# Patient Record
Sex: MALE | Race: WHITE | Employment: OTHER | URBAN - METROPOLITAN AREA
[De-identification: names, ages, dates, MRNs, and addresses within clinical notes are randomized per-mention and may not be internally consistent; named-entity substitution may affect disease eponyms.]

---

## 2017-02-03 ENCOUNTER — OFFICE VISIT (OUTPATIENT)
Dept: INTERNAL MEDICINE CLINIC | Age: 72
End: 2017-02-03

## 2017-02-03 VITALS
RESPIRATION RATE: 12 BRPM | BODY MASS INDEX: 32.59 KG/M2 | WEIGHT: 276 LBS | HEART RATE: 60 BPM | SYSTOLIC BLOOD PRESSURE: 155 MMHG | OXYGEN SATURATION: 99 % | DIASTOLIC BLOOD PRESSURE: 85 MMHG | HEIGHT: 77 IN | TEMPERATURE: 98.2 F

## 2017-02-03 DIAGNOSIS — Z00.00 MEDICARE ANNUAL WELLNESS VISIT, INITIAL: ICD-10-CM

## 2017-02-03 DIAGNOSIS — Z13.31 SCREENING FOR DEPRESSION: ICD-10-CM

## 2017-02-03 DIAGNOSIS — Z13.39 SCREENING FOR ALCOHOLISM: ICD-10-CM

## 2017-02-03 DIAGNOSIS — Z00.00 ROUTINE GENERAL MEDICAL EXAMINATION AT A HEALTH CARE FACILITY: ICD-10-CM

## 2017-02-03 DIAGNOSIS — G47.00 INSOMNIA, UNSPECIFIED TYPE: ICD-10-CM

## 2017-02-03 DIAGNOSIS — Z71.89 ADVANCED CARE PLANNING/COUNSELING DISCUSSION: ICD-10-CM

## 2017-02-03 DIAGNOSIS — J01.90 ACUTE SINUSITIS, RECURRENCE NOT SPECIFIED, UNSPECIFIED LOCATION: ICD-10-CM

## 2017-02-03 DIAGNOSIS — I10 ESSENTIAL HYPERTENSION, BENIGN: Primary | ICD-10-CM

## 2017-02-03 RX ORDER — CEFDINIR 300 MG/1
300 CAPSULE ORAL 2 TIMES DAILY
Qty: 20 CAP | Refills: 0 | Status: SHIPPED | OUTPATIENT
Start: 2017-02-03 | End: 2017-02-13

## 2017-02-03 RX ORDER — ZOLPIDEM TARTRATE 10 MG/1
10 TABLET ORAL
Qty: 30 TAB | Refills: 2 | Status: SHIPPED | OUTPATIENT
Start: 2017-02-03 | End: 2017-02-20

## 2017-02-03 RX ORDER — GABAPENTIN 300 MG/1
300 CAPSULE ORAL
COMMUNITY
Start: 2017-02-02 | End: 2019-02-08 | Stop reason: ALTCHOICE

## 2017-02-03 NOTE — MR AVS SNAPSHOT
Visit Information Date & Time Provider Department Dept. Phone Encounter #  
 2/3/2017  1:30 PM Cherelle Salcedo MD [facility] [address] [id number] Follow-up Instructions Return in about 3 months (around 5/3/2017). Your Appointments 4/26/2017  9:00 AM  
ESTABLISHED PATIENT with Ellie Scales MD  
[facility] Appt Note: 6 month follow up  
 [address] [facility] [address]  
[phone]  
  
   
 [address] [address]  
  
    
 8/9/2017  9:00 AM  
ESTABLISHED PATIENT with Ellie Scales MD  
[facility] Appt Note: annual check [address] [facility] [address] [place] [address] Road  
[phone] Upcoming Health Maintenance Date Due  
 GLAUCOMA SCREENING Q2Y 10/16/2017 MEDICARE YEARLY EXAM 2/4/2018 COLONOSCOPY 6/15/2021 DTaP/Tdap/Td series (2 - Td) 10/14/2023 Allergies as of 2/3/2017  Review Complete On: 2/3/2017 By: Cherelle Salcedo MD  
  
 Severity Noted Reaction Type Reactions Percocet [Oxycodone-acetaminophen] Medium 12/12/2011   Side Effect Other (comments)  
 hallucinations Penicillin G Low 12/12/2011   Side Effect Rash Did okay with keflex - no reaction with that. Current Immunizations  Reviewed on 2/3/2017 Name Date Hepatitis B Vaccine 1/1/1994 Influenza High Dose Vaccine PF 12/3/2016 Influenza Vaccine 12/3/2016, 11/16/2015, 10/14/2013 Pneumococcal Conjugate (PCV-13) 1/1/2013 Pneumococcal Polysaccharide (PPSV-23) 6/18/2014 Pneumococcal Vaccine (Unspecified Type) 10/16/2005 TD Vaccine 1/1/2001 Tdap 10/14/2013 Zoster Vaccine, Live 1/1/2012 Reviewed by Carmina Sanders on 2/3/2017 at  1:43 PM  
You Were Diagnosed With   
  
 Codes Comments Medicare annual wellness visit, initial    -  Primary ICD-10-CM: Z00.00 ICD-9-CM: V70.0 Routine general medical examination at a health care facility     ICD-10-CM: Z00.00 ICD-9-CM: V70.0 Screening for alcoholism     ICD-10-CM: Z13.89 ICD-9-CM: V79.1 Screening for depression     ICD-10-CM: Z13.89 ICD-9-CM: V79.0 Advanced care planning/counseling discussion     ICD-10-CM: Z71.89 ICD-9-CM: V65.49 Insomnia, unspecified type     ICD-10-CM: G47.00 ICD-9-CM: 780.52 Vitals BP Pulse Temp Resp Height(growth percentile) Weight(growth percentile) 155/85 (BP 1 Location: Left arm, BP Patient Position: Sitting) 60 98.2 °F (36.8 °C) 12 6' 5\" (1.956 m) 276 lb (125.2 kg) SpO2 BMI Smoking Status 99% 32.73 kg/m2 Former Smoker Vitals History BMI and BSA Data Body Mass Index Body Surface Area 32.73 kg/m 2 2.61 m 2 Preferred Pharmacy Pharmacy Name Phone [address] [facility] [phone] Your Updated Medication List  
  
   
This list is accurate as of: 2/3/17  2:16 PM.  Always use your most recent med list.  
  
  
  
  
 apixaban 5 mg tablet Commonly known as:  Princella De Jesus Take 1 Tab by mouth two (2) times a day. atorvastatin 20 mg tablet Commonly known as:  LIPITOR  
TAKE 1 TABLET DAILY  
  
 cefdinir 300 mg capsule Commonly known as:  OMNICEF Take 1 Cap by mouth two (2) times a day for 10 days. cyclobenzaprine 10 mg tablet Commonly known as:  FLEXERIL Take 1 Tab by mouth three (3) times daily as needed for Muscle Spasm(s). DICLOFENAC SODIUM PO Take  by mouth.  
  
 gabapentin 300 mg capsule Commonly known as:  NEURONTIN  
300 mg.  
  
 levocetirizine 5 mg tablet Commonly known as:  Rebecca Cocks TAKE 1 TABLET DAILY  
  
 lisinopril 10 mg tablet Commonly known as:  PRINIVIL, ZESTRIL  
TAKE 1 TABLET DAILY  
  
 metoprolol tartrate 25 mg tablet Commonly known as:  LOPRESSOR Take 0.5 Tabs by mouth two (2) times a day. terazosin 5 mg capsule Commonly known as:  HYTRIN  
TAKE 1 CAPSULE DAILY  
  
 zolpidem 10 mg tablet Commonly known as:  AMBIEN Take 1 Tab by mouth nightly as needed for Sleep. Max Daily Amount: 10 mg.  
  
  
  
  
Prescriptions Printed Refills  
 zolpidem (AMBIEN) 10 mg tablet 2 Sig: Take 1 Tab by mouth nightly as needed for Sleep. Max Daily Amount: 10 mg.  
 Class: Print Route: Oral  
 cefdinir (OMNICEF) 300 mg capsule 0 Sig: Take 1 Cap by mouth two (2) times a day for 10 days. Class: Print Route: Oral  
  
Follow-up Instructions Return in about 3 months (around 5/3/2017). Patient Instructions Medicare Part B Preventive Services Guidelines/Limitations Date last completed and Frequency Due Date Cardiovascular Screening Blood Tests (every 5 years) Total cholesterol, HDL, Triglycerides Order as a panel if possible Completed 5/2016 As recommended by your PCP As recommended by your PCP or Specialist  
Colorectal Cancer Screening 
-Fecal occult blood test (annual) -Flexible sigmoidoscopy (5y) 
-Screening colonoscopy (10y) -Barium Enema Age 49-80; After age [de-identified] if history of abnormal results Completed 6/15/2016 Recommended every 5 to 10 years  As recommended by your PCP or Specialist 
  
Counseling to Prevent Tobacco Use (up to 8 sessions per year) - Counseling greater than 3 and up to 10 minutes - Counseling greater than 10 minutes Patients must be asymptomatic of tobacco-related conditions to receive as preventive service N/A N/A Diabetes Screening Tests (at least every 3 years, Medicare covers annually or at 6-month intervals for prediabetic patients) Fasting blood sugar (FBS) or glucose tolerance test (GTT) Patient must be diagnosed with one of the following: 
-Hypertension, Dyslipidemia, obesity, previous impaired FBS or GTT 
Or any two of the following: overweight, FH of diabetes, age ? 65, history of gestational diabetes, birth of baby weighing more than 9 pounds Completed 8/2016 Recommended every 3 years for non-diabetics Recommended every 3-6 months for Pre-Diabetics and Diabetics As recommended by your PCP or Specialist 
  
Glaucoma Screening (no USPSTF recommendation) Diabetes mellitus, family history, , age 48 or over,  American, age 72 or over Completed 2016 Recommended annually As recommended by your PCP or Specialist  
Prostate Cancer Screening (annually up to age 76) - Digital rectal exam (BERNARD) - Prostate specific antigen (PSA) Annually (age 48 or over), BERNARD not paid separately when covered E/M service is provided on same date Completed 6/2014 Recommended annually to age 76 As recommended by your PCP or Specialist 
  
Seasonal Influenza Vaccination (annually)  Completed 12/2016 Recommended Annually Completed for 2016 flu season. TDAP Vaccination  Completed 10/2013 Recommended every 10 years As recommended by your PCP or Specialist  
Zoster (Shingles) Vaccination Covered by Medicare Part D through the pharmacy- PCP provides prescription Completed 1/2012 Recommended once over age 48  Complete Pneumococcal Vaccination (once after 65)  Pneumo 23- 6/2014 Recommended once over the age of 72 Prevnar 13- 1/2013 Recommended once over the age of 72 Complete Complete Ultrasound Screening for Abdominal Aortic Aneurysm (AAA) (once) Patient must be referred through IPPE and not have had a screening for abdominal aortic aneurysm before under Medicare. Limited to patients who meet one of the following criteria: 
- Men who are 73-68 years old and have smoked more than 100 cigarettes in their lifetime. 
-Anyone with a FH of AAA 
-Anyone recommended for screening by USPSTF Not indicated unless recommended by PCP Not indicated unless recommended by PCP Family Practice Management 2011 Please bring a copy of your completed advance medical directive to the office so it may be added to your medical record. Thank you. If you have any questions or concerns please feel free to contact me at [phone]. It was a pleasure meeting you today and participating in your healthcare. Wero Reid RN Introducing [facility]! Dear Janie Choi: Thank you for requesting a TrialBee account. Our records indicate that you already have an active TrialBee account. You can access your account anytime at [email or web address] Did you know that you can access your hospital and ER discharge instructions at any time in TrialBee? You can also review all of your test results from your hospital stay or ER visit. Additional Information If you have questions, please visit the Frequently Asked Questions section of the TrialBee website at [email or web address] Remember, TrialBee is NOT to be used for urgent needs. For medical emergencies, dial 911. Now available from your iPhone and Android! Please provide this summary of care documentation to your next provider. Your primary care clinician is listed as Madelin Stephen. If you have any questions after today's visit, please call [phone].

## 2017-02-03 NOTE — ACP (ADVANCE CARE PLANNING)
Patient states that a completed Advanced Medical Directive is at home. NN encouraged patient to bring a copy to the office for scanning into the medical record. Patient verbalized understanding & agreement.

## 2017-02-03 NOTE — PROGRESS NOTES
Nurse Navigator Medicare Wellness Visit performed by DORIS Zhao    This is an Initial Mya Exam (AWV) (Performed 12 months after IPPE or effective date of Medicare Part B enrollment, Once in a lifetime)    I have reviewed the patient's medical history in detail and updated the computerized patient record. History     Past Medical History   Diagnosis Date    Arthritis      left knee and lt. hand    BPH (benign prostatic hyperplasia)     Chronic pain      Back pain    Colon polyps     DJD (degenerative joint disease) of lumbar spine      laminectomy 1979, 1991, 2015    ED (erectile dysfunction) 6/18/2014    Heart palpitations      Dr. Javier Archuleta    Herniated nucleus pulposus, C3-4 12/2015     Dr. Yosi Hodges    Hyperlipidemia     Hypertension     IFG (impaired fasting glucose)     Insomnia     Knee pain, right     Lower back pain      hx lumbar laminectomyx2 w good results    Normal cardiac stress test 9/21/15    OA (osteoarthritis) of knee      Dr. German Frankel    Obesity     AVI (obstructive sleep apnea)      Dr. Abel Owens Right sided sciatica     Seasonal allergic rhinitis     Spinal stenosis in cervical region 12/2015    Spinal stenosis of lumbar region      MRI 12/2012. Dr. Yosi Hodges    SVT (supraventricular tachycardia)      Dr. Stoner Handler Dr Dilma Retana sleep apnea      cpap    Varicose veins      vein stripping 10/10      Past Surgical History   Procedure Laterality Date    Pr sinus surgery proc unlisted  1999    Hx bladder repair       polyp removal    Colonoscopy  2008     2 polyps. repeat 2011.   Dr. Priscilla Coreas Colonoscopy  2007     7 polyps per pt     Hx vein stripping  9/2010     Dr. Dario Queen, bilateral    Endoscopy, colon, diagnostic  2011     return in 2016    Vascular surgery procedure unlist  2008     bilateral vein stripping    Hx lumbar laminectomy  1979    Hx lumbar laminectomy  1991    Hx colonoscopy  4/27/11     Dr Sean Winchester polyp.  repeat 4/2016    Hx lumbar fusion  9/2015     L3-L4, Dr. Katheryn Klein    Hx cervical fusion  1/16/16     ACDF C3-C4 Dr. Katheryn Klein    Colonoscopy N/A 6/15/2016     COLONOSCOPY performed by Leandra Cardenas MD at [address] [facility] Hx urological  1993     bladder polyp removed with cystoscopy    Hx knee replacement  11/4/2013    Hx mohs procedure       left, Dr Farnaz Shah    Pr shoulder surg proc unlisted       left DECOMPRESSION, Dr. Steve Bajwa Hx knee arthroscopy  2005     right, Dr. Joya Cortes Hx knee arthroscopy  02/01/2012     left, Dr Joya Cortes Hx knee arthroscopy  1/2013     right    Pr total knee arthroplasty  11/2013     Current Outpatient Prescriptions   Medication Sig Dispense Refill    gabapentin (NEURONTIN) 300 mg capsule 300 mg.      DICLOFENAC SODIUM PO Take  by mouth.  levocetirizine (XYZAL) 5 mg tablet TAKE 1 TABLET DAILY 90 Tab 2    lisinopril (PRINIVIL, ZESTRIL) 10 mg tablet TAKE 1 TABLET DAILY 90 Tab 2    apixaban (ELIQUIS) 5 mg tablet Take 1 Tab by mouth two (2) times a day. 180 Tab 3    metoprolol tartrate (LOPRESSOR) 25 mg tablet Take 0.5 Tabs by mouth two (2) times a day. 90 Tab 3    terazosin (HYTRIN) 5 mg capsule TAKE 1 CAPSULE DAILY 90 Cap 1    atorvastatin (LIPITOR) 20 mg tablet TAKE 1 TABLET DAILY 90 Tab 1    zolpidem (AMBIEN) 5 mg tablet Take 1 Tab by mouth nightly as needed for Sleep. Max Daily Amount: 5 mg. 30 Tab 3    cyclobenzaprine (FLEXERIL) 10 mg tablet Take 1 Tab by mouth three (3) times daily as needed for Muscle Spasm(s). 60 Tab 0    aspirin 81 mg chewable tablet Take 1 Tab by mouth daily. 30 Tab 0    HYDROmorphone (DILAUDID) 2 mg tablet Take 1-2 Tabs by mouth every four (4) hours as needed. Max Daily Amount: 24 mg. 80 Tab 0     Allergies   Allergen Reactions    Percocet [Oxycodone-Acetaminophen] Other (comments)     hallucinations    Penicillin G Rash     Did okay with keflex - no reaction with that.      Family History   Problem Relation Age of Onset    Cancer Mother      liver    Cancer Father      leukemia    Cancer Sister      lung cancer     Social History   Substance Use Topics    Smoking status: Former Smoker     Packs/day: 0.25     Years: 19.00     Types: Cigarettes     Quit date: 1/1/1980    Smokeless tobacco: Never Used      Comment: quit ~1980    Alcohol use 0.6 oz/week     1 Glasses of wine per week      Comment: week     Patient Active Problem List   Diagnosis Code    Hypertension I10    Heart palpitations R00.2    Hyperlipidemia with target LDL less than 130 E78.5    BPH (benign prostatic hypertrophy) N40.0    Lower back pain M54.5    Knee pain, right M25.561    Right sided sciatica M54.31    AVI (obstructive sleep apnea) G47.33    Colon polyps K63.5    Palpitations R00.2    Supraventricular tachycardia I47.1    HLD (hyperlipidemia) E78.5    Essential hypertension, benign I10    Varicose veins I86.8    Tear of medial cartilage or meniscus of knee, current [id number]    Osteoarthrosis, unspecified whether generalized or localized, lower leg M17.9    Spinal stenosis of lumbar region M48.06    Right knee DJD M17.9    IFG (impaired fasting glucose) R73.01    ED (erectile dysfunction) N52.9    SVT (supraventricular tachycardia) I47.1    Spinal stenosis in cervical region M48.02    Herniated nucleus pulposus, C3-4 M50.21    Normal cardiac stress test Z13.6    Osteoarthritis of left knee M17.9    BPH (benign prostatic hyperplasia) N40.0    Insomnia G47.00    Paroxysmal atrial fibrillation (HCC) I48.0    Advanced care planning/counseling discussion Z71.89         Depression Risk Factor Screening:   Patient denies feelings of being down, depressed or hopeless at this time. Patient states that they have a strong support system within their family & friends.    PHQ 2 / 9, over the last two weeks 2/3/2017   Little interest or pleasure in doing things Not at all   Feeling down, depressed or hopeless Not at all   Total Score PHQ 2 0 Alcohol Risk Factor Screening: On any occasion during the past 3 months, have you had more than 4 drinks containing alcohol? No    Do you average more than 14 drinks per week? No    Functional Ability and Level of Safety:     Hearing Loss   normal-to-mild    Activities of Daily Living   Self-care. Patient states that he lives with his wife in a private residence. Patient states independence in all ADLs & denies the use of assistive devices for ambulation. NN encouraged patient to continue and/ or introduce routine physical exercise into their daily routine as applicable & as recommended by PCP. Patient verbalized understanding & agreement to take this into consideration. Requires assistance with:   ADL Assessment 2/3/2017   Feeding yourself No Help Needed   Getting from bed to chair No Help Needed   Getting dressed No Help Needed   Bathing or showering No Help Needed   Walk across the room (includes cane/walker) No Help Needed   Using the telphone No Help Needed   Taking your medications No Help Needed   Preparing meals No Help Needed   Managing money (expenses/bills) No Help Needed   Moderately strenuous housework (laundry) No Help Needed   Shopping for personal items (toiletries/medicines) No Help Needed   Shopping for groceries No Help Needed   Driving No Help Needed   Climbing a flight of stairs No Help Needed   Getting to places beyond walking distances No Help Needed       Fall Risk   Patient denies falls within the past year & verbalizes awareness of fall prevention strategies. Fall Risk Assessment, last 12 mths 2/3/2017   Able to walk? Yes   Fall in past 12 months?  No   Fall with injury? -   Number of falls in past 12 months -   Fall Risk Score -     Abuse Screen   Patient is not abused    Review of Systems   Medicare Wellness Visit    Physical Examination     No exam data present    Evaluation of Cognitive Function:  Mood/affect:  happy  Appearance: age appropriate and casually dressed  Family member/caregiver input: None present; however, patient reports a strong support system. No exam performed today, Medicare Wellness Visit. Patient Care Team:  [place] MD as PCP - Lj Goode MD (Orthopedic Surgery)  Kristen Pickens MD (Cardiology)  Karlos Glover MD (Orthopedic Surgery)  Kennedy Barton MD (Colon and Rectal Surgery)  Thong Fletcher MD (Orthopedic Surgery)    Advice/Referrals/Counseling   Education and counseling provided:  End-of-Life planning (with patient's consent)  Pneumococcal Vaccine  Influenza Vaccine  Prostate cancer screening tests (PSA, covered annually)  Colorectal cancer screening tests  Screening for glaucoma  tdap & shingles vaccinations    Assessment/Plan   1. Patient states that a completed AMD is at home. NN encouraged patient to bring a copy to the office for scanning into the medical record. Patient verbalized understanding & agreement. 2. Patient is up to date on the following immunizations: flu vaccine (admin 12/2016), tdap vaccine (admin 10/2013), shingles vaccine (admin 1/2012), pneumonia 23 vaccine (admin 6/2014) & prevnar 13 vaccine (admin 1/2013). Patient confirmed the aforementioned preventative immunization dates are correct. Patient's health maintenance immunization record has been updated & is current. 3. Patient states that he follows his PCP's recommendations for PSA screenings (report on file from 6/2014) & Colonoscopy screenings (report on file from 6/15/2016 performed by Dr. Pratibha Addison with recommended routine screening follow-up in 5 years, 2021). Patient confirmed the aforementioned preventative screening dates. 4. Patient was not wearing corrective lenses. Patient states that he does use reading glasses. Patient reports having a routine eye exam within the last year performed by Dr. Hamilton Gunn at [facility].  [facility] faxed requesting a copy of patient's last eye exam with glaucoma screening with patient's verbal approval.     Patient verbalized understanding of all information discussed. Patient was given the opportunity to ask questions. Medication reconciliation completed by MA/ LPN and reviewed by PCP. Patient provided AVS which includes Medicare Wellness Preventative Screening Table.

## 2017-02-03 NOTE — PROGRESS NOTES
Still with insomnia for a year now. He had a head cold that seems to be getting better. Blood pressures have been higher at home. Did miss today's morning pill.

## 2017-02-03 NOTE — PATIENT INSTRUCTIONS
Medicare Part B Preventive Services Guidelines/Limitations Date last completed and Frequency Due Date   Cardiovascular Screening Blood Tests (every 5 years)  Total cholesterol, HDL, Triglycerides Order as a panel if possible Completed 5/2016    As recommended by your PCP As recommended by your PCP or Specialist   Colorectal Cancer Screening  -Fecal occult blood test (annual)  -Flexible sigmoidoscopy (5y)  -Screening colonoscopy (10y)  -Barium Enema Age 49-80; After age [de-identified] if history of abnormal results Completed 6/15/2016     Recommended every 5 to 10 years  As recommended by your PCP or Specialist     Counseling to Prevent Tobacco Use (up to 8 sessions per year)  - Counseling greater than 3 and up to 10 minutes  - Counseling greater than 10 minutes Patients must be asymptomatic of tobacco-related conditions to receive as preventive service N/A N/A   Diabetes Screening Tests (at least every 3 years, Medicare covers annually or at 6-month intervals for prediabetic patients)    Fasting blood sugar (FBS) or glucose tolerance test (GTT) Patient must be diagnosed with one of the following:  -Hypertension, Dyslipidemia, obesity, previous impaired FBS or GTT  Or any two of the following: overweight, FH of diabetes, age ? 72, history of gestational diabetes, birth of baby weighing more than 9 pounds Completed 8/2016    Recommended every 3 years for non-diabetics    Recommended every 3-6 months for Pre-Diabetics and Diabetics As recommended by your PCP or Specialist     Glaucoma Screening (no USPSTF recommendation) Diabetes mellitus, family history, , age 48 or over,  American, age 72 or over Completed 2016    Recommended annually As recommended by your PCP or Specialist   Prostate Cancer Screening (annually up to age 76)  - Digital rectal exam (BERNARD)  - Prostate specific antigen (PSA) Annually (age 48 or over), BERNARD not paid separately when covered E/M service is provided on same date Completed 6/2014    Recommended annually to age 76 As recommended by your PCP or Specialist     Seasonal Influenza Vaccination (annually)  Completed 12/2016    Recommended Annually Completed for 2016 flu season. TDAP Vaccination  Completed 10/2013    Recommended every 10 years As recommended by your PCP or Specialist   Zoster (Shingles) Vaccination Covered by Medicare Part D through the pharmacy- PCP provides prescription Completed 1/2012    Recommended once over age 48  Complete   Pneumococcal Vaccination (once after 72)  Pneumo 23- 6/2014  Recommended once over the age of 72    Prevnar 15- 1/2013 Recommended once over the age of 72 Complete        Complete   Ultrasound Screening for Abdominal Aortic Aneurysm (AAA) (once) Patient must be referred through IPPE and not have had a screening for abdominal aortic aneurysm before under Medicare. Limited to patients who meet one of the following criteria:  - Men who are 73-68 years old and have smoked more than 100 cigarettes in their lifetime.  -Anyone with a FH of AAA  -Anyone recommended for screening by USPSTF Not indicated unless recommended by PCP   Not indicated unless recommended by PCP     Family Practice Management 2011    Please bring a copy of your completed advance medical directive to the office so it may be added to your medical record. Thank you. If you have any questions or concerns please feel free to contact me at [phone]. It was a pleasure meeting you today and participating in your healthcare.   Radha Nguyen RN

## 2017-02-05 NOTE — PROGRESS NOTES
HISTORY OF PRESENT ILLNESS    Chief Complaint   Patient presents with    Insomnia    Annual Wellness Visit       Presents for follow-up    Hypertension- did not take BP meds today. Took sudafed for sinus congestion  Hypertension ROS: taking medications as instructed, no medication side effects noted, no TIA's, no chest pain on exertion, no dyspnea on exertion, no swelling of ankles     reports that he quit smoking about 37 years ago. His smoking use included Cigarettes. He has a 4.75 pack-year smoking history. He has never used smokeless tobacco.    reports that he drinks about 0.6 oz of alcohol per week    BP Readings from Last 2 Encounters:   02/03/17 155/85   09/28/16 116/72     Presents with nasal blockage, post nasal drip and bilateral sinus pain for 7 days. Denies shortness of breath, chest pain, abdominal pain, nausea, vomiting,  fever and chills. Symptoms are moderate. Recent treatment for similar symptoms? None. Has tried over-the-counter remedies  with mild and paritial relief of symptoms. Contacts with similar infections: no.  Asthma?:  no.   reports that he quit smoking about 37 years ago. His smoking use included Cigarettes. He has a 4.75 pack-year smoking history. He has never used smokeless tobacco.    Insomnia  ongoing for years. Reports difficulty falling asleep all of the time. Reports difficulty staying asleep until morning most of the time. Does not feel rested most of the day. Is not falling asleep during the day or while driving. Has been taking ambien 5 mg for this and says it helps him fall asleep, but he wakes up 3 hours later and takes an OTC sleep aid with 2 shots of whiskey to fall back asleep. Review of Systems   All other systems reviewed and are negative, except as noted in HPI    Past Medical and Surgical History   has a past medical history of Arthritis; BPH (benign prostatic hyperplasia);  Chronic pain; Colon polyps; DJD (degenerative joint disease) of lumbar spine; ED (erectile dysfunction) (6/18/2014); Heart palpitations; Herniated nucleus pulposus, C3-4 (12/2015); Hyperlipidemia; Hypertension; IFG (impaired fasting glucose); Insomnia; Knee pain, right; Lower back pain; Normal cardiac stress test (9/21/15); OA (osteoarthritis) of knee; Obesity; AVI (obstructive sleep apnea); Right sided sciatica; Seasonal allergic rhinitis; Spinal stenosis in cervical region (12/2015); Spinal stenosis of lumbar region; SVT (supraventricular tachycardia); Unspecified sleep apnea; and Varicose veins. has a past surgical history that includes sinus surgery proc unlisted (1999); bladder repair; colonoscopy (2008); colonoscopy (2007); vein stripping (9/2010); endoscopy, colon, diagnostic (2011); vascular surgery procedure unlist (2008); lumbar laminectomy (4186); lumbar laminectomy (1991); colonoscopy (4/27/11); lumbar fusion (9/2015); cervical fusion (1/16/16); colonoscopy (N/A, 6/15/2016); urological (1993); knee replacement (11/4/2013); mohs procedure; shoulder surg proc unlisted; knee arthroscopy (2005); knee arthroscopy (02/01/2012); knee arthroscopy (1/2013); and total knee arthroplasty (11/2013). reports that he quit smoking about 37 years ago. His smoking use included Cigarettes. He has a 4.75 pack-year smoking history. He has never used smokeless tobacco. He reports that he drinks about 0.6 oz of alcohol per week  He reports that he does not use illicit drugs. family history includes Cancer in his father, mother, and sister. Physical Exam   Nursing note and vitals reviewed. Blood pressure 155/85, pulse 60, temperature 98.2 °F (36.8 °C), resp. rate 12, height 6' 5\" (1.956 m), weight 276 lb (125.2 kg), SpO2 99 %. Constitutional:  No distress. Eyes: Conjunctivae are normal.   Ears:  Hearing grossly intact  Cardiovascular: Normal rate.   regular rhythm, no murmurs or gallops  No edema  Pulmonary/Chest: Effort normal.   CTAB  Musculoskeletal: moves all 4 extremities Neurological: Alert and oriented to person, place, and time. Skin: No rash noted. Psychiatric: Normal mood and affect. Behavior is normal.     ASSESSMENT and PLAN  Zach Hobson was seen today for insomnia and annual wellness visit. Diagnoses and all orders for this visit:    Essential hypertension, benign- uncontrolled due to not taking meds and taking sudafed today. monitor    Insomnia, unspecified type- severe. Advised to avoid etoh. Will increase ambien - take 1/2 at bedtime and can take the other 1/2 prn if he wakens. Consider ambien CR or lunesta  -     zolpidem (AMBIEN) 10 mg tablet; Take 1 Tab by mouth nightly as needed for Sleep. Max Daily Amount: 10 mg. Acute sinusitis, recurrence not specified, unspecified location  Try mucinex 1200mg twice daily, tylenol or advil as directed on bottle. \  -     cefdinir (OMNICEF) 300 mg capsule; Take 1 Cap by mouth two (2) times a day for 10 days. lab results and schedule of future lab studies reviewed with patient  reviewed medications and side effects in detail  Return to clinic for further evaluation if new symptoms develop    Follow-up Disposition:  Return in about 3 months (around 5/3/2017). Current Outpatient Prescriptions   Medication Sig    gabapentin (NEURONTIN) 300 mg capsule 300 mg.    DICLOFENAC SODIUM PO Take  by mouth.  zolpidem (AMBIEN) 10 mg tablet Take 1 Tab by mouth nightly as needed for Sleep. Max Daily Amount: 10 mg.    cefdinir (OMNICEF) 300 mg capsule Take 1 Cap by mouth two (2) times a day for 10 days.  levocetirizine (XYZAL) 5 mg tablet TAKE 1 TABLET DAILY    lisinopril (PRINIVIL, ZESTRIL) 10 mg tablet TAKE 1 TABLET DAILY    apixaban (ELIQUIS) 5 mg tablet Take 1 Tab by mouth two (2) times a day.  metoprolol tartrate (LOPRESSOR) 25 mg tablet Take 0.5 Tabs by mouth two (2) times a day.     terazosin (HYTRIN) 5 mg capsule TAKE 1 CAPSULE DAILY    atorvastatin (LIPITOR) 20 mg tablet TAKE 1 TABLET DAILY    cyclobenzaprine (FLEXERIL) 10 mg tablet Take 1 Tab by mouth three (3) times daily as needed for Muscle Spasm(s). No current facility-administered medications for this visit.

## 2017-02-20 ENCOUNTER — TELEPHONE (OUTPATIENT)
Dept: INTERNAL MEDICINE CLINIC | Age: 72
End: 2017-02-20

## 2017-02-20 RX ORDER — ZOLPIDEM TARTRATE 6.25 MG/1
6.25 TABLET, FILM COATED, EXTENDED RELEASE ORAL
Qty: 15 TAB | Refills: 0 | OUTPATIENT
Start: 2017-02-20 | End: 2017-03-01 | Stop reason: ALTCHOICE

## 2017-02-20 NOTE — TELEPHONE ENCOUNTER
Pt was told he would get a script for Ambien Cr 12.5 mg, the 10 mg made him goofy. He has not filled the script.

## 2017-02-20 NOTE — TELEPHONE ENCOUNTER
Pt was told he would be given Ambien ER 12.5mg but was given a rx for Ambien 10mg. He has tried 10mg before and it did not agree with him.  Please give pt new rx

## 2017-02-20 NOTE — TELEPHONE ENCOUNTER
Discussed ambien CR or Lunesta as last visit. Recommend trial of ambien Cr, but 6.25 mg trial first.  Can not be cut. Please call in 15 pills of 6.25 mg- take 1 nightly.  Ask him to use [facility] to report how it works

## 2017-02-21 NOTE — TELEPHONE ENCOUNTER
Spoke with wife advised of new script called to [facility] for trial of Ambien Cr 6.25 mg, Ambien 10 mg discontinued.

## 2017-03-01 ENCOUNTER — TELEPHONE (OUTPATIENT)
Dept: INTERNAL MEDICINE CLINIC | Age: 72
End: 2017-03-01

## 2017-03-01 RX ORDER — ZOLPIDEM TARTRATE 12.5 MG/1
12.5 TABLET, FILM COATED, EXTENDED RELEASE ORAL
Qty: 30 TAB | Refills: 0 | OUTPATIENT
Start: 2017-03-01 | End: 2017-03-29 | Stop reason: SDUPTHER

## 2017-03-01 NOTE — TELEPHONE ENCOUNTER
Please have him try taking 2 pills of the 6.25 mg pills tonight.    We can call in 12.5 mg pill tomorrow if it helps

## 2017-03-01 NOTE — TELEPHONE ENCOUNTER
Has been taking (1 ) 6.25 mg of Ambien at bedtime, wakes up @ 2 am takes another, he has 1 pill left, it does work per pt taking 2 of the 6.25 mg.

## 2017-03-01 NOTE — TELEPHONE ENCOUNTER
----- Message from Ayah Osullivan sent at 3/1/2017 11:19 AM EST -----  Regarding: Dr. Anel Davies  Pt stated medication Ambien needs to be increased to 13.4mg instead of 6.2mg. The best contact number is [phone].

## 2017-03-16 ENCOUNTER — HOSPITAL ENCOUNTER (OUTPATIENT)
Dept: GENERAL RADIOLOGY | Age: 72
Discharge: HOME | End: 2017-03-16
Attending: ORTHOPAEDIC SURGERY
Payer: MEDICARE

## 2017-03-16 DIAGNOSIS — M16.11 PRIMARY OSTEOARTHRITIS OF RIGHT HIP: ICD-10-CM

## 2017-03-16 PROCEDURE — 74011000250 HC RX REV CODE- 250

## 2017-03-16 PROCEDURE — 74011250636 HC RX REV CODE- 250/636: Performed by: RADIOLOGY

## 2017-03-16 PROCEDURE — 74011000250 HC RX REV CODE- 250: Performed by: RADIOLOGY

## 2017-03-16 PROCEDURE — 20610 DRAIN/INJ JOINT/BURSA W/O US: CPT

## 2017-03-16 PROCEDURE — 74011636320 HC RX REV CODE- 636/320: Performed by: RADIOLOGY

## 2017-03-16 RX ORDER — LIDOCAINE HYDROCHLORIDE 10 MG/ML
5 INJECTION, SOLUTION EPIDURAL; INFILTRATION; INTRACAUDAL; PERINEURAL
Status: DISPENSED | OUTPATIENT
Start: 2017-03-16 | End: 2017-03-17

## 2017-03-16 RX ORDER — LIDOCAINE HYDROCHLORIDE 10 MG/ML
INJECTION INFILTRATION; PERINEURAL
Status: COMPLETED
Start: 2017-03-16 | End: 2017-03-16

## 2017-03-16 RX ORDER — TRIAMCINOLONE ACETONIDE 40 MG/ML
40 INJECTION, SUSPENSION INTRA-ARTICULAR; INTRAMUSCULAR
Status: COMPLETED | OUTPATIENT
Start: 2017-03-16 | End: 2017-03-16

## 2017-03-16 RX ORDER — BUPIVACAINE HYDROCHLORIDE 5 MG/ML
5 INJECTION, SOLUTION EPIDURAL; INTRACAUDAL
Status: COMPLETED | OUTPATIENT
Start: 2017-03-16 | End: 2017-03-16

## 2017-03-16 RX ADMIN — TRIAMCINOLONE ACETONIDE 40 MG: 40 INJECTION, SUSPENSION INTRA-ARTICULAR; INTRAMUSCULAR at 14:06

## 2017-03-16 RX ADMIN — LIDOCAINE HYDROCHLORIDE 10 MG: 10 INJECTION, SOLUTION INFILTRATION; PERINEURAL at 14:05

## 2017-03-16 RX ADMIN — IOHEXOL 20 ML: 180 INJECTION INTRAVENOUS at 14:05

## 2017-03-16 RX ADMIN — SODIUM BICARBONATE 5 ML: 0.2 INJECTION, SOLUTION INTRAVENOUS at 14:06

## 2017-03-16 RX ADMIN — BUPIVACAINE HYDROCHLORIDE 25 MG: 5 INJECTION, SOLUTION EPIDURAL; INTRACAUDAL at 14:05

## 2017-03-29 RX ORDER — ZOLPIDEM TARTRATE 12.5 MG/1
12.5 TABLET, FILM COATED, EXTENDED RELEASE ORAL
Qty: 30 TAB | Refills: 2 | OUTPATIENT
Start: 2017-03-29 | End: 2017-06-27 | Stop reason: SDUPTHER

## 2017-04-26 ENCOUNTER — OFFICE VISIT (OUTPATIENT)
Dept: CARDIOLOGY CLINIC | Age: 72
End: 2017-04-26

## 2017-04-26 VITALS
HEIGHT: 77 IN | SYSTOLIC BLOOD PRESSURE: 158 MMHG | RESPIRATION RATE: 18 BRPM | DIASTOLIC BLOOD PRESSURE: 96 MMHG | HEART RATE: 57 BPM | OXYGEN SATURATION: 98 % | WEIGHT: 279 LBS | BODY MASS INDEX: 32.94 KG/M2

## 2017-04-26 DIAGNOSIS — I48.0 PAROXYSMAL ATRIAL FIBRILLATION (HCC): Primary | ICD-10-CM

## 2017-04-26 NOTE — PATIENT INSTRUCTIONS
· Stop Eliquis. · Stop Toprol. · Injectable Cardiac Monitor. You are scheduled for a injectable cardiac monitor at [facility] on Wednesday, May 3rd. Please arrive at the patient registration desk located on the 2nd floor of the main building at 11:00am.    Do not eat or drink anything after midnight the night before your procedure. You will need a . You may take your normal medications with a sip of water the morning of your procedure. Please call Isaiah Simpson or David Lin if you have any questions at [phone]. Please call the office if you develop any type of illness prior to your procedure. Please contact our business office at [phone] with any financial concerns.

## 2017-04-26 NOTE — PROGRESS NOTES
Visit Vitals    BP (!) 158/96 (BP 1 Location: Left arm, BP Patient Position: Standing)    Pulse (!) 57    Resp 18    Ht 6' 5\" (1.956 m)    Wt 279 lb (126.6 kg)    SpO2 98%    BMI 33.08 kg/m2

## 2017-04-26 NOTE — PROGRESS NOTES
HISTORY OF PRESENTING ILLNESS      Bladimir Steward is a 74 y.o. male with HTN, SVT and hyperlipidemia whose Cardizem was reduced due to bradycardia in the past. He is no longer on calcium channel blocker. He is planned for left knee replacement on 8/15/16. He has AVI on CPAP. He was doing well although noted minor episodes of fluttering lasting only a few seconds last visit. He underwent left TKR recently and post operatively he was noted to have AF for which he was started on metoprolol 25 mg BID. He converted spontaneously while in hospital. He underwent echocardiogram which demonstrated preserved LV function. EKG tlast visit showed sinus bradycardia at 43bpm. He denies feeling AF since hospital visit and has not reported symptoms consistent with symptomatic bradycardia. Last visit, Eliquis was restarted for a CHADs-VASC of 2 and metoprolol was decreased to 12.5mg BID for bradycardia. EKG today demonstrates sinus bradycardia at 58 with PVC. He continues to feel well without breakthrough recurrence. The patient denies chest pain/ shortness of breath, orthopnea, PND, LE edema, palpitations, syncope, presyncope or fatigue.       ACTIVE PROBLEM LIST     Patient Active Problem List    Diagnosis Date Noted    Advanced care planning/counseling discussion 02/03/2017    Paroxysmal atrial fibrillation ([facility] [address] 09/28/2016    BPH (benign prostatic hyperplasia) 08/17/2016    Insomnia 08/17/2016    Osteoarthritis of left knee 08/15/2016    Spinal stenosis in cervical region 01/05/2016    Herniated nucleus pulposus, C3-4 01/05/2016    Normal cardiac stress test 09/21/2015    ED (erectile dysfunction) 06/18/2014    IFG (impaired fasting glucose)     SVT (supraventricular tachycardia) ([facility] [address]     Right knee DJD 11/04/2013    Spinal stenosis of lumbar region     Tear of medial cartilage or meniscus of knee, current 02/01/2013    Osteoarthrosis, unspecified whether generalized or localized, lower leg 02/01/2013  Palpitations 12/12/2011    Supraventricular tachycardia ([address] 12/12/2011    HLD (hyperlipidemia) 12/12/2011    Essential hypertension, benign 12/12/2011    Varicose veins 12/12/2011    Colon polyps     AVI (obstructive sleep apnea)     Heart palpitations     Hyperlipidemia with target LDL less than 130     BPH (benign prostatic hypertrophy)     Lower back pain     Knee pain, right     Right sided sciatica            PAST MEDICAL HISTORY     Past Medical History:   Diagnosis Date    Arthritis     left knee and lt. hand    Atrial fibrillation (HCC)     BPH (benign prostatic hyperplasia)     Chronic pain     Back pain    Colon polyps     DJD (degenerative joint disease) of lumbar spine     laminectomy 1979, 1991, 2015    ED (erectile dysfunction) 6/18/2014    Heart palpitations     Dr. Ortiz Young    Herniated nucleus pulposus, C3-4 12/2015    Dr. Shorty Read    Hyperlipidemia     Hypertension     IFG (impaired fasting glucose)     Insomnia     Knee pain, right     Lower back pain     hx lumbar laminectomyx2 w good results    Normal cardiac stress test 9/21/15    OA (osteoarthritis) of knee     Dr. Jenni Frankel    Obesity     AVI (obstructive sleep apnea)     Dr. Olivia Goins Right sided sciatica     Seasonal allergic rhinitis     Spinal stenosis in cervical region 12/2015    Spinal stenosis of lumbar region     MRI 12/2012. Dr. Shorty Read    SVT (supraventricular tachycardia) [facility])     Dr. Emmaunel [place] Dr Gabriela Back sleep apnea     cpap    Varicose veins     vein stripping 10/10           PAST SURGICAL HISTORY     Past Surgical History:   Procedure Laterality Date    COLONOSCOPY  2008    2 polyps. repeat 2011.   Dr. Jerrica Avery  2007    7 polyps per pt     COLONOSCOPY N/A 6/15/2016    COLONOSCOPY performed by Lai Huffman MD at [address], DIAGNOSTIC  2011    return in 2016    HX BLADDER REPAIR      polyp removal    HX CERVICAL FUSION 1/16/16    ACDF C3-C4 Dr. Tim Dahl HX COLONOSCOPY  4/27/11    Dr David Kimble, smal polyp. repeat 4/2016    HX KNEE ARTHROSCOPY  2005    right, Dr. Parker Clink ARTHROSCOPY  02/01/2012    left, Dr Parker Clink ARTHROSCOPY  1/2013    right    HX KNEE REPLACEMENT  11/4/2013    HX LUMBAR FUSION  9/2015    L3-L4, Dr. Melodie Christianson      left, Dr Dann Ash    bladder polyp removed with cystoscopy    HX VEIN STRIPPING  9/2010    Dr. Stew Musa, bilateral    SHOULDER SURG [address] UNLISTED      left DECOMPRESSION, Dr. Cady Lopez  11/2013    VASCULAR SURGERY PROCEDURE UNLIST  2008    bilateral vein stripping          ALLERGIES     Allergies   Allergen Reactions    Percocet [Oxycodone-Acetaminophen] Other (comments)     hallucinations    Penicillin G Rash     Did okay with keflex - no reaction with that.           FAMILY HISTORY     Family History   Problem Relation Age of Onset   [facility] Cancer Mother      liver    Cancer Father      leukemia    Cancer Sister      lung cancer    negative for cardiac disease       SOCIAL HISTORY     Social History     Social History    Marital status:      Spouse name: Ezequiel Plata Number of children: 3    Years of education: N/A     Social History Main Topics    Smoking status: Former Smoker     Packs/day: 0.25     Years: 19.00     Types: Cigarettes     Quit date: 1/1/1980    Smokeless tobacco: Never Used      Comment: quit ~1980    Alcohol use 0.6 oz/week     1 Glasses of wine per week      Comment: week    Drug use: No    Sexual activity: Yes     Other Topics Concern     Service Yes     [employer]     Social History Narrative    ** Merged History Encounter **         1 child, 2 stepchildren    fishes         MEDICATIONS     Current Outpatient Prescriptions   Medication Sig    zolpidem CR (AMBIEN CR) 12.5 mg tablet Take 1 Tab by mouth nightly as needed for Sleep. Max Daily Amount: 12.5 mg.    gabapentin (NEURONTIN) 300 mg capsule 300 mg.    DICLOFENAC SODIUM PO Take  by mouth.  levocetirizine (XYZAL) 5 mg tablet TAKE 1 TABLET DAILY    lisinopril (PRINIVIL, ZESTRIL) 10 mg tablet TAKE 1 TABLET DAILY    apixaban (ELIQUIS) 5 mg tablet Take 1 Tab by mouth two (2) times a day.  metoprolol tartrate (LOPRESSOR) 25 mg tablet Take 0.5 Tabs by mouth two (2) times a day.  terazosin (HYTRIN) 5 mg capsule TAKE 1 CAPSULE DAILY    atorvastatin (LIPITOR) 20 mg tablet TAKE 1 TABLET DAILY    cyclobenzaprine (FLEXERIL) 10 mg tablet Take 1 Tab by mouth three (3) times daily as needed for Muscle Spasm(s). No current facility-administered medications for this visit. I have reviewed the nurses notes, vitals, problem list, allergy list, medical history, family, social history and medications. REVIEW OF SYMPTOMS      General: Pt denies excessive weight gain or loss. Pt is able to conduct ADL's  HEENT: Denies blurred vision, headaches, hearing loss, epistaxis and difficulty swallowing. Respiratory: Denies cough, congestion, shortness of breath, MCRAE, wheezing or stridor. Cardiovascular: Denies precordial pain, palpitations, edema or PND  Gastrointestinal: Denies poor appetite, indigestion, abdominal pain or blood in stool  Genitourinary: Denies hematuria, dysuria, increased urinary frequency  Musculoskeletal: Denies joint pain or swelling from muscles or joints  Neurologic: Denies tremor, paresthesias, headache, or sensory motor disturbance  Psychiatric: Denies confusion, insomnia, depression  Integumentray: Denies rash, itching or ulcers. Hematologic: Denies easy bruising, bleeding       PHYSICAL EXAMINATION      Vitals:    04/26/17 0849   BP: (!) 158/96   Pulse: (!) 57   Resp: 18   SpO2: 98%   Weight: 279 lb (126.6 kg)   Height: 6' 5\" (1.956 m)     General: Well developed, in no acute distress.   HEENT: No jaundice, oral mucosa moist, no oral ulcers  Neck: Supple, no stiffness, no lymphadenopathy, supple  Heart:  Normal S1/S2 negative S3 or S4. Regular, + murmur, no gallop or rub, no jugular venous distention  Respiratory: Clear bilaterally x 4, no wheezing or rales  Abdomen:   Soft, non-tender, bowel sounds are active.   Extremities:  No edema, normal cap refill, no cyanosis. Musculoskeletal: No clubbing, no deformities  Neuro: A&Ox3, speech clear, gait stable, cooperative, no focal neurologic deficits  Skin: Skin color is normal. No rashes or lesions. Non diaphoretic, moist.  Vascular: 2+ pulses symmetric in all extremities       DIAGNOSTIC DATA      EKG: sinus bradycardia at 58bpm with PVC       LABORATORY DATA      Lab Results   Component Value Date/Time    WBC 8.8 08/08/2016 10:28 AM    HGB 12.4 08/18/2016 02:47 AM    HCT 40.1 08/08/2016 10:28 AM    PLATELET 090 04/73/4782 10:28 AM    MCV 90.1 08/08/2016 10:28 AM      Lab Results   Component Value Date/Time    Sodium 140 08/19/2016 03:36 AM    Potassium 4.1 08/19/2016 03:36 AM    Chloride 105 08/19/2016 03:36 AM    CO2 27 08/19/2016 03:36 AM    Anion gap 8 08/19/2016 03:36 AM    Glucose 133 08/19/2016 03:36 AM    BUN 23 08/19/2016 03:36 AM    Creatinine 1.47 08/19/2016 03:36 AM    BUN/Creatinine ratio 16 08/19/2016 03:36 AM    GFR est AA 57 08/19/2016 03:36 AM    GFR est non-AA 47 08/19/2016 03:36 AM    Calcium 8.0 08/19/2016 03:36 AM    Bilirubin, total 0.6 08/08/2016 10:28 AM    AST (SGOT) 18 08/08/2016 10:28 AM    Alk. phosphatase 63 08/08/2016 10:28 AM    Protein, total 6.6 08/08/2016 10:28 AM    Albumin 3.6 08/08/2016 10:28 AM    Globulin 3.0 08/08/2016 10:28 AM    A-G Ratio 1.2 08/08/2016 10:28 AM    ALT (SGPT) 24 08/08/2016 10:28 AM           ASSESSMENT      1. Atrial fibrillation   A. Paroxsymal    B. Asymptomatic   2. Supraventricular tachycardia  3. Hypertension  4. Hyperlipidemia  5. Venous insufficiency  6. First degree AV block   7. AVI on CPAP   8. Bradycardia     PLAN     Will discontinue Eliquis and Metoprolol, and plan for injection of loop recorder. He will start Aspirin 81mg. FOLLOW-UP     Follow up post ILR injection. Thank you, Gala Mcfadden MD and Dr. Maryam Baker for allowing me to participate in the care of this extraordinarily pleasant male. Please do not hesitate to contact me for further questions/concerns.      LUIS ALBERTO Ahn MD  Cardiac Electrophysiology / Cardiology    [address]  [address] [place] [address], [facility]  [phone] / [phone] Fax   [phone] / [phone] Fax

## 2017-04-27 RX ORDER — SODIUM CHLORIDE 0.9 % (FLUSH) 0.9 %
5-10 SYRINGE (ML) INJECTION AS NEEDED
Status: CANCELLED | OUTPATIENT
Start: 2017-04-27

## 2017-04-27 RX ORDER — SODIUM CHLORIDE 0.9 % (FLUSH) 0.9 %
5-10 SYRINGE (ML) INJECTION EVERY 8 HOURS
Status: CANCELLED | OUTPATIENT
Start: 2017-04-27

## 2017-05-03 ENCOUNTER — HOSPITAL ENCOUNTER (OUTPATIENT)
Dept: NON INVASIVE DIAGNOSTICS | Age: 72
Discharge: HOME | End: 2017-05-03
Attending: INTERNAL MEDICINE | Admitting: INTERNAL MEDICINE
Payer: MEDICARE

## 2017-05-03 VITALS
BODY MASS INDEX: 32.8 KG/M2 | WEIGHT: 277.78 LBS | RESPIRATION RATE: 16 BRPM | DIASTOLIC BLOOD PRESSURE: 95 MMHG | TEMPERATURE: 98.2 F | HEART RATE: 56 BPM | HEIGHT: 77 IN | OXYGEN SATURATION: 96 % | SYSTOLIC BLOOD PRESSURE: 137 MMHG

## 2017-05-03 PROCEDURE — C1764 EVENT RECORDER, CARDIAC: HCPCS

## 2017-05-03 PROCEDURE — 77030010507 HC ADH SKN DERMBND J&J -B

## 2017-05-03 PROCEDURE — 33282 EP STUDY: CPT

## 2017-05-03 PROCEDURE — 74011250636 HC RX REV CODE- 250/636: Performed by: INTERNAL MEDICINE

## 2017-05-03 PROCEDURE — 93306 TTE W/DOPPLER COMPLETE: CPT

## 2017-05-03 PROCEDURE — 77030012935 HC DRSG AQUACEL BMS -B

## 2017-05-03 PROCEDURE — 74011000250 HC RX REV CODE- 250: Performed by: INTERNAL MEDICINE

## 2017-05-03 RX ORDER — SODIUM CHLORIDE 0.9 % (FLUSH) 0.9 %
5-10 SYRINGE (ML) INJECTION AS NEEDED
Status: CANCELLED | OUTPATIENT
Start: 2017-05-03

## 2017-05-03 RX ORDER — SODIUM CHLORIDE 0.9 % (FLUSH) 0.9 %
5-10 SYRINGE (ML) INJECTION EVERY 8 HOURS
Status: CANCELLED | OUTPATIENT
Start: 2017-05-03

## 2017-05-03 RX ORDER — ONDANSETRON 2 MG/ML
4 INJECTION INTRAMUSCULAR; INTRAVENOUS
Status: CANCELLED | OUTPATIENT
Start: 2017-05-03

## 2017-05-03 RX ORDER — SODIUM CHLORIDE 0.9 % (FLUSH) 0.9 %
5-10 SYRINGE (ML) INJECTION EVERY 8 HOURS
Status: DISCONTINUED | OUTPATIENT
Start: 2017-05-03 | End: 2017-05-03 | Stop reason: HOSPADM

## 2017-05-03 RX ORDER — LIDOCAINE HYDROCHLORIDE AND EPINEPHRINE 10; 10 MG/ML; UG/ML
1.5 INJECTION, SOLUTION INFILTRATION; PERINEURAL ONCE
Status: COMPLETED | OUTPATIENT
Start: 2017-05-03 | End: 2017-05-03

## 2017-05-03 RX ORDER — FENTANYL CITRATE 50 UG/ML
25-200 INJECTION, SOLUTION INTRAMUSCULAR; INTRAVENOUS
Status: DISCONTINUED | OUTPATIENT
Start: 2017-05-03 | End: 2017-05-03 | Stop reason: HOSPADM

## 2017-05-03 RX ORDER — METOPROLOL TARTRATE 25 MG/1
25 TABLET, FILM COATED ORAL 2 TIMES DAILY
COMMUNITY
End: 2017-05-03

## 2017-05-03 RX ORDER — GENTAMICIN SULFATE 80 MG/100ML
80 INJECTION, SOLUTION INTRAVENOUS ONCE
Status: DISCONTINUED | OUTPATIENT
Start: 2017-05-03 | End: 2017-05-03 | Stop reason: HOSPADM

## 2017-05-03 RX ORDER — CLINDAMYCIN PHOSPHATE 900 MG/50ML
900 INJECTION INTRAVENOUS ONCE
Status: COMPLETED | OUTPATIENT
Start: 2017-05-03 | End: 2017-05-03

## 2017-05-03 RX ORDER — ASPIRIN 81 MG/1
81 TABLET ORAL DAILY
Qty: 30 TAB | Refills: 3 | Status: SHIPPED
Start: 2017-05-03 | End: 2017-07-12

## 2017-05-03 RX ORDER — SODIUM CHLORIDE 0.9 % (FLUSH) 0.9 %
5-10 SYRINGE (ML) INJECTION AS NEEDED
Status: DISCONTINUED | OUTPATIENT
Start: 2017-05-03 | End: 2017-05-03 | Stop reason: HOSPADM

## 2017-05-03 RX ORDER — CLINDAMYCIN HYDROCHLORIDE 150 MG/1
150 CAPSULE ORAL 3 TIMES DAILY
Qty: 15 CAP | Refills: 0 | Status: SHIPPED | OUTPATIENT
Start: 2017-05-03 | End: 2017-05-08

## 2017-05-03 RX ADMIN — GENTAMICIN SULFATE 80 MG: 80 INJECTION, SOLUTION INTRAVENOUS at 12:41

## 2017-05-03 RX ADMIN — LIDOCAINE HYDROCHLORIDE,EPINEPHRINE BITARTRATE 100 MG: 10; .01 INJECTION, SOLUTION INFILTRATION; PERINEURAL at 12:21

## 2017-05-03 RX ADMIN — FENTANYL CITRATE 50 MCG: 50 INJECTION, SOLUTION INTRAMUSCULAR; INTRAVENOUS at 12:19

## 2017-05-03 RX ADMIN — CLINDAMYCIN PHOSPHATE 900 MG: 18 INJECTION, SOLUTION INTRAMUSCULAR; INTRAVENOUS at 12:14

## 2017-05-03 RX ADMIN — FENTANYL CITRATE 50 MCG: 50 INJECTION, SOLUTION INTRAMUSCULAR; INTRAVENOUS at 12:17

## 2017-05-03 NOTE — PROGRESS NOTES
TRANSFER - OUT REPORT:    Verbal report given to Maude Prince RN on [employer].  being transferred to [facility](unit) for routine progression of care       Report consisted of patients Situation, Background, Assessment and   Recommendations(SBAR). Information from the following report(s) SBAR was reviewed with the receiving nurse. Lines:   Peripheral IV 05/03/17 Left Forearm (Active)   Site Assessment Clean, dry, & intact 5/3/2017 11:00 AM        Opportunity for questions and clarification was provided.       Patient transported with:   Registered Nurse

## 2017-05-03 NOTE — PROGRESS NOTES
TRANSFER - IN REPORT:    Verbal report received from [facility]  on [employer].  being received from EP Lab for routine progression of care      Report consisted of patients Situation, Background, Assessment and   Recommendations(SBAR). Information from the following report(s) Procedure Summary and MAR was reviewed with the receiving nurse. Opportunity for questions and clarification was provided. Assessment completed upon patients arrival to unit and care assumed.

## 2017-05-03 NOTE — H&P
HISTORY OF PRESENTING ILLNESS       Humberto Soler is a 74 y.o. male with HTN, SVT and hyperlipidemia whose Cardizem was reduced due to bradycardia in the past. He is no longer on calcium channel blocker. He is planned for left knee replacement on 8/15/16. He has AVI on CPAP. He was doing well although noted minor episodes of fluttering lasting only a few seconds last visit. He underwent left TKR recently and post operatively he was noted to have AF for which he was started on metoprolol 25 mg BID. He converted spontaneously while in hospital. He underwent echocardiogram which demonstrated preserved LV function. EKG tlast visit showed sinus bradycardia at 43bpm. He denies feeling AF since hospital visit and has not reported symptoms consistent with symptomatic bradycardia. Last visit, Eliquis was restarted for a CHADs-VASC of 2 and metoprolol was decreased to 12.5mg BID for bradycardia. EKG today demonstrates sinus bradycardia at 58 with PVC. He continues to feel well without breakthrough recurrence.  The patient denies chest pain/ shortness of breath, orthopnea, PND, LE edema, palpitations, syncope, presyncope or fatigue.      ACTIVE PROBLEM LIST           Patient Active Problem List     Diagnosis Date Noted    Advanced care planning/counseling discussion 02/03/2017    Paroxysmal atrial fibrillation ([facility] 09/28/2016    BPH (benign prostatic hyperplasia) 08/17/2016    Insomnia 08/17/2016    Osteoarthritis of left knee 08/15/2016    Spinal stenosis in cervical region 01/05/2016    Herniated nucleus pulposus, C3-4 01/05/2016    Normal cardiac stress test 09/21/2015    ED (erectile dysfunction) 06/18/2014    IFG (impaired fasting glucose)      SVT (supraventricular tachycardia) ([facility]      Right knee DJD 11/04/2013    Spinal stenosis of lumbar region      Tear of medial cartilage or meniscus of knee, current 02/01/2013    Osteoarthrosis, unspecified whether generalized or localized, lower leg 02/01/2013    Palpitations 12/12/2011    Supraventricular tachycardia ([address] 12/12/2011    HLD (hyperlipidemia) 12/12/2011    Essential hypertension, benign 12/12/2011    Varicose veins 12/12/2011    Colon polyps      AVI (obstructive sleep apnea)      Heart palpitations      Hyperlipidemia with target LDL less than 130      BPH (benign prostatic hypertrophy)      Lower back pain      Knee pain, right      Right sided sciatica              PAST MEDICAL HISTORY           Past Medical History:   Diagnosis Date    Arthritis       left knee and lt. hand    Atrial fibrillation (HCC)      BPH (benign prostatic hyperplasia)      Chronic pain       Back pain    Colon polyps      DJD (degenerative joint disease) of lumbar spine       laminectomy 1979, 1991, 2015    ED (erectile dysfunction) 6/18/2014    Heart palpitations       Dr. Mckinley Mckeon    Herniated nucleus pulposus, C3-4 12/2015     Dr. Maite Simmons Hyperlipidemia      Hypertension      IFG (impaired fasting glucose)      Insomnia      Knee pain, right      Lower back pain       hx lumbar laminectomyx2 w good results    Normal cardiac stress test 9/21/15    OA (osteoarthritis) of knee       Dr. Alex Singh Obesity      AVI (obstructive sleep apnea)       Dr. Mary Ko Right sided sciatica      Seasonal allergic rhinitis      Spinal stenosis in cervical region 12/2015    Spinal stenosis of lumbar region       MRI 12/2012. Dr. Bianca Sahu    SVT (supraventricular tachycardia) [facility])       Dr. Ignacio Brandon sleep apnea       cpap    Varicose veins       vein stripping 10/10            PAST SURGICAL HISTORY            Past Surgical History:   Procedure Laterality Date    COLONOSCOPY   2008     2 polyps. repeat 2011.  Dr. Gregory Aden   2007     7 polyps per pt     COLONOSCOPY N/A 6/15/2016     COLONOSCOPY performed by Janet Louise MD at [address] [place] [address], [place], DIAGNOSTIC   2011     return in 2016    HX BLADDER REPAIR         polyp removal    HX CERVICAL FUSION   1/16/16     ACDF C3-C4 Dr. Bianca Sahu    HX COLONOSCOPY   4/27/11     Dr Keely Bains, smal polyp.  repeat 4/2016    HX KNEE ARTHROSCOPY   2005     right, Dr. Ann Rey    HX KNEE ARTHROSCOPY   02/01/2012     left, Dr Ann Rey    HX KNEE ARTHROSCOPY   1/2013     right    HX KNEE REPLACEMENT   11/4/2013    HX LUMBAR FUSION   9/2015     L3-L4, Dr. Bianca Sahu    HX Samantha Schrader HX LUMBAR LAMINECTOMY   12    HX MOHS PROCEDURES         left, Dr Gerber Garcia     bladder polyp removed with cystoscopy    HX VEIN STRIPPING   9/2010     Dr. Jose Estrada, bilateral    SHOULDER SURG [address] UNLISTED         left DECOMPRESSION, Dr. Corey Pat   11/2013    VASCULAR SURGERY PROCEDURE UNLIST   2008     bilateral vein stripping            ALLERGIES            Allergies   Allergen Reactions    Percocet [Oxycodone-Acetaminophen] Other (comments)       hallucinations    Penicillin G Rash       Did okay with keflex - no reaction with that.            FAMILY HISTORY             Family History   Problem Relation Age of Onset    Cancer Mother         liver    Cancer Father         leukemia    Cancer Sister         lung cancer    negative for cardiac disease        SOCIAL HISTORY       Social History                Social History    Marital status:        Spouse name: Araceli Santo Number of children: 3    Years of education: N/A              Social History Main Topics    Smoking status: Former Smoker       Packs/day: 0.25       Years: 19.00       Types: Cigarettes       Quit date: 1/1/1980    Smokeless tobacco: Never Used         Comment: quit ~1980    Alcohol use 0.6 oz/week        1 Glasses of wine per week          Comment: week    Drug use: No    Sexual activity: Yes      Other Topics Concern     Service Yes       [employer]          Social History Narrative     ** Merged History Encounter **            1 child, 2 stepchildren     fishes               MEDICATIONS           Current Outpatient Prescriptions   Medication Sig    zolpidem CR (AMBIEN CR) 12.5 mg tablet Take 1 Tab by mouth nightly as needed for Sleep. Max Daily Amount: 12.5 mg.    gabapentin (NEURONTIN) 300 mg capsule 300 mg.    DICLOFENAC SODIUM PO Take by mouth.  levocetirizine (XYZAL) 5 mg tablet TAKE 1 TABLET DAILY    lisinopril (PRINIVIL, ZESTRIL) 10 mg tablet TAKE 1 TABLET DAILY    apixaban (ELIQUIS) 5 mg tablet Take 1 Tab by mouth two (2) times a day.  metoprolol tartrate (LOPRESSOR) 25 mg tablet Take 0.5 Tabs by mouth two (2) times a day.  terazosin (HYTRIN) 5 mg capsule TAKE 1 CAPSULE DAILY    atorvastatin (LIPITOR) 20 mg tablet TAKE 1 TABLET DAILY    cyclobenzaprine (FLEXERIL) 10 mg tablet Take 1 Tab by mouth three (3) times daily as needed for Muscle Spasm(s).      No current facility-administered medications for this visit.          I have reviewed the nurses notes, vitals, problem list, allergy list, medical history, family, social history and medications.        REVIEW OF SYMPTOMS       General: Pt denies excessive weight gain or loss. Pt is able to conduct ADL's  HEENT: Denies blurred vision, headaches, hearing loss, epistaxis and difficulty swallowing. Respiratory: Denies cough, congestion, shortness of breath, MCRAE, wheezing or stridor. Cardiovascular: Denies precordial pain, palpitations, edema or PND  Gastrointestinal: Denies poor appetite, indigestion, abdominal pain or blood in stool  Genitourinary: Denies hematuria, dysuria, increased urinary frequency  Musculoskeletal: Denies joint pain or swelling from muscles or joints  Neurologic: Denies tremor, paresthesias, headache, or sensory motor disturbance  Psychiatric: Denies confusion, insomnia, depression  Integumentray: Denies rash, itching or ulcers.   Hematologic: Denies easy bruising, bleeding        PHYSICAL EXAMINATION      Vitals:     04/26/17 0849   BP: (!) 158/96   Pulse: (!) 57   Resp: 18   SpO2: 98%   Weight: 279 lb (126.6 kg)   Height: 6' 5\" (1.956 m)      General: Well developed, in no acute distress. HEENT: No jaundice, oral mucosa moist, no oral ulcers  Neck: Supple, no stiffness, no lymphadenopathy, supple  Heart:  Normal S1/S2 negative S3 or S4. Regular, + murmur, no gallop or rub, no jugular venous distention  Respiratory: Clear bilaterally x 4, no wheezing or rales  Abdomen:   Soft, non-tender, bowel sounds are active.   Extremities: No edema, normal cap refill, no cyanosis. Musculoskeletal: No clubbing, no deformities  Neuro: A&Ox3, speech clear, gait stable, cooperative, no focal neurologic deficits  Skin: Skin color is normal. No rashes or lesions. Non diaphoretic, moist.  Vascular: 2+ pulses symmetric in all extremities        DIAGNOSTIC DATA       EKG: sinus bradycardia at 58bpm with PVC        LABORATORY DATA             Lab Results   Component Value Date/Time     WBC 8.8 08/08/2016 10:28 AM     HGB 12.4 08/18/2016 02:47 AM     HCT 40.1 08/08/2016 10:28 AM     PLATELET 603 96/86/1820 10:28 AM     MCV 90.1 08/08/2016 10:28 AM            Lab Results   Component Value Date/Time     Sodium 140 08/19/2016 03:36 AM     Potassium 4.1 08/19/2016 03:36 AM     Chloride 105 08/19/2016 03:36 AM     CO2 27 08/19/2016 03:36 AM     Anion gap 8 08/19/2016 03:36 AM     Glucose 133 08/19/2016 03:36 AM     BUN 23 08/19/2016 03:36 AM     Creatinine 1.47 08/19/2016 03:36 AM     BUN/Creatinine ratio 16 08/19/2016 03:36 AM     GFR est AA 57 08/19/2016 03:36 AM     GFR est non-AA 47 08/19/2016 03:36 AM     Calcium 8.0 08/19/2016 03:36 AM     Bilirubin, total 0.6 08/08/2016 10:28 AM     AST (SGOT) 18 08/08/2016 10:28 AM     Alk.  phosphatase 63 08/08/2016 10:28 AM     Protein, total 6.6 08/08/2016 10:28 AM     Albumin 3.6 08/08/2016 10:28 AM     Globulin 3.0 08/08/2016 10:28 AM     A-G Ratio 1.2 08/08/2016 10:28 AM     ALT (SGPT) 24 08/08/2016 10:28 AM            ASSESSMENT       1. Atrial fibrillation  A. Paroxsymal   B. Asymptomatic   2. Supraventricular tachycardia  3. Hypertension  4. Hyperlipidemia  5. Venous insufficiency  6. First degree AV block   7. AVI on CPAP   8. Bradycardia     PLAN      Will discontinue Eliquis and Metoprolol, and plan for injection of loop recorder. He will start Aspirin 81mg.      FOLLOW-UP      Follow up post ILR injection.     Thank you, Mary Rivas MD and Dr. Emma Love for allowing me to participate in the care of this extraordinarily pleasant male. Please do not hesitate to contact me for further questions/concerns.      LUIS ALBERTO Shetty MD  Cardiac Electrophysiology / Cardiology     [place] [address].  [address] [facility], [facility], [address]  [phone] / [phone] Fax   [phone] / [phone] Fax     No changes in history and physical since previous clinic visit on 4/26/17.     Viral Freire MD

## 2017-05-03 NOTE — IP AVS SNAPSHOT
[address] [facility] 
 
 
 [address] [facility] 
[phone] Patient: Ria Trevino. MRN: [id number] You are allergic to the following Allergen Reactions Percocet (Oxycodone-Acetaminophen) Other (comments)  
 hallucinations Penicillin G Rash Did okay with keflex - no reaction with that. Recent Documentation Height Weight BMI Smoking Status 1.956 m 126 kg 32.94 kg/m2 Former Smoker Emergency Contacts Name Discharge Info Relation Home Work Mobile [address] [facility] CAREGIVER [3] Spouse [3] [phone] About your hospitalization You were admitted on:  May 3, 2017 You last received care in the:  [facility] PAC[facility] You were discharged on:  May 3, 2017 Unit phone number:  [phone] Why you were hospitalized Your primary diagnosis was:  Not on File Providers Seen During Your Hospitalizations Provider Role Specialty Primary office phone Winsome Tavarez MD Attending Provider Cardiology [phone] Your Primary Care Physician (PCP) Primary Care Physician Office Phone Office Fax John Martel [phone] Follow-up Information Follow up With Details Comments Contact Info Sallie Thornton MD   [place] [address] [address] Internal Medicine [address] 
[phone] Current Discharge Medication List  
  
START taking these medications Dose & Instructions Dispensing Information Comments Morning Noon Evening Bedtime  
 aspirin delayed-release 81 mg tablet Your last dose was: Your next dose is:    
   
   
 Dose:  81 mg Take 1 Tab by mouth daily. Quantity:  30 Tab Refills:  3  
     
   
   
   
  
 clindamycin 150 mg capsule Commonly known as:  CLEOCIN Your last dose was:     
   
Your next dose is:    
   
   
 Dose:  150 mg  
 Take 1 Cap by mouth three (3) times daily for 5 days. Quantity:  15 Cap Refills:  0 CONTINUE these medications which have NOT CHANGED Dose & Instructions Dispensing Information Comments Morning Noon Evening Bedtime  
 atorvastatin 20 mg tablet Commonly known as:  LIPITOR Your last dose was: Your next dose is: TAKE 1 TABLET DAILY Quantity:  90 Tab Refills:  1  
     
   
   
   
  
 cyclobenzaprine 10 mg tablet Commonly known as:  FLEXERIL Your last dose was: Your next dose is:    
   
   
 Dose:  10 mg Take 1 Tab by mouth three (3) times daily as needed for Muscle Spasm(s). Quantity:  60 Tab Refills:  0 DICLOFENAC SODIUM PO Your last dose was: Your next dose is: Take  by mouth. Refills:  0  
     
   
   
   
  
 gabapentin 300 mg capsule Commonly known as:  NEURONTIN Your last dose was: Your next dose is:    
   
   
 Dose:  300 mg Take 300 mg by mouth nightly as needed for Pain. Indications: generalized joint pain Refills:  0  
     
   
   
   
  
 levocetirizine 5 mg tablet Commonly known as:  Castillo Sale Your last dose was: Your next dose is: TAKE 1 TABLET DAILY Quantity:  90 Tab Refills:  2  
     
   
   
   
  
 lisinopril 10 mg tablet Commonly known as:  Afsaneh Feil Your last dose was: Your next dose is: TAKE 1 TABLET DAILY Quantity:  90 Tab Refills:  2  
     
   
   
   
  
 terazosin 5 mg capsule Commonly known as:  HYTRIN Your last dose was: Your next dose is: TAKE 1 CAPSULE DAILY Quantity:  90 Cap Refills:  1  
     
   
   
   
  
 zolpidem CR 12.5 mg tablet Commonly known as:  AMBIEN CR Your last dose was:     
   
Your next dose is:    
   
   
 Dose:  12.5 mg  
 Take 1 Tab by mouth nightly as needed for Sleep. Max Daily Amount: 12.5 mg.  
 Quantity:  30 Tab Refills:  2 STOP taking these medications ELIQUIS 5 mg tablet Generic drug:  apixaban  
   
  
 metoprolol tartrate 25 mg tablet Commonly known as:  LOPRESSOR Where to Get Your Medications These medications were sent to [facility], [address] [facility] [address]  [address] [facility], [address] [facility] Hours:  24-hours Phone:  [phone]  
  clindamycin 150 mg capsule Information on where to get these meds will be given to you by the nurse or doctor. ! Ask your nurse or doctor about these medications  
  aspirin delayed-release 81 mg tablet Discharge Instructions Loop Recorder Discharge Instructions Please make sure you have received your Temporary Loop Recorder identification card with your discharge instructions MEDICATIONS ? Take only the medications prescribed to you at discharge. ACTIVITY ? Return to your normal activity, except as noted below. o Avoid tight clothes or unnecessary pressure over your incision (such as bra straps or seat belts). If it is tender or sensitive to clothing, cover the incision with a soft dressing or pad. 
o Questions about driving are individualized and should be discussed with one of the EP Physicians prior to discharge. SHOWERING  
  
 
? Leave the bandage over your incision for 7 days after the Loop Recorder implant. You bandage will be removed in clinic in 7 days. ? It is important to keep the bandaged area clean and dry. You may shower around the site until the bandage is removed in clinic. Thereafter, you may shower after the bandage is removed, washing it gently with soap and water. Do not apply any lotions, powders, or perfumes to the incision line. ? Avoid submerging your incision in water (tub baths, hot tubs, or swimming) for two weeks. ? Underneath the dressing. o If you have white steri-strips over your incision (underneath the gauze dressing), they will curl up at the end and fall off, usually within 10 days. Do not pull them off. 
- OR -  
o You may have a different type of closure for the incision. If Dermabond Adhesive was used to close your incision, you will receive a separate instruction sheet. DISCHARGE PRECAUTIONS ? Record your temperature every day, at the same time, for 3 weeks after your implant. A temperature of 100.5 F, or higher, can be the first sign of infection. This should be reported to your Doctor immediately. ? You can have an MRI after 6 weeks. You must be aware that any strong magnet or magnetic field can affect your Loop Recorder. In general, be careful of metal detectors, heavy machinery, and any area where arc-welding is performed. Avoid metal detectors such as the ones in security checkpoints at [facility] or [address] [place] [address]. When approaching a security checkpoint show your Loop Recorder ID Card to security personnel and ask to be hand searched. ? Always tell your doctor or dentist that you have a Loop Recorder. Antibiotics may be prescribed before certain procedures. ? Your temporary identification will be given to you with these instructions. Keep your device card in your wallet or on your person at all times. You should receive your device in 8 weeks. If you do not receive your permanent card, please call the office at [phone]. TAKING YOUR PULSE ? Take your pulse the same time every day, preferably in the morning. ? Sit down and rest for 5 minutes prior to taking your pulse. ? Take your pulse for 1 full minute, use a clock or stop watch with a second hand.  
 
? To feel your pulse, use the first two fingers of one hand; place them on the thumb side of the wrist of the opposite hand. The pulse will be steady, regular and throbbing. ? Call the EP Lab Doctors if your pulse is less than 40 beats per minute. SYMPTOMS THAT NEED TO BE REPORTED IMMEDIATELY ? Temperature more than 100.4 F ? Redness or warmth at the incision site, or pain for longer than the first 5 days after the implant. ? Drainage from the incision site. ? Swelling around the incision site. ? Shortness of breath. ? Rapid heart rate or palpitations. ? Dizziness, lightheadedness, fainting. ? Slow pulse below 40 beats per minute. ? REMEMBER: If you feel something is an emergency or cannot be handled over the phone, call 911 or go to the closest emergency room. Alexei oJhn MD 
Cardiac Electrophysiology / Cardiology [address] [facility] 
[address] [facility], [address] Lopez[address] 
[phone] / [phone] Fax       [phone] / [phone] Fax Discharge Orders None ACO Transitions of Care Introducing [facility] [employer] offers a voluntary care coordination program to provide high quality service and care to [facility] fee-for-service beneficiaries. Jomar Melendez was designed to help you enhance your health and well-being through the following services: ? Transitions of Care  support for individuals who are transitioning from one care setting to another (example: Hospital to home). ? Chronic and Complex Care Coordination  support for individuals and caregivers of those with serious or chronic illnesses or with more than one chronic (ongoing) condition and those who take a number of different medications.   
 
 
If you meet specific medical criteria, a [employer] Coordinator may call you directly to coordinate your care with your primary care physician and your other care providers. For questions about the [facility] programs, please, contact your physicians office. For general questions or additional information about Accountable Care Organizations: 
Please visit www.medicare.gov/acos. html or call 1-800-MEDICARE (7-101.981.7382) TTY users should call 1-949.483.7123. Introducing [facility] & HEALTH SERVICES! Dear Neha Gibson: Thank you for requesting a [employer] account. Our records indicate that you already have an active [employer] account. You can access your account anytime at [email or web address] Did you know that you can access your hospital and ER discharge instructions at any time in [employer]? You can also review all of your test results from your hospital stay or ER visit. Additional Information If you have questions, please visit the Frequently Asked Questions section of the [employer] website at [email or web address] Remember, [employer] is NOT to be used for urgent needs. For medical emergencies, dial 911. Now available from your iPhone and Android! General Information Please provide this summary of care documentation to your next provider. Patient Signature:  ____________________________________________________________ Date:  ____________________________________________________________  
  
Miki Star Provider Signature:  ____________________________________________________________ Date:  ____________________________________________________________

## 2017-05-03 NOTE — PROGRESS NOTES
10:55 AM Received patient from waiting area. Armband and allergies verbally confirmed with patient. Procedure explained and consents signed. 12:05 PM TRANSFER - OUT REPORT:    Verbal report given to [facility] [employer] (name) on [employer].  being transferred to  (unit) for routine progression of care       Report consisted of patients Situation, Background, Assessment and   Recommendations(SBAR). Information from the following report(s) Procedure Summary was reviewed with the receiving nurse. Lines:   Peripheral IV 05/03/17 Left Forearm (Active)   Site Assessment Clean, dry, & intact 5/3/2017 11:00 AM        Opportunity for questions and clarification was provided. Patient transported with:   Registered Nurse    12:58 PM reviewed discharge instructions with pt and wife, both verbalized understanding, loop recorder rep in to explain home device, occasional episodes of bigeminey rate in the 60's then back to sinus, gentamycin infusing    1:07 PM pt and wife have no questions re; home device  2903 Copy of printed discharge instructions given to patient and  Wife  Patient escorted via wheelchair to entrance. wife driving. Patient discharged into care of wife. Juliana King

## 2017-05-03 NOTE — IP AVS SNAPSHOT
Current Discharge Medication List  
  
START taking these medications Dose & Instructions Dispensing Information Comments Morning Noon Evening Bedtime  
 aspirin delayed-release 81 mg tablet Your last dose was: Your next dose is:    
   
   
 Dose:  81 mg Take 1 Tab by mouth daily. Quantity:  30 Tab Refills:  3  
     
   
   
   
  
 clindamycin 150 mg capsule Commonly known as:  CLEOCIN Your last dose was: Your next dose is:    
   
   
 Dose:  150 mg Take 1 Cap by mouth three (3) times daily for 5 days. Quantity:  15 Cap Refills:  0 CONTINUE these medications which have NOT CHANGED Dose & Instructions Dispensing Information Comments Morning Noon Evening Bedtime  
 atorvastatin 20 mg tablet Commonly known as:  LIPITOR Your last dose was: Your next dose is: TAKE 1 TABLET DAILY Quantity:  90 Tab Refills:  1  
     
   
   
   
  
 cyclobenzaprine 10 mg tablet Commonly known as:  FLEXERIL Your last dose was: Your next dose is:    
   
   
 Dose:  10 mg Take 1 Tab by mouth three (3) times daily as needed for Muscle Spasm(s). Quantity:  60 Tab Refills:  0 DICLOFENAC SODIUM PO Your last dose was: Your next dose is: Take  by mouth. Refills:  0  
     
   
   
   
  
 gabapentin 300 mg capsule Commonly known as:  NEURONTIN Your last dose was: Your next dose is:    
   
   
 Dose:  300 mg Take 300 mg by mouth nightly as needed for Pain. Indications: generalized joint pain Refills:  0  
     
   
   
   
  
 levocetirizine 5 mg tablet Commonly known as:  Jackson De Jesus Your last dose was: Your next dose is: TAKE 1 TABLET DAILY Quantity:  90 Tab Refills:  2  
     
   
   
   
  
 lisinopril 10 mg tablet Commonly known as:  Caleb Robledo Your last dose was: Your next dose is: TAKE 1 TABLET DAILY Quantity:  90 Tab Refills:  2  
     
   
   
   
  
 terazosin 5 mg capsule Commonly known as:  HYTRIN Your last dose was: Your next dose is: TAKE 1 CAPSULE DAILY Quantity:  90 Cap Refills:  1  
     
   
   
   
  
 zolpidem CR 12.5 mg tablet Commonly known as:  AMBIEN CR Your last dose was: Your next dose is:    
   
   
 Dose:  12.5 mg Take 1 Tab by mouth nightly as needed for Sleep. Max Daily Amount: 12.5 mg.  
 Quantity:  30 Tab Refills:  2 STOP taking these medications ELIQUIS 5 mg tablet Generic drug:  apixaban  
   
  
 metoprolol tartrate 25 mg tablet Commonly known as:  LOPRESSOR Where to Get Your Medications These medications were sent to [facility], [address] [facility]  [address] [facility], [address] [facility] Hours:  24-hours Phone:  [phone]  
  clindamycin 150 mg capsule Information on where to get these meds will be given to you by the nurse or doctor. ! Ask your nurse or doctor about these medications  
  aspirin delayed-release 81 mg tablet

## 2017-05-03 NOTE — PROCEDURES
Cardiac Electrophysiology Report      PATIENT INFORMATION     Patient Name: uRbén Escalera. MRN: [id number]       Study Date: 5/3/2017    YOB: 1945  Age: 74 y.o. Gender: male      Procedure:  Loop Recorder Injectiona    Referring Physician:  Meghana Long MD and Dr. Vickers Minus Name   Electrophysiologist Yann Gong MD   Monitor Norm Roland RN   Circulator Francis Parks RN       PATIENT HISTORY     Hector Sidhu is a 74 y.o. male with HTN, SVT and hyperlipidemia whose Cardizem was reduced due to bradycardia in the past. He has AVI on CPAP. He was doing well although noted minor episodes of fluttering lasting only a few seconds last visit. He underwent left TKR recently and post operatively he was noted to have AF for which he was started on metoprolol 25 mg BID. He converted spontaneously while in hospital. He underwent echocardiogram which demonstrated preserved LV function. EKG tlast visit showed sinus bradycardia at 43bpm. He denies feeling AF since hospital visit and has not reported symptoms consistent with symptomatic bradycardia. EKG today demonstrates sinus bradycardia at 58 with PVC. He was suspected to have postoperative AF. As a result, Eliquis and metoprolol were discontinued. He now presents for injection of a loop recorder. PROCEDURE     The patient was brought to the Cardiac Electrophysiology laboratory in a post-absorptive, fasting state. Informed consent was obtained. A peripheral IV was in place. Continuous electrocardiographic, blood pressure, O2 saturation and  CO2 monitoring was initiated. Pre-operative antibiotics were administered pre-operatively. Self-adhesive cardioversion patches were positioned on the chest.  Conscious sedation was effectuated according to protocol. The patient was then prepped and draped in the usual sterile fashion.   A 50/50 mixture of lidocaine (1%) with epinephrine and bupivicaine (0.5%) was utilized for local anesthesia. A blunt incision was delivered along the left sternal border between the 3rd and 4th intercostal space. A loop recorder was then injected successfully using a Medtronic loop recorder injection tool. 3-O monocryl was utilized for skin closure and Dermabond adhesive glue was applied to the skin. The patient remained hemodynamically stable, tolerated the procedure well and was transferred in stable condition. There were no immediate complications encountered during the procedure. There was minimal blood loss and no specimen were removed. LEAD & GENERATOR DATA       Model # Serial #   Generator Medtronic [id number]       MEDICATION SUMMARY     Medication Route Unit Total   Ancef IV grams 1   Fentanyl IV micrograms 100       RADIOLOGY SUMMARY     N/A      CONCLUSIONS     1. Successful injection of loop recorder. 2. Wound check in EP clinic in 7 days. 3. Oral antibiotics x 5 days. 4.  Follow up in EP clinic in 1 month or earlier if necessary. 5. Follow up with  Daniel Rogers MD and Dr. Anuel Curiel as scheduled. Thank you Daniel Rogers MD and Dr. Anuel Curiel for allowing me to participate in the care of this extraordinarily pleasant male.         Savage Lopez MD  Cardiac Electrophysiology / Cardiology    [address], [address] [facility], [address]  [address]  [phone] / [phone] Fax [phone] / [phone] Fax

## 2017-05-03 NOTE — DISCHARGE INSTRUCTIONS
Loop Recorder  Discharge Instructions    Please make sure you have received your Temporary Loop Recorder identification card with your discharge instructions      MEDICATIONS         Take only the medications prescribed to you at discharge. ACTIVITY         Return to your normal activity, except as noted below. o Avoid tight clothes or unnecessary pressure over your incision (such as bra straps or seat belts). If it is tender or sensitive to clothing, cover the incision with a soft dressing or pad.  o Questions about driving are individualized and should be discussed with one of the EP Physicians prior to discharge. SHOWERING         Leave the bandage over your incision for 7 days after the Loop Recorder implant. You bandage will be removed in clinic in 7 days.  It is important to keep the bandaged area clean and dry. You may shower around the site until the bandage is removed in clinic. Thereafter, you may shower after the bandage is removed, washing it gently with soap and water. Do not apply any lotions, powders, or perfumes to the incision line.  Avoid submerging your incision in water (tub baths, hot tubs, or swimming) for two weeks.  Underneath the dressing. o If you have white steri-strips over your incision (underneath the gauze dressing), they will curl up at the end and fall off, usually within 10 days. Do not pull them off.  - OR -   o You may have a different type of closure for the incision. If Dermabond Adhesive was used to close your incision, you will receive a separate instruction sheet. DISCHARGE PRECAUTIONS         Record your temperature every day, at the same time, for 3 weeks after your implant. A temperature of 100.5 F, or higher, can be the first sign of infection. This should be reported to your Doctor immediately.  You can have an MRI after 6 weeks. You must be aware that any strong magnet or magnetic field can affect your Loop Recorder. In general, be careful of metal detectors, heavy machinery, and any area where arc-welding is performed. Avoid metal detectors such as the ones in security checkpoints at [facility] or [address] [place] [address]. When approaching a security checkpoint show your Loop Recorder ID Card to security personnel and ask to be hand searched.  Always tell your doctor or dentist that you have a Loop Recorder. Antibiotics may be prescribed before certain procedures.  Your temporary identification will be given to you with these instructions. Keep your device card in your wallet or on your person at all times. You should receive your device in 8 weeks. If you do not receive your permanent card, please call the office at [phone]. TAKING YOUR PULSE         Take your pulse the same time every day, preferably in the morning.  Sit down and rest for 5 minutes prior to taking your pulse.  Take your pulse for 1 full minute, use a clock or stop watch with a second hand.  To feel your pulse, use the first two fingers of one hand; place them on the thumb side of the wrist of the opposite hand. The pulse will be steady, regular and throbbing.  Call the EP Lab Doctors if your pulse is less than 40 beats per minute. SYMPTOMS THAT NEED TO BE REPORTED IMMEDIATELY         Temperature more than 100.4 F     Redness or warmth at the incision site, or pain for longer than the first 5 days after the implant.  Drainage from the incision site.  Swelling around the incision site.  Shortness of breath.  Rapid heart rate or palpitations.  Dizziness, lightheadedness, fainting.  Slow pulse below 40 beats per minute.  REMEMBER: If you feel something is an emergency or cannot be handled over the phone, call 911 or go to the closest emergency room.           Kimberlee Blankenship MD  Cardiac Electrophysiology / Cardiology    [address] [place] [address] [address] [facility], [address] [place] [address]  [address] [facility]  [phone] / [phone] Fax       [phone] / [phone] Fax

## 2017-05-08 ENCOUNTER — OFFICE VISIT (OUTPATIENT)
Dept: CARDIOLOGY CLINIC | Age: 72
End: 2017-05-08

## 2017-05-08 DIAGNOSIS — Z95.818 STATUS POST PLACEMENT OF IMPLANTABLE LOOP RECORDER: Primary | ICD-10-CM

## 2017-05-09 ENCOUNTER — OFFICE VISIT (OUTPATIENT)
Dept: CARDIOLOGY CLINIC | Age: 72
End: 2017-05-09

## 2017-05-09 VITALS
WEIGHT: 279.2 LBS | RESPIRATION RATE: 18 BRPM | SYSTOLIC BLOOD PRESSURE: 142 MMHG | HEART RATE: 54 BPM | BODY MASS INDEX: 32.97 KG/M2 | HEIGHT: 77 IN | OXYGEN SATURATION: 98 % | DIASTOLIC BLOOD PRESSURE: 84 MMHG

## 2017-05-09 DIAGNOSIS — I48.91 ATRIAL FIBRILLATION BY ELECTROCARDIOGRAM (HCC): Primary | ICD-10-CM

## 2017-05-09 NOTE — PROGRESS NOTES
Visit Vitals    /84 (BP 1 Location: Left arm, BP Patient Position: Sitting)    Pulse (!) 54    Resp 18    Ht 6' 5\" (1.956 m)    Wt 279 lb 3.2 oz (126.6 kg)    SpO2 98%    BMI 33.11 kg/m2

## 2017-05-09 NOTE — PROGRESS NOTES
Patient presents for wound check post-device implantation of a loop recorder. The dressing was removed and the site was inspected. The site appeared to be well-healing without ecchymosis/tenderness/erythema. Denies pain, fevers, discharge. Plan:    Continue follow up in device clinic as planned.        Aram Springer MD

## 2017-05-15 ENCOUNTER — OFFICE VISIT (OUTPATIENT)
Dept: CARDIOLOGY CLINIC | Age: 72
End: 2017-05-15

## 2017-05-15 DIAGNOSIS — Z95.818 STATUS POST PLACEMENT OF IMPLANTABLE LOOP RECORDER: Primary | ICD-10-CM

## 2017-06-05 ENCOUNTER — OFFICE VISIT (OUTPATIENT)
Dept: CARDIOLOGY CLINIC | Age: 72
End: 2017-06-05

## 2017-06-05 DIAGNOSIS — Z95.818 STATUS POST PLACEMENT OF IMPLANTABLE LOOP RECORDER: Primary | ICD-10-CM

## 2017-06-07 ENCOUNTER — CLINICAL SUPPORT (OUTPATIENT)
Dept: CARDIOLOGY CLINIC | Age: 72
End: 2017-06-07

## 2017-06-07 ENCOUNTER — OFFICE VISIT (OUTPATIENT)
Dept: CARDIOLOGY CLINIC | Age: 72
End: 2017-06-07

## 2017-06-07 VITALS
SYSTOLIC BLOOD PRESSURE: 148 MMHG | RESPIRATION RATE: 18 BRPM | OXYGEN SATURATION: 97 % | HEIGHT: 77 IN | HEART RATE: 56 BPM | WEIGHT: 281 LBS | DIASTOLIC BLOOD PRESSURE: 92 MMHG | BODY MASS INDEX: 33.18 KG/M2

## 2017-06-07 DIAGNOSIS — I48.91 ATRIAL FIBRILLATION BY ELECTROCARDIOGRAPHY (HCC): Primary | ICD-10-CM

## 2017-06-07 RX ORDER — METOPROLOL SUCCINATE 25 MG/1
12.5 TABLET, EXTENDED RELEASE ORAL DAILY
Qty: 30 TAB | Refills: 3 | Status: SHIPPED | OUTPATIENT
Start: 2017-06-07 | End: 2017-06-07 | Stop reason: SDUPTHER

## 2017-06-07 NOTE — PROGRESS NOTES
HISTORY OF PRESENTING ILLNESS      Dafne Reyes is a 74 y.o. male with HTN,  hyperlipidemia whose Cardizem was reduced due to bradycardia in the past. He is no longer on calcium channel blocker. He has AVI on CPAP. He was doing well although noted minor episodes of fluttering lasting only a few seconds last visit. He underwent left TKR recently and post operatively he was noted to have AF for which he was started on metoprolol 25 mg BID. He converted spontaneously while in hospital. He underwent echocardiogram which demonstrated preserved LV function. He underwent injection of an ILR to monitor whether he truly has AF. ILR today demonstrates an episode of sustained, asymptomatic AF at 136 bpm. His BP log shows uncontrolled HTN as well.         ACTIVE PROBLEM LIST     Patient Active Problem List    Diagnosis Date Noted    Advanced care planning/counseling discussion 02/03/2017    Paroxysmal atrial fibrillation ([address] 09/28/2016    BPH (benign prostatic hyperplasia) 08/17/2016    Insomnia 08/17/2016    Osteoarthritis of left knee 08/15/2016    Spinal stenosis in cervical region 01/05/2016    Herniated nucleus pulposus, C3-4 01/05/2016    Normal cardiac stress test 09/21/2015    ED (erectile dysfunction) 06/18/2014    IFG (impaired fasting glucose)     SVT (supraventricular tachycardia) ([address]     Right knee DJD 11/04/2013    Spinal stenosis of lumbar region     Tear of medial cartilage or meniscus of knee, current 02/01/2013    Osteoarthrosis, unspecified whether generalized or localized, lower leg 02/01/2013    Palpitations 12/12/2011    Supraventricular tachycardia ([address] 12/12/2011    HLD (hyperlipidemia) 12/12/2011    Essential hypertension, benign 12/12/2011    Varicose veins 12/12/2011    Colon polyps     AVI (obstructive sleep apnea)     Heart palpitations     Hyperlipidemia with target LDL less than 130     BPH (benign prostatic hypertrophy)     Lower back pain     Knee pain, right     Right sided sciatica            PAST MEDICAL HISTORY     Past Medical History:   Diagnosis Date    Arthritis     left knee and lt. hand    Atrial fibrillation (HCC)     BPH (benign prostatic hyperplasia)     Chronic pain     Back pain    Colon polyps     DJD (degenerative joint disease) of lumbar spine     laminectomy 1979, 1991, 2015    ED (erectile dysfunction) 6/18/2014    Heart palpitations     Dr. Sb Casitllo    Herniated nucleus pulposus, C3-4 12/2015    Dr. Sergey Anderson    Hyperlipidemia     Hypertension     IFG (impaired fasting glucose)     Insomnia     Knee pain, right     Lower back pain     hx lumbar laminectomyx2 w good results    Normal cardiac stress test 9/21/15    OA (osteoarthritis) of knee     Dr. Betsy Osei    Obesity     AVI (obstructive sleep apnea)     Dr. Peter Ritter Right sided sciatica     Seasonal allergic rhinitis     Spinal stenosis in cervical region 12/2015    Spinal stenosis of lumbar region     MRI 12/2012. Dr. Sergey Anderson    SVT (supraventricular tachycardia) [facility])     Dr. Patty Quinn sleep apnea     cpap    Varicose veins     vein stripping 10/10           PAST SURGICAL HISTORY     Past Surgical History:   Procedure Laterality Date    COLONOSCOPY  2008    2 polyps. repeat 2011. Dr. aDry Pandey  2007    7 polyps per pt     COLONOSCOPY N/A 6/15/2016    COLONOSCOPY performed by Summer Monteiro MD at [address] [place] [address] [place], COLON, DIAGNOSTIC  2011    return in 2016    HX BLADDER REPAIR      polyp removal    HX CERVICAL FUSION  1/16/16    ACDF C3-C4 Dr. Sergey Anderson    HX COLONOSCOPY  4/27/11    Dr Marco A Fink, smal polyp.   repeat 4/2016    HX KNEE ARTHROSCOPY  2005    right, Dr. Tuan Acosta    HX KNEE ARTHROSCOPY  02/01/2012    left, Dr Tuan Acosta    HX KNEE ARTHROSCOPY  1/2013    right    HX KNEE REPLACEMENT  11/4/2013    HX LUMBAR FUSION  9/2015    L3-L4, Dr. Mckenzie Davis 1991    HX MOHS PROCEDURES      left, Dr Key Louie    bladder polyp removed with cystoscopy    HX VEIN STRIPPING  9/2010    Dr. Latoya Toribio, bilateral    SHOULDER SURG [address] UNLISTED      left DECOMPRESSION, Dr. April Baer  11/2013    VASCULAR SURGERY PROCEDURE UNLIST  2008    bilateral vein stripping          ALLERGIES     Allergies   Allergen Reactions    Percocet [Oxycodone-Acetaminophen] Other (comments)     hallucinations    Penicillin G Rash     Did okay with keflex - no reaction with that. FAMILY HISTORY     Family History   Problem Relation Age of Onset    Cancer Mother      liver    Cancer Father      leukemia    Cancer Sister      lung cancer    negative for cardiac disease       SOCIAL HISTORY     Social History     Social History    Marital status:      Spouse name: Deepika Holley Number of children: 3    Years of education: N/A     Social History Main Topics    Smoking status: Former Smoker     Packs/day: 0.25     Years: 19.00     Types: Cigarettes     Quit date: 1/1/1980    Smokeless tobacco: Never Used      Comment: quit ~1980    Alcohol use 0.6 oz/week     1 Glasses of wine per week      Comment: week    Drug use: No    Sexual activity: Yes     Other Topics Concern     Service Yes     arm[employer]     Social History Narrative    ** Merged History Encounter **         1 child, 2 stepchildren    fishes         MEDICATIONS     Current Outpatient Prescriptions   Medication Sig    terazosin (HYTRIN) 5 mg capsule TAKE 1 CAPSULE DAILY    atorvastatin (LIPITOR) 20 mg tablet TAKE 1 TABLET DAILY    diclofenac EC (VOLTAREN) 75 mg EC tablet TAKE 1 TABLET TWICE A DAY AS NEEDED    aspirin delayed-release 81 mg tablet Take 1 Tab by mouth daily.  zolpidem CR (AMBIEN CR) 12.5 mg tablet Take 1 Tab by mouth nightly as needed for Sleep.  Max Daily Amount: 12.5 mg.    gabapentin (NEURONTIN) 300 mg capsule Take 300 mg by mouth nightly as needed for Pain. Indications: generalized joint pain    levocetirizine (XYZAL) 5 mg tablet TAKE 1 TABLET DAILY    lisinopril (PRINIVIL, ZESTRIL) 10 mg tablet TAKE 1 TABLET DAILY    cyclobenzaprine (FLEXERIL) 10 mg tablet Take 1 Tab by mouth three (3) times daily as needed for Muscle Spasm(s). No current facility-administered medications for this visit. I have reviewed the nurses notes, vitals, problem list, allergy list, medical history, family, social history and medications. REVIEW OF SYMPTOMS      General: Pt denies excessive weight gain or loss. Pt is able to conduct ADL's  HEENT: Denies blurred vision, headaches, hearing loss, epistaxis and difficulty swallowing. Respiratory: Denies cough, congestion, shortness of breath, MCRAE, wheezing or stridor. Cardiovascular: Denies precordial pain, palpitations, edema or PND  Gastrointestinal: Denies poor appetite, indigestion, abdominal pain or blood in stool  Genitourinary: Denies hematuria, dysuria, increased urinary frequency  Musculoskeletal: Denies joint pain or swelling from muscles or joints  Neurologic: Denies tremor, paresthesias, headache, or sensory motor disturbance  Psychiatric: Denies confusion, insomnia, depression  Integumentray: Denies rash, itching or ulcers. Hematologic: Denies easy bruising, bleeding       PHYSICAL EXAMINATION      Vitals:    06/07/17 1359   BP: (!) 148/92   Pulse: (!) 56   Resp: 18   SpO2: 97%   Weight: 281 lb (127.5 kg)   Height: 6' 5\" (1.956 m)     General: Well developed, in no acute distress. HEENT: No jaundice, oral mucosa moist, no oral ulcers  Neck: Supple, no stiffness, no lymphadenopathy, supple  Heart:  Normal S1/S2 negative S3 or S4.  Regular, no murmur, gallop or rub, no jugular venous distention  Respiratory: Clear bilaterally x 4, no wheezing or rales  Abdomen:   Soft, non-tender, bowel sounds are active.   Extremities:  No edema, normal cap refill, no cyanosis. Musculoskeletal: No clubbing, no deformities  Neuro: A&Ox3, speech clear, gait stable, cooperative, no focal neurologic deficits  Skin: Skin color is normal. No rashes or lesions. Non diaphoretic, moist.  Vascular: 2+ pulses symmetric in all extremities       DIAGNOSTIC DATA      EKG:        LABORATORY DATA      Lab Results   Component Value Date/Time    WBC 8.8 08/08/2016 10:28 AM    HGB 12.4 08/18/2016 02:47 AM    HCT 40.1 08/08/2016 10:28 AM    PLATELET 523 89/62/0681 10:28 AM    MCV 90.1 08/08/2016 10:28 AM      Lab Results   Component Value Date/Time    Sodium 140 08/19/2016 03:36 AM    Potassium 4.1 08/19/2016 03:36 AM    Chloride 105 08/19/2016 03:36 AM    CO2 27 08/19/2016 03:36 AM    Anion gap 8 08/19/2016 03:36 AM    Glucose 133 08/19/2016 03:36 AM    BUN 23 08/19/2016 03:36 AM    Creatinine 1.47 08/19/2016 03:36 AM    BUN/Creatinine ratio 16 08/19/2016 03:36 AM    GFR est AA 57 08/19/2016 03:36 AM    GFR est non-AA 47 08/19/2016 03:36 AM    Calcium 8.0 08/19/2016 03:36 AM    Bilirubin, total 0.6 08/08/2016 10:28 AM    AST (SGOT) 18 08/08/2016 10:28 AM    Alk. phosphatase 63 08/08/2016 10:28 AM    Protein, total 6.6 08/08/2016 10:28 AM    Albumin 3.6 08/08/2016 10:28 AM    Globulin 3.0 08/08/2016 10:28 AM    A-G Ratio 1.2 08/08/2016 10:28 AM    ALT (SGPT) 24 08/08/2016 10:28 AM           ASSESSMENT      1. Atrial fibrillation   A. Paroxsymal    B. Asymptomatic   2. Supraventricular tachycardia  3. Hypertension  4. Hyperlipidemia  5. Venous insufficiency  6. First degree AV block   7. AVI on CPAP   8. Bradycardia       PLAN     Restart eliquis and low dose trial of toprol 12.5 mg daily. If bradycardia/tachycardia observed will consider PPM vs AF ablation in future. Monitor BP and if remains uncontrolled will consider additional agent (ARB etc). FOLLOW-UP     1 month      Thank you, Carry Penta, MD for allowing me to participate in the care of this extraordinarily pleasant male.  Please do not hesitate to contact me for further questions/concerns.          Juliette Saini MD  Cardiac Electrophysiology / Cardiology    [facility]  [address] [facility], [facility], [address] [facility]  Eugenia Lopez     Dieudonne Morgan  [phone] / [phone] Fax   [phone] / [phone] Fax

## 2017-06-07 NOTE — PROGRESS NOTES
Visit Vitals    BP (!) 148/92 (BP 1 Location: Left arm, BP Patient Position: Sitting)    Pulse (!) 56    Resp 18    Ht 6' 5\" (1.956 m)    Wt 281 lb (127.5 kg)    SpO2 97%    BMI 33.32 kg/m2

## 2017-06-08 RX ORDER — METOPROLOL SUCCINATE 25 MG/1
TABLET, EXTENDED RELEASE ORAL
Qty: 45 TAB | Refills: 3 | Status: SHIPPED | OUTPATIENT
Start: 2017-06-08 | End: 2017-11-27 | Stop reason: SDUPTHER

## 2017-06-08 NOTE — TELEPHONE ENCOUNTER
Requested Prescriptions     Signed Prescriptions Disp Refills    metoprolol succinate (TOPROL-XL) 25 mg XL tablet 45 Tab 3     Sig: TAKE 1/2 TABLET BY MOUTH EVERY DAY     Authorizing Provider: Lori Mojica     Ordering User: Dennise Ferrari

## 2017-06-19 ENCOUNTER — OFFICE VISIT (OUTPATIENT)
Dept: CARDIOLOGY CLINIC | Age: 72
End: 2017-06-19

## 2017-06-19 DIAGNOSIS — Z95.818 STATUS POST PLACEMENT OF IMPLANTABLE LOOP RECORDER: Primary | ICD-10-CM

## 2017-06-26 ENCOUNTER — OFFICE VISIT (OUTPATIENT)
Dept: CARDIOLOGY CLINIC | Age: 72
End: 2017-06-26

## 2017-06-26 DIAGNOSIS — Z95.818 STATUS POST PLACEMENT OF IMPLANTABLE LOOP RECORDER: Primary | ICD-10-CM

## 2017-07-06 ENCOUNTER — OFFICE VISIT (OUTPATIENT)
Dept: CARDIOLOGY CLINIC | Age: 72
End: 2017-07-06

## 2017-07-06 DIAGNOSIS — Z95.818 STATUS POST PLACEMENT OF IMPLANTABLE LOOP RECORDER: Primary | ICD-10-CM

## 2017-07-12 ENCOUNTER — OFFICE VISIT (OUTPATIENT)
Dept: CARDIOLOGY CLINIC | Age: 72
End: 2017-07-12

## 2017-07-12 VITALS
HEIGHT: 77 IN | DIASTOLIC BLOOD PRESSURE: 84 MMHG | SYSTOLIC BLOOD PRESSURE: 140 MMHG | WEIGHT: 279 LBS | HEART RATE: 52 BPM | BODY MASS INDEX: 32.94 KG/M2 | RESPIRATION RATE: 18 BRPM | OXYGEN SATURATION: 97 %

## 2017-07-12 DIAGNOSIS — I10 ESSENTIAL HYPERTENSION: ICD-10-CM

## 2017-07-12 DIAGNOSIS — I47.1 SVT (SUPRAVENTRICULAR TACHYCARDIA) (HCC): Primary | ICD-10-CM

## 2017-07-12 DIAGNOSIS — I48.0 PAROXYSMAL ATRIAL FIBRILLATION (HCC): ICD-10-CM

## 2017-07-12 DIAGNOSIS — I47.1 SVT (SUPRAVENTRICULAR TACHYCARDIA) (HCC): ICD-10-CM

## 2017-07-12 RX ORDER — LISINOPRIL 20 MG/1
20 TABLET ORAL DAILY
Qty: 30 TAB | Refills: 6 | Status: SHIPPED | OUTPATIENT
Start: 2017-07-12 | End: 2017-07-12 | Stop reason: SDUPTHER

## 2017-07-12 NOTE — PROGRESS NOTES
Visit Vitals    /84 (BP 1 Location: Left arm, BP Patient Position: Sitting)    Pulse (!) 52    Resp 18    Ht 6' 5\" (1.956 m)    Wt 279 lb (126.6 kg)    SpO2 97%    BMI 33.08 kg/m2

## 2017-07-12 NOTE — PROGRESS NOTES
HISTORY OF PRESENTING ILLNESS      Aram Arellano is a 74 y.o. male with HTN,  hyperlipidemia whose Cardizem was reduced due to bradycardia in the past. He is no longer on calcium channel blocker. He has AVI on CPAP. He was doing well although noted minor episodes of fluttering lasting only a few seconds last visit. He underwent left TKR recently and post operatively he was noted to have AF for which he was started on metoprolol 25 mg BID. He converted spontaneously while in hospital. He underwent echocardiogram which demonstrated preserved LV function. He underwent injection of an ILR to monitor whether he truly has AF. ILR demonstrated sustained asymptomatic AF with RVR (136 bpm). Last visit eliquis was restarted and toprol 12.5 mg daily was started with plan to monitor for bradycardia. His ILR has failed to demonstrate recurrent AF however HR's have been in the 40's. BP log shows SBP's in the 170-180's. He denies fatigue, MCRAE.         ACTIVE PROBLEM LIST     Patient Active Problem List    Diagnosis Date Noted    Advanced care planning/counseling discussion 02/03/2017    Paroxysmal atrial fibrillation ([address] 09/28/2016    BPH (benign prostatic hyperplasia) 08/17/2016    Insomnia 08/17/2016    Osteoarthritis of left knee 08/15/2016    Spinal stenosis in cervical region 01/05/2016    Herniated nucleus pulposus, C3-4 01/05/2016    Normal cardiac stress test 09/21/2015    ED (erectile dysfunction) 06/18/2014    IFG (impaired fasting glucose)     SVT (supraventricular tachycardia) ([address]     Right knee DJD 11/04/2013    Spinal stenosis of lumbar region     Tear of medial cartilage or meniscus of knee, current 02/01/2013    Osteoarthrosis, unspecified whether generalized or localized, lower leg 02/01/2013    Palpitations 12/12/2011    Supraventricular tachycardia ([address] 12/12/2011    HLD (hyperlipidemia) 12/12/2011    Essential hypertension, benign 12/12/2011    Varicose veins 12/12/2011    Colon polyps     AVI (obstructive sleep apnea)     Heart palpitations     Hyperlipidemia with target LDL less than 130     BPH (benign prostatic hypertrophy)     Lower back pain     Knee pain, right     Right sided sciatica            PAST MEDICAL HISTORY     Past Medical History:   Diagnosis Date    Arthritis     left knee and lt. hand    Atrial fibrillation (HCC)     BPH (benign prostatic hyperplasia)     Chronic pain     Back pain    Colon polyps     DJD (degenerative joint disease) of lumbar spine     laminectomy 1979, 1991, 2015    ED (erectile dysfunction) 6/18/2014    Heart palpitations     Dr. Charlie Ramirez    Herniated nucleus pulposus, C3-4 12/2015    Dr. Jose Singh    Hyperlipidemia     Hypertension     IFG (impaired fasting glucose)     Insomnia     Knee pain, right     Lower back pain     hx lumbar laminectomyx2 w good results    Normal cardiac stress test 9/21/15    OA (osteoarthritis) of knee     Dr. Orin Chavez    Obesity     AVI (obstructive sleep apnea)     Dr. Anjum Benitez Right sided sciatica     Seasonal allergic rhinitis     Spinal stenosis in cervical region 12/2015    Spinal stenosis of lumbar region     MRI 12/2012. Dr. Jose Singh    SVT (supraventricular tachycardia) [facility])     Dr. Uday Mann sleep apnea     cpap    Varicose veins     vein stripping 10/10           PAST SURGICAL HISTORY     Past Surgical History:   Procedure Laterality Date    COLONOSCOPY  2008    2 polyps. repeat 2011. Dr. Adenike Kinney  2007    7 polyps per pt     COLONOSCOPY N/A 6/15/2016    COLONOSCOPY performed by Junie Simmons MD at [address] [place] [address], COLON, DIAGNOSTIC  2011    return in 2016    HX BLADDER REPAIR      polyp removal    HX CERVICAL FUSION  1/16/16    ACDF C3-C4 Dr. Jose Singh    HX COLONOSCOPY  4/27/11    Dr Anthony Fallon, dyan polyp.   repeat 4/2016    HX KNEE ARTHROSCOPY  2005    right, Dr. Cuevas Sample ARTHROSCOPY  02/01/2012 left, Dr Davis Samples ARTHROSCOPY  1/2013    right    HX KNEE REPLACEMENT  11/4/2013    HX LUMBAR FUSION  9/2015    L3-L4, Dr. Prem Altamirano      left, Dr Lori Miller    bladder polyp removed with cystoscopy    HX VEIN STRIPPING  9/2010    Dr. Abbie Arteaga, bilateral    SHOULDER SURG [address] UNLISTED      left DECOMPRESSION, Dr. Terri Tran  11/2013    VASCULAR SURGERY PROCEDURE UNLIST  2008    bilateral vein stripping          ALLERGIES     Allergies   Allergen Reactions    Percocet [Oxycodone-Acetaminophen] Other (comments)     hallucinations    Penicillin G Rash     Did okay with keflex - no reaction with that.           FAMILY HISTORY     Family History   Problem Relation Age of Onset   Jignesh Tseer Cancer Mother      liver    Cancer Father      leukemia    Cancer Sister      lung cancer    negative for cardiac disease       SOCIAL HISTORY     Social History     Social History    Marital status:      Spouse name: Brandi Felder Number of children: 3    Years of education: N/A     Social History Main Topics    Smoking status: Former Smoker     Packs/day: 0.25     Years: 19.00     Types: Cigarettes     Quit date: 1/1/1980    Smokeless tobacco: Never Used      Comment: quit ~1980    Alcohol use 0.6 oz/week     1 Glasses of wine per week      Comment: week    Drug use: No    Sexual activity: Yes     Other Topics Concern     Service Yes     army     Social History Narrative    ** Merged History Encounter **         1 child, 2 stepchildren    fishes         MEDICATIONS     Current Outpatient Prescriptions   Medication Sig    zolpidem CR (AMBIEN CR) 12.5 mg tablet TAKE 1 TABLET BY MOUTH AT NIGHT AS NEEDED FOR SLEEP    metoprolol succinate (TOPROL-XL) 25 mg XL tablet TAKE 1/2 TABLET BY MOUTH EVERY DAY (Patient taking differently: TAKE 1/2 TABLET BY MOUTH twice daily)    apixaban (ELIQUIS) 5 mg tablet Take 1 Tab by mouth two (2) times a day.  terazosin (HYTRIN) 5 mg capsule TAKE 1 CAPSULE DAILY    atorvastatin (LIPITOR) 20 mg tablet TAKE 1 TABLET DAILY    diclofenac EC (VOLTAREN) 75 mg EC tablet TAKE 1 TABLET TWICE A DAY AS NEEDED    gabapentin (NEURONTIN) 300 mg capsule Take 300 mg by mouth nightly as needed for Pain. Indications: generalized joint pain    levocetirizine (XYZAL) 5 mg tablet TAKE 1 TABLET DAILY    lisinopril (PRINIVIL, ZESTRIL) 10 mg tablet TAKE 1 TABLET DAILY    cyclobenzaprine (FLEXERIL) 10 mg tablet Take 1 Tab by mouth three (3) times daily as needed for Muscle Spasm(s). No current facility-administered medications for this visit. I have reviewed the nurses notes, vitals, problem list, allergy list, medical history, family, social history and medications. REVIEW OF SYMPTOMS      General: Pt denies excessive weight gain or loss. Pt is able to conduct ADL's  HEENT: Denies blurred vision, headaches, hearing loss, epistaxis and difficulty swallowing. Respiratory: Denies cough, congestion, shortness of breath, MCRAE, wheezing or stridor. Cardiovascular: Denies precordial pain, palpitations, edema or PND  Gastrointestinal: Denies poor appetite, indigestion, abdominal pain or blood in stool  Genitourinary: Denies hematuria, dysuria, increased urinary frequency  Musculoskeletal: Denies joint pain or swelling from muscles or joints  Neurologic: Denies tremor, paresthesias, headache, or sensory motor disturbance  Psychiatric: Denies confusion, insomnia, depression  Integumentray: Denies rash, itching or ulcers. Hematologic: Denies easy bruising, bleeding       PHYSICAL EXAMINATION      Vitals:    07/12/17 1429   BP: 140/84   Pulse: (!) 52   Resp: 18   SpO2: 97%   Weight: 279 lb (126.6 kg)   Height: 6' 5\" (1.956 m)     General: Well developed, in no acute distress.   HEENT: No jaundice, oral mucosa moist, no oral ulcers  Neck: Supple, no stiffness, no lymphadenopathy, supple  Heart:  Normal S1/S2 negative S3 or S4. Regular, no murmur, gallop or rub, no jugular venous distention  Respiratory: Clear bilaterally x 4, no wheezing or rales  Abdomen:   Soft, non-tender, bowel sounds are active.   Extremities:  No edema, normal cap refill, no cyanosis. Musculoskeletal: No clubbing, no deformities  Neuro: A&Ox3, speech clear, gait stable, cooperative, no focal neurologic deficits  Skin: Skin color is normal. No rashes or lesions. Non diaphoretic, moist.  Vascular: 2+ pulses symmetric in all extremities       DIAGNOSTIC DATA      EKG: sinus bradycardia at 47 bpm       LABORATORY DATA      Lab Results   Component Value Date/Time    WBC 8.8 08/08/2016 10:28 AM    HGB 12.4 08/18/2016 02:47 AM    HCT 40.1 08/08/2016 10:28 AM    PLATELET 763 59/06/9775 10:28 AM    MCV 90.1 08/08/2016 10:28 AM      Lab Results   Component Value Date/Time    Sodium 140 08/19/2016 03:36 AM    Potassium 4.1 08/19/2016 03:36 AM    Chloride 105 08/19/2016 03:36 AM    CO2 27 08/19/2016 03:36 AM    Anion gap 8 08/19/2016 03:36 AM    Glucose 133 08/19/2016 03:36 AM    BUN 23 08/19/2016 03:36 AM    Creatinine 1.47 08/19/2016 03:36 AM    BUN/Creatinine ratio 16 08/19/2016 03:36 AM    GFR est AA 57 08/19/2016 03:36 AM    GFR est non-AA 47 08/19/2016 03:36 AM    Calcium 8.0 08/19/2016 03:36 AM    Bilirubin, total 0.6 08/08/2016 10:28 AM    AST (SGOT) 18 08/08/2016 10:28 AM    Alk. phosphatase 63 08/08/2016 10:28 AM    Protein, total 6.6 08/08/2016 10:28 AM    Albumin 3.6 08/08/2016 10:28 AM    Globulin 3.0 08/08/2016 10:28 AM    A-G Ratio 1.2 08/08/2016 10:28 AM    ALT (SGPT) 24 08/08/2016 10:28 AM           ASSESSMENT      1. Atrial fibrillation                        A. Paroxsymal                         B. Asymptomatic              C. RVR  2. Supraventricular tachycardia  3. Hypertension  4. Hyperlipidemia  5. Venous insufficiency  6. First degree AV block   7. AVI on CPAP   8. Bradycardia       PLAN     Currently, his bradycardia is asymptomatic though I am concerned he will likely need a pacemaker in the future. Will increase her lisinopril to 20 mg daily. FOLLOW-UP     2 weeks      Thank you, Rickie Crawley MD for allowing me to participate in the care of this extraordinarily pleasant male. Please do not hesitate to contact me for further questions/concerns.          Jennifer Betancourt MD  Cardiac Electrophysiology / Cardiology    [address]  [address], [address] [facility], [address]  [place], [address]    [facility], [facility]  [phone] / [phone] Fax   [phone] / [phone] Fax

## 2017-07-12 NOTE — PATIENT INSTRUCTIONS
Treatment Plan  · Increase lisinopril to 20mg daily. · Follow up with Dr. Rachel Rivera in 2 weeks. · Contact our office with any concerns.

## 2017-07-13 RX ORDER — LISINOPRIL 20 MG/1
TABLET ORAL
Qty: 90 TAB | Refills: 3 | Status: SHIPPED | OUTPATIENT
Start: 2017-07-13 | End: 2017-07-26 | Stop reason: SDUPTHER

## 2017-07-13 NOTE — TELEPHONE ENCOUNTER
Requested Prescriptions     Signed Prescriptions Disp Refills    lisinopril (PRINIVIL, ZESTRIL) 20 mg tablet 90 Tab 3     Sig: TAKE 1 TABLET BY MOUTH EVERY DAY     Authorizing Provider: Mariana Maloney     Ordering User: Perez Villanueva     90 day supply ordered per patient's request.

## 2017-07-21 ENCOUNTER — OFFICE VISIT (OUTPATIENT)
Dept: CARDIOLOGY CLINIC | Age: 72
End: 2017-07-21

## 2017-07-21 DIAGNOSIS — Z95.818 STATUS POST PLACEMENT OF IMPLANTABLE LOOP RECORDER: Primary | ICD-10-CM

## 2017-07-26 ENCOUNTER — OFFICE VISIT (OUTPATIENT)
Dept: CARDIOLOGY CLINIC | Age: 72
End: 2017-07-26

## 2017-07-26 VITALS
HEIGHT: 77 IN | SYSTOLIC BLOOD PRESSURE: 136 MMHG | BODY MASS INDEX: 33.13 KG/M2 | HEART RATE: 48 BPM | RESPIRATION RATE: 16 BRPM | OXYGEN SATURATION: 98 % | DIASTOLIC BLOOD PRESSURE: 64 MMHG | WEIGHT: 280.6 LBS

## 2017-07-26 DIAGNOSIS — I48.0 PAROXYSMAL ATRIAL FIBRILLATION (HCC): ICD-10-CM

## 2017-07-26 DIAGNOSIS — I10 ESSENTIAL HYPERTENSION: ICD-10-CM

## 2017-07-26 DIAGNOSIS — I47.1 SVT (SUPRAVENTRICULAR TACHYCARDIA) (HCC): ICD-10-CM

## 2017-07-26 RX ORDER — LISINOPRIL 40 MG/1
40 TABLET ORAL DAILY
Qty: 30 TAB | Refills: 6 | Status: SHIPPED | OUTPATIENT
Start: 2017-07-26 | End: 2017-07-26 | Stop reason: SDUPTHER

## 2017-07-26 NOTE — PROGRESS NOTES
Visit Vitals    /64 (BP 1 Location: Left arm, BP Patient Position: Sitting)    Pulse (!) 48    Resp 16    Ht 6' 5\" (1.956 m)    Wt 280 lb 9.6 oz (127.3 kg)    SpO2 98%    BMI 33.27 kg/m2

## 2017-07-26 NOTE — PROGRESS NOTES
HISTORY OF PRESENTING ILLNESS      Hemant Stein is a 74 y.o. male with HTN, hyperlipidemia whose Cardizem was reduced due to bradycardia in the past. He is no longer on calcium channel blocker. He has AVI on CPAP. He was doing well although noted minor episodes of fluttering lasting only a few seconds last visit. He underwent left TKR recently and post operatively he was noted to have AF for which he was started on metoprolol 25 mg BID. He converted spontaneously while in hospital. He underwent echocardiogram which demonstrated preserved LV function. He underwent injection of an ILR to monitor whether he truly has AF. ILR today demonstrates an episode of sustained, asymptomatic AF at 136 bpm. We restarted eliquis and low dose trial of toprol 12.5 mg daily. ILR interrogation demonstrates PACS as well as bradycardia with rates in the 40s-50s, during which he was asymptomatic. Although his blood pressure is normotensive today, his home readings are hypertensive.       ACTIVE PROBLEM LIST     Patient Active Problem List    Diagnosis Date Noted    Advanced care planning/counseling discussion 02/03/2017    Paroxysmal atrial fibrillation ([address] 09/28/2016    BPH (benign prostatic hyperplasia) 08/17/2016    Insomnia 08/17/2016    Osteoarthritis of left knee 08/15/2016    Spinal stenosis in cervical region 01/05/2016    Herniated nucleus pulposus, C3-4 01/05/2016    Normal cardiac stress test 09/21/2015    ED (erectile dysfunction) 06/18/2014    IFG (impaired fasting glucose)     SVT (supraventricular tachycardia) ([address]     Right knee DJD 11/04/2013    Spinal stenosis of lumbar region     Tear of medial cartilage or meniscus of knee, current 02/01/2013    Osteoarthrosis, unspecified whether generalized or localized, lower leg 02/01/2013    Palpitations 12/12/2011    Supraventricular tachycardia ([address] 12/12/2011    HLD (hyperlipidemia) 12/12/2011    Essential hypertension, benign 12/12/2011    Varicose veins 12/12/2011    Colon polyps     AVI (obstructive sleep apnea)     Heart palpitations     Hyperlipidemia with target LDL less than 130     BPH (benign prostatic hypertrophy)     Lower back pain     Knee pain, right     Right sided sciatica            PAST MEDICAL HISTORY     Past Medical History:   Diagnosis Date    Arthritis     left knee and lt. hand    Atrial fibrillation (HCC)     BPH (benign prostatic hyperplasia)     Chronic pain     Back pain    Colon polyps     DJD (degenerative joint disease) of lumbar spine     laminectomy 1979, 1991, 2015    ED (erectile dysfunction) 6/18/2014    Heart palpitations     Dr. Mildred Jones    Herniated nucleus pulposus, C3-4 12/2015    Dr. Alyssia Johnson    Hyperlipidemia     Hypertension     IFG (impaired fasting glucose)     Insomnia     Knee pain, right     Lower back pain     hx lumbar laminectomyx2 w good results    Normal cardiac stress test 9/21/15    OA (osteoarthritis) of knee     Dr. Kessler Ground    Obesity     AVI (obstructive sleep apnea)     Dr. Christos Gray Right sided sciatica     Seasonal allergic rhinitis     Spinal stenosis in cervical region 12/2015    Spinal stenosis of lumbar region     MRI 12/2012. Dr. Alyssia Johnson    SVT (supraventricular tachycardia) [facility])     Dr. Yovana Pacheco [place] sleep apnea     cpap    Varicose veins     vein stripping 10/10           PAST SURGICAL HISTORY     Past Surgical History:   Procedure Laterality Date    COLONOSCOPY  2008    2 polyps. repeat 2011. Dr. Maggi Narvaez  2007    7 polyps per pt     COLONOSCOPY N/A 6/15/2016    COLONOSCOPY performed by Angeles Hung MD at [address] [place] [address] [place], COLON, DIAGNOSTIC  2011    return in 2016    HX BLADDER REPAIR      polyp removal    HX CERVICAL FUSION  1/16/16    ACDF C3-C4 Dr. Alyssia Johnson    HX COLONOSCOPY  4/27/11    Dr Yassine Moe, dyan polyp.   repeat 4/2016    HX KNEE ARTHROSCOPY  2005    right, Dr. Veena Davila ARTHROSCOPY  02/01/2012    left, Dr Artem Parsons ARTHROSCOPY  1/2013    right    HX KNEE REPLACEMENT  11/4/2013    HX LUMBAR FUSION  9/2015    L3-L4, Dr. Berta Sanchez      left, Dr Bryan Ramos    bladder polyp removed with cystoscopy    HX VEIN STRIPPING  9/2010    Dr. Abi Trotter, bilateral    SHOULDER SURG [address] UNLISTED      left DECOMPRESSION, Dr. Dobbins Ket  11/2013    VASCULAR SURGERY PROCEDURE UNLIST  2008    bilateral vein stripping          ALLERGIES     Allergies   Allergen Reactions    Percocet [Oxycodone-Acetaminophen] Other (comments)     hallucinations    Penicillin G Rash     Did okay with keflex - no reaction with that.           FAMILY HISTORY     Family History   Problem Relation Age of Onset    Cancer Mother      liver    Cancer Father      leukemia    Cancer Sister      lung cancer    negative for cardiac disease       SOCIAL HISTORY     Social History     Social History    Marital status:      Spouse name: Terrell Closs Number of children: 3    Years of education: N/A     Social History Main Topics    Smoking status: Former Smoker     Packs/day: 0.25     Years: 19.00     Types: Cigarettes     Quit date: 1/1/1980    Smokeless tobacco: Never Used      Comment: quit ~1980    Alcohol use 0.6 oz/week     1 Glasses of wine per week      Comment: week    Drug use: No    Sexual activity: Yes     Other Topics Concern     Service Yes     army     Social History Narrative    ** Merged History Encounter **         1 child, 2 stepchildren    fishes         MEDICATIONS     Current Outpatient Prescriptions   Medication Sig    lisinopril (PRINIVIL, ZESTRIL) 20 mg tablet TAKE 1 TABLET BY MOUTH EVERY DAY    zolpidem CR (AMBIEN CR) 12.5 mg tablet TAKE 1 TABLET BY MOUTH AT NIGHT AS NEEDED FOR SLEEP    metoprolol succinate (TOPROL-XL) 25 mg XL tablet TAKE 1/2 TABLET BY MOUTH EVERY DAY (Patient taking differently: TAKE 1/2 TABLET BY MOUTH twice daily)    apixaban (ELIQUIS) 5 mg tablet Take 1 Tab by mouth two (2) times a day.  terazosin (HYTRIN) 5 mg capsule TAKE 1 CAPSULE DAILY    atorvastatin (LIPITOR) 20 mg tablet TAKE 1 TABLET DAILY    diclofenac EC (VOLTAREN) 75 mg EC tablet TAKE 1 TABLET TWICE A DAY AS NEEDED    gabapentin (NEURONTIN) 300 mg capsule Take 300 mg by mouth nightly as needed for Pain. Indications: generalized joint pain    levocetirizine (XYZAL) 5 mg tablet TAKE 1 TABLET DAILY    cyclobenzaprine (FLEXERIL) 10 mg tablet Take 1 Tab by mouth three (3) times daily as needed for Muscle Spasm(s). No current facility-administered medications for this visit. I have reviewed the nurses notes, vitals, problem list, allergy list, medical history, family, social history and medications. REVIEW OF SYMPTOMS      General: Pt denies excessive weight gain or loss. Pt is able to conduct ADL's  HEENT: Denies blurred vision, headaches, hearing loss, epistaxis and difficulty swallowing. Respiratory: Denies cough, congestion, shortness of breath, MCRAE, wheezing or stridor. Cardiovascular: Denies precordial pain, palpitations, edema or PND  Gastrointestinal: Denies poor appetite, indigestion, abdominal pain or blood in stool  Genitourinary: Denies hematuria, dysuria, increased urinary frequency  Musculoskeletal: Denies joint pain or swelling from muscles or joints  Neurologic: Denies tremor, paresthesias, headache, or sensory motor disturbance  Psychiatric: Denies confusion, insomnia, depression  Integumentray: Denies rash, itching or ulcers. Hematologic: Denies easy bruising, bleeding       PHYSICAL EXAMINATION      Vitals:    07/26/17 1305   Resp: 16   Weight: 280 lb 9.6 oz (127.3 kg)   Height: 6' 5\" (1.956 m)     General: Well developed, in no acute distress.   HEENT: No jaundice, oral mucosa moist, no oral ulcers  Neck: Supple, no stiffness, no lymphadenopathy, supple  Heart:  Normal S1/S2 negative S3 or S4. Regular, no murmur, gallop or rub, no jugular venous distention  Respiratory: Clear bilaterally x 4, no wheezing or rales  Abdomen:   Soft, non-tender, bowel sounds are active.   Extremities:  No edema, normal cap refill, no cyanosis. Musculoskeletal: No clubbing, no deformities  Neuro: A&Ox3, speech clear, gait stable, cooperative, no focal neurologic deficits  Skin: Skin color is normal. No rashes or lesions. Non diaphoretic, moist.  Vascular: 2+ pulses symmetric in all extremities       DIAGNOSTIC DATA      EKG:        LABORATORY DATA      Lab Results   Component Value Date/Time    WBC 8.8 08/08/2016 10:28 AM    HGB 12.4 08/18/2016 02:47 AM    HCT 40.1 08/08/2016 10:28 AM    PLATELET 767 96/23/9972 10:28 AM    MCV 90.1 08/08/2016 10:28 AM      Lab Results   Component Value Date/Time    Sodium 140 08/19/2016 03:36 AM    Potassium 4.1 08/19/2016 03:36 AM    Chloride 105 08/19/2016 03:36 AM    CO2 27 08/19/2016 03:36 AM    Anion gap 8 08/19/2016 03:36 AM    Glucose 133 08/19/2016 03:36 AM    BUN 23 08/19/2016 03:36 AM    Creatinine 1.47 08/19/2016 03:36 AM    BUN/Creatinine ratio 16 08/19/2016 03:36 AM    GFR est AA 57 08/19/2016 03:36 AM    GFR est non-AA 47 08/19/2016 03:36 AM    Calcium 8.0 08/19/2016 03:36 AM    Bilirubin, total 0.6 08/08/2016 10:28 AM    AST (SGOT) 18 08/08/2016 10:28 AM    Alk. phosphatase 63 08/08/2016 10:28 AM    Protein, total 6.6 08/08/2016 10:28 AM    Albumin 3.6 08/08/2016 10:28 AM    Globulin 3.0 08/08/2016 10:28 AM    A-G Ratio 1.2 08/08/2016 10:28 AM    ALT (SGPT) 24 08/08/2016 10:28 AM           ASSESSMENT      1. Atrial fibrillation                        A. Paroxsymal                         B. Asymptomatic   2. Supraventricular tachycardia  3. Hypertension  4. Hyperlipidemia  5. Venous insufficiency  6. First degree AV block   7. AVI on CPAP   8.  Bradycardia       PLAN     Will increase lisinopril to 40mg daily. He is going to replace home blood pressure monitoring system and continue to monitor blood pressure readings at home. FOLLOW-UP     1 month      Thank you, Chely Ferrell MD for allowing me to participate in the care of this extraordinarily pleasant male. Please do not hesitate to contact me for further questions/concerns.      LUIS ALBERTO Sweeney MD  Cardiac Electrophysiology / Cardiology    [address]  [address] [place] [address], [facility], [address]  [phone] / [phone] Fax   [phone] / [phone] Fax

## 2017-07-27 RX ORDER — LISINOPRIL 40 MG/1
TABLET ORAL
Qty: 90 TAB | Refills: 6 | Status: SHIPPED | OUTPATIENT
Start: 2017-07-27 | End: 2017-11-27 | Stop reason: SDUPTHER

## 2017-08-08 ENCOUNTER — OFFICE VISIT (OUTPATIENT)
Dept: CARDIOLOGY CLINIC | Age: 72
End: 2017-08-08

## 2017-08-08 DIAGNOSIS — Z95.818 STATUS POST PLACEMENT OF IMPLANTABLE LOOP RECORDER: Primary | ICD-10-CM

## 2017-08-18 ENCOUNTER — OFFICE VISIT (OUTPATIENT)
Dept: CARDIOLOGY CLINIC | Age: 72
End: 2017-08-18

## 2017-08-18 DIAGNOSIS — Z95.818 STATUS POST PLACEMENT OF IMPLANTABLE LOOP RECORDER: Primary | ICD-10-CM

## 2017-08-29 ENCOUNTER — OFFICE VISIT (OUTPATIENT)
Dept: CARDIOLOGY CLINIC | Age: 72
End: 2017-08-29

## 2017-08-29 DIAGNOSIS — Z95.818 STATUS POST PLACEMENT OF IMPLANTABLE LOOP RECORDER: Primary | ICD-10-CM

## 2017-08-30 ENCOUNTER — OFFICE VISIT (OUTPATIENT)
Dept: CARDIOLOGY CLINIC | Age: 72
End: 2017-08-30

## 2017-08-30 VITALS
DIASTOLIC BLOOD PRESSURE: 80 MMHG | HEIGHT: 77 IN | HEART RATE: 54 BPM | OXYGEN SATURATION: 98 % | SYSTOLIC BLOOD PRESSURE: 150 MMHG | RESPIRATION RATE: 16 BRPM | WEIGHT: 278 LBS | BODY MASS INDEX: 32.82 KG/M2

## 2017-08-30 DIAGNOSIS — E78.5 HYPERLIPIDEMIA WITH TARGET LDL LESS THAN 130: ICD-10-CM

## 2017-08-30 DIAGNOSIS — I10 ESSENTIAL HYPERTENSION, BENIGN: ICD-10-CM

## 2017-08-30 DIAGNOSIS — G47.33 OSA (OBSTRUCTIVE SLEEP APNEA): ICD-10-CM

## 2017-08-30 DIAGNOSIS — I47.1 SVT (SUPRAVENTRICULAR TACHYCARDIA) (HCC): ICD-10-CM

## 2017-08-30 DIAGNOSIS — I48.0 PAROXYSMAL ATRIAL FIBRILLATION (HCC): Primary | ICD-10-CM

## 2017-08-30 RX ORDER — AMLODIPINE BESYLATE 5 MG/1
5 TABLET ORAL DAILY
Qty: 30 TAB | Refills: 3 | Status: SHIPPED | OUTPATIENT
Start: 2017-08-30 | End: 2017-08-30 | Stop reason: SDUPTHER

## 2017-08-30 RX ORDER — AMLODIPINE BESYLATE 5 MG/1
TABLET ORAL
Qty: 90 TAB | Refills: 3 | Status: SHIPPED | OUTPATIENT
Start: 2017-08-30 | End: 2017-11-27 | Stop reason: SDUPTHER

## 2017-09-06 ENCOUNTER — OFFICE VISIT (OUTPATIENT)
Dept: CARDIOLOGY CLINIC | Age: 72
End: 2017-09-06

## 2017-09-06 DIAGNOSIS — Z95.818 STATUS POST PLACEMENT OF IMPLANTABLE LOOP RECORDER: Primary | ICD-10-CM

## 2017-09-15 ENCOUNTER — OFFICE VISIT (OUTPATIENT)
Dept: CARDIOLOGY CLINIC | Age: 72
End: 2017-09-15

## 2017-09-15 DIAGNOSIS — Z95.818 STATUS POST PLACEMENT OF IMPLANTABLE LOOP RECORDER: Primary | ICD-10-CM

## 2017-09-18 ENCOUNTER — DOCUMENTATION ONLY (OUTPATIENT)
Dept: CARDIOLOGY CLINIC | Age: 72
End: 2017-09-18

## 2017-09-18 NOTE — PROGRESS NOTES
Spoke to pt regarding his Linq implantable loop. Went over instructions on sending and asked he send a manual once a week since he has a lot of viv episodes that record. Pt stated he understands and will do today.

## 2017-09-28 ENCOUNTER — OFFICE VISIT (OUTPATIENT)
Dept: CARDIOLOGY CLINIC | Age: 72
End: 2017-09-28

## 2017-09-28 DIAGNOSIS — Z95.818 STATUS POST PLACEMENT OF IMPLANTABLE LOOP RECORDER: Primary | ICD-10-CM

## 2017-10-01 RX ORDER — LEVOCETIRIZINE DIHYDROCHLORIDE 5 MG/1
TABLET, FILM COATED ORAL
Qty: 90 TAB | Refills: 2 | Status: SHIPPED | OUTPATIENT
Start: 2017-10-01 | End: 2018-06-30 | Stop reason: SDUPTHER

## 2017-10-02 ENCOUNTER — OFFICE VISIT (OUTPATIENT)
Dept: CARDIOLOGY CLINIC | Age: 72
End: 2017-10-02

## 2017-10-02 DIAGNOSIS — Z95.818 STATUS POST PLACEMENT OF IMPLANTABLE LOOP RECORDER: Primary | ICD-10-CM

## 2017-10-09 ENCOUNTER — OFFICE VISIT (OUTPATIENT)
Dept: CARDIOLOGY CLINIC | Age: 72
End: 2017-10-09

## 2017-10-09 VITALS
SYSTOLIC BLOOD PRESSURE: 118 MMHG | HEIGHT: 77 IN | BODY MASS INDEX: 32.71 KG/M2 | DIASTOLIC BLOOD PRESSURE: 70 MMHG | OXYGEN SATURATION: 99 % | WEIGHT: 277 LBS | HEART RATE: 50 BPM

## 2017-10-09 DIAGNOSIS — Z95.818 STATUS POST PLACEMENT OF IMPLANTABLE LOOP RECORDER: Primary | ICD-10-CM

## 2017-10-09 DIAGNOSIS — I10 ESSENTIAL HYPERTENSION: Primary | ICD-10-CM

## 2017-10-09 NOTE — PROGRESS NOTES
HISTORY OF PRESENTING ILLNESS      Earline Becker is a 74 y.o. male with HTN, SVT, AF, AVI on CPAP and hyperlipidemia whose Cardizem was reduced due to bradycardia in the past.  Last visit we added amlodipine to his medical regimen for enhanced BP control. He presents today for follow-up. He is tolerating his current drug regimen thus far. His blood pressure is now well controlled on his BP log. Of note he has extensive varicose veins observed on bilateral external lower extremities.        ACTIVE PROBLEM LIST     Patient Active Problem List    Diagnosis Date Noted    Advanced care planning/counseling discussion 02/03/2017    Paroxysmal atrial fibrillation ([address] 09/28/2016    BPH (benign prostatic hyperplasia) 08/17/2016    Insomnia 08/17/2016    Osteoarthritis of left knee 08/15/2016    Spinal stenosis in cervical region 01/05/2016    Herniated nucleus pulposus, C3-4 01/05/2016    Normal cardiac stress test 09/21/2015    ED (erectile dysfunction) 06/18/2014    IFG (impaired fasting glucose)     SVT (supraventricular tachycardia) ([address]     Right knee DJD 11/04/2013    Spinal stenosis of lumbar region     Tear of medial cartilage or meniscus of knee, current 02/01/2013    Osteoarthrosis, unspecified whether generalized or localized, lower leg 02/01/2013    Palpitations 12/12/2011    Supraventricular tachycardia ([address] 12/12/2011    HLD (hyperlipidemia) 12/12/2011    Essential hypertension, benign 12/12/2011    Varicose veins 12/12/2011    Colon polyps     AVI (obstructive sleep apnea)     Heart palpitations     Hyperlipidemia with target LDL less than 130     BPH (benign prostatic hypertrophy)     Lower back pain     Knee pain, right     Right sided sciatica            PAST MEDICAL HISTORY     Past Medical History:   Diagnosis Date    Arthritis     left knee and lt. hand    Atrial fibrillation (HCC)     BPH (benign prostatic hyperplasia)     Chronic pain     Back pain    Colon polyps     DJD (degenerative joint disease) of lumbar spine     laminectomy 1979, 1991, 2015    ED (erectile dysfunction) 6/18/2014    Heart palpitations     Dr. German Montanez    Herniated nucleus pulposus, C3-4 12/2015    Dr. Alexandria Shelton Hyperlipidemia     Hypertension     IFG (impaired fasting glucose)     Insomnia     Knee pain, right     Lower back pain     hx lumbar laminectomyx2 w good results    Normal cardiac stress test 9/21/15    OA (osteoarthritis) of knee     Dr. Dariel Ga    Obesity     AVI (obstructive sleep apnea)     Dr. Rafat Metz Right sided sciatica     Seasonal allergic rhinitis     Spinal stenosis in cervical region 12/2015    Spinal stenosis of lumbar region     MRI 12/2012. Dr. Wanda Jones    SVT (supraventricular tachycardia) [facility])     Dr. Syl Bowen sleep apnea     cpap    Varicose veins     vein stripping 10/10           PAST SURGICAL HISTORY     Past Surgical History:   Procedure Laterality Date    COLONOSCOPY  2008    2 polyps. repeat 2011. Dr. Erasmo Altamirano  2007    7 polyps per pt     COLONOSCOPY N/A 6/15/2016    COLONOSCOPY performed by Raquel Kulkarni MD at [address] [place] [address] Andrews, COLON, DIAGNOSTIC  2011    return in 2016    HX BLADDER REPAIR      polyp removal    HX CERVICAL FUSION  1/16/16    ACDF C3-C4 Dr. Wanda Jones    HX COLONOSCOPY  4/27/11    Dr Osmani Liz, [facility] polyp.   repeat 4/2016    HX KNEE ARTHROSCOPY  2005    right, Dr. Betancur Six    HX KNEE ARTHROSCOPY  02/01/2012    left, Dr Betancur Six    HX KNEE ARTHROSCOPY  1/2013    right    HX KNEE REPLACEMENT  11/4/2013    HX LUMBAR FUSION  9/2015    L3-L4, Dr. Alcides Olsen      left, Dr Birdie Belle    bladder polyp removed with cystoscopy    HX VEIN STRIPPING  9/2010    Dr. Cadence Grewal, bilateral    SHOULDER SURG [address] UNLISTED      left DECOMPRESSION, Dr. Maria Teresa Garnett [address] TOTAL KNEE ARTHROPLASTY  11/2013    VASCULAR SURGERY PROCEDURE UNLIST  2008    bilateral vein stripping          ALLERGIES     Allergies   Allergen Reactions    Percocet [Oxycodone-Acetaminophen] Other (comments)     hallucinations    Penicillin G Rash     Did okay with keflex - no reaction with that. FAMILY HISTORY     Family History   Problem Relation Age of Onset    Cancer Mother      liver    Cancer Father      leukemia    Cancer Sister      lung cancer    negative for cardiac disease       SOCIAL HISTORY     Social History     Social History    Marital status:      Spouse name: Kristine Chapman Number of children: 3    Years of education: N/A     Social History Main Topics    Smoking status: Former Smoker     Packs/day: 0.25     Years: 19.00     Types: Cigarettes     Quit date: 1/1/1980    Smokeless tobacco: Never Used      Comment: quit ~1980    Alcohol use 0.6 oz/week     1 Glasses of wine per week      Comment: socially    Drug use: No    Sexual activity: Yes     Other Topics Concern     Service Yes     army     Social History Narrative    ** Merged History Encounter **         1 child, 2 stepchildren    fishes         MEDICATIONS     Current Outpatient Prescriptions   Medication Sig    levocetirizine (XYZAL) 5 mg tablet TAKE 1 TABLET DAILY    amLODIPine (NORVASC) 5 mg tablet TAKE 1 TABLET BY MOUTH DAILY    lisinopril (PRINIVIL, ZESTRIL) 40 mg tablet TAKE 1 TABLET BY MOUTH DAILY    zolpidem CR (AMBIEN CR) 12.5 mg tablet TAKE 1 TABLET BY MOUTH AT NIGHT AS NEEDED FOR SLEEP    metoprolol succinate (TOPROL-XL) 25 mg XL tablet TAKE 1/2 TABLET BY MOUTH EVERY DAY (Patient taking differently: TAKE 1/2 TABLET BY MOUTH twice daily)    apixaban (ELIQUIS) 5 mg tablet Take 1 Tab by mouth two (2) times a day.     terazosin (HYTRIN) 5 mg capsule TAKE 1 CAPSULE DAILY    atorvastatin (LIPITOR) 20 mg tablet TAKE 1 TABLET DAILY    diclofenac EC (VOLTAREN) 75 mg EC tablet TAKE 1 TABLET TWICE A DAY AS NEEDED    gabapentin (NEURONTIN) 300 mg capsule Take 300 mg by mouth nightly as needed for Pain. Indications: generalized joint pain    cyclobenzaprine (FLEXERIL) 10 mg tablet Take 1 Tab by mouth three (3) times daily as needed for Muscle Spasm(s). No current facility-administered medications for this visit. I have reviewed the nurses notes, vitals, problem list, allergy list, medical history, family, social history and medications. REVIEW OF SYMPTOMS      General: Pt denies excessive weight gain or loss. Pt is able to conduct ADL's  HEENT: Denies blurred vision, headaches, hearing loss, epistaxis and difficulty swallowing. Respiratory: Denies cough, congestion, shortness of breath, MCRAE, wheezing or stridor. Cardiovascular: Denies precordial pain, palpitations, edema or PND  Gastrointestinal: Denies poor appetite, indigestion, abdominal pain or blood in stool  Genitourinary: Denies hematuria, dysuria, increased urinary frequency  Musculoskeletal: Denies joint pain or swelling from muscles or joints  Neurologic: Denies tremor, paresthesias, headache, or sensory motor disturbance  Psychiatric: Denies confusion, insomnia, depression  Integumentray: Denies rash, itching or ulcers. Hematologic: Denies easy bruising, bleeding     PHYSICAL EXAMINATION      Vitals:    10/09/17 1051   BP: 118/70   Pulse: (!) 50   SpO2: 99%   Weight: 277 lb (125.6 kg)   Height: 6' 5\" (1.956 m)     General: Well developed, in no acute distress. HEENT: No jaundice, oral mucosa moist, no oral ulcers  Neck: Supple, no stiffness, no lymphadenopathy, supple  Heart:  Normal S1/S2 negative S3 or S4. Regular, no murmur, gallop or rub, no jugular venous distention  Respiratory: Clear bilaterally x 4, no wheezing or rales  Abdomen:   Soft, non-tender, bowel sounds are active.   Extremities:  No edema, normal cap refill, no cyanosis.   Musculoskeletal: No clubbing, no deformities  Neuro: A&Ox3, speech clear, gait stable, cooperative, no focal neurologic deficits  Skin: Skin color is normal. No rashes or lesions. Non diaphoretic, moist.  Vascular: 2+ pulses symmetric in all extremities       DIAGNOSTIC DATA             LABORATORY DATA      Lab Results   Component Value Date/Time    WBC 8.8 08/08/2016 10:28 AM    HGB 12.4 08/18/2016 02:47 AM    HCT 40.1 08/08/2016 10:28 AM    PLATELET 441 40/61/4510 10:28 AM    MCV 90.1 08/08/2016 10:28 AM      Lab Results   Component Value Date/Time    Sodium 140 08/19/2016 03:36 AM    Potassium 4.1 08/19/2016 03:36 AM    Chloride 105 08/19/2016 03:36 AM    CO2 27 08/19/2016 03:36 AM    Anion gap 8 08/19/2016 03:36 AM    Glucose 133 08/19/2016 03:36 AM    BUN 23 08/19/2016 03:36 AM    Creatinine 1.47 08/19/2016 03:36 AM    BUN/Creatinine ratio 16 08/19/2016 03:36 AM    GFR est AA 57 08/19/2016 03:36 AM    GFR est non-AA 47 08/19/2016 03:36 AM    Calcium 8.0 08/19/2016 03:36 AM    Bilirubin, total 0.6 08/08/2016 10:28 AM    AST (SGOT) 18 08/08/2016 10:28 AM    Alk. phosphatase 63 08/08/2016 10:28 AM    Protein, total 6.6 08/08/2016 10:28 AM    Albumin 3.6 08/08/2016 10:28 AM    Globulin 3.0 08/08/2016 10:28 AM    A-G Ratio 1.2 08/08/2016 10:28 AM    ALT (SGPT) 24 08/08/2016 10:28 AM           ASSESSMENT      1. Supraventricular tachycardia  2. Hypertension  3. Hyperlipidemia  4. Venous insufficiency  5. First degree AV block    6. AVI on CPAP       PLAN     Continue current drug regimen. Encourage hydration/exercise. He underwent recent steroid injection for degenerative disc disease in his lower back. Recommended compression socks for his venous insufficiency. FOLLOW-UP     6 months      Thank you,  Lisa Puri MD for involving me in the care of this extraordinarily pleasant male. Please do not hesitate to contact me for further questions/concerns.          Jaciel Kidd MD  Cardiac Electrophysiology / Cardiology    [address] [place] [address], [address] [place] [address] Drive    Eddie Andrésmofrancoise  [phone] / [phone] Fax   [phone] / [phone] Fax

## 2017-10-17 ENCOUNTER — OFFICE VISIT (OUTPATIENT)
Dept: CARDIOLOGY CLINIC | Age: 72
End: 2017-10-17

## 2017-10-17 DIAGNOSIS — Z95.818 STATUS POST PLACEMENT OF IMPLANTABLE LOOP RECORDER: Primary | ICD-10-CM

## 2017-10-24 RX ORDER — ZOLPIDEM TARTRATE 12.5 MG/1
TABLET, FILM COATED, EXTENDED RELEASE ORAL
Qty: 30 TAB | Refills: 3 | OUTPATIENT
Start: 2017-10-24 | End: 2017-10-26 | Stop reason: SDUPTHER

## 2017-10-25 ENCOUNTER — OFFICE VISIT (OUTPATIENT)
Dept: CARDIOLOGY CLINIC | Age: 72
End: 2017-10-25

## 2017-10-25 DIAGNOSIS — Z95.818 STATUS POST PLACEMENT OF IMPLANTABLE LOOP RECORDER: Primary | ICD-10-CM

## 2017-10-26 RX ORDER — ZOLPIDEM TARTRATE 12.5 MG/1
TABLET, FILM COATED, EXTENDED RELEASE ORAL
Qty: 30 TAB | Refills: 3 | Status: SHIPPED | OUTPATIENT
Start: 2017-10-26 | End: 2018-01-19 | Stop reason: ALTCHOICE

## 2017-10-26 NOTE — TELEPHONE ENCOUNTER
Pharmacy has not received refill request for ambien . Pt would like a call back . They are heading out of town.

## 2017-10-31 ENCOUNTER — OFFICE VISIT (OUTPATIENT)
Dept: CARDIOLOGY CLINIC | Age: 72
End: 2017-10-31

## 2017-10-31 DIAGNOSIS — Z95.818 STATUS POST PLACEMENT OF IMPLANTABLE LOOP RECORDER: Primary | ICD-10-CM

## 2017-11-14 ENCOUNTER — OFFICE VISIT (OUTPATIENT)
Dept: CARDIOLOGY CLINIC | Age: 72
End: 2017-11-14

## 2017-11-14 DIAGNOSIS — Z95.818 STATUS POST PLACEMENT OF IMPLANTABLE LOOP RECORDER: Primary | ICD-10-CM

## 2017-11-27 DIAGNOSIS — I47.1 SVT (SUPRAVENTRICULAR TACHYCARDIA) (HCC): ICD-10-CM

## 2017-11-27 DIAGNOSIS — I48.0 PAROXYSMAL ATRIAL FIBRILLATION (HCC): ICD-10-CM

## 2017-11-27 DIAGNOSIS — I10 ESSENTIAL HYPERTENSION: ICD-10-CM

## 2017-11-27 RX ORDER — METOPROLOL SUCCINATE 25 MG/1
TABLET, EXTENDED RELEASE ORAL
Qty: 90 TAB | Refills: 3 | Status: SHIPPED | OUTPATIENT
Start: 2017-11-27 | End: 2018-01-08 | Stop reason: ALTCHOICE

## 2017-11-27 RX ORDER — LISINOPRIL 40 MG/1
TABLET ORAL
Qty: 90 TAB | Refills: 3 | Status: SHIPPED | OUTPATIENT
Start: 2017-11-27 | End: 2018-10-08 | Stop reason: SDUPTHER

## 2017-11-27 RX ORDER — AMLODIPINE BESYLATE 5 MG/1
TABLET ORAL
Qty: 90 TAB | Refills: 3 | Status: SHIPPED | OUTPATIENT
Start: 2017-11-27 | End: 2018-02-21 | Stop reason: SDUPTHER

## 2017-11-27 NOTE — TELEPHONE ENCOUNTER
Requested Prescriptions     Signed Prescriptions Disp Refills    lisinopril (PRINIVIL, ZESTRIL) 40 mg tablet 90 Tab 3     Sig: TAKE 1 TABLET BY MOUTH DAILY     Authorizing Provider: Fartun Culver     Ordering User: Kell Jhonatanamado A    amLODIPine (NORVASC) 5 mg tablet 90 Tab 3     Sig: TAKE 1 TABLET BY MOUTH DAILY     Authorizing Provider: Fartun Culver     Ordering User: Kell Del Cid A    metoprolol succinate (TOPROL-XL) 25 mg XL tablet 90 Tab 3     Sig: TAKE 1/2 TABLET BY MOUTH twice daily     Authorizing Provider: Fartun Culver     Ordering User: Naida Angela     Verbal order for refill per Dr. Beryl Roman

## 2017-11-27 NOTE — TELEPHONE ENCOUNTER
Requested Prescriptions     Signed Prescriptions Disp Refills    apixaban (ELIQUIS) 5 mg tablet 180 Tab 3     Sig: Take 1 Tab by mouth two (2) times a day.      Authorizing Provider: Trang Villatoro     Ordering User: Yandel Golden     Verbal order for refill per Dr. Kendal Scott

## 2017-11-28 ENCOUNTER — OFFICE VISIT (OUTPATIENT)
Dept: INTERNAL MEDICINE CLINIC | Age: 72
End: 2017-11-28

## 2017-11-28 VITALS
HEART RATE: 47 BPM | WEIGHT: 283 LBS | RESPIRATION RATE: 18 BRPM | SYSTOLIC BLOOD PRESSURE: 147 MMHG | TEMPERATURE: 98.5 F | HEIGHT: 77 IN | DIASTOLIC BLOOD PRESSURE: 72 MMHG | OXYGEN SATURATION: 96 % | BODY MASS INDEX: 33.42 KG/M2

## 2017-11-28 DIAGNOSIS — H61.21 IMPACTED CERUMEN OF RIGHT EAR: ICD-10-CM

## 2017-11-28 DIAGNOSIS — J40 BRONCHITIS: Primary | ICD-10-CM

## 2017-11-28 RX ORDER — CODEINE PHOSPHATE AND GUAIFENESIN 10; 100 MG/5ML; MG/5ML
5 SOLUTION ORAL
Qty: 118 ML | Refills: 0 | Status: SHIPPED | OUTPATIENT
Start: 2017-11-28 | End: 2018-01-19 | Stop reason: ALTCHOICE

## 2017-11-28 RX ORDER — AZITHROMYCIN 250 MG/1
250 TABLET, FILM COATED ORAL SEE ADMIN INSTRUCTIONS
Qty: 6 TAB | Refills: 0 | Status: SHIPPED | OUTPATIENT
Start: 2017-11-28 | End: 2017-12-03

## 2017-11-28 NOTE — MR AVS SNAPSHOT
Visit Information Date & Time Provider Department Dept. Phone Encounter #  
 11/28/2017  4:00 PM Marvin Maldonado NP [facility] [address]  [id number] Your Appointments 4/25/2018  9:00 AM  
ESTABLISHED PATIENT with Santosh Street MD  
[facility] ([address]) Appt Note: 6 mo fu  
 [address] [facility] [address]  
[address] [facility] [address] [address] Upcoming Health Maintenance Date Due Influenza Age 5 to Adult 8/1/2017 GLAUCOMA SCREENING Q2Y 10/16/2017 MEDICARE YEARLY EXAM 2/4/2018 COLONOSCOPY 6/15/2021 DTaP/Tdap/Td series (2 - Td) 10/14/2023 Allergies as of 11/28/2017  Review Complete On: 11/28/2017 By: Marvin Maldonado NP Severity Noted Reaction Type Reactions Percocet [Oxycodone-acetaminophen] Medium 12/12/2011   Side Effect Other (comments)  
 hallucinations Penicillin G Low 12/12/2011   Side Effect Rash Did okay with keflex - no reaction with that. Current Immunizations  Reviewed on 2/3/2017 Name Date Hepatitis B Vaccine 1/1/1994 Influenza High Dose Vaccine PF 12/3/2016 Influenza Vaccine 12/3/2016, 11/16/2015, 10/14/2013 Pneumococcal Conjugate (PCV-13) 1/1/2013 Pneumococcal Polysaccharide (PPSV-23) 6/18/2014 TD Vaccine 1/1/2001 Tdap 10/14/2013 ZZZ-RETIRED (DO NOT USE) Pneumococcal Vaccine (Unspecified Type) 10/16/2005 Zoster Vaccine, Live 1/1/2012 Not reviewed this visit You Were Diagnosed With   
  
 Codes Comments Bronchitis    -  Primary ICD-10-CM: S91 ICD-9-CM: 753 Impacted cerumen of right ear     ICD-10-CM: H61.21 ICD-9-CM: 380.4 Vitals BP Pulse Temp Resp Height(growth percentile) Weight(growth percentile)  147/72 (BP 1 Location: Left arm, BP Patient Position: Sitting) (!) 47 98.5 °F (36.9 °C) (Oral) 18 6' 5\" (1.956 m) 283 lb (128.4 kg) SpO2 BMI Smoking Status 96% 33.56 kg/m2 Former Smoker Vitals History BMI and BSA Data Body Mass Index Body Surface Area  
 33.56 kg/m 2 2.64 m 2 Preferred Pharmacy Pharmacy Name Phone [facility] [address] [place] [address] Dr ALLEN AT [facility] [phone] Your Updated Medication List  
  
   
This list is accurate as of: 11/28/17  4:43 PM.  Always use your most recent med list. amLODIPine 5 mg tablet Commonly known as:  Wandra Reina TAKE 1 TABLET BY MOUTH DAILY  
  
 apixaban 5 mg tablet Commonly known as:  Delonte Woodbine Take 1 Tab by mouth two (2) times a day. atorvastatin 20 mg tablet Commonly known as:  LIPITOR  
TAKE 1 TABLET DAILY  
  
 azithromycin 250 mg tablet Commonly known as:  Nicole Kellie Take 1 Tab by mouth See Admin Instructions for 5 days. carbamide peroxide 6.5 % otic solution Commonly known as:  Gino Ahuja Administer 5 Drops into each ear two (2) times a day. cyclobenzaprine 10 mg tablet Commonly known as:  FLEXERIL Take 1 Tab by mouth three (3) times daily as needed for Muscle Spasm(s). diclofenac EC 75 mg EC tablet Commonly known as:  VOLTAREN  
TAKE 1 TABLET TWICE A DAY AS NEEDED  
  
 gabapentin 300 mg capsule Commonly known as:  NEURONTIN Take 300 mg by mouth nightly as needed for Pain. Indications: generalized joint pain  
  
 guaiFENesin-codeine 100-10 mg/5 mL solution Commonly known as:  ROBITUSSIN AC Take 5 mL by mouth nightly as needed for Cough. Max Daily Amount: 5 mL. levocetirizine 5 mg tablet Commonly known as:  Nikkie Acevedo TAKE 1 TABLET DAILY  
  
 lisinopril 40 mg tablet Commonly known as:  PRINIVIL, ZESTRIL  
TAKE 1 TABLET BY MOUTH DAILY  
  
 metoprolol succinate 25 mg XL tablet Commonly known as:  TOPROL-XL  
TAKE 1/2 TABLET BY MOUTH twice daily terazosin 5 mg capsule Commonly known as:  HYTRIN  
TAKE 1 CAPSULE DAILY  
  
 zolpidem CR 12.5 mg tablet Commonly known as:  AMBIEN CR  
TAKE 1 TABLET BY MOUTH AT NIGHT AS NEEDED FOR SLEEP Prescriptions Printed Refills  
 guaiFENesin-codeine (ROBITUSSIN AC) 100-10 mg/5 mL solution 0 Sig: Take 5 mL by mouth nightly as needed for Cough. Max Daily Amount: 5 mL. Class: Print Route: Oral  
  
Prescriptions Sent to Pharmacy Refills  
 azithromycin (ZITHROMAX) 250 mg tablet 0 Sig: Take 1 Tab by mouth See Admin Instructions for 5 days. Class: Normal  
 Pharmacy: [facility] [address] [facility] #: [phone] Route: Oral  
  
Patient Instructions Bronchitis: Care Instructions Your Care Instructions Bronchitis is inflammation of the bronchial tubes, which carry air to the lungs. The tubes swell and produce mucus, or phlegm. The mucus and inflamed bronchial tubes make you cough. You may have trouble breathing. Most cases of bronchitis are caused by viruses like those that cause colds. Antibiotics usually do not help and they may be harmful. Bronchitis usually develops rapidly and lasts about 2 to 3 weeks in otherwise healthy people. Follow-up care is a key part of your treatment and safety. Be sure to make and go to all appointments, and call your doctor if you are having problems. It's also a good idea to know your test results and keep a list of the medicines you take. How can you care for yourself at home? · Take all medicines exactly as prescribed. Call your doctor if you think you are having a problem with your medicine. · Get some extra rest. 
· Take an over-the-counter pain medicine, such as acetaminophen (Tylenol), ibuprofen (Advil, Motrin), or naproxen (Aleve) to reduce fever and relieve body aches. Read and follow all instructions on the label. · Do not take two or more pain medicines at the same time unless the doctor told you to. Many pain medicines have acetaminophen, which is Tylenol. Too much acetaminophen (Tylenol) can be harmful. · Take an over-the-counter cough medicine that contains dextromethorphan to help quiet a dry, hacking cough so that you can sleep. Avoid cough medicines that have more than one active ingredient. Read and follow all instructions on the label. · Breathe moist air from a humidifier, hot shower, or sink filled with hot water. The heat and moisture will thin mucus so you can cough it out. · Do not smoke. Smoking can make bronchitis worse. If you need help quitting, talk to your doctor about stop-smoking programs and medicines. These can increase your chances of quitting for good. When should you call for help? Call 911 anytime you think you may need emergency care. For example, call if: 
? · You have severe trouble breathing. ?Call your doctor now or seek immediate medical care if: 
? · You have new or worse trouble breathing. ? · You cough up dark brown or bloody mucus (sputum). ? · You have a new or higher fever. ? · You have a new rash. ? Watch closely for changes in your health, and be sure to contact your doctor if: 
? · You cough more deeply or more often, especially if you notice more mucus or a change in the color of your mucus. ? · You are not getting better as expected. Where can you learn more? Go to http://corrine-violeta.info/. Enter H333 in the search box to learn more about \"Bronchitis: Care Instructions. \" Current as of: May 12, 2017 Content Version: 11.4 © 3323-1090 [employer]). Care instructions adapted under license by [employer] (which disclaims liability or warranty for this information).  If you have questions about a medical condition or this instruction, always ask your healthcare professional. [employer] disclaims any warranty or liability for your use of this information. Earwax Blockage: Care Instructions Your Care Instructions Earwax is a natural substance that protects the ear canal. Normally, earwax drains from the ears and does not cause problems. Sometimes earwax builds up and hardens. Earwax blockage (also called cerumen impaction) can cause some loss of hearing and pain. When wax is tightly packed, you will need to have your doctor remove it. Follow-up care is a key part of your treatment and safety. Be sure to make and go to all appointments, and call your doctor if you are having problems. It's also a good idea to know your test results and keep a list of the medicines you take. How can you care for yourself at home? · Do not try to remove earwax with cotton swabs, fingers, or other objects. This can make the blockage worse and damage the eardrum. · If your doctor recommends that you try to remove earwax at home: ¨ Soften and loosen the earwax with warm mineral oil. You also can try hydrogen peroxide mixed with an equal amount of room temperature water. Place 2 drops of the fluid, warmed to body temperature, in the ear two times a day for up to 5 days. ¨ Once the wax is loose and soft, all that is usually needed to remove it from the ear canal is a gentle, warm shower. Direct the water into the ear, then tip your head to let the earwax drain out. Dry your ear thoroughly with a hair dryer set on low. Hold the dryer several inches from your ear. ¨ If the warm mineral oil and shower do not work, use an over-the-counter wax softener followed by gentle flushing with an ear syringe each night for a week or two. Make sure the flushing solution is body temperature. Cool or hot fluids in the ear can cause dizziness. When should you call for help? Call your doctor now or seek immediate medical care if: 
? · Pus or blood drains from your ear. ? · Your ears are ringing or feel full. ? · You have a loss of hearing. ? Watch closely for changes in your health, and be sure to contact your doctor if: 
? · You have pain or reduced hearing after 1 week of home treatment. ? · You have any new symptoms, such as nausea or balance problems. Where can you learn more? Go to http://corrine-violeta.info/. Enter P866 in the search box to learn more about \"Earwax Blockage: Care Instructions. \" Current as of: March 20, 2017 Content Version: 11.4 © 0223-8965 [employer]. Care instructions adapted under license by [employer] (which disclaims liability or warranty for this information). If you have questions about a medical condition or this instruction, always ask your healthcare professional. [address] any warranty or liability for your use of this information. Introducing [facility]! Dear Yue Wallace: Thank you for requesting a "[employer] account. Our records indicate that you already have an active "[employer] account. You can access your account anytime at [email or web address] Did you know that you can access your hospital and ER discharge instructions at any time in "[employer]? You can also review all of your test results from your hospital stay or ER visit. Additional Information If you have questions, please visit the Frequently Asked Questions section of the "[employer] website at [email or web address] Remember, "[employer] is NOT to be used for urgent needs. For medical emergencies, dial 911. Now available from your iPhone and Android! Please provide this summary of care documentation to your next provider. Your primary care clinician is listed as Danyelle Acuna. If you have any questions after today's visit, please call [phone].

## 2017-11-28 NOTE — PATIENT INSTRUCTIONS
Bronchitis: Care Instructions  Your Care Instructions    Bronchitis is inflammation of the bronchial tubes, which carry air to the lungs. The tubes swell and produce mucus, or phlegm. The mucus and inflamed bronchial tubes make you cough. You may have trouble breathing. Most cases of bronchitis are caused by viruses like those that cause colds. Antibiotics usually do not help and they may be harmful. Bronchitis usually develops rapidly and lasts about 2 to 3 weeks in otherwise healthy people. Follow-up care is a key part of your treatment and safety. Be sure to make and go to all appointments, and call your doctor if you are having problems. It's also a good idea to know your test results and keep a list of the medicines you take. How can you care for yourself at home? · Take all medicines exactly as prescribed. Call your doctor if you think you are having a problem with your medicine. · Get some extra rest.  · Take an over-the-counter pain medicine, such as acetaminophen (Tylenol), ibuprofen (Advil, Motrin), or naproxen (Aleve) to reduce fever and relieve body aches. Read and follow all instructions on the label. · Do not take two or more pain medicines at the same time unless the doctor told you to. Many pain medicines have acetaminophen, which is Tylenol. Too much acetaminophen (Tylenol) can be harmful. · Take an over-the-counter cough medicine that contains dextromethorphan to help quiet a dry, hacking cough so that you can sleep. Avoid cough medicines that have more than one active ingredient. Read and follow all instructions on the label. · Breathe moist air from a humidifier, hot shower, or sink filled with hot water. The heat and moisture will thin mucus so you can cough it out. · Do not smoke. Smoking can make bronchitis worse. If you need help quitting, talk to your doctor about stop-smoking programs and medicines. These can increase your chances of quitting for good.   When should you call for help? Call 911 anytime you think you may need emergency care. For example, call if:  ? · You have severe trouble breathing. ?Call your doctor now or seek immediate medical care if:  ? · You have new or worse trouble breathing. ? · You cough up dark brown or bloody mucus (sputum). ? · You have a new or higher fever. ? · You have a new rash. ? Watch closely for changes in your health, and be sure to contact your doctor if:  ? · You cough more deeply or more often, especially if you notice more mucus or a change in the color of your mucus. ? · You are not getting better as expected. Where can you learn more? Go to http://corrine-violeta.info/. Enter H333 in the search box to learn more about \"Bronchitis: Care Instructions. \"  Current as of: May 12, 2017  Content Version: 11.4  © 4268-7850 [employer]. Care instructions adapted under license by [employer] (which disclaims liability or warranty for this information). If you have questions about a medical condition or this instruction, always ask your healthcare professional. [place] [address] any warranty or liability for your use of this information. Earwax Blockage: Care Instructions  Your Care Instructions    Earwax is a natural substance that protects the ear canal. Normally, earwax drains from the ears and does not cause problems. Sometimes earwax builds up and hardens. Earwax blockage (also called cerumen impaction) can cause some loss of hearing and pain. When wax is tightly packed, you will need to have your doctor remove it. Follow-up care is a key part of your treatment and safety. Be sure to make and go to all appointments, and call your doctor if you are having problems. It's also a good idea to know your test results and keep a list of the medicines you take. How can you care for yourself at home? · Do not try to remove earwax with cotton swabs, fingers, or other objects. This can make the blockage worse and damage the eardrum. · If your doctor recommends that you try to remove earwax at home:  ¨ Soften and loosen the earwax with warm mineral oil. You also can try hydrogen peroxide mixed with an equal amount of room temperature water. Place 2 drops of the fluid, warmed to body temperature, in the ear two times a day for up to 5 days. ¨ Once the wax is loose and soft, all that is usually needed to remove it from the ear canal is a gentle, warm shower. Direct the water into the ear, then tip your head to let the earwax drain out. Dry your ear thoroughly with a hair dryer set on low. Hold the dryer several inches from your ear. ¨ If the warm mineral oil and shower do not work, use an over-the-counter wax softener followed by gentle flushing with an ear syringe each night for a week or two. Make sure the flushing solution is body temperature. Cool or hot fluids in the ear can cause dizziness. When should you call for help? Call your doctor now or seek immediate medical care if:  ? · Pus or blood drains from your ear. ? · Your ears are ringing or feel full. ? · You have a loss of hearing. ? Watch closely for changes in your health, and be sure to contact your doctor if:  ? · You have pain or reduced hearing after 1 week of home treatment. ? · You have any new symptoms, such as nausea or balance problems. Where can you learn more? Go to http://corrine-violeta.info/. Enter U348 in the search box to learn more about \"Earwax Blockage: Care Instructions. \"  Current as of: March 20, 2017  Content Version: 11.4  © 9329-3616 [employer]. Care instructions adapted under license by [facility] (which disclaims liability or warranty for this information).  If you have questions about a medical condition or this instruction, always ask your healthcare professional. [address] any warranty or liability for your use of this information.

## 2017-11-28 NOTE — PROGRESS NOTES
HISTORY OF PRESENT ILLNESS  Deric Randall is a 74 y.o. male. HPI  Upper respiratory illness:  Deric Randall presents with complaints of congestion, sore throat, post nasal drip, cough described as productive of yellow sputum, headache, lower sinus pain, fevers up to 99 degrees, yellow nasal discharge and hoarseness for 6 days. no nausea and no vomiting . he has not had  Shortness of breath. Symptoms are moderate. Over-the-counter remedies including Mucinex   has been used with poor relief of symptoms. cCough has been disturbing his sleep. Drinking plenty of fluids: yes  Asthma?:  no  non-smoker  Contacts with similar infections: yes         Review of Systems   Constitutional: Positive for chills, fever and malaise/fatigue. HENT: Positive for congestion, hearing loss, sinus pain and sore throat. Negative for ear pain. Respiratory: Positive for cough and sputum production. Negative for shortness of breath and wheezing. Cardiovascular: Negative for chest pain and palpitations. Gastrointestinal: Negative for nausea and vomiting. Musculoskeletal: Negative for myalgias. Neurological: Positive for headaches. Negative for dizziness. /72 (BP 1 Location: Left arm, BP Patient Position: Sitting)  Pulse (!) 47  Temp 98.5 °F (36.9 °C) (Oral)   Resp 18  Ht 6' 5\" (1.956 m)  Wt 283 lb (128.4 kg)  SpO2 96%  BMI 33.56 kg/m2  Physical Exam   Constitutional: He is oriented to person, place, and time. He appears well-developed and well-nourished. HENT:   Head: Normocephalic and atraumatic. Right Ear: External ear normal.   Left Ear: External ear normal.   Nose: Mucosal edema present. Right sinus exhibits maxillary sinus tenderness. Left sinus exhibits maxillary sinus tenderness. Mouth/Throat: Posterior oropharyngeal erythema present. No posterior oropharyngeal edema. Right ear canal occluded with hard brown cerumen   Neck: Normal range of motion. Neck supple. No thyromegaly present. Cardiovascular: Normal rate and regular rhythm. Pulmonary/Chest: Effort normal. He has no wheezes. Loose cough with upper chest congestion   Lymphadenopathy:     He has no cervical adenopathy. Neurological: He is alert and oriented to person, place, and time. Psychiatric: He has a normal mood and affect. His behavior is normal.   Nursing note and vitals reviewed. ASSESSMENT and PLAN  Diagnoses and all orders for this visit:    1. Bronchitis  -     azithromycin (ZITHROMAX) 250 mg tablet; Take 1 Tab by mouth See Admin Instructions for 5 days.  -     guaiFENesin-codeine (ROBITUSSIN AC) 100-10 mg/5 mL solution; Take 5 mL by mouth nightly as needed for Cough. Max Daily Amount: 5 mL. 2. Impacted cerumen of right ear - advised to use this for several days and if unable to remove cerumen, return to office for irrigation  -     carbamide peroxide (DEBROX) 6.5 % otic solution; Administer 5 Drops into each ear two (2) times a day.       reviewed diet, exercise and weight control  reviewed medications and side effects in detail

## 2017-11-30 ENCOUNTER — OFFICE VISIT (OUTPATIENT)
Dept: CARDIOLOGY CLINIC | Age: 72
End: 2017-11-30

## 2017-11-30 DIAGNOSIS — Z95.818 STATUS POST PLACEMENT OF IMPLANTABLE LOOP RECORDER: Primary | ICD-10-CM

## 2017-12-18 ENCOUNTER — OFFICE VISIT (OUTPATIENT)
Dept: CARDIOLOGY CLINIC | Age: 72
End: 2017-12-18

## 2017-12-18 DIAGNOSIS — Z95.818 STATUS POST PLACEMENT OF IMPLANTABLE LOOP RECORDER: Primary | ICD-10-CM

## 2017-12-26 ENCOUNTER — OFFICE VISIT (OUTPATIENT)
Dept: CARDIOLOGY CLINIC | Age: 72
End: 2017-12-26

## 2017-12-26 DIAGNOSIS — Z95.818 STATUS POST PLACEMENT OF IMPLANTABLE LOOP RECORDER: Primary | ICD-10-CM

## 2017-12-27 ENCOUNTER — TELEPHONE (OUTPATIENT)
Dept: INTERNAL MEDICINE CLINIC | Age: 72
End: 2017-12-27

## 2018-01-02 ENCOUNTER — OFFICE VISIT (OUTPATIENT)
Dept: CARDIOLOGY CLINIC | Age: 73
End: 2018-01-02

## 2018-01-02 DIAGNOSIS — Z95.818 STATUS POST PLACEMENT OF IMPLANTABLE LOOP RECORDER: Primary | ICD-10-CM

## 2018-01-03 ENCOUNTER — TELEPHONE (OUTPATIENT)
Dept: CARDIOLOGY CLINIC | Age: 73
End: 2018-01-03

## 2018-01-03 NOTE — TELEPHONE ENCOUNTER
Received a Linq alert this am from 1/2/18 9:26 am for AF w/3 sec pause. Showed Dr Osbaldo Golden & l/m for pt. Need to verify pts meds.

## 2018-01-05 NOTE — TELEPHONE ENCOUNTER
Per Dr Noble Yu wants to see pt next wk. Informed pt appt for [facility] 1/8/18 10:40 STF.

## 2018-01-05 NOTE — TELEPHONE ENCOUNTER
Spoke to pt regarding his Linq alert. He stated he doesn't remember what he was doing at the time but has not had any symptoms. Verified meds still taking Eliquis, Metoprolol 25 mg 1/2 twice daily. He has increased his Voltaren 75 mg 2 twice daily for back pain (this was the only change). Pt just called back stating that his wife informed him that at time of event he was sitting at home & c/o just not feeling good but couldn't explain why. Please advise.

## 2018-01-08 ENCOUNTER — OFFICE VISIT (OUTPATIENT)
Dept: CARDIOLOGY CLINIC | Age: 73
End: 2018-01-08

## 2018-01-08 VITALS
RESPIRATION RATE: 20 BRPM | WEIGHT: 289.8 LBS | HEIGHT: 77 IN | BODY MASS INDEX: 34.22 KG/M2 | DIASTOLIC BLOOD PRESSURE: 76 MMHG | HEART RATE: 48 BPM | SYSTOLIC BLOOD PRESSURE: 132 MMHG | OXYGEN SATURATION: 98 %

## 2018-01-08 DIAGNOSIS — G47.33 OSA (OBSTRUCTIVE SLEEP APNEA): ICD-10-CM

## 2018-01-08 DIAGNOSIS — I47.1 SUPRAVENTRICULAR TACHYCARDIA (HCC): ICD-10-CM

## 2018-01-08 DIAGNOSIS — I48.0 PAROXYSMAL ATRIAL FIBRILLATION (HCC): Primary | ICD-10-CM

## 2018-01-08 DIAGNOSIS — R00.1 BRADYCARDIA: ICD-10-CM

## 2018-01-08 DIAGNOSIS — I10 ESSENTIAL HYPERTENSION, BENIGN: ICD-10-CM

## 2018-01-08 NOTE — PROGRESS NOTES
HISTORY OF PRESENTING ILLNESS      Cayetano Mcclain is a 74 y.o. male with with HTN, SVT, AF, AVI on CPAP and hyperlipidemia whose Cardizem was reduced due to bradycardia in the past.  Last visit we added amlodipine to his medical regimen for enhanced BP control. His blood pressure is now well controlled on his BP log. Of note he has extensive varicose veins observed on bilateral external lower extremities. He is here for follow up today due to an alert on his ILR demonstrating a 3 second pause during AF. He reports feeling fatigued more than usual that day, but denies any other cardiac complaints.  EKG today demonstrates sinus bradycardia, which has been his baseline in the past.      ACTIVE PROBLEM LIST     Patient Active Problem List    Diagnosis Date Noted    Advanced care planning/counseling discussion 02/03/2017    Paroxysmal atrial fibrillation ([address] 09/28/2016    BPH (benign prostatic hyperplasia) 08/17/2016    Insomnia 08/17/2016    Osteoarthritis of left knee 08/15/2016    Spinal stenosis in cervical region 01/05/2016    Herniated nucleus pulposus, C3-4 01/05/2016    Normal cardiac stress test 09/21/2015    ED (erectile dysfunction) 06/18/2014    IFG (impaired fasting glucose)     SVT (supraventricular tachycardia) ([address]     Right knee DJD 11/04/2013    Spinal stenosis of lumbar region     Tear of medial cartilage or meniscus of knee, current 02/01/2013    Osteoarthrosis, unspecified whether generalized or localized, lower leg 02/01/2013    Palpitations 12/12/2011    Supraventricular tachycardia ([address] 12/12/2011    HLD (hyperlipidemia) 12/12/2011    Essential hypertension, benign 12/12/2011    Varicose veins 12/12/2011    Colon polyps     AVI (obstructive sleep apnea)     Heart palpitations     Hyperlipidemia with target LDL less than 130     BPH (benign prostatic hypertrophy)     Lower back pain     Knee pain, right     Right sided sciatica            PAST MEDICAL HISTORY     Past Medical History:   Diagnosis Date    Arthritis     left knee and lt. hand    Atrial fibrillation (HCC)     BPH (benign prostatic hyperplasia)     Chronic pain     Back pain    Colon polyps     DJD (degenerative joint disease) of lumbar spine     laminectomy 1979, 1991, 2015    ED (erectile dysfunction) 6/18/2014    Heart palpitations     Dr. Sriram Andrews    Herniated nucleus pulposus, C3-4 12/2015    Dr. Irma Tomlin    Hyperlipidemia     Hypertension     IFG (impaired fasting glucose)     Insomnia     Knee pain, right     Lower back pain     hx lumbar laminectomyx2 w good results    Normal cardiac stress test 9/21/15    OA (osteoarthritis) of knee     Dr. Blanca Shi    Obesity     AVI (obstructive sleep apnea)     Dr. Amelia Wilkinson Right sided sciatica     Seasonal allergic rhinitis     Spinal stenosis in cervical region 12/2015    Spinal stenosis of lumbar region     MRI 12/2012. Dr. Irma Tomlin    SVT (supraventricular tachycardia) [facility])     Dr. Nicole Chamberlain sleep apnea     cpap    Varicose veins     vein stripping 10/10           PAST SURGICAL HISTORY     Past Surgical History:   Procedure Laterality Date    COLONOSCOPY  2008    2 polyps. repeat 2011. Dr. Shraddha Esquivel  2007    7 polyps per pt     COLONOSCOPY N/A 6/15/2016    COLONOSCOPY performed by Luetta Moritz, MD at [address] [place] [address], COLON, DIAGNOSTIC  2011    return in 2016    HX BLADDER REPAIR      polyp removal    HX CERVICAL FUSION  1/16/16    ACDF C3-C4 Dr. Irma Tomlin    HX COLONOSCOPY  4/27/11    Dr Arin Tian, [facility] polyp.   repeat 4/2016    HX KNEE ARTHROSCOPY  2005    right, Dr. Khris Ramirez ARTHROSCOPY  02/01/2012    left, Dr Khris Ramirez ARTHROSCOPY  1/2013    right    HX KNEE REPLACEMENT  11/4/2013    HX LUMBAR FUSION  9/2015    L3-L4, Dr. Jose Watts HX MOHS PROCEDURES      left, Dr Greg Antunez UROLOGICAL  1993    bladder polyp removed with cystoscopy    HX VEIN STRIPPING  9/2010    Dr. Akira Duffy, bilateral    SHOULDER SURG [address] UNLISTED      left DECOMPRESSION, Dr. Jose Packer  11/2013    VASCULAR SURGERY PROCEDURE UNLIST  2008    bilateral vein stripping          ALLERGIES     Allergies   Allergen Reactions    Percocet [Oxycodone-Acetaminophen] Other (comments)     hallucinations    Penicillin G Rash     Did okay with keflex - no reaction with that. FAMILY HISTORY     Family History   Problem Relation Age of Onset   22 [facility] Abel Cancer Mother      liver    Cancer Father      leukemia    Cancer Sister      lung cancer    negative for cardiac disease       SOCIAL HISTORY     Social History     Social History    Marital status:      Spouse name: Mirian Simeon Number of children: 3    Years of education: N/A     Social History Main Topics    Smoking status: Former Smoker     Packs/day: 0.25     Years: 19.00     Types: Cigarettes     Quit date: 1/1/1980    Smokeless tobacco: Never Used      Comment: quit ~1980    Alcohol use 0.6 oz/week     1 Glasses of wine per week      Comment: socially    Drug use: No    Sexual activity: Yes     Other Topics Concern     Service Yes     [facility]     Social History Narrative    ** Merged History Encounter **         1 child, 2 stepchildren    fishes         MEDICATIONS     Current Outpatient Prescriptions   Medication Sig    carbamide peroxide (DEBROX) 6.5 % otic solution Administer 5 Drops into each ear two (2) times a day.  guaiFENesin-codeine (ROBITUSSIN AC) 100-10 mg/5 mL solution Take 5 mL by mouth nightly as needed for Cough. Max Daily Amount: 5 mL.  apixaban (ELIQUIS) 5 mg tablet Take 1 Tab by mouth two (2) times a day.     lisinopril (PRINIVIL, ZESTRIL) 40 mg tablet TAKE 1 TABLET BY MOUTH DAILY    amLODIPine (NORVASC) 5 mg tablet TAKE 1 TABLET BY MOUTH DAILY    zolpidem CR (AMBIEN CR) 12.5 mg tablet TAKE 1 TABLET BY MOUTH AT NIGHT AS NEEDED FOR SLEEP    levocetirizine (XYZAL) 5 mg tablet TAKE 1 TABLET DAILY    terazosin (HYTRIN) 5 mg capsule TAKE 1 CAPSULE DAILY    atorvastatin (LIPITOR) 20 mg tablet TAKE 1 TABLET DAILY    diclofenac EC (VOLTAREN) 75 mg EC tablet TAKE 1 TABLET TWICE A DAY AS NEEDED    gabapentin (NEURONTIN) 300 mg capsule Take 300 mg by mouth nightly as needed for Pain. Indications: generalized joint pain    cyclobenzaprine (FLEXERIL) 10 mg tablet Take 1 Tab by mouth three (3) times daily as needed for Muscle Spasm(s). No current facility-administered medications for this visit. I have reviewed the nurses notes, vitals, problem list, allergy list, medical history, family, social history and medications. REVIEW OF SYMPTOMS      General: +fatigue, Pt denies excessive weight gain or loss. Pt is able to conduct ADL's  HEENT: Denies blurred vision, headaches, hearing loss, epistaxis and difficulty swallowing. Respiratory: Denies cough, congestion, shortness of breath, MCRAE, wheezing or stridor. Cardiovascular: Denies precordial pain, palpitations, edema or PND  Gastrointestinal: Denies poor appetite, indigestion, abdominal pain or blood in stool  Genitourinary: Denies hematuria, dysuria, increased urinary frequency  Musculoskeletal: Denies joint pain or swelling from muscles or joints  Neurologic: Denies tremor, paresthesias, headache, or sensory motor disturbance  Psychiatric: Denies confusion, insomnia, depression  Integumentray: Denies rash, itching or ulcers. Hematologic: Denies easy bruising, bleeding       PHYSICAL EXAMINATION      Vitals:    01/08/18 1047   BP: 132/76   Pulse: (!) 48   Resp: 20   SpO2: 98%   Weight: 289 lb 12.8 oz (131.5 kg)   Height: 6' 5\" (1.956 m)     General: Well developed, in no acute distress.   HEENT: No jaundice, oral mucosa moist, no oral ulcers  Neck: Supple, no stiffness, no lymphadenopathy, supple  Heart:  Normal S1/S2 negative S3 or S4. Regular, no murmur, gallop or rub, no jugular venous distention  Respiratory: Clear bilaterally x 4, no wheezing or rales  Abdomen:   Soft, non-tender, bowel sounds are active.   Extremities:  No edema, normal cap refill, no cyanosis. Musculoskeletal: No clubbing, no deformities  Neuro: A&Ox3, speech clear, gait stable, cooperative, no focal neurologic deficits  Skin: Skin color is normal. No rashes or lesions. Non diaphoretic, moist.  Vascular: 2+ pulses symmetric in all extremities       DIAGNOSTIC DATA      EKG: sinus bradycardia (39bpm)     LABORATORY DATA      Lab Results   Component Value Date/Time    WBC 8.8 08/08/2016 10:28 AM    HGB 12.4 08/18/2016 02:47 AM    HCT 40.1 08/08/2016 10:28 AM    PLATELET 494 25/95/6401 10:28 AM    MCV 90.1 08/08/2016 10:28 AM      Lab Results   Component Value Date/Time    Sodium 140 08/19/2016 03:36 AM    Potassium 4.1 08/19/2016 03:36 AM    Chloride 105 08/19/2016 03:36 AM    CO2 27 08/19/2016 03:36 AM    Anion gap 8 08/19/2016 03:36 AM    Glucose 133 08/19/2016 03:36 AM    BUN 23 08/19/2016 03:36 AM    Creatinine 1.47 08/19/2016 03:36 AM    BUN/Creatinine ratio 16 08/19/2016 03:36 AM    GFR est AA 57 08/19/2016 03:36 AM    GFR est non-AA 47 08/19/2016 03:36 AM    Calcium 8.0 08/19/2016 03:36 AM    Bilirubin, total 0.6 08/08/2016 10:28 AM    AST (SGOT) 18 08/08/2016 10:28 AM    Alk. phosphatase 63 08/08/2016 10:28 AM    Protein, total 6.6 08/08/2016 10:28 AM    Albumin 3.6 08/08/2016 10:28 AM    Globulin 3.0 08/08/2016 10:28 AM    A-G Ratio 1.2 08/08/2016 10:28 AM    ALT (SGPT) 24 08/08/2016 10:28 AM           ASSESSMENT      1. Supraventricular tachycardia  2. Hypertension  3. Hyperlipidemia  4. Venous insufficiency  5. First degree AV block    6. AVI on CPAP  7. Atrial fibrillation   Persistent      PLAN     It is unclear whether reported symptoms correlate with recorded pause as he has a history of asymptomatic bradycardia at baseline.  Will discontinue metoprolol and monitor clinically for changes in symptoms/ILR download. May consider pacemaker for continued pauses/symptomatic bradycardia, as well as to allow for increased medical therapy in the setting of AF. FOLLOW-UP   1month    Thank you, La Nena Hall MD for allowing me to participate in the care of this extraordinarily pleasant male. Please do not hesitate to contact me for further questions/concerns.      LUIS ALBERTO [facility] [address].  [address] [facility], [facility], [place] [address]  [facility] LopezEm[facility]    [facility]  [phone] / [phone] Fax   [phone] / [phone] Fax

## 2018-01-08 NOTE — PATIENT INSTRUCTIONS
Treatment Plan:  Discontinue metoprolol. Follow up in 1 month. Atrial Fibrillation: Care Instructions  Your Care Instructions    Atrial fibrillation is an irregular and often fast heartbeat. Treating this condition is important for several reasons. It can cause blood clots, which can travel from your heart to your brain and cause a stroke. If you have a fast heartbeat, you may feel lightheaded, dizzy, and weak. An irregular heartbeat can also increase your risk for heart failure. Atrial fibrillation is often the result of another heart condition, such as high blood pressure or coronary artery disease. Making changes to improve your heart condition will help you stay healthy and active. Follow-up care is a key part of your treatment and safety. Be sure to make and go to all appointments, and call your doctor if you are having problems. It's also a good idea to know your test results and keep a list of the medicines you take. How can you care for yourself at home? Medicines  ? · Take your medicines exactly as prescribed. Call your doctor if you think you are having a problem with your medicine. You will get more details on the specific medicines your doctor prescribes. ? · If your doctor has given you a blood thinner to prevent a stroke, be sure you get instructions about how to take your medicine safely. Blood thinners can cause serious bleeding problems. ? · Do not take any vitamins, over-the-counter drugs, or herbal products without talking to your doctor first.   ? Lifestyle changes  ? · Do not smoke. Smoking can increase your chance of a stroke and heart attack. If you need help quitting, talk to your doctor about stop-smoking programs and medicines. These can increase your chances of quitting for good. ? · Eat a heart-healthy diet. ? · Stay at a healthy weight. Lose weight if you need to.   ? · Limit alcohol to 2 drinks a day for men and 1 drink a day for women.  Too much alcohol can cause health problems. ? · Avoid colds and flu. Get a pneumococcal vaccine shot. If you have had one before, ask your doctor whether you need another dose. Get a flu shot every year. If you must be around people with colds or flu, wash your hands often. Activity  ? · If your doctor recommends it, get more exercise. Walking is a good choice. Bit by bit, increase the amount you walk every day. Try for at least 30 minutes on most days of the week. You also may want to swim, bike, or do other activities. Your doctor may suggest that you join a cardiac rehabilitation program so that you can have help increasing your physical activity safely. ? · Start light exercise if your doctor says it is okay. Even a small amount will help you get stronger, have more energy, and manage stress. Walking is an easy way to get exercise. Start out by walking a little more than you did in the hospital. Gradually increase the amount you walk. ? · When you exercise, watch for signs that your heart is working too hard. You are pushing too hard if you cannot talk while you are exercising. If you become short of breath or dizzy or have chest pain, sit down and rest immediately. ? · Check your pulse regularly. Place two fingers on the artery at the palm side of your wrist, in line with your thumb. If your heartbeat seems uneven or fast, talk to your doctor. When should you call for help? Call 911 anytime you think you may need emergency care. For example, call if:  ? · You have symptoms of a heart attack. These may include:  ¨ Chest pain or pressure, or a strange feeling in the chest.  ¨ Sweating. ¨ Shortness of breath. ¨ Nausea or vomiting. ¨ Pain, pressure, or a strange feeling in the back, neck, jaw, or upper belly or in one or both shoulders or arms. ¨ Lightheadedness or sudden weakness. ¨ A fast or irregular heartbeat. After you call 911, the  may tell you to chew 1 adult-strength or 2 to 4 low-dose aspirin.  Wait for an ambulance. Do not try to drive yourself. ? · You have symptoms of a stroke. These may include:  ¨ Sudden numbness, tingling, weakness, or loss of movement in your face, arm, or leg, especially on only one side of your body. ¨ Sudden vision changes. ¨ Sudden trouble speaking. ¨ Sudden confusion or trouble understanding simple statements. ¨ Sudden problems with walking or balance. ¨ A sudden, severe headache that is different from past headaches. ? · You passed out (lost consciousness). ?Call your doctor now or seek immediate medical care if:  ? · You have new or increased shortness of breath. ? · You feel dizzy or lightheaded, or you feel like you may faint. ? · Your heart rate becomes irregular. ? · You can feel your heart flutter in your chest or skip heartbeats. Tell your doctor if these symptoms are new or worse. ? Watch closely for changes in your health, and be sure to contact your doctor if you have any problems. Where can you learn more? Go to http://corrine-violeta.info/. Enter U020 in the search box to learn more about \"Atrial Fibrillation: Care Instructions. \"  Current as of: September 21, 2016  Content Version: 11.4  © 8963-0489 [employer]. Care instructions adapted under license by [employer] (which disclaims liability or warranty for this information). If you have questions about a medical condition or this instruction, always ask your healthcare professional. [place] [address] any warranty or liability for your use of this information.

## 2018-01-11 ENCOUNTER — OFFICE VISIT (OUTPATIENT)
Dept: CARDIOLOGY CLINIC | Age: 73
End: 2018-01-11

## 2018-01-11 DIAGNOSIS — Z95.818 STATUS POST PLACEMENT OF IMPLANTABLE LOOP RECORDER: Primary | ICD-10-CM

## 2018-01-15 ENCOUNTER — OFFICE VISIT (OUTPATIENT)
Dept: CARDIOLOGY CLINIC | Age: 73
End: 2018-01-15

## 2018-01-15 DIAGNOSIS — Z95.818 STATUS POST PLACEMENT OF IMPLANTABLE LOOP RECORDER: Primary | ICD-10-CM

## 2018-01-19 ENCOUNTER — OFFICE VISIT (OUTPATIENT)
Dept: INTERNAL MEDICINE CLINIC | Age: 73
End: 2018-01-19

## 2018-01-19 VITALS
DIASTOLIC BLOOD PRESSURE: 71 MMHG | WEIGHT: 289 LBS | BODY MASS INDEX: 34.12 KG/M2 | HEIGHT: 77 IN | OXYGEN SATURATION: 100 % | HEART RATE: 64 BPM | RESPIRATION RATE: 20 BRPM | TEMPERATURE: 98.1 F | SYSTOLIC BLOOD PRESSURE: 115 MMHG

## 2018-01-19 DIAGNOSIS — E78.5 HYPERLIPIDEMIA WITH TARGET LDL LESS THAN 130: ICD-10-CM

## 2018-01-19 DIAGNOSIS — M25.471 ANKLE EDEMA, BILATERAL: ICD-10-CM

## 2018-01-19 DIAGNOSIS — I48.0 PAROXYSMAL ATRIAL FIBRILLATION (HCC): ICD-10-CM

## 2018-01-19 DIAGNOSIS — I10 ESSENTIAL HYPERTENSION, BENIGN: ICD-10-CM

## 2018-01-19 DIAGNOSIS — R73.01 IFG (IMPAIRED FASTING GLUCOSE): ICD-10-CM

## 2018-01-19 DIAGNOSIS — F51.01 PRIMARY INSOMNIA: Primary | ICD-10-CM

## 2018-01-19 DIAGNOSIS — M25.472 ANKLE EDEMA, BILATERAL: ICD-10-CM

## 2018-01-19 RX ORDER — ESZOPICLONE 2 MG/1
2 TABLET, FILM COATED ORAL
Qty: 30 TAB | Refills: 2 | Status: SHIPPED | OUTPATIENT
Start: 2018-01-19 | End: 2018-04-23 | Stop reason: SDUPTHER

## 2018-01-19 RX ORDER — TERBINAFINE HYDROCHLORIDE 250 MG/1
TABLET ORAL
Refills: 2 | COMMUNITY
Start: 2018-01-13 | End: 2018-06-27 | Stop reason: ALTCHOICE

## 2018-01-19 NOTE — PROGRESS NOTES
Patient states he is having trouble with insomnia. Some swelling in left ankle. States he is off Eliquis to get his \"heart rate up\".

## 2018-01-19 NOTE — PROGRESS NOTES
HISTORY OF PRESENT ILLNESS    Chief Complaint   Patient presents with    Insomnia       Presents for follow-up    Patient states he is having trouble with insomnia. Taking ambien CR 12.5 mg hs and is no longer sleeping well. Denies side effects. He is not drinking ethos as much    Some swelling in left leg. Onset was after knee surgery. Taking amlodopine    States he is off Eliquis to get his \"heart rate up\". Records show metoprolol was stopped by dr Osbaldo Golden. He then reports that he thinks he did stop metoprolol and is taking eliquiis    Hyperlipidemia  Currently he takes lipitor 20 mg  ROS: taking medications as instructed, no medication side effects noted  No new myalgias, no joint pains, no weakness  No TIA's, no chest pain on exertion, no dyspnea on exertion, no swelling of ankles. Lab Results   Component Value Date/Time    Cholesterol, total 142 05/12/2016 08:44 AM    HDL Cholesterol 52 05/12/2016 08:44 AM    LDL, calculated 80 05/12/2016 08:44 AM    VLDL, calculated 10 05/12/2016 08:44 AM    Triglyceride 52 05/12/2016 08:44 AM    CHOL/HDL Ratio 3.3 10/14/2010 09:43 AM       BP is low. Pulse is increased  Blood pressure 115/71, pulse 64, temperature 98.1 °F (36.7 °C), temperature source Oral, resp. rate 20, height 6' 5\" (1.956 m), weight 289 lb (131.1 kg), SpO2 100 %. Impaired fasting glucose / Pre-diabetes follow-up  Lab Results   Component Value Date/Time    Glucose 133 08/19/2016 03:36 AM   Last hemoglobin a1c   Lab Results   Component Value Date/Time    Hemoglobin A1c 5.9 08/08/2016 10:28 AM   Diabetic diet compliance: compliant most of the time. Patient does not perform home glucose monitoring. Review of Systems   All other systems reviewed and are negative, except as noted in HPI    Past Medical and Surgical History   has a past medical history of Arthritis; Atrial fibrillation ([address] BPH (benign prostatic hyperplasia);  Chronic pain; Colon polyps; DJD (degenerative joint disease) of lumbar spine; ED (erectile dysfunction) (6/18/2014); Heart palpitations; Herniated nucleus pulposus, C3-4 (12/2015); Hyperlipidemia; Hypertension; IFG (impaired fasting glucose); Insomnia; Knee pain, right; Lower back pain; Normal cardiac stress test (9/21/15); OA (osteoarthritis) of knee; Obesity; AVI (obstructive sleep apnea); Right sided sciatica; Seasonal allergic rhinitis; Spinal stenosis in cervical region (12/2015); Spinal stenosis of lumbar region; SVT (supraventricular tachycardia) ([facility] [address] Unspecified sleep apnea; and Varicose veins. has a past surgical history that includes pr sinus surgery proc unlisted (1999); hx bladder repair; colonoscopy (2008); colonoscopy (2007); hx vein stripping (9/2010); endoscopy, colon, diagnostic (2011); vascular surgery procedure unlist (2008); hx lumbar laminectomy (1979); hx lumbar laminectomy (1991); hx colonoscopy (4/27/11); hx lumbar fusion (9/2015); hx cervical fusion (1/16/16); colonoscopy (N/A, 6/15/2016); hx urological (1993); hx knee replacement (11/4/2013); hx mohs procedure; pr shoulder surg proc unlisted; hx knee arthroscopy (2005); hx knee arthroscopy (02/01/2012); hx knee arthroscopy (1/2013); and pr total knee arthroplasty (11/2013). reports that he quit smoking about 38 years ago. His smoking use included Cigarettes. He has a 4.75 pack-year smoking history. He has never used smokeless tobacco. He reports that he drinks about 0.6 oz of alcohol per week  He reports that he does not use illicit drugs. family history includes Cancer in his father, mother, and sister. Physical Exam   Nursing note and vitals reviewed. Blood pressure 115/71, pulse 64, temperature 98.1 °F (36.7 °C), temperature source Oral, resp. rate 20, height 6' 5\" (1.956 m), weight 289 lb (131.1 kg), SpO2 100 %. Constitutional:  No distress. Eyes: Conjunctivae are normal.   Ears:  Hearing grossly intact  Cardiovascular: Normal rate.   regular rhythm, no murmurs or gallops  No edema  Pulmonary/Chest: Effort normal.   CTAB  Musculoskeletal: moves all 4 extremities   Neurological: Alert and oriented to person, place, and time. Skin: No rash noted. Psychiatric: Normal mood and affect. Behavior is normal.     ASSESSMENT and PLAN  Diagnoses and all orders for this visit:    1. Primary insomnia  uncontrolled. Stop ambien. Start lunesta- can increase to 3 mg max prn. Next option would be to try Belsomra. -     eszopiclone (LUNESTA) 2 mg tablet; Take 1 Tab by mouth nightly. Max Daily Amount: 2 mg. 2. Paroxysmal atrial fibrillation ([address]- Currently asymptomatic  Confirm he stopped metoprolol    3. Essential hypertension, benign- Controlled on current regimen. Continue current medications as written in chart.  -     TSH 3RD GENERATION    4. IFG (impaired fasting glucose) - The patient is asked to make an attempt to improve diet and exercise patterns to aid in medical management of this problem. -     HEMOGLOBIN A1C WITH EAG    5. Ankle edema, bilateral- perhaps due to norvasc. Check labs  -     TSH 3RD GENERATION  -     METABOLIC PANEL, COMPREHENSIVE  -     CBC W/O DIFF    6. Hyperlipidemia with target LDL less than 130- The patient is asked to make an attempt to improve diet and exercise patterns to aid in medical management of this problem  -     LIPID PANEL    lab results and schedule of future lab studies reviewed with patient  reviewed medications and side effects in detail  Return to clinic for further evaluation if new symptoms develop    Follow-up Disposition: Not on File    Current Outpatient Prescriptions   Medication Sig    terbinafine HCl (LAMISIL) 250 mg tablet TK 1 T PO QD    eszopiclone (LUNESTA) 2 mg tablet Take 1 Tab by mouth nightly. Max Daily Amount: 2 mg.  apixaban (ELIQUIS) 5 mg tablet Take 1 Tab by mouth two (2) times a day.     lisinopril (PRINIVIL, ZESTRIL) 40 mg tablet TAKE 1 TABLET BY MOUTH DAILY    amLODIPine (NORVASC) 5 mg tablet TAKE 1 TABLET BY MOUTH DAILY    levocetirizine (XYZAL) 5 mg tablet TAKE 1 TABLET DAILY    terazosin (HYTRIN) 5 mg capsule TAKE 1 CAPSULE DAILY    atorvastatin (LIPITOR) 20 mg tablet TAKE 1 TABLET DAILY    diclofenac EC (VOLTAREN) 75 mg EC tablet TAKE 1 TABLET TWICE A DAY AS NEEDED    gabapentin (NEURONTIN) 300 mg capsule Take 300 mg by mouth nightly as needed for Pain. Indications: generalized joint pain    cyclobenzaprine (FLEXERIL) 10 mg tablet Take 1 Tab by mouth three (3) times daily as needed for Muscle Spasm(s). No current facility-administered medications for this visit.

## 2018-01-19 NOTE — MR AVS SNAPSHOT
[address] [facility] [address] [facility] [address] [facility] 
[phone] Patient: Pravin Marcelino MRN: [id number] CUK:[id number] Visit Information Date & Time Provider Department Dept. Phone Encounter #  
 1/19/2018  8:30 AM Gaurav Stapleton MD [facility] [address] [facility] [id number] Your Appointments 2/12/2018 10:00 AM  
ESTABLISHED PATIENT with Sophronia Castleman, MD  
[facility] ([facility]) Appt Note: 1 mo fu  
 [address] [place] [address] [facility]  
[phone]  
  
   
 [address] [place] [address] [place] [id number]  
  
    
 4/25/2018  9:00 AM  
ESTABLISHED PATIENT with Sophronia Castleman, MD  
[facility] ([facility]) Appt Note: 6 mo fu  
 [address] [place] [address] [address] [facility]  
[phone] Upcoming Health Maintenance Date Due  
 GLAUCOMA SCREENING Q2Y 10/16/2017 MEDICARE YEARLY EXAM 2/4/2018 COLONOSCOPY 6/15/2021 DTaP/Tdap/Td series (2 - Td) 10/14/2023 Allergies as of 1/19/2018  Review Complete On: 1/19/2018 By: Gaurav Stapleton MD  
  
 Severity Noted Reaction Type Reactions Percocet [Oxycodone-acetaminophen] Medium 12/12/2011   Side Effect Other (comments)  
 hallucinations Penicillin G Low 12/12/2011   Side Effect Rash Did okay with keflex - no reaction with that. Current Immunizations  Reviewed on 1/19/2018 Name Date Hepatitis B Vaccine 1/1/1994 Influenza High Dose Vaccine PF 12/3/2016 Influenza Vaccine 12/1/2017, 12/3/2016, 11/16/2015, 10/14/2013 Pneumococcal Conjugate (PCV-13) 1/1/2013 Pneumococcal Polysaccharide (PPSV-23) 6/18/2014 TD Vaccine 1/1/2001 Tdap 10/14/2013 ZZZ-RETIRED (DO NOT USE) Pneumococcal Vaccine (Unspecified Type) 10/16/2005 Zoster Vaccine, Live 1/1/2012  Reviewed by Adriana Durant on 1/19/2018 at  8:26 AM  
 You Were Diagnosed With   
  
 Codes Comments Primary insomnia    -  Primary ICD-10-CM: F51.01 
ICD-9-CM: 307.42 Paroxysmal atrial fibrillation (HCC)     ICD-10-CM: I48.0 ICD-9-CM: 427.31 Essential hypertension, benign     ICD-10-CM: I10 
ICD-9-CM: 401.1 IFG (impaired fasting glucose)     ICD-10-CM: R73.01 
ICD-9-CM: 790.21 Ankle edema, bilateral     ICD-10-CM: M25.471, M25.472 ICD-9-CM: 719.07 Hyperlipidemia with target LDL less than 130     ICD-10-CM: E78.5 ICD-9-CM: 272.4 Vitals BP Pulse Temp Resp Height(growth percentile) Weight(growth percentile) 115/71 (BP 1 Location: Left arm, BP Patient Position: Sitting) 64 98.1 °F (36.7 °C) (Oral) 20 6' 5\" (1.956 m) 289 lb (131.1 kg) SpO2 BMI Smoking Status 100% 34.27 kg/m2 Former Smoker Vitals History BMI and BSA Data Body Mass Index Body Surface Area  
 34.27 kg/m 2 2.67 m 2 Preferred Pharmacy Pharmacy Name Phone [facility] [place], [address] [place] [facility] [phone] Your Updated Medication List  
  
   
This list is accurate as of: 1/19/18  9:05 AM.  Always use your most recent med list. amLODIPine 5 mg tablet Commonly known as:  Nerissa Garcia TAKE 1 TABLET BY MOUTH DAILY  
  
 apixaban 5 mg tablet Commonly known as:  Alecia Ty Take 1 Tab by mouth two (2) times a day. atorvastatin 20 mg tablet Commonly known as:  LIPITOR  
TAKE 1 TABLET DAILY  
  
 cyclobenzaprine 10 mg tablet Commonly known as:  FLEXERIL Take 1 Tab by mouth three (3) times daily as needed for Muscle Spasm(s). diclofenac EC 75 mg EC tablet Commonly known as:  VOLTAREN  
TAKE 1 TABLET TWICE A DAY AS NEEDED  
  
 eszopiclone 2 mg tablet Commonly known as:  Akanksha Furlong Take 1 Tab by mouth nightly. Max Daily Amount: 2 mg.  
  
 gabapentin 300 mg capsule Commonly known as:  NEURONTIN  
 Take 300 mg by mouth nightly as needed for Pain. Indications: generalized joint pain  
  
 levocetirizine 5 mg tablet Commonly known as:  Darlis Handsome TAKE 1 TABLET DAILY  
  
 lisinopril 40 mg tablet Commonly known as:  PRINIVIL, ZESTRIL  
TAKE 1 TABLET BY MOUTH DAILY  
  
 terazosin 5 mg capsule Commonly known as:  HYTRIN  
TAKE 1 CAPSULE DAILY  
  
 terbinafine HCl 250 mg tablet Commonly known as:  LAMISIL TK 1 T PO QD Prescriptions Printed Refills  
 eszopiclone (LUNESTA) 2 mg tablet 2 Sig: Take 1 Tab by mouth nightly. Max Daily Amount: 2 mg. Class: Print Route: Oral  
  
We Performed the Following CBC W/O DIFF [38936 CPT(R)] HEMOGLOBIN A1C WITH EAG [88586 CPT(R)] LIPID PANEL [02895 CPT(R)] METABOLIC PANEL, COMPREHENSIVE [87668 CPT(R)] TSH 3RD GENERATION [08764 CPT(R)] Introducing [facility] SERVICES! Dear Gopi Bliss: Thank you for requesting a [employer] account. Our records indicate that you already have an active [employer] account. You can access your account anytime at [email or web address] Did you know that you can access your hospital and ER discharge instructions at any time in [employer]? You can also review all of your test results from your hospital stay or ER visit. Additional Information If you have questions, please visit the Frequently Asked Questions section of the [employer] website at [email or web address] Remember, [employer] is NOT to be used for urgent needs. For medical emergencies, dial 911. Now available from your iPhone and Android! Please provide this summary of care documentation to your next provider. Your primary care clinician is listed as [facility]. If you have any questions after today's visit, please call [phone].

## 2018-01-29 ENCOUNTER — HOSPITAL ENCOUNTER (OUTPATIENT)
Dept: LAB | Age: 73
Discharge: HOME | End: 2018-01-29
Payer: MEDICARE

## 2018-01-29 ENCOUNTER — CLINICAL SUPPORT (OUTPATIENT)
Dept: CARDIOLOGY CLINIC | Age: 73
End: 2018-01-29

## 2018-01-29 DIAGNOSIS — Z95.818 STATUS POST PLACEMENT OF IMPLANTABLE LOOP RECORDER: Primary | ICD-10-CM

## 2018-01-29 PROCEDURE — 84443 ASSAY THYROID STIM HORMONE: CPT

## 2018-01-29 PROCEDURE — 80053 COMPREHEN METABOLIC PANEL: CPT

## 2018-01-29 PROCEDURE — 36415 COLL VENOUS BLD VENIPUNCTURE: CPT

## 2018-01-29 PROCEDURE — 80061 LIPID PANEL: CPT

## 2018-01-29 PROCEDURE — 83036 HEMOGLOBIN GLYCOSYLATED A1C: CPT

## 2018-01-29 PROCEDURE — 85027 COMPLETE CBC AUTOMATED: CPT

## 2018-01-30 LAB
ALBUMIN SERPL-MCNC: 4.2 G/DL (ref 3.5–4.8)
ALBUMIN/GLOB SERPL: 1.9 {RATIO} (ref 1.2–2.2)
ALP SERPL-CCNC: 44 IU/L (ref 39–117)
ALT SERPL-CCNC: 29 IU/L (ref 0–44)
AST SERPL-CCNC: 23 IU/L (ref 0–40)
BILIRUB SERPL-MCNC: 0.6 MG/DL (ref 0–1.2)
BUN SERPL-MCNC: 23 MG/DL (ref 8–27)
BUN/CREAT SERPL: 18 (ref 10–24)
CALCIUM SERPL-MCNC: 8.7 MG/DL (ref 8.6–10.2)
CHLORIDE SERPL-SCNC: 108 MMOL/L (ref 96–106)
CHOLEST SERPL-MCNC: 154 MG/DL (ref 100–199)
CO2 SERPL-SCNC: 23 MMOL/L (ref 18–29)
CREAT SERPL-MCNC: 1.31 MG/DL (ref 0.76–1.27)
ERYTHROCYTE [DISTWIDTH] IN BLOOD BY AUTOMATED COUNT: 14.9 % (ref 12.3–15.4)
EST. AVERAGE GLUCOSE BLD GHB EST-MCNC: 108 MG/DL
GFR SERPLBLD CREATININE-BSD FMLA CKD-EPI: 53 ML/MIN/1.73
GFR SERPLBLD CREATININE-BSD FMLA CKD-EPI: 62 ML/MIN/1.73
GLOBULIN SER CALC-MCNC: 2.2 G/DL (ref 1.5–4.5)
GLUCOSE SERPL-MCNC: 93 MG/DL (ref 65–99)
HBA1C MFR BLD: 5.4 % (ref 4.8–5.6)
HCT VFR BLD AUTO: 39.6 % (ref 37.5–51)
HDLC SERPL-MCNC: 47 MG/DL
HGB BLD-MCNC: 13 G/DL (ref 13–17.7)
INTERPRETATION, 910389: NORMAL
INTERPRETATION: NORMAL
LDLC SERPL CALC-MCNC: 94 MG/DL (ref 0–99)
MCH RBC QN AUTO: 29.9 PG (ref 26.6–33)
MCHC RBC AUTO-ENTMCNC: 32.8 G/DL (ref 31.5–35.7)
MCV RBC AUTO: 91 FL (ref 79–97)
PDF IMAGE, 910387: NORMAL
PLATELET # BLD AUTO: 165 X10E3/UL (ref 150–379)
POTASSIUM SERPL-SCNC: 4.6 MMOL/L (ref 3.5–5.2)
PROT SERPL-MCNC: 6.4 G/DL (ref 6–8.5)
RBC # BLD AUTO: 4.35 X10E6/UL (ref 4.14–5.8)
SODIUM SERPL-SCNC: 144 MMOL/L (ref 134–144)
TRIGL SERPL-MCNC: 66 MG/DL (ref 0–149)
TSH SERPL DL<=0.005 MIU/L-ACNC: 3.04 UIU/ML (ref 0.45–4.5)
VLDLC SERPL CALC-MCNC: 13 MG/DL (ref 5–40)
WBC # BLD AUTO: 6.2 X10E3/UL (ref 3.4–10.8)

## 2018-02-12 ENCOUNTER — CLINICAL SUPPORT (OUTPATIENT)
Dept: CARDIOLOGY CLINIC | Age: 73
End: 2018-02-12

## 2018-02-12 DIAGNOSIS — Z95.818 STATUS POST PLACEMENT OF IMPLANTABLE LOOP RECORDER: Primary | ICD-10-CM

## 2018-02-14 ENCOUNTER — TELEPHONE (OUTPATIENT)
Dept: CARDIOLOGY CLINIC | Age: 73
End: 2018-02-14

## 2018-02-14 NOTE — TELEPHONE ENCOUNTER
Pt needs to r/s his appt missed on 2/12. Please call pt after 1pm.  Pt can be reached at [phone].       Thank you,  Anastasiia Ludwig

## 2018-02-21 ENCOUNTER — OFFICE VISIT (OUTPATIENT)
Dept: CARDIOLOGY CLINIC | Age: 73
End: 2018-02-21

## 2018-02-21 VITALS
BODY MASS INDEX: 34.36 KG/M2 | SYSTOLIC BLOOD PRESSURE: 120 MMHG | WEIGHT: 291 LBS | HEART RATE: 61 BPM | DIASTOLIC BLOOD PRESSURE: 80 MMHG | HEIGHT: 77 IN | OXYGEN SATURATION: 99 %

## 2018-02-21 DIAGNOSIS — I47.1 SVT (SUPRAVENTRICULAR TACHYCARDIA) (HCC): ICD-10-CM

## 2018-02-21 DIAGNOSIS — I10 ESSENTIAL HYPERTENSION: ICD-10-CM

## 2018-02-21 DIAGNOSIS — R00.1 BRADYCARDIA: ICD-10-CM

## 2018-02-21 DIAGNOSIS — I48.0 PAROXYSMAL ATRIAL FIBRILLATION (HCC): Primary | ICD-10-CM

## 2018-02-21 DIAGNOSIS — Z95.818 STATUS POST PLACEMENT OF IMPLANTABLE LOOP RECORDER: Primary | ICD-10-CM

## 2018-02-21 RX ORDER — AMLODIPINE BESYLATE 10 MG/1
TABLET ORAL
Qty: 90 TAB | Refills: 3 | Status: SHIPPED | OUTPATIENT
Start: 2018-02-21 | End: 2018-06-27 | Stop reason: SDUPTHER

## 2018-02-21 NOTE — PROGRESS NOTES
Visit Vitals    /80 (BP 1 Location: Left arm, BP Patient Position: Sitting)    Pulse 61    Ht 6' 5\" (1.956 m)    Wt 291 lb (132 kg)    SpO2 99%    BMI 34.51 kg/m2

## 2018-02-21 NOTE — PROGRESS NOTES
HISTORY OF PRESENTING ILLNESS      Joe Torres is a 74 y.o. male with HTN, SVT, AF, AVI on CPAP and hyperlipidemia whose Cardizem was reduced due to bradycardia in the past.  Last visit we added amlodipine to his medical regimen for enhanced BP control.  His blood pressure is now well controlled on his BP log. Of note he has extensive varicose veins observed on bilateral external lower extremities. He is here for follow up today due to an alert on his ILR demonstrating a 3 second pause during AF. He reports feeling fatigued more than usual that day, but denies any other cardiac complaints. He has history of bradycardia as a baseline. Last visit it was unclear whether reported symptoms correlated with recorded pause since he has a history of asymptomatic bradycardia at baseline. Metoprolol was discontinued and ILR downloads thus far have revealed sinus bradycardia with infrequent ectopy. He reports feeling well, has had some improvement in energy since last visit. His home blood pressure readings have been trending hypertensive (150s-160s/90) at home since metoprolol was discontinued.        ACTIVE PROBLEM LIST     Patient Active Problem List    Diagnosis Date Noted    Advanced care planning/counseling discussion 02/03/2017    Paroxysmal atrial fibrillation ([facility] [address] 09/28/2016    BPH (benign prostatic hyperplasia) 08/17/2016    Insomnia 08/17/2016    Osteoarthritis of left knee 08/15/2016    Spinal stenosis in cervical region 01/05/2016    Herniated nucleus pulposus, C3-4 01/05/2016    Normal cardiac stress test 09/21/2015    ED (erectile dysfunction) 06/18/2014    IFG (impaired fasting glucose)     SVT (supraventricular tachycardia) ([address]     Right knee DJD 11/04/2013    Spinal stenosis of lumbar region     Tear of medial cartilage or meniscus of knee, current 02/01/2013    Osteoarthrosis, unspecified whether generalized or localized, lower leg 02/01/2013    Palpitations 12/12/2011    Supraventricular tachycardia ([facility] [address] 12/12/2011    HLD (hyperlipidemia) 12/12/2011    Essential hypertension, benign 12/12/2011    Varicose veins 12/12/2011    Colon polyps     AVI (obstructive sleep apnea)     Heart palpitations     Hyperlipidemia with target LDL less than 130     BPH (benign prostatic hypertrophy)     Lower back pain     Knee pain, right     Right sided sciatica            PAST MEDICAL HISTORY     Past Medical History:   Diagnosis Date    Arthritis     left knee and lt. hand    Atrial fibrillation (HCC)     BPH (benign prostatic hyperplasia)     Chronic pain     Back pain    Colon polyps     DJD (degenerative joint disease) of lumbar spine     laminectomy 1979, 1991, 2015    ED (erectile dysfunction) 6/18/2014    Heart palpitations     Dr. Valentina Celaya    Herniated nucleus pulposus, C3-4 12/2015    Dr. Perez Crocker    Hyperlipidemia     Hypertension     IFG (impaired fasting glucose)     Insomnia     Knee pain, right     Lower back pain     hx lumbar laminectomyx2 w good results    Normal cardiac stress test 9/21/15    OA (osteoarthritis) of knee     Dr. Zaire Kendall    Obesity     Onychomycosis 2018    Dr Tish Jaime. lamisil    AVI (obstructive sleep apnea)     Dr. Yamile Gray Right sided sciatica     Seasonal allergic rhinitis     Spinal stenosis in cervical region 12/2015    Spinal stenosis of lumbar region     MRI 12/2012. Dr. Perez Crocker    SVT (supraventricular tachycardia) [facility])     Dr. Jamari Marcus sleep apnea     cpap    Varicose veins     vein stripping 10/10           PAST SURGICAL HISTORY     Past Surgical History:   Procedure Laterality Date    COLONOSCOPY  2008    2 polyps. repeat 2011.   Dr. Zulema Cook  2007    7 polyps per pt     COLONOSCOPY N/A 6/15/2016    COLONOSCOPY performed by Breanne Posey MD at [address], DIAGNOSTIC  2011    return in 2016    HX BLADDER REPAIR      polyp removal    HX CERVICAL FUSION  1/16/16    ACDF C3-C4 Dr. Remigio Shone    HX COLONOSCOPY  4/27/11    Dr Guillaume Tuhrman, smal polyp. repeat 4/2016    HX KNEE ARTHROSCOPY  2005    right, Dr. Ratna Brar ARTHROSCOPY  02/01/2012    left, Dr Ratna Brar ARTHROSCOPY  1/2013    right    HX KNEE REPLACEMENT  11/4/2013    HX LUMBAR FUSION  9/2015    L3-L4, Dr. Lavetta Bloch      left, Dr Ronald Ramirez    bladder polyp removed with cystoscopy    HX VEIN STRIPPING  9/2010    Dr. Kevan Quinonez, bilateral    SHOULDER SURG [address] UNLISTED      left DECOMPRESSION, Dr. Jordan Vicente  11/2013    VASCULAR SURGERY PROCEDURE UNLIST  2008    bilateral vein stripping          ALLERGIES     Allergies   Allergen Reactions    Percocet [Oxycodone-Acetaminophen] Other (comments)     hallucinations    Penicillin G Rash     Did okay with keflex - no reaction with that.           FAMILY HISTORY     Family History   Problem Relation Age of Onset   Rita Talco Cancer Mother      liver    Cancer Father      leukemia    Cancer Sister      lung cancer    negative for cardiac disease       SOCIAL HISTORY     Social History     Social History    Marital status:      Spouse name: Milton Metz Number of children: 3    Years of education: N/A     Social History Main Topics    Smoking status: Former Smoker     Packs/day: 0.25     Years: 19.00     Types: Cigarettes     Quit date: 1/1/1980    Smokeless tobacco: Never Used      Comment: quit ~1980    Alcohol use 0.6 oz/week     1 Glasses of wine per week      Comment: socially    Drug use: No    Sexual activity: Yes     Other Topics Concern     Service Yes     army     Social History Narrative    ** Merged History Encounter **         1 child, 2 stepchildren    fishes         MEDICATIONS     Current Outpatient Prescriptions   Medication Sig    amLODIPine (NORVASC) 10 mg tablet TAKE 1 TABLET BY MOUTH DAILY    diclofenac EC (VOLTAREN) 75 mg EC tablet TAKE 1 TABLET TWICE A DAY AS NEEDED (Patient taking differently: TAKE 1 TABLET  A DAY AS NEEDED)    eszopiclone (LUNESTA) 2 mg tablet Take 1 Tab by mouth nightly. Max Daily Amount: 2 mg.  apixaban (ELIQUIS) 5 mg tablet Take 1 Tab by mouth two (2) times a day.  lisinopril (PRINIVIL, ZESTRIL) 40 mg tablet TAKE 1 TABLET BY MOUTH DAILY    levocetirizine (XYZAL) 5 mg tablet TAKE 1 TABLET DAILY    terazosin (HYTRIN) 5 mg capsule TAKE 1 CAPSULE DAILY    atorvastatin (LIPITOR) 20 mg tablet TAKE 1 TABLET DAILY    gabapentin (NEURONTIN) 300 mg capsule Take 300 mg by mouth nightly as needed for Pain. Indications: generalized joint pain    terbinafine HCl (LAMISIL) 250 mg tablet TK 1 T PO QD    cyclobenzaprine (FLEXERIL) 10 mg tablet Take 1 Tab by mouth three (3) times daily as needed for Muscle Spasm(s). No current facility-administered medications for this visit. I have reviewed the nurses notes, vitals, problem list, allergy list, medical history, family, social history and medications. REVIEW OF SYMPTOMS      General: Pt denies excessive weight gain or loss. Pt is able to conduct ADL's  HEENT: Denies blurred vision, headaches, hearing loss, epistaxis and difficulty swallowing. Respiratory: Denies cough, congestion, shortness of breath, MCRAE, wheezing or stridor. Cardiovascular: Denies precordial pain, palpitations, edema or PND  Gastrointestinal: Denies poor appetite, indigestion, abdominal pain or blood in stool  Genitourinary: Denies hematuria, dysuria, increased urinary frequency  Musculoskeletal: Denies joint pain or swelling from muscles or joints  Neurologic: Denies tremor, paresthesias, headache, or sensory motor disturbance  Psychiatric: Denies confusion, insomnia, depression  Integumentray: Denies rash, itching or ulcers.   Hematologic: Denies easy bruising, bleeding       PHYSICAL EXAMINATION      Vitals:    02/21/18 1319   BP: 120/80   Pulse: 61   SpO2: 99%   Weight: 291 lb (132 kg)   Height: 6' 5\" (1.956 m)     General: Well developed, in no acute distress. HEENT: No jaundice, oral mucosa moist, no oral ulcers  Neck: Supple, no stiffness, no lymphadenopathy, supple  Heart:  Normal S1/S2 negative S3 or S4. Regular, no murmur, gallop or rub, no jugular venous distention  Respiratory: Clear bilaterally x 4, no wheezing or rales  Abdomen:   Soft, non-tender, bowel sounds are active.   Extremities:  No edema, normal cap refill, no cyanosis. Musculoskeletal: No clubbing, no deformities  Neuro: A&Ox3, speech clear, gait stable, cooperative, no focal neurologic deficits  Skin: Skin color is normal. No rashes or lesions. Non diaphoretic, moist.  Vascular: 2+ pulses symmetric in all extremities       DIAGNOSTIC DATA      EKG:        LABORATORY DATA      Lab Results   Component Value Date/Time    WBC 6.2 01/29/2018 09:51 AM    HGB 13.0 01/29/2018 09:51 AM    HCT 39.6 01/29/2018 09:51 AM    PLATELET 300 98/59/6243 09:51 AM    MCV 91 01/29/2018 09:51 AM      Lab Results   Component Value Date/Time    Sodium 144 01/29/2018 09:51 AM    Potassium 4.6 01/29/2018 09:51 AM    Chloride 108 (H) 01/29/2018 09:51 AM    CO2 23 01/29/2018 09:51 AM    Anion gap 8 08/19/2016 03:36 AM    Glucose 93 01/29/2018 09:51 AM    BUN 23 01/29/2018 09:51 AM    Creatinine 1.31 (H) 01/29/2018 09:51 AM    BUN/Creatinine ratio 18 01/29/2018 09:51 AM    GFR est AA 62 01/29/2018 09:51 AM    GFR est non-AA 53 (L) 01/29/2018 09:51 AM    Calcium 8.7 01/29/2018 09:51 AM    Bilirubin, total 0.6 01/29/2018 09:51 AM    AST (SGOT) 23 01/29/2018 09:51 AM    Alk. phosphatase 44 01/29/2018 09:51 AM    Protein, total 6.4 01/29/2018 09:51 AM    Albumin 4.2 01/29/2018 09:51 AM    Globulin 3.0 08/08/2016 10:28 AM    A-G Ratio 1.9 01/29/2018 09:51 AM    ALT (SGPT) 29 01/29/2018 09:51 AM           ASSESSMENT      1. Supraventricular tachycardia  2. Hypertension  3. Hyperlipidemia  4. Venous insufficiency  5. First degree AV block    6. AVI on CPAP  7. Atrial fibrillation                        Persistent      PLAN     Will increase Amlodipine to 10mg daily and continue to record home blood pressure readings. Continue ILR downloads monitoring for recurrence of sinus pauses, symptomatic bradycardia or AF with tachycardia. FOLLOW-UP   As scheduled    Thank you, Alexia Thurman MD for allowing me to participate in the care of this extraordinarily pleasant male. Please do not hesitate to contact me for further questions/concerns.      LUIS ALBERTO Gonzalez [address].  [address] [place] [address], [address], [facility]  [phone] / [phone] Fax   [phone] / [phone] Fax

## 2018-03-19 ENCOUNTER — OFFICE VISIT (OUTPATIENT)
Dept: CARDIOLOGY CLINIC | Age: 73
End: 2018-03-19

## 2018-03-19 DIAGNOSIS — Z95.818 STATUS POST PLACEMENT OF IMPLANTABLE LOOP RECORDER: Primary | ICD-10-CM

## 2018-03-26 ENCOUNTER — OFFICE VISIT (OUTPATIENT)
Dept: CARDIOLOGY CLINIC | Age: 73
End: 2018-03-26

## 2018-03-26 DIAGNOSIS — Z95.818 STATUS POST PLACEMENT OF IMPLANTABLE LOOP RECORDER: Primary | ICD-10-CM

## 2018-03-30 ENCOUNTER — OFFICE VISIT (OUTPATIENT)
Dept: CARDIOLOGY CLINIC | Age: 73
End: 2018-03-30

## 2018-03-30 DIAGNOSIS — Z95.818 STATUS POST PLACEMENT OF IMPLANTABLE LOOP RECORDER: Primary | ICD-10-CM

## 2018-04-16 ENCOUNTER — OFFICE VISIT (OUTPATIENT)
Dept: CARDIOLOGY CLINIC | Age: 73
End: 2018-04-16

## 2018-04-16 DIAGNOSIS — Z95.818 STATUS POST PLACEMENT OF IMPLANTABLE LOOP RECORDER: Primary | ICD-10-CM

## 2018-04-25 ENCOUNTER — OFFICE VISIT (OUTPATIENT)
Dept: CARDIOLOGY CLINIC | Age: 73
End: 2018-04-25

## 2018-04-25 VITALS
SYSTOLIC BLOOD PRESSURE: 120 MMHG | OXYGEN SATURATION: 99 % | HEART RATE: 60 BPM | DIASTOLIC BLOOD PRESSURE: 80 MMHG | BODY MASS INDEX: 34.59 KG/M2 | HEIGHT: 77 IN | WEIGHT: 293 LBS

## 2018-04-25 DIAGNOSIS — I48.0 PAROXYSMAL ATRIAL FIBRILLATION (HCC): Primary | ICD-10-CM

## 2018-04-25 NOTE — PROGRESS NOTES
Visit Vitals    /80 (BP 1 Location: Left arm, BP Patient Position: Sitting)    Pulse 60    Ht 6' 5\" (1.956 m)    Wt 293 lb (132.9 kg)    SpO2 99%    BMI 34.74 kg/m2

## 2018-04-25 NOTE — MR AVS SNAPSHOT
[address] [facility] [address] 
[phone] Patient: Redd Goss MRN: [id number] DBI:[birth year] Visit Information Date & Time Provider Department Dept. Phone Encounter #  
 4/25/2018  9:00 AM Adair Aguilera MD [facility] [phone] [id number] Your Appointments 10/26/2018  9:00 AM  
ESTABLISHED PATIENT with Adair Aguilera MD  
[facility] ([address]) Appt Note: 6 mo fu  
 [address] [facility] [address]  
54 [facility] [address] [address] Upcoming Health Maintenance Date Due  
 GLAUCOMA SCREENING Q2Y 10/16/2017 MEDICARE YEARLY EXAM 3/14/2018 COLONOSCOPY 6/15/2021 DTaP/Tdap/Td series (2 - Td) 10/14/2023 Allergies as of 4/25/2018  Review Complete On: 4/25/2018 By: Adair Aguilera MD  
  
 Severity Noted Reaction Type Reactions Percocet [Oxycodone-acetaminophen] Medium 12/12/2011   Side Effect Other (comments)  
 hallucinations Penicillin G Low 12/12/2011   Side Effect Rash Did okay with keflex - no reaction with that. Current Immunizations  Reviewed on 1/19/2018 Name Date Hepatitis B Vaccine 1/1/1994 Influenza High Dose Vaccine PF 12/3/2016 Influenza Vaccine 12/1/2017, 12/3/2016, 11/16/2015, 10/14/2013 Pneumococcal Conjugate (PCV-13) 1/1/2013 Pneumococcal Polysaccharide (PPSV-23) 6/18/2014 TD Vaccine 1/1/2001 Tdap 10/14/2013 ZZZ-RETIRED (DO NOT USE) Pneumococcal Vaccine (Unspecified Type) 10/16/2005 Zoster Vaccine, Live 1/1/2012 Not reviewed this visit Vitals BP Pulse Height(growth percentile) Weight(growth percentile) SpO2 BMI  
 120/80 (BP 1 Location: Left arm, BP Patient Position: Sitting) 60 6' 5\" (1.956 m) 293 lb (132.9 kg) 99% 34.74 kg/m2 Smoking Status Former Smoker Vitals History BMI and BSA Data Body Mass Index Body Surface Area 34.74 kg/m 2 2.69 m 2 Preferred Pharmacy Pharmacy Name Phone [facility] [address] [facility] [phone] Your Updated Medication List  
  
   
This list is accurate as of 4/25/18  9:19 AM.  Always use your most recent med list. amLODIPine 10 mg tablet Commonly known as:  Maira Starr TAKE 1 TABLET BY MOUTH DAILY  
  
 apixaban 5 mg tablet Commonly known as:  Artice Becki Take 1 Tab by mouth two (2) times a day. atorvastatin 20 mg tablet Commonly known as:  LIPITOR  
TAKE 1 TABLET DAILY  
  
 cyclobenzaprine 10 mg tablet Commonly known as:  FLEXERIL Take 1 Tab by mouth three (3) times daily as needed for Muscle Spasm(s). diclofenac EC 75 mg EC tablet Commonly known as:  VOLTAREN  
TAKE 1 TABLET TWICE A DAY AS NEEDED  
  
 eszopiclone 2 mg tablet Commonly known as:  Oley Erm TAKE 1 TABLET BY MOUTH EVERY NIGHT AT BEDTIME  
  
 gabapentin 300 mg capsule Commonly known as:  NEURONTIN Take 300 mg by mouth nightly as needed for Pain. Indications: generalized joint pain  
  
 levocetirizine 5 mg tablet Commonly known as:  Angelita Birks TAKE 1 TABLET DAILY  
  
 lisinopril 40 mg tablet Commonly known as:  PRINIVIL, ZESTRIL  
TAKE 1 TABLET BY MOUTH DAILY  
  
 terazosin 5 mg capsule Commonly known as:  HYTRIN  
TAKE 1 CAPSULE DAILY  
  
 terbinafine HCl 250 mg tablet Commonly known as:  LAMISIL TK 1 T PO QD Introducing [facility]! Dear Ilan Bedoya: Thank you for requesting a [employer] account. Our records indicate that you already have an active [employer] account. You can access your account anytime at [email or web address] Did you know that you can access your hospital and ER discharge instructions at any time in [employer]?   You can also review all of your test results from your hospital stay or ER visit. Additional Information If you have questions, please visit the Frequently Asked Questions section of the [employer] website at [email or web address] Remember, [employer] is NOT to be used for urgent needs. For medical emergencies, dial 911. Now available from your iPhone and Android! Please provide this summary of care documentation to your next provider. Your primary care clinician is listed as Carlos Delgadillo. If you have any questions after today's visit, please call [phone].

## 2018-04-25 NOTE — PROGRESS NOTES
HISTORY OF PRESENTING ILLNESS      Vance Reed is a 74 y.o. male with with HTN, SVT, AF, AVI on CPAP, hyperlipidemia, bradycardia (Cardizem was reduced due to bradycardia in the past) here for follow-up of his atrial fibrillation and bradycardia. During last visit it was unclear whether his reported symptoms correlated with a recorded pause on his ILR. We decided to discontinue metoprolol and to monitor clinically for changes in his symptoms/ILR findings. Last visit, his Amlodipine was increased to 10 mg and his blood pressures at home have been normotensive. He has had some bradycardia on his ILR during sleeping hours. He denies additional complaints.      ACTIVE PROBLEM LIST     Patient Active Problem List    Diagnosis Date Noted    Advanced care planning/counseling discussion 02/03/2017    Paroxysmal atrial fibrillation ([address] 09/28/2016    BPH (benign prostatic hyperplasia) 08/17/2016    Insomnia 08/17/2016    Osteoarthritis of left knee 08/15/2016    Spinal stenosis in cervical region 01/05/2016    Herniated nucleus pulposus, C3-4 01/05/2016    Normal cardiac stress test 09/21/2015    ED (erectile dysfunction) 06/18/2014    IFG (impaired fasting glucose)     SVT (supraventricular tachycardia) ([address]     Right knee DJD 11/04/2013    Spinal stenosis of lumbar region     Tear of medial cartilage or meniscus of knee, current 02/01/2013    Osteoarthrosis, unspecified whether generalized or localized, lower leg 02/01/2013    Palpitations 12/12/2011    Supraventricular tachycardia ([address] 12/12/2011    HLD (hyperlipidemia) 12/12/2011    Essential hypertension, benign 12/12/2011    Varicose veins 12/12/2011    Colon polyps     AVI (obstructive sleep apnea)     Heart palpitations     Hyperlipidemia with target LDL less than 130     BPH (benign prostatic hypertrophy)     Lower back pain     Knee pain, right     Right sided sciatica            PAST MEDICAL HISTORY     Past Medical History:   Diagnosis Date    Arthritis     left knee and lt. hand    Atrial fibrillation (HCC)     BPH (benign prostatic hyperplasia)     Chronic pain     Back pain    Colon polyps     DJD (degenerative joint disease) of lumbar spine     laminectomy 1979, 1991, 2015    ED (erectile dysfunction) 6/18/2014    Heart palpitations     Dr. Schulte Tang    Herniated nucleus pulposus, C3-4 12/2015    Dr. Kita Harrison    Hyperlipidemia     Hypertension     IFG (impaired fasting glucose)     Insomnia     Knee pain, right     Lower back pain     hx lumbar laminectomyx2 w good results    Normal cardiac stress test 9/21/15    OA (osteoarthritis) of knee     Dr. Amira De Souza Obesity     Onychomycosis 2018    Dr Reginald Zepeda lamisil    AVI (obstructive sleep apnea)     Dr. Prince Antunez Right sided sciatica     Seasonal allergic rhinitis     Spinal stenosis in cervical region 12/2015    Spinal stenosis of lumbar region     MRI 12/2012. Dr. Kita Harrison    SVT (supraventricular tachycardia) [facility])     Dr. Colin Snyder [facility] sleep apnea     cpap    Varicose veins     vein stripping 10/10           PAST SURGICAL HISTORY     Past Surgical History:   Procedure Laterality Date    COLONOSCOPY  2008    2 polyps. repeat 2011. Dr. Geoff Zarco  2007    7 polyps per pt     COLONOSCOPY N/A 6/15/2016    COLONOSCOPY performed by Kana Piedra MD at [address] [place] [address] [place], COLON, DIAGNOSTIC  2011    return in 2016    HX BLADDER REPAIR      polyp removal    HX CERVICAL FUSION  1/16/16    ACDF C3-C4 Dr. Kita Harrison    HX COLONOSCOPY  4/27/11    Dr Cinthia Jolley, [facility] polyp.   repeat 4/2016    HX KNEE ARTHROSCOPY  2005    right, Dr. Zeke Gongora ARTHROSCOPY  02/01/2012    left, Dr Zeke Gongora ARTHROSCOPY  1/2013    right    HX KNEE REPLACEMENT  11/4/2013    HX LUMBAR FUSION  9/2015    L3-L4, Dr. Nataly Renee      left, Dr Jay Jensen    bladder polyp removed with cystoscopy    HX VEIN STRIPPING  9/2010    Dr. Mykel Ball, bilateral    SHOULDER SURG [address] UNLISTED      left DECOMPRESSION, Dr. Adrian Quijano  11/2013    VASCULAR SURGERY PROCEDURE UNLIST  2008    bilateral vein stripping          ALLERGIES     Allergies   Allergen Reactions    Percocet [Oxycodone-Acetaminophen] Other (comments)     hallucinations    Penicillin G Rash     Did okay with keflex - no reaction with that. FAMILY HISTORY     Family History   Problem Relation Age of Onset    Cancer Mother      liver    Cancer Father      leukemia    Cancer Sister      lung cancer    negative for cardiac disease       SOCIAL HISTORY     Social History     Social History    Marital status:      Spouse name: Aubrey Silva Number of children: 3    Years of education: N/A     Social History Main Topics    Smoking status: Former Smoker     Packs/day: 0.25     Years: 19.00     Types: Cigarettes     Quit date: 1/1/1980    Smokeless tobacco: Never Used      Comment: quit ~1980    Alcohol use 0.6 oz/week     1 Glasses of wine per week      Comment: socially    Drug use: No    Sexual activity: Yes     Other Topics Concern     Service Yes     army     Social History Narrative    ** Merged History Encounter **         1 child, 2 stepchildren    fishes         MEDICATIONS     Current Outpatient Prescriptions   Medication Sig    eszopiclone (LUNESTA) 2 mg tablet TAKE 1 TABLET BY MOUTH EVERY NIGHT AT BEDTIME    diclofenac EC (VOLTAREN) 75 mg EC tablet TAKE 1 TABLET TWICE A DAY AS NEEDED    amLODIPine (NORVASC) 10 mg tablet TAKE 1 TABLET BY MOUTH DAILY    terbinafine HCl (LAMISIL) 250 mg tablet TK 1 T PO QD    apixaban (ELIQUIS) 5 mg tablet Take 1 Tab by mouth two (2) times a day.     lisinopril (PRINIVIL, ZESTRIL) 40 mg tablet TAKE 1 TABLET BY MOUTH DAILY    levocetirizine (XYZAL) 5 mg tablet TAKE 1 TABLET DAILY    terazosin (HYTRIN) 5 mg capsule TAKE 1 CAPSULE DAILY    atorvastatin (LIPITOR) 20 mg tablet TAKE 1 TABLET DAILY    gabapentin (NEURONTIN) 300 mg capsule Take 300 mg by mouth nightly as needed for Pain. Indications: generalized joint pain    cyclobenzaprine (FLEXERIL) 10 mg tablet Take 1 Tab by mouth three (3) times daily as needed for Muscle Spasm(s). No current facility-administered medications for this visit. I have reviewed the nurses notes, vitals, problem list, allergy list, medical history, family, social history and medications. REVIEW OF SYMPTOMS      General: Pt denies excessive weight gain or loss. Pt is able to conduct ADL's  HEENT: Denies blurred vision, headaches, hearing loss, epistaxis and difficulty swallowing. Respiratory: Denies cough, congestion, shortness of breath, MCRAE, wheezing or stridor. Cardiovascular: +BLE edema, Denies precordial pain, palpitations, or PND  Gastrointestinal: Denies poor appetite, indigestion, abdominal pain or blood in stool  Genitourinary: Denies hematuria, dysuria, increased urinary frequency  Musculoskeletal: Denies joint pain or swelling from muscles or joints  Neurologic: Denies tremor, paresthesias, headache, or sensory motor disturbance  Psychiatric: Denies confusion, insomnia, depression  Integumentray: Denies rash, itching or ulcers. Hematologic: Denies easy bruising, bleeding       PHYSICAL EXAMINATION      There were no vitals filed for this visit. General: Well developed, in no acute distress. HEENT: No jaundice, oral mucosa moist, no oral ulcers  Neck: Supple, no stiffness, no lymphadenopathy, supple  Heart:  Normal S1/S2 negative S3 or S4. Regular, no murmur, gallop or rub, no jugular venous distention  Respiratory: Clear bilaterally x 4, no wheezing or rales  Abdomen:   Soft, non-tender, bowel sounds are active.   Extremities:  No edema, normal cap refill, no cyanosis.   Musculoskeletal: No clubbing, no deformities  Neuro: A&Ox3, speech clear, gait stable, cooperative, no focal neurologic deficits  Skin: Skin color is normal. No rashes or lesions. Non diaphoretic, moist.  Vascular: 2+ pulses symmetric in all extremities       DIAGNOSTIC DATA      EKG:        LABORATORY DATA      Lab Results   Component Value Date/Time    WBC 6.2 01/29/2018 09:51 AM    HGB 13.0 01/29/2018 09:51 AM    HCT 39.6 01/29/2018 09:51 AM    PLATELET 702 96/57/3406 09:51 AM    MCV 91 01/29/2018 09:51 AM      Lab Results   Component Value Date/Time    Sodium 144 01/29/2018 09:51 AM    Potassium 4.6 01/29/2018 09:51 AM    Chloride 108 (H) 01/29/2018 09:51 AM    CO2 23 01/29/2018 09:51 AM    Anion gap 8 08/19/2016 03:36 AM    Glucose 93 01/29/2018 09:51 AM    BUN 23 01/29/2018 09:51 AM    Creatinine 1.31 (H) 01/29/2018 09:51 AM    BUN/Creatinine ratio 18 01/29/2018 09:51 AM    GFR est AA 62 01/29/2018 09:51 AM    GFR est non-AA 53 (L) 01/29/2018 09:51 AM    Calcium 8.7 01/29/2018 09:51 AM    Bilirubin, total 0.6 01/29/2018 09:51 AM    AST (SGOT) 23 01/29/2018 09:51 AM    Alk. phosphatase 44 01/29/2018 09:51 AM    Protein, total 6.4 01/29/2018 09:51 AM    Albumin 4.2 01/29/2018 09:51 AM    Globulin 3.0 08/08/2016 10:28 AM    A-G Ratio 1.9 01/29/2018 09:51 AM    ALT (SGPT) 29 01/29/2018 09:51 AM           ASSESSMENT      1. Supraventricular tachycardia  2. Hypertension  3. Hyperlipidemia  4. Venous insufficiency  5. First degree AV block    6. AVI on CPAP  7. Atrial fibrillation                        Persistent        PLAN     Continue current therapy and monitor ILR reports for recurrence of sinus pauses, symptomatic bradycardia or AF with tachycardia. Suspect that LE edema is secondary to Amlodipine; however, the patient would like to continue to monitor this prior to changing medication. FOLLOW-UP   6 months    Thank you, Marlon Bennett MD for allowing me to participate in the care of this extraordinarily pleasant male.  Please do not hesitate to contact me for further questions/concerns.      LUIS ALBERTO Zayas MD  Cardiac Electrophysiology / Cardiology    [facility] [address] [address]  [address], [address] [facility], [address]  [address]  [phone] / [phone] Fax   [phone] / [phone] Fax

## 2018-04-26 RX ORDER — TERAZOSIN 5 MG/1
CAPSULE ORAL
Qty: 90 CAP | Refills: 3 | Status: SHIPPED | OUTPATIENT
Start: 2018-04-26 | End: 2019-03-30 | Stop reason: SDUPTHER

## 2018-04-26 RX ORDER — ATORVASTATIN CALCIUM 20 MG/1
TABLET, FILM COATED ORAL
Qty: 90 TAB | Refills: 3 | Status: SHIPPED | OUTPATIENT
Start: 2018-04-26 | End: 2019-03-30 | Stop reason: SDUPTHER

## 2018-04-30 ENCOUNTER — OFFICE VISIT (OUTPATIENT)
Dept: CARDIOLOGY CLINIC | Age: 73
End: 2018-04-30

## 2018-04-30 DIAGNOSIS — Z95.818 STATUS POST PLACEMENT OF IMPLANTABLE LOOP RECORDER: Primary | ICD-10-CM

## 2018-05-15 ENCOUNTER — OFFICE VISIT (OUTPATIENT)
Dept: CARDIOLOGY CLINIC | Age: 73
End: 2018-05-15

## 2018-05-15 DIAGNOSIS — Z95.818 STATUS POST PLACEMENT OF IMPLANTABLE LOOP RECORDER: Primary | ICD-10-CM

## 2018-05-16 ENCOUNTER — DOCUMENTATION ONLY (OUTPATIENT)
Dept: CARDIOLOGY CLINIC | Age: 73
End: 2018-05-16

## 2018-05-16 NOTE — PROGRESS NOTES
Recent ILR interrogation demonstrates recurrent AF with RVR. Patient's metoprolol was recently discontinued due to observation of tachycardia. Will restart metoprolol and monitor for recurrent episodes of bradycardia. If observed will consider pacemaker implantation.   Follow-up 1 month      Mika Solano MD

## 2018-05-17 RX ORDER — METOPROLOL SUCCINATE 25 MG/1
12.5 TABLET, EXTENDED RELEASE ORAL 2 TIMES DAILY
Qty: 90 TAB | Refills: 1
Start: 2018-05-17 | End: 2019-04-01 | Stop reason: SDUPTHER

## 2018-05-17 NOTE — PROGRESS NOTES
Spoke to pts wife & informed her that Dr Cam Britt wants pt to restart the Metoprolol 25 mg 1/2 tablet twice daily and monitor for rates with recurrent pauses. She stated pt still has some Metoprolol so does not need a new script called to pharmacy. Wife verified understanding and will call if any problems.

## 2018-05-17 NOTE — TELEPHONE ENCOUNTER
Per Dr. Claire Sheehan patient to restart Metoprolol Succinate 25 mg 1/2 tablet twice daily for recurrent Atrial FIB with RVR noted on ILR. Patient aware to restart medication and follow up with Dr. Claire Sheehan in one month.

## 2018-05-24 ENCOUNTER — TELEPHONE (OUTPATIENT)
Dept: CARDIOLOGY CLINIC | Age: 73
End: 2018-05-24

## 2018-05-24 NOTE — TELEPHONE ENCOUNTER
Blood thinner has been signed off but the form was not checked to determine if it was approved. [facility] is refaxing this form to [phone].      Thanks

## 2018-05-29 ENCOUNTER — OFFICE VISIT (OUTPATIENT)
Dept: CARDIOLOGY CLINIC | Age: 73
End: 2018-05-29

## 2018-05-29 DIAGNOSIS — Z95.818 STATUS POST PLACEMENT OF IMPLANTABLE LOOP RECORDER: Primary | ICD-10-CM

## 2018-06-18 ENCOUNTER — OFFICE VISIT (OUTPATIENT)
Dept: CARDIOLOGY CLINIC | Age: 73
End: 2018-06-18

## 2018-06-18 DIAGNOSIS — Z95.818 STATUS POST PLACEMENT OF IMPLANTABLE LOOP RECORDER: Primary | ICD-10-CM

## 2018-06-27 ENCOUNTER — OFFICE VISIT (OUTPATIENT)
Dept: CARDIOLOGY CLINIC | Age: 73
End: 2018-06-27

## 2018-06-27 VITALS
OXYGEN SATURATION: 97 % | DIASTOLIC BLOOD PRESSURE: 64 MMHG | HEART RATE: 46 BPM | SYSTOLIC BLOOD PRESSURE: 112 MMHG | HEIGHT: 77 IN | WEIGHT: 289.4 LBS | BODY MASS INDEX: 34.17 KG/M2 | RESPIRATION RATE: 20 BRPM

## 2018-06-27 DIAGNOSIS — I47.1 SVT (SUPRAVENTRICULAR TACHYCARDIA) (HCC): ICD-10-CM

## 2018-06-27 DIAGNOSIS — I48.0 PAROXYSMAL ATRIAL FIBRILLATION (HCC): Primary | ICD-10-CM

## 2018-06-27 DIAGNOSIS — I10 ESSENTIAL HYPERTENSION: ICD-10-CM

## 2018-06-27 RX ORDER — AMLODIPINE BESYLATE 5 MG/1
TABLET ORAL
Qty: 90 TAB | Refills: 3 | Status: SHIPPED | OUTPATIENT
Start: 2018-06-27 | End: 2018-09-18 | Stop reason: SDUPTHER

## 2018-06-27 NOTE — PROGRESS NOTES
HISTORY OF PRESENTING ILLNESS      Carole Kendall is a 74 y.o. male with HTN, SVT, AF, AVI on CPAP, hyperlipidemia, bradycardia (Cardizem was reduced due to bradycardia in the past) here for follow-up of his atrial fibrillation and bradycardia. During last visit it was unclear whether his reported symptoms correlated with a recorded pause on his ILR. We decided to discontinue metoprolol and to monitor clinically for changes in his symptoms/ILR findings. Previously, his Amlodipine was increased to 10 mg and his blood pressures at home had been normotensive. However he has lower extremity edema and also reports bilateral exertional thigh pain. ILR shows his last AF episode was in 12/2017.  No bradycardia.         ACTIVE PROBLEM LIST     Patient Active Problem List    Diagnosis Date Noted    Advanced care planning/counseling discussion 02/03/2017    Paroxysmal atrial fibrillation ([address] 09/28/2016    BPH (benign prostatic hyperplasia) 08/17/2016    Insomnia 08/17/2016    Osteoarthritis of left knee 08/15/2016    Spinal stenosis in cervical region 01/05/2016    Herniated nucleus pulposus, C3-4 01/05/2016    Normal cardiac stress test 09/21/2015    ED (erectile dysfunction) 06/18/2014    IFG (impaired fasting glucose)     SVT (supraventricular tachycardia) ([address]     Right knee DJD 11/04/2013    Spinal stenosis of lumbar region     Tear of medial cartilage or meniscus of knee, current 02/01/2013    Osteoarthrosis, unspecified whether generalized or localized, lower leg 02/01/2013    Palpitations 12/12/2011    Supraventricular tachycardia ([address] 12/12/2011    HLD (hyperlipidemia) 12/12/2011    Essential hypertension, benign 12/12/2011    Varicose veins 12/12/2011    Colon polyps     AVI (obstructive sleep apnea)     Heart palpitations     Hyperlipidemia with target LDL less than 130     BPH (benign prostatic hypertrophy)     Lower back pain     Knee pain, right     Right sided sciatica            PAST MEDICAL HISTORY     Past Medical History:   Diagnosis Date    Arthritis     left knee and lt. hand    Atrial fibrillation (HCC)     BPH (benign prostatic hyperplasia)     Chronic pain     Back pain    Colon polyps     DJD (degenerative joint disease) of lumbar spine     laminectomy 1979, 1991, 2015    ED (erectile dysfunction) 6/18/2014    Heart palpitations     Dr. Ayad Rae    Herniated nucleus pulposus, C3-4 12/2015    Dr. Sangeetha Hernandez    Hyperlipidemia     Hypertension     IFG (impaired fasting glucose)     Insomnia     Knee pain, right     Lower back pain     hx lumbar laminectomyx2 w good results    Normal cardiac stress test 9/21/15    OA (osteoarthritis) of knee     Dr. Vega Hannon Obesity     Onychomycosis 2018    Dr Clarisse Schwab. lamisil    AVI (obstructive sleep apnea)     Dr. Dennis Avery Right sided sciatica     Seasonal allergic rhinitis     Spinal stenosis in cervical region 12/2015    Spinal stenosis of lumbar region     MRI 12/2012. Dr. Sangeetha Hernandez    [facility])     Dr. Bal Bound Dr Graciela Conde sleep apnea     cpap    Varicose veins     vein stripping 10/10           PAST SURGICAL HISTORY     Past Surgical History:   Procedure Laterality Date    COLONOSCOPY  2008    2 polyps. repeat 2011. Dr. Kar Ricks  2007    7 polyps per pt     COLONOSCOPY N/A 6/15/2016    COLONOSCOPY performed by Karlee Barker MD at [address] [place] [address], COLON, DIAGNOSTIC  2011    return in 2016    HX BLADDER REPAIR      polyp removal    HX CERVICAL FUSION  1/16/16    ACDF C3-C4 Dr. Sangeetha Hernandez    HX COLONOSCOPY  4/27/11    Dr Dakota Cox, smal polyp.   repeat 4/2016    HX KNEE ARTHROSCOPY  2005    right, Dr. Marcus Soliz    HX KNEE ARTHROSCOPY  02/01/2012    left, Dr Cruz Blejavanr ARTHROSCOPY  1/2013    right    HX KNEE REPLACEMENT  11/4/2013    HX LUMBAR FUSION  9/2015    L3-L4, Dr. Nam Marrero HX LUMBAR LAMINECTOMY  1991    HX MOHS PROCEDURES      left, Dr Sumeet Sharma    bladder polyp removed with cystoscopy    HX VEIN STRIPPING  9/2010    Dr. Aguilar Boy, bilateral    SHOULDER SURG [address] UNLISTED      left DECOMPRESSION, Dr. Sahil Larsen  11/2013    VASCULAR SURGERY PROCEDURE UNLIST  2008    bilateral vein stripping          ALLERGIES     Allergies   Allergen Reactions    Percocet [Oxycodone-Acetaminophen] Other (comments)     hallucinations    Penicillin G Rash     Did okay with keflex - no reaction with that. FAMILY HISTORY     Family History   Problem Relation Age of Onset   Denisa Newell Cancer Mother      liver    Cancer Father      leukemia    Cancer Sister      lung cancer    negative for cardiac disease       SOCIAL HISTORY     Social History     Social History    Marital status:      Spouse name: Bacilio Scanlon Number of children: 3    Years of education: N/A     Social History Main Topics    Smoking status: Former Smoker     Packs/day: 0.25     Years: 19.00     Types: Cigarettes     Quit date: 1/1/1980    Smokeless tobacco: Never Used      Comment: quit ~1980    Alcohol use 0.6 oz/week     1 Glasses of wine per week      Comment: socially    Drug use: No    Sexual activity: Yes     Other Topics Concern     Service Yes     army     Social History Narrative    ** Merged History Encounter **         1 child, 2 stepchildren    fishes         MEDICATIONS     Current Outpatient Prescriptions   Medication Sig    metoprolol succinate (TOPROL-XL) 25 mg XL tablet Take 0.5 Tabs by mouth two (2) times a day.     terazosin (HYTRIN) 5 mg capsule TAKE 1 CAPSULE DAILY    atorvastatin (LIPITOR) 20 mg tablet TAKE 1 TABLET DAILY    eszopiclone (LUNESTA) 2 mg tablet TAKE 1 TABLET BY MOUTH EVERY NIGHT AT BEDTIME    diclofenac EC (VOLTAREN) 75 mg EC tablet TAKE 1 TABLET TWICE A DAY AS NEEDED    amLODIPine (NORVASC) 10 mg tablet TAKE 1 TABLET BY MOUTH DAILY    terbinafine HCl (LAMISIL) 250 mg tablet TK 1 T PO QD    apixaban (ELIQUIS) 5 mg tablet Take 1 Tab by mouth two (2) times a day.  lisinopril (PRINIVIL, ZESTRIL) 40 mg tablet TAKE 1 TABLET BY MOUTH DAILY    levocetirizine (XYZAL) 5 mg tablet TAKE 1 TABLET DAILY    gabapentin (NEURONTIN) 300 mg capsule Take 300 mg by mouth nightly as needed for Pain. Indications: generalized joint pain    cyclobenzaprine (FLEXERIL) 10 mg tablet Take 1 Tab by mouth three (3) times daily as needed for Muscle Spasm(s). No current facility-administered medications for this visit. I have reviewed the nurses notes, vitals, problem list, allergy list, medical history, family, social history and medications. REVIEW OF SYMPTOMS      General: Pt denies excessive weight gain or loss. Pt is able to conduct ADL's  HEENT: Denies blurred vision, headaches, hearing loss, epistaxis and difficulty swallowing. Respiratory: Denies cough, congestion, shortness of breath, MCRAE, wheezing or stridor. Cardiovascular: Denies precordial pain, palpitations, edema or PND  Gastrointestinal: Denies poor appetite, indigestion, abdominal pain or blood in stool  Genitourinary: Denies hematuria, dysuria, increased urinary frequency  Musculoskeletal: Denies joint pain or swelling from muscles or joints  Neurologic: Denies tremor, paresthesias, headache, or sensory motor disturbance  Psychiatric: Denies confusion, insomnia, depression  Integumentray: Denies rash, itching or ulcers. Hematologic: Denies easy bruising, bleeding       PHYSICAL EXAMINATION      There were no vitals filed for this visit. General: Well developed, in no acute distress. HEENT: No jaundice, oral mucosa moist, no oral ulcers  Neck: Supple, no stiffness, no lymphadenopathy, supple  Heart:  Normal S1/S2 negative S3 or S4.  Regular, no murmur, gallop or rub, no jugular venous distention  Respiratory: Clear bilaterally x 4, no wheezing or rales  Abdomen:   Soft, non-tender, bowel sounds are active.   Extremities:  No edema, normal cap refill, no cyanosis. Musculoskeletal: No clubbing, no deformities  Neuro: A&Ox3, speech clear, gait stable, cooperative, no focal neurologic deficits  Skin: Skin color is normal. No rashes or lesions. Non diaphoretic, moist.  Vascular: 2+ pulses symmetric in all extremities       DIAGNOSTIC DATA      EKG:        LABORATORY DATA      Lab Results   Component Value Date/Time    WBC 6.2 01/29/2018 09:51 AM    HGB 13.0 01/29/2018 09:51 AM    HCT 39.6 01/29/2018 09:51 AM    PLATELET 396 46/63/1959 09:51 AM    MCV 91 01/29/2018 09:51 AM      Lab Results   Component Value Date/Time    Sodium 144 01/29/2018 09:51 AM    Potassium 4.6 01/29/2018 09:51 AM    Chloride 108 (H) 01/29/2018 09:51 AM    CO2 23 01/29/2018 09:51 AM    Anion gap 8 08/19/2016 03:36 AM    Glucose 93 01/29/2018 09:51 AM    BUN 23 01/29/2018 09:51 AM    Creatinine 1.31 (H) 01/29/2018 09:51 AM    BUN/Creatinine ratio 18 01/29/2018 09:51 AM    GFR est AA 62 01/29/2018 09:51 AM    GFR est non-AA 53 (L) 01/29/2018 09:51 AM    Calcium 8.7 01/29/2018 09:51 AM    Bilirubin, total 0.6 01/29/2018 09:51 AM    AST (SGOT) 23 01/29/2018 09:51 AM    Alk. phosphatase 44 01/29/2018 09:51 AM    Protein, total 6.4 01/29/2018 09:51 AM    Albumin 4.2 01/29/2018 09:51 AM    Globulin 3.0 08/08/2016 10:28 AM    A-G Ratio 1.9 01/29/2018 09:51 AM    ALT (SGPT) 29 01/29/2018 09:51 AM           ASSESSMENT      1. Supraventricular tachycardia  2. Hypertension  3. Hyperlipidemia  4. Venous insufficiency  5. First degree AV block    6. AVI on CPAP  7. Atrial fibrillation                        Persistent        PLAN     Lower norvasc to 5 mg daily and monitor for improvement in lower ext edema. Obtain MELITA exercise test to evaluate bilateral exertional thigh pain.         FOLLOW-UP     1 month with Daniela Moreno      Thank you, Kaitlin Rizvi MD for allowing me to participate in the care of this extraordinarily pleasant male. Please do not hesitate to contact me for further questions/concerns.          Merry Valle MD  Cardiac Electrophysiology / Cardiology    [facility]  [address] [facility], [facility], [address] [facility] [address]  [address], [address]  [phone] / [phone] Fax   [phone] / [phone] Fax

## 2018-06-27 NOTE — PROGRESS NOTES
Visit Vitals    /64 (BP 1 Location: Left arm, BP Patient Position: Sitting)    Pulse (!) 46    Resp 20    Ht 6' 5\" (1.956 m)    Wt 289 lb 6.4 oz (131.3 kg)    SpO2 97%    BMI 34.32 kg/m2

## 2018-07-01 RX ORDER — LEVOCETIRIZINE DIHYDROCHLORIDE 5 MG/1
TABLET, FILM COATED ORAL
Qty: 90 TAB | Refills: 2 | Status: SHIPPED | OUTPATIENT
Start: 2018-07-01 | End: 2019-03-30 | Stop reason: SDUPTHER

## 2018-07-02 ENCOUNTER — CLINICAL SUPPORT (OUTPATIENT)
Dept: CARDIOLOGY CLINIC | Age: 73
End: 2018-07-02

## 2018-07-02 DIAGNOSIS — I73.9 CLAUDICATION (HCC): Primary | ICD-10-CM

## 2018-07-02 NOTE — PROCEDURES
[facility]  *** FINAL REPORT ***    Name: Munira Chen  MRN: [id number]       Outpatient  : 1945  HIS Order #: [id number]  [address] [facility] Visit #: [id number]  Date: 2018    TYPE OF TEST: Peripheral Arterial Testing    REASON FOR TEST  Claudication (both sides)    Right Leg  Segmentals: Normal                     mmHg  Brachial         115  High thigh  Low thigh  Calf  Posterior tibial 181  Dorsalis pedis   177  Peroneal  Metatarsal  Toe pressure  Doppler:    Normal  PVR:        Normal  Ankle/Brachial: 1.57    Left Leg  Segmentals: Normal                     mmHg  Brachial         111  High thigh  Low thigh  Calf  Posterior tibial 180  Dorsalis pedis   178  Peroneal  Metatarsal  Toe pressure  Doppler:    Normal  PVR:        Normal  Ankle/Brachial: 1.57  Post exercise results:  Speed:  mph  Grade:  Duration: 5     Brachial  Right Ankle  MELITA    Left Ankle  MELITA    1:   156         207     1.33       216     1.38  2:  3:  4:  5:    INTERPRETATION/FINDINGS  PROCEDURE:  Evaluation of lower extremity arteries with systolic blood   pressure measurement at the ankle and brachial level pre and post  exercise. FINDINGS:  Normal multiphasic Doppler waveforms are exhibited within  the bilateral distal posterior tibial and dorsalis pedis arteries. The  pulse volume recordings obtained at the ankle level appears normal in  configuration. Resting ankle/brachial indices are within normal  limits. The patient walked for 5 minutes without symptoms. Post exercise,  there is a rise in ankle pressure bilaterally ( 1.33 on the right and  1.38 on the left). IMPRESSION:  1)  No evidence of significant arterial occlusive disease in either  lower extremity. 2)  Resting ankle/brachial indices are normal bilaterally. 3)  There is no significant drop in ankle pressure post exercise,  consistent with normal peripheral perfusion.     ADDITIONAL COMMENTS    I have personally reviewed the data relevant to the interpretation of  this  study.     TECHNOLOGIST: GLORIA Jennings  Signed: 07/02/2018 12:07 PM    PHYSICIAN: Destiny Cowart MD  Signed: 07/02/2018 05:56 PM

## 2018-07-03 ENCOUNTER — OFFICE VISIT (OUTPATIENT)
Dept: CARDIOLOGY CLINIC | Age: 73
End: 2018-07-03

## 2018-07-03 DIAGNOSIS — Z95.818 STATUS POST PLACEMENT OF IMPLANTABLE LOOP RECORDER: Primary | ICD-10-CM

## 2018-07-12 DIAGNOSIS — F51.01 PRIMARY INSOMNIA: Primary | ICD-10-CM

## 2018-07-12 RX ORDER — ESZOPICLONE 3 MG/1
3 TABLET, FILM COATED ORAL
Qty: 30 TAB | Refills: 2 | OUTPATIENT
Start: 2018-07-12 | End: 2018-10-08 | Stop reason: SDUPTHER

## 2018-07-18 ENCOUNTER — OFFICE VISIT (OUTPATIENT)
Dept: CARDIOLOGY CLINIC | Age: 73
End: 2018-07-18

## 2018-07-18 DIAGNOSIS — Z95.818 STATUS POST PLACEMENT OF IMPLANTABLE LOOP RECORDER: Primary | ICD-10-CM

## 2018-07-23 ENCOUNTER — OFFICE VISIT (OUTPATIENT)
Dept: CARDIOLOGY CLINIC | Age: 73
End: 2018-07-23

## 2018-07-23 DIAGNOSIS — Z95.818 STATUS POST PLACEMENT OF IMPLANTABLE LOOP RECORDER: Primary | ICD-10-CM

## 2018-07-25 ENCOUNTER — OFFICE VISIT (OUTPATIENT)
Dept: CARDIOLOGY CLINIC | Age: 73
End: 2018-07-25

## 2018-07-25 VITALS
WEIGHT: 286 LBS | OXYGEN SATURATION: 98 % | HEART RATE: 74 BPM | DIASTOLIC BLOOD PRESSURE: 82 MMHG | BODY MASS INDEX: 33.77 KG/M2 | SYSTOLIC BLOOD PRESSURE: 130 MMHG | HEIGHT: 77 IN

## 2018-07-25 DIAGNOSIS — I48.0 PAROXYSMAL ATRIAL FIBRILLATION (HCC): Primary | ICD-10-CM

## 2018-07-25 DIAGNOSIS — I10 ESSENTIAL HYPERTENSION, BENIGN: ICD-10-CM

## 2018-07-25 DIAGNOSIS — G47.33 OSA (OBSTRUCTIVE SLEEP APNEA): ICD-10-CM

## 2018-07-25 DIAGNOSIS — I47.1 SVT (SUPRAVENTRICULAR TACHYCARDIA) (HCC): ICD-10-CM

## 2018-07-25 NOTE — PROGRESS NOTES
HISTORY OF PRESENTING ILLNESS      Olya Parham is a 74 y.o. male with  HTN, SVT, AF, AVI on CPAP, hyperlipidemia, bradycardia (Cardizem was reduced due to bradycardia in the past) here for follow-up of his atrial fibrillation and bradycardia.  During last visit it was unclear whether his reported symptoms correlated with a recorded pause on his ILR.  We decided to discontinue metoprolol and to monitor clinically for changes in his symptoms/ILR findings. Previously, his Amlodipine was increased to 10 mg and his blood pressures at home had been normotensive. However he has lower extremity edema and also reports bilateral exertional thigh pain. ILR demonstrates an episode of AF in July. No bradycardia. He had ABIs to evaluate bilateral thigh pain and FAY has improved with lower dose of amlodipine.      ACTIVE PROBLEM LIST     Patient Active Problem List    Diagnosis Date Noted    Advanced care planning/counseling discussion 02/03/2017    Paroxysmal atrial fibrillation ([address] 09/28/2016    BPH (benign prostatic hyperplasia) 08/17/2016    Insomnia 08/17/2016    Osteoarthritis of left knee 08/15/2016    Spinal stenosis in cervical region 01/05/2016    Herniated nucleus pulposus, C3-4 01/05/2016    Normal cardiac stress test 09/21/2015    ED (erectile dysfunction) 06/18/2014    IFG (impaired fasting glucose)     SVT (supraventricular tachycardia) ([address]     Right knee DJD 11/04/2013    Spinal stenosis of lumbar region     Tear of medial cartilage or meniscus of knee, current 02/01/2013    Osteoarthrosis, unspecified whether generalized or localized, lower leg 02/01/2013    Palpitations 12/12/2011    Supraventricular tachycardia ([address] 12/12/2011    HLD (hyperlipidemia) 12/12/2011    Essential hypertension, benign 12/12/2011    Varicose veins 12/12/2011    Colon polyps     AVI (obstructive sleep apnea)     Heart palpitations     Hyperlipidemia with target LDL less than 130     BPH (benign prostatic hypertrophy)     Lower back pain     Knee pain, right     Right sided sciatica            PAST MEDICAL HISTORY     Past Medical History:   Diagnosis Date    Arthritis     left knee and lt. hand    Atrial fibrillation (HCC)     BPH (benign prostatic hyperplasia)     Chronic pain     Back pain    Colon polyps     DJD (degenerative joint disease) of lumbar spine     laminectomy 1979, 1991, 2015    ED (erectile dysfunction) 6/18/2014    Heart palpitations     Dr. Yamila Godwin    Herniated nucleus pulposus, C3-4 12/2015    Dr. Ina Rangel    Hyperlipidemia     Hypertension     IFG (impaired fasting glucose)     Insomnia     Knee pain, right     Lower back pain     hx lumbar laminectomyx2 w good results    Normal cardiac stress test 9/21/15    OA (osteoarthritis) of knee     Dr. Merna Carney Obesity     Onychomycosis 2018    Dr Fabricio Narayan. lamisil    AVI (obstructive sleep apnea)     Dr. Miranda Rosales Right sided sciatica     Seasonal allergic rhinitis     Spinal stenosis in cervical region 12/2015    Spinal stenosis of lumbar region     MRI 12/2012. Dr. Ina Rangel    SVT (supraventricular tachycardia) [facility])     Dr. Lelia Saenz [place] sleep apnea     cpap    Varicose veins     vein stripping 10/10           PAST SURGICAL HISTORY     Past Surgical History:   Procedure Laterality Date    COLONOSCOPY  2008    2 polyps. repeat 2011. Dr. Vance Kidd  2007    7 polyps per pt     COLONOSCOPY N/A 6/15/2016    COLONOSCOPY performed by Cookie Ramírez MD at [address] [place] [address], [place], DIAGNOSTIC  2011    return in 2016    HX BLADDER REPAIR      polyp removal    HX CERVICAL FUSION  1/16/16    ACDF C3-C4 Dr. Ina Rangel    [facility]  [facility]27[facility]    [facility]   repeat 4/2016    HX KNEE ARTHROSCOPY  2005    right, Dr. Abbey Burkitt ARTHROSCOPY  02/01/2012    left, Dr Abbey Burkitt ARTHROSCOPY  1/2013    right    HX KNEE REPLACEMENT  11/4/2013    HX LUMBAR FUSION  9/2015    L3-L4, Dr. Jsoe Stallings      left, Dr Jocelyne Carroll    bladder polyp removed with cystoscopy    HX VEIN STRIPPING  9/2010    Dr. Antonella Ferrari, bilateral    SHOULDER SURG [address] UNLISTED      left DECOMPRESSION, Dr. Leatha Canas  11/2013    VASCULAR SURGERY PROCEDURE UNLIST  2008    bilateral vein stripping          ALLERGIES     Allergies   Allergen Reactions    Percocet [Oxycodone-Acetaminophen] Other (comments)     hallucinations    Penicillin G Rash     Did okay with keflex - no reaction with that. FAMILY HISTORY     Family History   Problem Relation Age of Onset   Enrique Flynn Cancer Mother      liver    Cancer Father      leukemia    Cancer Sister      lung cancer    negative for cardiac disease       SOCIAL HISTORY     Social History     Social History    Marital status:      Spouse name: Cam Pruett Number of children: 3    Years of education: N/A     Social History Main Topics    Smoking status: Former Smoker     Packs/day: 0.25     Years: 19.00     Types: Cigarettes     Quit date: 1/1/1980    Smokeless tobacco: Never Used      Comment: quit ~1980    Alcohol use 4.2 oz/week     7 Glasses of wine per week      Comment: socially    Drug use: No    Sexual activity: Yes     Other Topics Concern     Service Yes     army     Social History Narrative    ** Merged History Encounter **         1 child, 2 stepchildren    fishes         MEDICATIONS     Current Outpatient Prescriptions   Medication Sig    eszopiclone (LUNESTA) 3 mg tablet Take 1 Tab by mouth nightly. Max Daily Amount: 3 mg.  Increased pill dose 7/12/18    levocetirizine (XYZAL) 5 mg tablet TAKE 1 TABLET DAILY    amLODIPine (NORVASC) 5 mg tablet TAKE 1 TABLET BY MOUTH DAILY    metoprolol succinate (TOPROL-XL) 25 mg XL tablet Take 0.5 Tabs by mouth two (2) times a day.  terazosin (HYTRIN) 5 mg capsule TAKE 1 CAPSULE DAILY    atorvastatin (LIPITOR) 20 mg tablet TAKE 1 TABLET DAILY    diclofenac EC (VOLTAREN) 75 mg EC tablet TAKE 1 TABLET TWICE A DAY AS NEEDED    apixaban (ELIQUIS) 5 mg tablet Take 1 Tab by mouth two (2) times a day.  lisinopril (PRINIVIL, ZESTRIL) 40 mg tablet TAKE 1 TABLET BY MOUTH DAILY    gabapentin (NEURONTIN) 300 mg capsule Take 300 mg by mouth nightly as needed for Pain. Indications: generalized joint pain    cyclobenzaprine (FLEXERIL) 10 mg tablet Take 1 Tab by mouth three (3) times daily as needed for Muscle Spasm(s). No current facility-administered medications for this visit. I have reviewed the nurses notes, vitals, problem list, allergy list, medical history, family, social history and medications. REVIEW OF SYMPTOMS      General: Pt denies excessive weight gain or loss. Pt is able to conduct ADL's  HEENT: Denies blurred vision, headaches, hearing loss, epistaxis and difficulty swallowing. Respiratory: Denies cough, congestion, shortness of breath, MCRAE, wheezing or stridor. Cardiovascular: Denies precordial pain, palpitations, edema or PND  Gastrointestinal: Denies poor appetite, indigestion, abdominal pain or blood in stool  Genitourinary: Denies hematuria, dysuria, increased urinary frequency  Musculoskeletal: Denies joint pain or swelling from muscles or joints  Neurologic: Denies tremor, paresthesias, headache, or sensory motor disturbance  Psychiatric: Denies confusion, insomnia, depression  Integumentray: Denies rash, itching or ulcers. Hematologic: Denies easy bruising, bleeding       PHYSICAL EXAMINATION      Vitals:    07/25/18 1402   BP: 130/82   Pulse: 74   SpO2: 98%   Weight: 286 lb (129.7 kg)   Height: 6' 5\" (1.956 m)     General: Well developed, in no acute distress.   HEENT: No jaundice, oral mucosa moist, no oral ulcers  Neck: Supple, no stiffness, no lymphadenopathy, supple  Heart:  Normal S1/S2 negative S3 or S4. Regular, no murmur, gallop or rub, no jugular venous distention  Respiratory: Clear bilaterally x 4, no wheezing or rales  Abdomen:   Soft, non-tender, bowel sounds are active.   Extremities:  No edema, normal cap refill, no cyanosis. Musculoskeletal: No clubbing, no deformities  Neuro: A&Ox3, speech clear, gait stable, cooperative, no focal neurologic deficits  Skin: Skin color is normal. No rashes or lesions. Non diaphoretic, moist.  Vascular: 2+ pulses symmetric in all extremities       DIAGNOSTIC DATA      EKG:        LABORATORY DATA      Lab Results   Component Value Date/Time    WBC 6.2 01/29/2018 09:51 AM    HGB 13.0 01/29/2018 09:51 AM    HCT 39.6 01/29/2018 09:51 AM    PLATELET 954 05/49/7980 09:51 AM    MCV 91 01/29/2018 09:51 AM      Lab Results   Component Value Date/Time    Sodium 144 01/29/2018 09:51 AM    Potassium 4.6 01/29/2018 09:51 AM    Chloride 108 (H) 01/29/2018 09:51 AM    CO2 23 01/29/2018 09:51 AM    Anion gap 8 08/19/2016 03:36 AM    Glucose 93 01/29/2018 09:51 AM    BUN 23 01/29/2018 09:51 AM    Creatinine 1.31 (H) 01/29/2018 09:51 AM    BUN/Creatinine ratio 18 01/29/2018 09:51 AM    GFR est AA 62 01/29/2018 09:51 AM    GFR est non-AA 53 (L) 01/29/2018 09:51 AM    Calcium 8.7 01/29/2018 09:51 AM    Bilirubin, total 0.6 01/29/2018 09:51 AM    AST (SGOT) 23 01/29/2018 09:51 AM    Alk. phosphatase 44 01/29/2018 09:51 AM    Protein, total 6.4 01/29/2018 09:51 AM    Albumin 4.2 01/29/2018 09:51 AM    Globulin 3.0 08/08/2016 10:28 AM    A-G Ratio 1.9 01/29/2018 09:51 AM    ALT (SGPT) 29 01/29/2018 09:51 AM           ASSESSMENT      1. Supraventricular tachycardia  2. Hypertension  3. Hyperlipidemia  4. Venous insufficiency  5. First degree AV block    6. AVI on CPAP  7. Atrial fibrillation                        Persistent      PLAN     Continue to monitor ILRs for tachycardia or symptomatic AF.       FOLLOW-UP   1 year    Thank you, Marleen Richardson MD for allowing me to participate in the care of this extraordinarily pleasant male. Please do not hesitate to contact me for further questions/concerns.      Simon Haring, NP    [address] [facility]  [address] [facility], [facility], [place] [address]  [address]  [phone] / [phone] Fax   [phone] / [phone] Fax

## 2018-07-25 NOTE — PROGRESS NOTES
Visit Vitals    /82 (BP 1 Location: Left arm, BP Patient Position: Sitting)    Pulse 74    Ht 6' 5\" (1.956 m)    Wt 286 lb (129.7 kg)    SpO2 98%    BMI 33.91 kg/m2

## 2018-07-30 ENCOUNTER — OFFICE VISIT (OUTPATIENT)
Dept: CARDIOLOGY CLINIC | Age: 73
End: 2018-07-30

## 2018-07-30 DIAGNOSIS — Z95.818 STATUS POST PLACEMENT OF IMPLANTABLE LOOP RECORDER: Primary | ICD-10-CM

## 2018-08-13 ENCOUNTER — OFFICE VISIT (OUTPATIENT)
Dept: CARDIOLOGY CLINIC | Age: 73
End: 2018-08-13

## 2018-08-13 DIAGNOSIS — Z95.818 STATUS POST PLACEMENT OF IMPLANTABLE LOOP RECORDER: Primary | ICD-10-CM

## 2018-08-27 ENCOUNTER — OFFICE VISIT (OUTPATIENT)
Dept: CARDIOLOGY CLINIC | Age: 73
End: 2018-08-27

## 2018-08-27 DIAGNOSIS — Z95.818 STATUS POST PLACEMENT OF IMPLANTABLE LOOP RECORDER: Primary | ICD-10-CM

## 2018-09-17 ENCOUNTER — TELEPHONE (OUTPATIENT)
Dept: CARDIOLOGY CLINIC | Age: 73
End: 2018-09-17

## 2018-09-17 DIAGNOSIS — I10 ESSENTIAL HYPERTENSION: ICD-10-CM

## 2018-09-17 DIAGNOSIS — I48.0 PAROXYSMAL ATRIAL FIBRILLATION (HCC): ICD-10-CM

## 2018-09-17 DIAGNOSIS — I47.1 SVT (SUPRAVENTRICULAR TACHYCARDIA) (HCC): ICD-10-CM

## 2018-09-17 NOTE — TELEPHONE ENCOUNTER
Spoke with patient. C: Elevated blood pressure readings. O: Patient states for the past 4-5 days he has noticed a rise in his blood pressure. States his bp is normally in the 510 range for systolic but has increase to 180's. Last reading was 187/86. Blood pressure readings taken between 6-10pm.  Patient notes no changes in diet or lifestyle. Confirmed patient is taking Lisinopril, amlodipine, and metoprolol as stated in chart. Will send to Ho Romero for review.

## 2018-09-18 RX ORDER — AMLODIPINE BESYLATE 10 MG/1
TABLET ORAL
Qty: 80 TAB | Refills: 3
Start: 2018-09-18 | End: 2019-02-08

## 2018-09-18 NOTE — TELEPHONE ENCOUNTER
Returned call. Per Lesley James, patient will increase amlodipine to 10mg daily. Instructed to report updated blood pressure readings in 1 week. Understanding expressed.     Requested Prescriptions     Signed Prescriptions Disp Refills    amLODIPine (NORVASC) 10 mg tablet 80 Tab 3     Sig: TAKE 1 TABLET BY MOUTH DAILY     Authorizing Provider: Elen Merino     Ordering User: Jay Brower

## 2018-10-08 DIAGNOSIS — I10 ESSENTIAL HYPERTENSION: ICD-10-CM

## 2018-10-08 DIAGNOSIS — I47.1 SVT (SUPRAVENTRICULAR TACHYCARDIA) (HCC): ICD-10-CM

## 2018-10-08 DIAGNOSIS — I48.0 PAROXYSMAL ATRIAL FIBRILLATION (HCC): ICD-10-CM

## 2018-10-08 RX ORDER — LISINOPRIL 40 MG/1
TABLET ORAL
Qty: 90 TAB | Refills: 3 | Status: SHIPPED | OUTPATIENT
Start: 2018-10-08 | End: 2019-09-05 | Stop reason: SDUPTHER

## 2018-10-08 NOTE — TELEPHONE ENCOUNTER
Requested Prescriptions     Signed Prescriptions Disp Refills    lisinopril (PRINIVIL, ZESTRIL) 40 mg tablet 90 Tab 3     Sig: TAKE 1 TABLET DAILY     Authorizing Provider: Jane Beckford     Ordering User: Paulette Party     Refill per verbal order Dr. Leandro Tee.

## 2018-10-26 NOTE — PROGRESS NOTES
ROOM: 2    Patient c/o legs feeling \"tired\" when walking.      Visit Vitals  /62 (BP 1 Location: Left arm, BP Patient Position: Sitting)   Pulse (!) 56   Resp 18   Ht 6' 5\" (1.956 m)   Wt 290 lb 6.4 oz (131.7 kg)   SpO2 97%   BMI 34.44 kg/m²

## 2018-10-31 ENCOUNTER — OFFICE VISIT (OUTPATIENT)
Dept: CARDIOLOGY CLINIC | Age: 73
End: 2018-10-31

## 2018-10-31 VITALS
SYSTOLIC BLOOD PRESSURE: 110 MMHG | BODY MASS INDEX: 34.29 KG/M2 | WEIGHT: 290.4 LBS | HEART RATE: 56 BPM | OXYGEN SATURATION: 97 % | DIASTOLIC BLOOD PRESSURE: 62 MMHG | HEIGHT: 77 IN | RESPIRATION RATE: 18 BRPM

## 2018-10-31 DIAGNOSIS — R00.2 PALPITATIONS: ICD-10-CM

## 2018-10-31 DIAGNOSIS — I47.1 SUPRAVENTRICULAR TACHYCARDIA (HCC): Primary | ICD-10-CM

## 2018-10-31 RX ORDER — FLUOROURACIL 50 MG/G
CREAM TOPICAL
Refills: 0 | COMMUNITY
Start: 2018-10-03 | End: 2018-12-05

## 2018-10-31 NOTE — PROGRESS NOTES
HISTORY OF PRESENTING ILLNESS      Mariela Wilson is a 74 y.o. male with HTN, SVT, AF, AVI on CPAP, hyperlipidemia, bradycardia (Cardizem was reduced due to bradycardia in the past) here for follow-up of his atrial fibrillation and bradycardia. His metoprolol was decreased d/t pause noted on ILR and baseline bradycardia. His Amlodipine was increased to 10 mg and his blood pressures at home had been normotensive. He has lower extremity edema and also reports bilateral exertional thigh pain. Exercise ABIs were normal. ILR downloads how an increase of AF episodes over the past two months and now show PVCs. He had one 3 second pause in July and remains bradycardic. He reports exertional shortness of breath/fatigue has slightly worsened.        ACTIVE PROBLEM LIST     Patient Active Problem List    Diagnosis Date Noted    Advanced care planning/counseling discussion 02/03/2017    Paroxysmal atrial fibrillation ([address] 09/28/2016    BPH (benign prostatic hyperplasia) 08/17/2016    Insomnia 08/17/2016    Osteoarthritis of left knee 08/15/2016    Spinal stenosis in cervical region 01/05/2016    Herniated nucleus pulposus, C3-4 01/05/2016    Normal cardiac stress test 09/21/2015    ED (erectile dysfunction) 06/18/2014    IFG (impaired fasting glucose)     SVT (supraventricular tachycardia) ([address]     Right knee DJD 11/04/2013    Spinal stenosis of lumbar region     Tear of medial cartilage or meniscus of knee, current 02/01/2013    Osteoarthrosis, unspecified whether generalized or localized, lower leg 02/01/2013    Palpitations 12/12/2011    Supraventricular tachycardia ([address] 12/12/2011    HLD (hyperlipidemia) 12/12/2011    Essential hypertension, benign 12/12/2011    Varicose veins 12/12/2011    Colon polyps     AVI (obstructive sleep apnea)     Heart palpitations     Hyperlipidemia with target LDL less than 130     BPH (benign prostatic hypertrophy)     Lower back pain     Knee pain, right     Right sided sciatica            PAST MEDICAL HISTORY     Past Medical History:   Diagnosis Date    Arthritis     left knee and lt. hand    Atrial fibrillation (HCC)     BPH (benign prostatic hyperplasia)     Chronic pain     Back pain    Colon polyps     DJD (degenerative joint disease) of lumbar spine     laminectomy 1979, 1991, 2015    ED (erectile dysfunction) 6/18/2014    Heart palpitations     Dr. Nitesh Redmond    Herniated nucleus pulposus, C3-4 12/2015    Dr. Shiva Mireles    Hyperlipidemia     Hypertension     IFG (impaired fasting glucose)     Insomnia     Knee pain, right     Lower back pain     hx lumbar laminectomyx2 w good results    Normal cardiac stress test 9/21/15    OA (osteoarthritis) of knee     Dr. Claudia Avery Obesity     Onychomycosis 2018    Dr Iain Lynn. lamisil    AVI (obstructive sleep apnea)     Dr. Mio Chavez Right sided sciatica     Seasonal allergic rhinitis     Spinal stenosis in cervical region 12/2015    Spinal stenosis of lumbar region     MRI 12/2012. Dr. Shiva Mireles    SVT (supraventricular tachycardia) [facility])     Dr. Lino Pitt sleep apnea     cpap    Varicose veins     vein stripping 10/10           PAST SURGICAL HISTORY     Past Surgical History:   Procedure Laterality Date    COLONOSCOPY  2008    2 polyps. repeat 2011. Dr. Facundo Obrien  2007    7 polyps per pt     ENDOSCOPY, COLON, DIAGNOSTIC  2011    return in 2016    HX BLADDER REPAIR      polyp removal    HX CERVICAL FUSION  1/16/16    ACDF C3-C4 Dr. Shiva Mireles    HX COLONOSCOPY  4/27/11    Dr Gabby Robison, [facility] polyp.   repeat 4/2016    HX KNEE ARTHROSCOPY  2005    right, Dr. Sai Bella ARTHROSCOPY  02/01/2012    left, Dr Sai Bella ARTHROSCOPY  1/2013    right    HX KNEE REPLACEMENT  11/4/2013    HX LUMBAR FUSION  9/2015    L3-L4, Dr. Collin Reyna HX MOHS PROCEDURES      left, Dr Jigna Keen   [facility]      bladder polyp removed with cystoscopy    HX VEIN STRIPPING  2010    Dr. Samy Olson, bilateral    SHOULDER SURG [address] UNLISTED      left DECOMPRESSION, Dr. Shruthi Snider  2013    VASCULAR SURGERY PROCEDURE UNLIST      bilateral vein stripping          ALLERGIES     Allergies   Allergen Reactions    Percocet [Oxycodone-Acetaminophen] Other (comments)     hallucinations    Penicillin G Rash     Did okay with keflex - no reaction with that. FAMILY HISTORY     Family History   Problem Relation Age of Onset   Irma Piggs Cancer Mother         liver    Cancer Father         leukemia    Cancer Sister         lung cancer    negative for cardiac disease       SOCIAL HISTORY     Social History     Socioeconomic History    Marital status:      Spouse name: Grisel Cabrera Number of children: 3    Years of education: Not on file    Highest education level: Not on file   Social Needs    Financial resource strain: Not on file    Food insecurity - worry: Not on file    Food insecurity - inability: Not on file   [employer] needs - medical: Not on file   [employer] needs - non-medical: Not on file   Occupational History    Not on file   Tobacco Use    Smoking status: Former Smoker     Packs/day: 0.25     Years: 19.00     Pack years: 4.75     Types: Cigarettes     Last attempt to quit: 1980     Years since quittin.8    Smokeless tobacco: Never Used    Tobacco comment: quit ~   Substance and Sexual Activity    Alcohol use:  Yes     Alcohol/week: 4.2 oz     Types: 7 Glasses of wine per week     Comment: socially    Drug use: No    Sexual activity: Yes   Other Topics Concern     Service Yes     Comment: army    Blood Transfusions Not Asked    Caffeine Concern Not Asked    Occupational Exposure Not Asked    Hobby Hazards Not Asked    Sleep Concern Not Asked    Stress Concern Not Asked    Weight Concern Not Asked    Special Diet Not Asked    Back Care Not Asked    Exercise Not Asked    Bike Helmet Not Asked    Seat Belt Not Asked    Self-Exams Not Asked   Social History Narrative    ** Merged History Encounter **         1 child, 2 stepchildren    fishes         MEDICATIONS     Current Outpatient Medications   Medication Sig    eszopiclone (LUNESTA) 3 mg tablet TAKE 1 TABLET BY MOUTH AT NIGHT    lisinopril (PRINIVIL, ZESTRIL) 40 mg tablet TAKE 1 TABLET DAILY    amLODIPine (NORVASC) 10 mg tablet TAKE 1 TABLET BY MOUTH DAILY    levocetirizine (XYZAL) 5 mg tablet TAKE 1 TABLET DAILY    metoprolol succinate (TOPROL-XL) 25 mg XL tablet Take 0.5 Tabs by mouth two (2) times a day.  terazosin (HYTRIN) 5 mg capsule TAKE 1 CAPSULE DAILY    atorvastatin (LIPITOR) 20 mg tablet TAKE 1 TABLET DAILY    diclofenac EC (VOLTAREN) 75 mg EC tablet TAKE 1 TABLET TWICE A DAY AS NEEDED    apixaban (ELIQUIS) 5 mg tablet Take 1 Tab by mouth two (2) times a day.  gabapentin (NEURONTIN) 300 mg capsule Take 300 mg by mouth nightly as needed for Pain. Indications: generalized joint pain    cyclobenzaprine (FLEXERIL) 10 mg tablet Take 1 Tab by mouth three (3) times daily as needed for Muscle Spasm(s). No current facility-administered medications for this visit. I have reviewed the nurses notes, vitals, problem list, allergy list, medical history, family, social history and medications. REVIEW OF SYMPTOMS      General: Pt denies excessive weight gain or loss. Pt is able to conduct ADL's  HEENT: Denies blurred vision, headaches, hearing loss, epistaxis and difficulty swallowing. Respiratory: Denies cough, congestion, shortness of breath, MCRAE, wheezing or stridor.   Cardiovascular: Denies precordial pain, palpitations, edema or PND  Gastrointestinal: Denies poor appetite, indigestion, abdominal pain or blood in stool  Genitourinary: Denies hematuria, dysuria, increased urinary frequency  Musculoskeletal: Denies joint pain or swelling from muscles or joints  Neurologic: Denies tremor, paresthesias, headache, or sensory motor disturbance  Psychiatric: Denies confusion, insomnia, depression  Integumentray: Denies rash, itching or ulcers. Hematologic: Denies easy bruising, bleeding       PHYSICAL EXAMINATION      There were no vitals filed for this visit. General: Well developed, in no acute distress. HEENT: No jaundice, oral mucosa moist, no oral ulcers  Neck: Supple, no stiffness, no lymphadenopathy, supple  Heart:  Normal S1/S2 negative S3 or S4. Regular, no murmur, gallop or rub, no jugular venous distention  Respiratory: Clear bilaterally x 4, no wheezing or rales  Abdomen:   Soft, non-tender, bowel sounds are active.   Extremities:  No edema, normal cap refill, no cyanosis. Musculoskeletal: No clubbing, no deformities  Neuro: A&Ox3, speech clear, gait stable, cooperative, no focal neurologic deficits  Skin: Skin color is normal. No rashes or lesions.  Non diaphoretic, moist.  Vascular: 2+ pulses symmetric in all extremities       DIAGNOSTIC DATA      EKG: Sinus rhythm with 1st degree AVB and PVC       LABORATORY DATA      Lab Results   Component Value Date/Time    WBC 6.2 01/29/2018 09:51 AM    HGB 13.0 01/29/2018 09:51 AM    HCT 39.6 01/29/2018 09:51 AM    PLATELET 995 89/78/3138 09:51 AM    MCV 91 01/29/2018 09:51 AM      Lab Results   Component Value Date/Time    Sodium 144 01/29/2018 09:51 AM    Potassium 4.6 01/29/2018 09:51 AM    Chloride 108 (H) 01/29/2018 09:51 AM    CO2 23 01/29/2018 09:51 AM    Anion gap 8 08/19/2016 03:36 AM    Glucose 93 01/29/2018 09:51 AM    BUN 23 01/29/2018 09:51 AM    Creatinine 1.31 (H) 01/29/2018 09:51 AM    BUN/Creatinine ratio 18 01/29/2018 09:51 AM    GFR est AA 62 01/29/2018 09:51 AM    GFR est non-AA 53 (L) 01/29/2018 09:51 AM    Calcium 8.7 01/29/2018 09:51 AM    Bilirubin, total 0.6 01/29/2018 09:51 AM    AST (SGOT) 23 01/29/2018 09:51 AM Alk. phosphatase 44 01/29/2018 09:51 AM    Protein, total 6.4 01/29/2018 09:51 AM    Albumin 4.2 01/29/2018 09:51 AM    Globulin 3.0 08/08/2016 10:28 AM    A-G Ratio 1.9 01/29/2018 09:51 AM    ALT (SGPT) 29 01/29/2018 09:51 AM           ASSESSMENT      1. Supraventricular tachycardia  2. Hypertension  3. Hyperlipidemia  4. Venous insufficiency  5. First degree AV block    6. AVI on CPAP  7. Atrial fibrillation                        Persistent        PLAN     It is unclear whether patient may have symptom attic bradycardia/chronotropic incompetence that is driving his fatigue. We will plan for an exercise treadmill test to evaluate whether he has an appropriate heart rate response to exercise determine whether pacemaker therapy may be indicated. FOLLOW-UP     After testing completed. Thank you, Marc Tse MD for allowing me to participate in the care of this extraordinarily pleasant male. Please do not hesitate to contact me for further questions/concerns.          Gina Crowell MD  Cardiac Electrophysiology / Cardiology    [address]  [address] [place] [address]  [phone] / [phone] Fax   [phone] / [phone] Fax

## 2018-10-31 NOTE — PATIENT INSTRUCTIONS
Exercise Treadmill test  Follow up with Ramez Nguyen  Contact our office with any concerns. Exercise Electrocardiogram: About This Test  What is it? An exercise electrocardiogram (EKG or ECG) is a test that checks for changes in your heart while you exercise. Sometimes your doctor can only see heart problems during exercise or while symptoms are present. This test is sometimes called an exercise EKG, stress test, or treadmill test.  Why is this test done? You may need this test to check your heart's electrical activity. It can help find out whether a heart problem is causing chest pain and can help your doctor decide on the best treatment for certain heart problems. It also helps find the cause of symptoms that happen during exercise or activity, such as dizziness, fainting, or rapid, irregular heartbeats. How can you prepare for the test?  · Do not smoke or eat a heavy meal before this test.  · Wear flat, comfortable shoes (no bedroom slippers) and loose, lightweight shorts or sweatpants. Walking or running shoes are best.  · Tell your doctor if:  ? You are taking any medicines. ? You are taking medicine for an erection problem (such as Viagra). You may need to take nitroglycerin during this test, which can cause a serious reaction if you have taken a medicine for an erection problem within the past 48 hours. ? You have had bleeding problems, or if you take aspirin or some other blood thinner. ? You have joint problems in your hips or legs that may make it hard for you to exercise. ? You have a heart valve problem. What happens before the test?  · Men are usually bare-chested during the test. Women may often wear a bra, T-shirt, or gown. · You may want to stretch your arm and leg muscles. · You may have to remove certain jewelry. · You will have a blood pressure cuff on your upper arm.   · Small pads or patches (electrodes) will be attached to your skin on each arm and leg and on your chest. A special paste or pad may go between the electrodes and your skin. Your doctor may wrap your chest with an elastic band to keep the electrodes from falling off. What happens during the test?  You most likely will either walk on a treadmill or pedal a stationary bicycle. On the treadmill, you will start out slowly in a level or slightly inclined position. After certain periods of time, the speed and steepness of the treadmill will be increased so that you will be walking faster and at a greater incline. On the stationary bicycle, you will pedal fast enough to keep a certain speed. After certain periods of time, the resistance will be increased, making it harder to pedal.  In both tests:  · Your EKG, heart rate, and blood pressure are recorded. · You might be asked to use numbers to say how hard you are exercising. The higher the number, the harder you think you are exercising. · The test will continue until:  ? You need to stop. ? You have reached your maximum heart rate. Heart rate is how many times your heart beats in a minute. Maximum heart rate is the fastest your heart can beat when you are exercising as hard as you can. Maximum heart rate changes as you get older. ? You have angina symptoms, such as chest pain or pressure. ? You are very tired or very short of breath. ? Your doctor feels you need to stop because of a change in your heartbeat or blood pressure. What else should you know about the test?  · No electricity passes through your body during the test. There is no danger of getting an electrical shock. · During the test, tell your doctor if:  ? You have chest pain. ? You are very short of breath. ? You are lightheaded. ? You have other symptoms. How long does the test take? · The test usually takes 15 to 30 minutes. What happens after the test?  · You will be able to sit or lie down and rest.  · Your EKG and blood pressure will be checked for about 5 to 10 minutes.   When should you call for help? Call 911 anytime you think you may need emergency care. For example, call if:  · You passed out (lost consciousness). · You have been diagnosed with angina, and you have angina symptoms that do not go away with rest or are not getting better within 5 minutes after you take a dose of nitroglycerin. · You have symptoms of a heart attack. These may include:  ? Chest pain or pressure, or a strange feeling in the chest.  ? Sweating. ? Shortness of breath. ? Nausea or vomiting. ? Pain, pressure, or a strange feeling in the back, neck, jaw, or upper belly or in one or both shoulders or arms. ? Lightheadedness or sudden weakness. ? A fast or irregular pulse. After you call 911, the  may tell you to chew 1 adult-strength or 2 to 4 low-dose aspirin. Wait for an ambulance. Do not try to drive yourself. Watch closely for changes in your health, and be sure to contact your doctor if you have any problems. Follow-up care is a key part of your treatment and safety. Be sure to make and go to all appointments, and call your doctor if you are having problems. It's also a good idea to keep a list of the medicines you take. Ask your doctor when you can expect to have your test results. Where can you learn more? Go to http://corrine-violeta.info/. Enter Z441 in the search box to learn more about \"Exercise Electrocardiogram: About This Test.\"  Current as of: December 6, 2017  Content Version: 11.8  © 1059-7840 Healthwise, Incorporated. Care instructions adapted under license by [employer] (which disclaims liability or warranty for this information). If you have questions about a medical condition or this instruction, always ask your healthcare professional. [address] any warranty or liability for your use of this information.

## 2018-11-26 NOTE — PROGRESS NOTES
LOV: 10/31/18  Reason For Visit: Follow Up, Atrial Fib, Bradycardia, Fatigue, Follow up to testing   EKG:NO  Stress Echo- 11/27/18  Device: Medtronic  ILR  Device Check: 10/31/18

## 2018-11-27 ENCOUNTER — CLINICAL SUPPORT (OUTPATIENT)
Dept: CARDIOLOGY CLINIC | Age: 73
End: 2018-11-27

## 2018-11-27 DIAGNOSIS — R00.1 BRADYCARDIA: ICD-10-CM

## 2018-11-27 DIAGNOSIS — R06.02 SHORTNESS OF BREATH: Primary | ICD-10-CM

## 2018-11-27 DIAGNOSIS — R53.82 CHRONIC FATIGUE: ICD-10-CM

## 2018-12-05 ENCOUNTER — OFFICE VISIT (OUTPATIENT)
Dept: CARDIOLOGY CLINIC | Age: 73
End: 2018-12-05

## 2018-12-05 VITALS
HEART RATE: 44 BPM | SYSTOLIC BLOOD PRESSURE: 120 MMHG | HEIGHT: 77 IN | BODY MASS INDEX: 33.93 KG/M2 | OXYGEN SATURATION: 95 % | DIASTOLIC BLOOD PRESSURE: 70 MMHG | RESPIRATION RATE: 16 BRPM | WEIGHT: 287.4 LBS

## 2018-12-05 DIAGNOSIS — I48.0 PAROXYSMAL ATRIAL FIBRILLATION (HCC): Primary | ICD-10-CM

## 2018-12-05 NOTE — PROGRESS NOTES
HISTORY OF PRESENTING ILLNESS      Genoveva Perkins is a 74 y.o. male with HTN, SVT, AF, AVI on CPAP, hyperlipidemia, bradycardia (Cardizem was reduced due to bradycardia in the past) here for follow-up of his atrial fibrillation and bradycardia. His metoprolol was decreased d/t pause noted on ILR and baseline bradycardia. His Amlodipine was increased to 10 mg and his blood pressures at home had been normotensive. He has lower extremity edema and also reports bilateral exertional thigh pain. Exercise ABIs were normal. ILR downloads how an increase of AF episodes over the past two months and now show PVCs. He had one 3 second pause in July and remains bradycardic. He reported exertional shortness of breath/fatigue has slightly worsened. Last visit we arranged for an exercise treadmill test to determine whether he exhibited an appropriate heart rate response to exercise to determine whether he has chronotropic incompetence as an underlying etiology for his fatigue. Treadmill exercise test demonstrated that he achieved 79% of his predicted heart rate.        ACTIVE PROBLEM LIST     Patient Active Problem List    Diagnosis Date Noted    Advanced care planning/counseling discussion 02/03/2017    Paroxysmal atrial fibrillation ([address] 09/28/2016    BPH (benign prostatic hyperplasia) 08/17/2016    Insomnia 08/17/2016    Osteoarthritis of left knee 08/15/2016    Spinal stenosis in cervical region 01/05/2016    Herniated nucleus pulposus, C3-4 01/05/2016    Normal cardiac stress test 09/21/2015    ED (erectile dysfunction) 06/18/2014    IFG (impaired fasting glucose)     SVT (supraventricular tachycardia) ([address]     Right knee DJD 11/04/2013    Spinal stenosis of lumbar region     Tear of medial cartilage or meniscus of knee, current 02/01/2013    Osteoarthrosis, unspecified whether generalized or localized, lower leg 02/01/2013    Palpitations 12/12/2011    Supraventricular tachycardia ([address] 12/12/2011    HLD (hyperlipidemia) 12/12/2011    Essential hypertension, benign 12/12/2011    Varicose veins 12/12/2011    Colon polyps     AVI (obstructive sleep apnea)     Heart palpitations     Hyperlipidemia with target LDL less than 130     BPH (benign prostatic hypertrophy)     Lower back pain     Knee pain, right     Right sided sciatica            PAST MEDICAL HISTORY     Past Medical History:   Diagnosis Date    Arthritis     left knee and lt. hand    Atrial fibrillation (HCC)     BPH (benign prostatic hyperplasia)     Chronic pain     Back pain    Colon polyps     DJD (degenerative joint disease) of lumbar spine     laminectomy 1979, 1991, 2015    ED (erectile dysfunction) 6/18/2014    Heart palpitations     Dr. Jeff Pierre    Herniated nucleus pulposus, C3-4 12/2015    Dr. Ulises Peoples    Hyperlipidemia     Hypertension     IFG (impaired fasting glucose)     Insomnia     Knee pain, right     Lower back pain     hx lumbar laminectomyx2 w good results    Normal cardiac stress test 9/21/15    OA (osteoarthritis) of knee     Dr. Chetan Bowie    Obesity     Onychomycosis 2018    Dr Amparo Douglas. lamisil    AVI (obstructive sleep apnea)     Dr. Mimi Chairez AVI on CPAP     Right sided sciatica     Seasonal allergic rhinitis     Spinal stenosis in cervical region 12/2015    Spinal stenosis of lumbar region     MRI 12/2012. Dr. Ulises Peoples    SVT (supraventricular tachycardia) [facility])     Dr. Holger Estrella sleep apnea     cpap    Varicose veins     vein stripping 10/10           PAST SURGICAL HISTORY     Past Surgical History:   Procedure Laterality Date    COLONOSCOPY  2008    2 polyps. repeat 2011.   Dr. Jalyn Jj  2007    7 polyps per pt     COLONOSCOPY N/A 6/15/2016    COLONOSCOPY performed by Fatemeh Ruffin MD at [address], DIAGNOSTIC  2011    return in 2016    HX BLADDER REPAIR      polyp removal    HX CERVICAL FUSION 16    ACDF C3-C4 Dr. Medardo Sun HX COLONOSCOPY  11    Dr Marycruz Jones, smal polyp. repeat 2016    HX KNEE ARTHROSCOPY      right, Dr. Mallorie Jackson ARTHROSCOPY  2012    left, Dr Mallorie Jackson ARTHROSCOPY  2013    right    HX KNEE REPLACEMENT  2013    HX LUMBAR FUSION  2015    L3-L4, Dr. Edmar Joseph      left, Dr Анна Hamlin    bladder polyp removed with cystoscopy    HX VEIN STRIPPING  2010    Dr. Deonna Clarke, bilateral    SHOULDER SURG [address] UNLISTED      left DECOMPRESSION, Dr. Chester   2013    VASCULAR SURGERY PROCEDURE UNLIST      bilateral vein stripping          ALLERGIES     Allergies   Allergen Reactions    Percocet [Oxycodone-Acetaminophen] Other (comments)     hallucinations    Penicillin G Rash     Did okay with keflex - no reaction with that. FAMILY HISTORY     Family History   Problem Relation Age of Onset   [facility] Cancer Mother         liver    Cancer Father         leukemia    Cancer Sister         lung cancer    negative for cardiac disease       SOCIAL HISTORY     Social History     Socioeconomic History    Marital status:      Spouse name: Mart Yip Number of children: 3    Years of education: Not on file    Highest education level: Not on file   Tobacco Use    Smoking status: Former Smoker     Packs/day: 0.25     Years: 19.00     Pack years: 4.75     Types: Cigarettes     Last attempt to quit: 1980     Years since quittin.9    Smokeless tobacco: Never Used    Tobacco comment: quit ~   Substance and Sexual Activity    Alcohol use:  Yes     Alcohol/week: 1.2 - 1.8 oz     Types: 2 - 3 Glasses of wine per week     Comment: socially    Drug use: No    Sexual activity: Yes   Other Topics Concern     Service Yes     Comment: [employer]   Social History Narrative ** Merged History Encounter **         1 child, 2 stepchildren    fishes         MEDICATIONS     Current Outpatient Medications   Medication Sig    diclofenac EC (VOLTAREN) 75 mg EC tablet Take 1 Tab by mouth two (2) times a day. Use sparingly!  fluorouracil (EFUDEX) 5 % chemo cream APPLY 1 APPLICATION AA BID    eszopiclone (LUNESTA) 3 mg tablet TAKE 1 TABLET BY MOUTH AT NIGHT    lisinopril (PRINIVIL, ZESTRIL) 40 mg tablet TAKE 1 TABLET DAILY    amLODIPine (NORVASC) 10 mg tablet TAKE 1 TABLET BY MOUTH DAILY    levocetirizine (XYZAL) 5 mg tablet TAKE 1 TABLET DAILY    metoprolol succinate (TOPROL-XL) 25 mg XL tablet Take 0.5 Tabs by mouth two (2) times a day.  terazosin (HYTRIN) 5 mg capsule TAKE 1 CAPSULE DAILY    atorvastatin (LIPITOR) 20 mg tablet TAKE 1 TABLET DAILY    apixaban (ELIQUIS) 5 mg tablet Take 1 Tab by mouth two (2) times a day.  gabapentin (NEURONTIN) 300 mg capsule Take 300 mg by mouth nightly as needed for Pain. Indications: generalized joint pain    cyclobenzaprine (FLEXERIL) 10 mg tablet Take 1 Tab by mouth three (3) times daily as needed for Muscle Spasm(s). No current facility-administered medications for this visit. I have reviewed the nurses notes, vitals, problem list, allergy list, medical history, family, social history and medications. REVIEW OF SYMPTOMS      General: Pt denies excessive weight gain or loss. Pt is able to conduct ADL's  HEENT: Denies blurred vision, headaches, hearing loss, epistaxis and difficulty swallowing. Respiratory: Denies cough, congestion, shortness of breath, MCRAE, wheezing or stridor.   Cardiovascular: Denies precordial pain, palpitations, edema or PND  Gastrointestinal: Denies poor appetite, indigestion, abdominal pain or blood in stool  Genitourinary: Denies hematuria, dysuria, increased urinary frequency  Musculoskeletal: Denies joint pain or swelling from muscles or joints  Neurologic: Denies tremor, paresthesias, headache, or sensory motor disturbance  Psychiatric: Denies confusion, insomnia, depression  Integumentray: Denies rash, itching or ulcers. Hematologic: Denies easy bruising, bleeding       PHYSICAL EXAMINATION      There were no vitals filed for this visit. General: Well developed, in no acute distress. HEENT: No jaundice, oral mucosa moist, no oral ulcers  Neck: Supple, no stiffness, no lymphadenopathy, supple  Heart:  Normal S1/S2 negative S3 or S4. Regular, no murmur, gallop or rub, no jugular venous distention  Respiratory: Clear bilaterally x 4, no wheezing or rales  Abdomen:   Soft, non-tender, bowel sounds are active.   Extremities:  No edema, normal cap refill, no cyanosis. Musculoskeletal: No clubbing, no deformities  Neuro: A&Ox3, speech clear, gait stable, cooperative, no focal neurologic deficits  Skin: Skin color is normal. No rashes or lesions. Non diaphoretic, moist.  Vascular: 2+ pulses symmetric in all extremities       DIAGNOSTIC DATA      EKG:        LABORATORY DATA      Lab Results   Component Value Date/Time    WBC 6.2 01/29/2018 09:51 AM    HGB 13.0 01/29/2018 09:51 AM    HCT 39.6 01/29/2018 09:51 AM    PLATELET 393 49/61/0281 09:51 AM    MCV 91 01/29/2018 09:51 AM      Lab Results   Component Value Date/Time    Sodium 144 01/29/2018 09:51 AM    Potassium 4.6 01/29/2018 09:51 AM    Chloride 108 (H) 01/29/2018 09:51 AM    CO2 23 01/29/2018 09:51 AM    Anion gap 8 08/19/2016 03:36 AM    Glucose 93 01/29/2018 09:51 AM    BUN 23 01/29/2018 09:51 AM    Creatinine 1.31 (H) 01/29/2018 09:51 AM    BUN/Creatinine ratio 18 01/29/2018 09:51 AM    GFR est AA 62 01/29/2018 09:51 AM    GFR est non-AA 53 (L) 01/29/2018 09:51 AM    Calcium 8.7 01/29/2018 09:51 AM    Bilirubin, total 0.6 01/29/2018 09:51 AM    AST (SGOT) 23 01/29/2018 09:51 AM    Alk.  phosphatase 44 01/29/2018 09:51 AM    Protein, total 6.4 01/29/2018 09:51 AM    Albumin 4.2 01/29/2018 09:51 AM    Globulin 3.0 08/08/2016 10:28 AM    A-G Ratio 1.9 01/29/2018 09:51 AM    ALT (SGPT) 29 01/29/2018 09:51 AM           ASSESSMENT      1. Supraventricular tachycardia  2. Hypertension  3. Hyperlipidemia  4. Venous insufficiency  5. First degree AV block    6. AVI on CPAP  7. Atrial fibrillation                        Persistent        PLAN     Given the remains unclear whether patient has chronotropic incompetence driving his fatigue, I recommended weight loss and exercise. Should his fatigue persist we may consider pacemaker implantation in the future. FOLLOW-UP     1 year      Thank you, Jeramie Jimenez MD for allowing me to participate in the care of this extraordinarily pleasant male. Please do not hesitate to contact me for further questions/concerns.          Delores Raymundo MD  Cardiac Electrophysiology / Cardiology    [place] [address].  [address] [place], [place] [address]    [facility], [facility]  [phone] / [phone] Fax   [phone] / [phone] Fax

## 2018-12-05 NOTE — PROGRESS NOTES
Room[address]  · Stress Echo on 11/27  · Medtronic ILR  · No cardiac complaints  Visit Vitals  /70 (BP 1 Location: Right arm, BP Patient Position: Sitting)   Pulse (!) 44   Resp 16   Ht 6' 5\" (1.956 m)   Wt 287 lb 6.4 oz (130.4 kg)   SpO2 95%   BMI 34.08 kg/m²

## 2019-01-16 DIAGNOSIS — F51.01 PRIMARY INSOMNIA: ICD-10-CM

## 2019-01-16 RX ORDER — ESZOPICLONE 3 MG/1
TABLET, FILM COATED ORAL
Qty: 30 TAB | Refills: 2 | OUTPATIENT
Start: 2019-01-16 | End: 2019-01-17 | Stop reason: SDUPTHER

## 2019-01-16 NOTE — TELEPHONE ENCOUNTER
[facility] [place]             Katie Guillen, pt wife, requesting a refill on prescription: \"Eszopiclone\". [facility] pharmacy is on file [phone].  Mrs. Adah Kawasaki best contact number is [address] [place] AT [facility] [phone]

## 2019-01-17 DIAGNOSIS — F51.01 PRIMARY INSOMNIA: ICD-10-CM

## 2019-01-17 RX ORDER — ESZOPICLONE 3 MG/1
TABLET, FILM COATED ORAL
Qty: 30 TAB | Refills: 2 | Status: CANCELLED | OUTPATIENT
Start: 2019-01-17

## 2019-01-17 NOTE — TELEPHONE ENCOUNTER
[facility] Front Office [place]             Radha Ashton (wife) is requesting a call back regarding the Rx for \"Eszopiclone 3 mg\"  that has not been sent to [facility]. Mrs. Martinez  advised that pt is down to only one pill.      Best [phone]

## 2019-01-25 ENCOUNTER — OFFICE VISIT (OUTPATIENT)
Dept: CARDIOLOGY CLINIC | Age: 74
End: 2019-01-25

## 2019-01-25 DIAGNOSIS — Z95.818 STATUS POST PLACEMENT OF IMPLANTABLE LOOP RECORDER: Primary | ICD-10-CM

## 2019-02-08 ENCOUNTER — OFFICE VISIT (OUTPATIENT)
Dept: INTERNAL MEDICINE CLINIC | Age: 74
End: 2019-02-08

## 2019-02-08 VITALS
RESPIRATION RATE: 16 BRPM | DIASTOLIC BLOOD PRESSURE: 81 MMHG | BODY MASS INDEX: 34 KG/M2 | SYSTOLIC BLOOD PRESSURE: 118 MMHG | OXYGEN SATURATION: 95 % | HEIGHT: 77 IN | TEMPERATURE: 97.6 F | HEART RATE: 73 BPM | WEIGHT: 288 LBS

## 2019-02-08 DIAGNOSIS — R73.01 IFG (IMPAIRED FASTING GLUCOSE): ICD-10-CM

## 2019-02-08 DIAGNOSIS — I47.1 SVT (SUPRAVENTRICULAR TACHYCARDIA) (HCC): ICD-10-CM

## 2019-02-08 DIAGNOSIS — I73.9 CLAUDICATION (HCC): ICD-10-CM

## 2019-02-08 DIAGNOSIS — I10 ESSENTIAL HYPERTENSION, BENIGN: ICD-10-CM

## 2019-02-08 DIAGNOSIS — Z00.00 MEDICARE ANNUAL WELLNESS VISIT, SUBSEQUENT: Primary | ICD-10-CM

## 2019-02-08 DIAGNOSIS — M25.472 ANKLE EDEMA, BILATERAL: ICD-10-CM

## 2019-02-08 DIAGNOSIS — F51.01 PRIMARY INSOMNIA: ICD-10-CM

## 2019-02-08 DIAGNOSIS — I48.0 PAROXYSMAL ATRIAL FIBRILLATION (HCC): ICD-10-CM

## 2019-02-08 DIAGNOSIS — M25.471 ANKLE EDEMA, BILATERAL: ICD-10-CM

## 2019-02-08 RX ORDER — ZALEPLON 10 MG/1
10 CAPSULE ORAL
Qty: 15 CAP | Refills: 0 | Status: SHIPPED | OUTPATIENT
Start: 2019-02-08 | End: 2019-03-02 | Stop reason: SDUPTHER

## 2019-02-08 RX ORDER — MELATONIN 5 MG
5 CAPSULE ORAL
Qty: 30 CAP | Refills: 2
Start: 2019-02-08 | End: 2019-09-06

## 2019-02-08 RX ORDER — AMLODIPINE BESYLATE 10 MG/1
TABLET ORAL
Qty: 45 TAB | Refills: 1
Start: 2019-02-08 | End: 2019-05-14 | Stop reason: DRUGHIGH

## 2019-02-08 NOTE — PROGRESS NOTES
Patient stated he is taking Lunesta, he would fall asleep for 1 hour then he would wake up and can't fall back asleep so he would take a shot of whiskey to help him fall asleep.

## 2019-02-08 NOTE — PATIENT INSTRUCTIONS
Body Mass Index: Care Instructions Your Care Instructions Body mass index (BMI) can help you see if your weight is raising your risk for health problems. It uses a formula to compare how much you weigh with how tall you are. · A BMI lower than 18.5 is considered underweight. · A BMI between 18.5 and 24.9 is considered healthy. · A BMI between 25 and 29.9 is considered overweight. A BMI of 30 or higher is considered obese. If your BMI is in the normal range, it means that you have a lower risk for weight-related health problems. If your BMI is in the overweight or obese range, you may be at increased risk for weight-related health problems, such as high blood pressure, heart disease, stroke, arthritis or joint pain, and diabetes. If your BMI is in the underweight range, you may be at increased risk for health problems such as fatigue, lower protection (immunity) against illness, muscle loss, bone loss, hair loss, and hormone problems. BMI is just one measure of your risk for weight-related health problems. You may be at higher risk for health problems if you are not active, you eat an unhealthy diet, or you drink too much alcohol or use tobacco products. Follow-up care is a key part of your treatment and safety. Be sure to make and go to all appointments, and call your doctor if you are having problems. It's also a good idea to know your test results and keep a list of the medicines you take. How can you care for yourself at home? · Practice healthy eating habits. This includes eating plenty of fruits, vegetables, whole grains, lean protein, and low-fat dairy. · If your doctor recommends it, get more exercise. Walking is a good choice. Bit by bit, increase the amount you walk every day. Try for at least 30 minutes on most days of the week. · Do not smoke. Smoking can increase your risk for health problems.  If you need help quitting, talk to your doctor about stop-smoking programs and medicines. These can increase your chances of quitting for good. · Limit alcohol to 2 drinks a day for men and 1 drink a day for women. Too much alcohol can cause health problems. If you have a BMI higher than 25 · Your doctor may do other tests to check your risk for weight-related health problems. This may include measuring the distance around your waist. A waist measurement of more than 40 inches in men or 35 inches in women can increase the risk of weight-related health problems. · Talk with your doctor about steps you can take to stay healthy or improve your health. You may need to make lifestyle changes to lose weight and stay healthy, such as changing your diet and getting regular exercise. If you have a BMI lower than 18.5 · Your doctor may do other tests to check your risk for health problems. · Talk with your doctor about steps you can take to stay healthy or improve your health. You may need to make lifestyle changes to gain or maintain weight and stay healthy, such as getting more healthy foods in your diet and doing exercises to build muscle. Where can you learn more? Go to http://corrine-violeta.info/. Enter S176 in the search box to learn more about \"Body Mass Index: Care Instructions. \" Current as of: October 13, 2016 Content Version: 11.4 © 0455-9543 Healthwise, Incorporated. Care instructions adapted under license by [employer] (which disclaims liability or warranty for this information). If you have questions about a medical condition or this instruction, always ask your healthcare professional. [address] any warranty or liability for your use of this information.

## 2019-02-08 NOTE — ACP (ADVANCE CARE PLANNING)
Advance Care Planning (ACP) Provider Note - Comprehensive     Date of ACP Conversation: 02/08/19  Persons included in Conversation:  patient  Length of ACP Conversation in minutes:  <16 minutes (Non-Billable)    Authorized Decision Maker (if patient is incapable of making informed decisions): This person is:  Healthcare Agent/Medical Power of  under Advance Directive          General ACP for ALL Patients with Decision Making Capacity:   Importance of advance care planning, including choosing a healthcare agent to communicate patient's healthcare decisions if patient lost the ability to make decisions, such as after a sudden illness or accident  Understanding of the healthcare agent role was assessed and information provided  Exploration of values, goals, and preferences if recovery is not expected, even with continued medical treatment in the event of: Imminent death  Severe, permanent brain injury    Review of Existing Advance Directive:  not availble     For Serious or Chronic Illness:  Understanding of medical condition    Understanding of CPR, goals and expected outcomes, benefits and burdens discussed. Information on CPR success rates provided (e.g. for CPR in hospital, survival to d/c at two weeks is 22%, for chronically ill or elderly/frail survival is less than 3%); Individual asked to communicate understanding of information in his/her own words.   Explored fears and concerns regarding CPR or possible outcomes    Interventions Provided:  Recommended completion of Advance Directive form after review of ACP materials and conversation with prospective healthcare agent   Recommended communicating the plan and making copies for the healthcare agent, personal physician, and others as appropriate (e.g., health system)

## 2019-02-08 NOTE — PROGRESS NOTES
This is a Subsequent Medicare Annual Wellness Visit providing Personalized Prevention Plan Services (PPPS) (Performed 12 months after initial AWV and PPPS ) I have reviewed the patient's medical history in detail and updated the computerized patient record. His insomnia is not controlled with lunesta 3 mg. Sleeps 4-5 hours and wakes up. Saw Dr. Jun Mendez and was recommended to try melatonin Impaired fasting glucose / Pre-diabetes follow-up Lab Results Component Value Date/Time Glucose 93 01/29/2018 09:51 AM  
Last hemoglobin a1c Lab Results Component Value Date/Time Hemoglobin A1c 5.4 01/29/2018 09:51 AM  
Diabetic diet compliance: compliant most of the time. Patient does not perform home glucose monitoring. Hypertension Hypertension ROS: taking medications as instructed, no medication side effects noted, no TIA's, no chest pain on exertion, no dyspnea on exertion,  
Reports some ankle edema 
    reports that he quit smoking about 39 years ago. His smoking use included cigarettes. He has a 4.75 pack-year smoking history. he has never used smokeless tobacco.    reports that he drinks about 1.2 - 1.8 oz of alcohol per week. BP Readings from Last 2 Encounters:  
02/08/19 118/81  
12/05/18 120/70 History Past Medical History:  
Diagnosis Date  Arthritis   
 left knee and lt. hand  Atrial fibrillation ([address]  BPH (benign prostatic hyperplasia)  Chronic pain Back pain  Colon polyps  DJD (degenerative joint disease) of lumbar spine   
 laminectomy 1979, 1991, 2015  ED (erectile dysfunction) 6/18/2014  Heart palpitations Dr. Naveen Piper  Herniated nucleus pulposus, C3-4 12/2015 Dr. Leyla Mcintyre  Hyperlipidemia  Hypertension  IFG (impaired fasting glucose)  Insomnia  Knee pain, right  Lower back pain   
 hx lumbar laminectomyx2 w good results  Normal cardiac stress test 9/21/15  OA (osteoarthritis) of knee Dr. Toño Ventura  Obesity  Onychomycosis 2018 Dr Colleen Deal. lamisil  AVI (obstructive sleep apnea) Dr. Elizabeth Monroy  AVI on CPAP  Right sided sciatica  Seasonal allergic rhinitis  Spinal stenosis in cervical region 12/2015  Spinal stenosis of lumbar region MRI 12/2012. Dr. Seth Bazzi  SVT (supraventricular tachycardia) ([facility] Dr. Radha Han  Unspecified sleep apnea   
 cpap  Varicose veins   
 vein stripping 10/10 Past Surgical History:  
Procedure Laterality Date  COLONOSCOPY  2008 2 polyps. repeat 2011. Dr. Avi Brady  COLONOSCOPY  2007  
 7 polyps per pt  COLONOSCOPY N/A 6/15/2016 COLONOSCOPY performed by Xenia Holland MD at [address] [place] [address]  ENDOSCOPY, COLON, DIAGNOSTIC  2011  
 return in 2016  HX BLADDER REPAIR    
 polyp removal  
 HX CERVICAL FUSION  1/16/16 ACDF C3-C4 Dr. Seth Bazzi  HX COLONOSCOPY  4/27/11 Dr Lian Gongora, [facility] polyp. repeat 4/2016  HX KNEE ARTHROSCOPY  2005  
 right, Dr. Shirlene Mejia  HX KNEE ARTHROSCOPY  02/01/2012  
 left, Dr Shirlene Mejia  HX KNEE ARTHROSCOPY  1/2013  
 right  HX KNEE REPLACEMENT  11/4/2013  HX LUMBAR FUSION  9/2015 L3-L4, Dr. Seth Bazzi [id number]  HX LUMBAR LAMINECTOMY  1991  
 HX MOHS PROCEDURES    
 left, Dr Shirlene Mejia  HX UROLOGICAL  1993  
 bladder polyp removed with cystoscopy  HX VEIN STRIPPING  9/2010 Dr. Ivor Duverney, bilateral  
 SHOULDER SURG [address] UNLISTED    
 left DECOMPRESSION, Dr. Bernarda Garyph [id number] [facility]  TOTAL KNEE ARTHROPLASTY  11/2013  VASCULAR SURGERY PROCEDURE UNLIST  2008  
 bilateral vein stripping Current Outpatient Medications Medication Sig  
 zaleplon (SONATA) 10 mg capsule Take 1 Cap by mouth nightly. Max Daily Amount: 10 mg.  
 amLODIPine (NORVASC) 10 mg tablet TAKE 1/2 PILL DAILY. DECREASED 2/28/19 DUE TO EDEMA  melatonin 5 mg cap capsule Take 1 Cap by mouth nightly.  TAKE 2 HOURS BEFORE BEDTIME  
  lisinopril (PRINIVIL, ZESTRIL) 40 mg tablet TAKE 1 TABLET DAILY  levocetirizine (XYZAL) 5 mg tablet TAKE 1 TABLET DAILY  metoprolol succinate (TOPROL-XL) 25 mg XL tablet Take 0.5 Tabs by mouth two (2) times a day.  terazosin (HYTRIN) 5 mg capsule TAKE 1 CAPSULE DAILY  atorvastatin (LIPITOR) 20 mg tablet TAKE 1 TABLET DAILY  apixaban (ELIQUIS) 5 mg tablet Take 1 Tab by mouth two (2) times a day. No current facility-administered medications for this visit. Allergies Allergen Reactions  Percocet [Oxycodone-Acetaminophen] Other (comments)  
  hallucinations  Penicillin G Rash Did okay with keflex - no reaction with that. Family History Problem Relation Age of Onset  Cancer Mother   
     liver  Cancer Father   
     leukemia  Cancer Sister   
     lung cancer  
 
 
 reports that he quit smoking about 39 years ago. His smoking use included cigarettes. He has a 4.75 pack-year smoking history. he has never used smokeless tobacco. 
 reports that he drinks about 1.2 - 1.8 oz of alcohol per week. Depression Risk Factor Screening:  
 
 
Alcohol Risk Factor Screening: On any occasion during the past 3 months, have you had more than 3 drinks containing alcohol? No 
 
Do you average more than 14 drinks per week? No 
 
 
Functional Ability and Level of Safety:  
 
Hearing Loss  
mild Activities of Daily Living Self-care. Requires assistance with: no ADLs Fall Risk Fall Risk Assessment, last 12 mths 2/3/2017 Able to walk? Yes Fall in past 12 months? No  
Fall with injury? -  
Number of falls in past 12 months - Fall Risk Score -  
 
 
 
Abuse Screen Patient is not abused Review of Systems A comprehensive review of systems was negative except for that written in the HPI. Physical Examination Evaluation of Cognitive Function: 
Mood/affect:  neutral, happy Appearance: age appropriate Family member/caregiver input: none Blood pressure 118/81, pulse 73, temperature 97.6 °F (36.4 °C), temperature source Oral, resp. rate 16, height 6' 5\" (1.956 m), weight 288 lb (130.6 kg), SpO2 95 %. General appearance: alert, cooperative, no distress, appears stated age Neck: supple, symmetrical, trachea midline, no adenopathy, thyroid: not enlarged, symmetric, no tenderness/mass/nodules, no carotid bruit and no JVD Lungs: clear to auscultation bilaterally Heart: regular rate and rhythm, S1, S2 normal, no murmur, click, rub or gallop Extremities: extremities normal, atraumatic, no cyanosis 2 edema sheng Patient Care Team: 
Jackie Marcum MD as PCP - Marquita Vinson MD (Orthopedic Surgery) Ana Bhatti MD (Cardiology) Sola Collazo MD (Orthopedic Surgery) Fern Gonzalez MD (Colon and Rectal Surgery) Neeru Bloom MD (Orthopedic Surgery) Cyrus Carter MD (Cardiology) Advice/Referrals/Counseling Education and counseling provided. See below for specific orders Assessment/Plan Diagnoses and all orders for this visit: 
 
1. Medicare annual wellness visit, subsequent 
shingrix Eye exam 
 
2. Primary insomnia Chronic, severe. D/c lunesta He will try melatonin. Sleep apnea is controlled per sleep specialist.   
Given option of Sonata. He says he may want to re-try ambien CR Could consider seququel, but weight gain may be prohibitive. Belsomra is liklely cost-prohibitive  
-     zaleplon (SONATA) 10 mg capsule; Take 1 Cap by mouth nightly. Max Daily Amount: 10 mg. 
-     melatonin 5 mg cap capsule; Take 1 Cap by mouth nightly. TAKE 2 HOURS BEFORE BEDTIME 
 
3. IFG (impaired fasting glucose)- The patient is asked to make an attempt to improve diet and exercise patterns to aid in medical management of this problem 
-     HEMOGLOBIN A1C WITH EAG 4. Paroxysmal atrial fibrillation (HCC) 5. Ankle edema, bilateral 
norvasc reduction. 6. Essential hypertension, benign- controlled, but edema is likely due to norvasc 
-     amLODIPine (NORVASC) 10 mg tablet; TAKE 1/2 PILL DAILY. DECREASED 2/28/19 DUE TO EDEMA 
-     LIPID PANEL 
-     METABOLIC PANEL, COMPREHENSIVE 
-     CBC WITH AUTOMATED DIFF 7. Claudication ([address] 8. SVT (supraventricular tachycardia) (HCC) 
-     amLODIPine (NORVASC) 10 mg tablet; TAKE 1/2 PILL DAILY. DECREASED 2/28/19 DUE TO EDEMA Potential medication side effects were discussed with the patient; let me know if any occur. Return for yearly Annual Wellness Visits Discussed the patient's BMI with him. The BMI follow up plan is as follows:  
 
dietary management education, guidance, and counseling 
encourage exercise 
monitor weight 
prescribed dietary intake An After Visit Summary was printed and given to the patient.

## 2019-02-11 DIAGNOSIS — J31.0 CHRONIC RHINITIS: ICD-10-CM

## 2019-02-11 RX ORDER — IPRATROPIUM BROMIDE 21 UG/1
2 SPRAY, METERED NASAL 3 TIMES DAILY
Qty: 30 ML | Refills: 3 | Status: SHIPPED | OUTPATIENT
Start: 2019-02-11 | End: 2019-02-11 | Stop reason: SDUPTHER

## 2019-02-11 RX ORDER — IPRATROPIUM BROMIDE 21 UG/1
SPRAY, METERED NASAL
Qty: 90 ML | Refills: 3 | Status: ON HOLD | OUTPATIENT
Start: 2019-02-11 | End: 2020-10-26

## 2019-02-12 ENCOUNTER — HOSPITAL ENCOUNTER (OUTPATIENT)
Dept: LAB | Age: 74
Discharge: HOME | End: 2019-02-12
Payer: MEDICARE

## 2019-02-12 PROCEDURE — 85025 COMPLETE CBC W/AUTO DIFF WBC: CPT

## 2019-02-12 PROCEDURE — 36415 COLL VENOUS BLD VENIPUNCTURE: CPT

## 2019-02-12 PROCEDURE — 80053 COMPREHEN METABOLIC PANEL: CPT

## 2019-02-12 PROCEDURE — 80061 LIPID PANEL: CPT

## 2019-02-12 PROCEDURE — 83036 HEMOGLOBIN GLYCOSYLATED A1C: CPT

## 2019-02-13 LAB
ALBUMIN SERPL-MCNC: 4.3 G/DL (ref 3.5–4.8)
ALBUMIN/GLOB SERPL: 2 {RATIO} (ref 1.2–2.2)
ALP SERPL-CCNC: 51 IU/L (ref 39–117)
ALT SERPL-CCNC: 14 IU/L (ref 0–44)
AST SERPL-CCNC: 17 IU/L (ref 0–40)
BASOPHILS # BLD AUTO: 0 X10E3/UL (ref 0–0.2)
BASOPHILS NFR BLD AUTO: 0 %
BILIRUB SERPL-MCNC: 0.6 MG/DL (ref 0–1.2)
BUN SERPL-MCNC: 20 MG/DL (ref 8–27)
BUN/CREAT SERPL: 14 (ref 10–24)
CALCIUM SERPL-MCNC: 8.7 MG/DL (ref 8.6–10.2)
CHLORIDE SERPL-SCNC: 106 MMOL/L (ref 96–106)
CHOLEST SERPL-MCNC: 133 MG/DL (ref 100–199)
CO2 SERPL-SCNC: 25 MMOL/L (ref 20–29)
CREAT SERPL-MCNC: 1.43 MG/DL (ref 0.76–1.27)
EOSINOPHIL # BLD AUTO: 0.2 X10E3/UL (ref 0–0.4)
EOSINOPHIL NFR BLD AUTO: 4 %
ERYTHROCYTE [DISTWIDTH] IN BLOOD BY AUTOMATED COUNT: 14.5 % (ref 12.3–15.4)
EST. AVERAGE GLUCOSE BLD GHB EST-MCNC: 114 MG/DL
GLOBULIN SER CALC-MCNC: 2.2 G/DL (ref 1.5–4.5)
GLUCOSE SERPL-MCNC: 106 MG/DL (ref 65–99)
HBA1C MFR BLD: 5.6 % (ref 4.8–5.6)
HCT VFR BLD AUTO: 41.5 % (ref 37.5–51)
HDLC SERPL-MCNC: 43 MG/DL
HGB BLD-MCNC: 13.9 G/DL (ref 13–17.7)
IMM GRANULOCYTES # BLD AUTO: 0 X10E3/UL (ref 0–0.1)
IMM GRANULOCYTES NFR BLD AUTO: 0 %
INTERPRETATION, 910389: NORMAL
INTERPRETATION: NORMAL
LDLC SERPL CALC-MCNC: 74 MG/DL (ref 0–99)
LYMPHOCYTES # BLD AUTO: 1.8 X10E3/UL (ref 0.7–3.1)
LYMPHOCYTES NFR BLD AUTO: 30 %
MCH RBC QN AUTO: 30.7 PG (ref 26.6–33)
MCHC RBC AUTO-ENTMCNC: 33.5 G/DL (ref 31.5–35.7)
MCV RBC AUTO: 92 FL (ref 79–97)
MONOCYTES # BLD AUTO: 0.5 X10E3/UL (ref 0.1–0.9)
MONOCYTES NFR BLD AUTO: 8 %
NEUTROPHILS # BLD AUTO: 3.5 X10E3/UL (ref 1.4–7)
NEUTROPHILS NFR BLD AUTO: 58 %
PDF IMAGE, 910387: NORMAL
PLATELET # BLD AUTO: 166 X10E3/UL (ref 150–379)
POTASSIUM SERPL-SCNC: 4.4 MMOL/L (ref 3.5–5.2)
PROT SERPL-MCNC: 6.5 G/DL (ref 6–8.5)
RBC # BLD AUTO: 4.53 X10E6/UL (ref 4.14–5.8)
SODIUM SERPL-SCNC: 146 MMOL/L (ref 134–144)
TRIGL SERPL-MCNC: 78 MG/DL (ref 0–149)
VLDLC SERPL CALC-MCNC: 16 MG/DL (ref 5–40)
WBC # BLD AUTO: 6 X10E3/UL (ref 3.4–10.8)

## 2019-03-02 DIAGNOSIS — F51.01 PRIMARY INSOMNIA: ICD-10-CM

## 2019-03-02 RX ORDER — ZALEPLON 10 MG/1
10 CAPSULE ORAL
Qty: 30 CAP | Refills: 2 | OUTPATIENT
Start: 2019-03-02 | End: 2019-05-29 | Stop reason: SDUPTHER

## 2019-03-02 RX ORDER — GABAPENTIN 300 MG/1
600 CAPSULE ORAL
Qty: 60 CAP | Refills: 2 | Status: ON HOLD | OUTPATIENT
Start: 2019-03-02 | End: 2020-10-26

## 2019-03-28 ENCOUNTER — OFFICE VISIT (OUTPATIENT)
Dept: CARDIOLOGY CLINIC | Age: 74
End: 2019-03-28

## 2019-03-28 DIAGNOSIS — Z95.818 STATUS POST PLACEMENT OF IMPLANTABLE LOOP RECORDER: Primary | ICD-10-CM

## 2019-03-30 DIAGNOSIS — I10 ESSENTIAL HYPERTENSION: ICD-10-CM

## 2019-03-30 DIAGNOSIS — I48.0 PAROXYSMAL ATRIAL FIBRILLATION (HCC): ICD-10-CM

## 2019-03-30 DIAGNOSIS — I47.1 SVT (SUPRAVENTRICULAR TACHYCARDIA) (HCC): ICD-10-CM

## 2019-04-01 RX ORDER — APIXABAN 5 MG/1
TABLET, FILM COATED ORAL
Qty: 180 TAB | Refills: 3 | Status: SHIPPED | OUTPATIENT
Start: 2019-04-01 | End: 2020-05-06

## 2019-04-01 RX ORDER — METOPROLOL SUCCINATE 25 MG/1
TABLET, EXTENDED RELEASE ORAL
Qty: 90 TAB | Refills: 3 | Status: SHIPPED | OUTPATIENT
Start: 2019-04-01 | End: 2019-10-05

## 2019-04-01 NOTE — TELEPHONE ENCOUNTER
Requested Prescriptions     Signed Prescriptions Disp Refills    metoprolol succinate (TOPROL-XL) 25 mg XL tablet 90 Tab 3     Sig: TAKE ONE-HALF (1/2) TABLET TWICE A DAY     Authorizing Provider: Blaire Fish     Ordering User: MILADY HARRISQUAYAN 5 mg tablet 180 Tab 3     Sig: TAKE 1 TABLET TWICE A DAY     Authorizing Provider: Blaire Padilla User: Nayla Britton     Refill per verbal order Dr. Conrad Rodriguez.

## 2019-04-30 ENCOUNTER — OFFICE VISIT (OUTPATIENT)
Dept: CARDIOLOGY CLINIC | Age: 74
End: 2019-04-30

## 2019-04-30 DIAGNOSIS — Z95.818 STATUS POST PLACEMENT OF IMPLANTABLE LOOP RECORDER: Primary | ICD-10-CM

## 2019-05-14 DIAGNOSIS — I47.1 SVT (SUPRAVENTRICULAR TACHYCARDIA) (HCC): ICD-10-CM

## 2019-05-14 DIAGNOSIS — I48.0 PAROXYSMAL ATRIAL FIBRILLATION (HCC): ICD-10-CM

## 2019-05-14 DIAGNOSIS — I10 ESSENTIAL HYPERTENSION: ICD-10-CM

## 2019-05-14 RX ORDER — AMLODIPINE BESYLATE 5 MG/1
TABLET ORAL
Qty: 90 TAB | Refills: 3 | OUTPATIENT
Start: 2019-05-14

## 2019-05-14 RX ORDER — AMLODIPINE BESYLATE 5 MG/1
5 TABLET ORAL DAILY
Qty: 90 TAB | Refills: 1 | Status: SHIPPED | COMMUNITY
Start: 2019-05-14 | End: 2019-09-12 | Stop reason: SDUPTHER

## 2019-05-29 DIAGNOSIS — F51.01 PRIMARY INSOMNIA: ICD-10-CM

## 2019-05-29 RX ORDER — ZALEPLON 10 MG/1
10 CAPSULE ORAL
Qty: 30 CAP | Refills: 2 | Status: SHIPPED | OUTPATIENT
Start: 2019-05-29 | End: 2019-09-18 | Stop reason: ALTCHOICE

## 2019-07-01 ENCOUNTER — OFFICE VISIT (OUTPATIENT)
Dept: CARDIOLOGY CLINIC | Age: 74
End: 2019-07-01

## 2019-07-01 DIAGNOSIS — Z95.818 STATUS POST PLACEMENT OF IMPLANTABLE LOOP RECORDER: Primary | ICD-10-CM

## 2019-08-01 ENCOUNTER — OFFICE VISIT (OUTPATIENT)
Dept: CARDIOLOGY CLINIC | Age: 74
End: 2019-08-01

## 2019-08-01 DIAGNOSIS — Z95.818 STATUS POST PLACEMENT OF IMPLANTABLE LOOP RECORDER: Primary | ICD-10-CM

## 2019-09-03 ENCOUNTER — TELEPHONE (OUTPATIENT)
Dept: CARDIOLOGY CLINIC | Age: 74
End: 2019-09-03

## 2019-09-03 NOTE — TELEPHONE ENCOUNTER
Patient's wife, Milton Grimaldo, is calling to schedule an appt with Dr. Car Nichols. Please call Milton Grimaldo at [phone].  Thanks

## 2019-09-05 DIAGNOSIS — I47.1 SVT (SUPRAVENTRICULAR TACHYCARDIA) (HCC): ICD-10-CM

## 2019-09-05 DIAGNOSIS — I48.0 PAROXYSMAL ATRIAL FIBRILLATION (HCC): ICD-10-CM

## 2019-09-05 DIAGNOSIS — I10 ESSENTIAL HYPERTENSION: ICD-10-CM

## 2019-09-05 RX ORDER — LISINOPRIL 40 MG/1
40 TABLET ORAL DAILY
Qty: 90 TAB | Refills: 3 | Status: SHIPPED | OUTPATIENT
Start: 2019-09-05 | End: 2020-07-17

## 2019-09-05 NOTE — TELEPHONE ENCOUNTER
Requested Prescriptions     Signed Prescriptions Disp Refills    lisinopril (PRINIVIL, ZESTRIL) 40 mg tablet 90 Tab 3     Sig: Take 1 Tab by mouth daily. Authorizing Provider: Noel Yuen     Ordering User: Paul Mclaughlin     Refill per verbal order Dr. Alisson Garcias.

## 2019-09-06 ENCOUNTER — OFFICE VISIT (OUTPATIENT)
Dept: CARDIOLOGY CLINIC | Age: 74
End: 2019-09-06

## 2019-09-06 VITALS
HEIGHT: 77 IN | BODY MASS INDEX: 34.31 KG/M2 | OXYGEN SATURATION: 97 % | RESPIRATION RATE: 18 BRPM | DIASTOLIC BLOOD PRESSURE: 62 MMHG | SYSTOLIC BLOOD PRESSURE: 100 MMHG | WEIGHT: 290.6 LBS | HEART RATE: 58 BPM

## 2019-09-06 DIAGNOSIS — R53.82 CHRONIC FATIGUE: ICD-10-CM

## 2019-09-06 DIAGNOSIS — M25.471 EDEMA OF BOTH ANKLES: ICD-10-CM

## 2019-09-06 DIAGNOSIS — M25.472 EDEMA OF BOTH ANKLES: ICD-10-CM

## 2019-09-06 DIAGNOSIS — I47.1 SVT (SUPRAVENTRICULAR TACHYCARDIA) (HCC): ICD-10-CM

## 2019-09-06 DIAGNOSIS — I48.0 PAROXYSMAL ATRIAL FIBRILLATION (HCC): Primary | ICD-10-CM

## 2019-09-06 RX ORDER — FUROSEMIDE 20 MG/1
TABLET ORAL
Qty: 30 TAB | Refills: 3 | Status: SHIPPED | OUTPATIENT
Start: 2019-09-06 | End: 2019-11-25 | Stop reason: SDUPTHER

## 2019-09-06 NOTE — PROGRESS NOTES
HISTORY OF PRESENTING ILLNESS      Janie Greenfield is a 66 y.o. male with HTN, SVT, AF, AVI on CPAP, hyperlipidemia, bradycardia (Cardizem was reduced due to bradycardia in the past) here for follow-up of his atrial fibrillation and bradycardia. His metoprolol was decreased d/t pause noted on ILR and baseline bradycardia. His Amlodipine was increased to 10 mg and his blood pressures at home had been normotensive. He has lower extremity edema and also reports bilateral exertional thigh pain. Exercise ABIs were normal. ILR downloads how an increase of AF episodes over the past two months and now show PVCs. He had one 3 second pause in July and remains bradycardic. He reported exertional shortness of breath/fatigue has slightly worsened. Last visit we arranged for an exercise treadmill test to determine whether he exhibited an appropriate heart rate response to exercise to determine whether he has chronotropic incompetence as an underlying etiology for his fatigue. Treadmill exercise test demonstrated that he achieved 79% of his predicted heart rate. Given the remains unclear whether patient has chronotropic incompetence driving his fatigue, I recommended weight loss and exercise. Should his fatigue persist we would potentially consider pacemaker implantation in the future. He now presents with continued worsening fatigue and now has significant lower extremity edema as well.         ACTIVE PROBLEM LIST     Patient Active Problem List    Diagnosis Date Noted    Advanced care planning/counseling discussion 02/03/2017    Paroxysmal atrial fibrillation ([address] 09/28/2016    BPH (benign prostatic hyperplasia) 08/17/2016    Insomnia 08/17/2016    Osteoarthritis of left knee 08/15/2016    Spinal stenosis in cervical region 01/05/2016    Herniated nucleus pulposus, C3-4 01/05/2016    Normal cardiac stress test 09/21/2015    ED (erectile dysfunction) 06/18/2014    IFG (impaired fasting glucose)     [facility]     Right knee DJD 11/04/2013    Spinal stenosis of lumbar region     Tear of medial cartilage or meniscus of knee, current 02/01/2013    Osteoarthrosis, unspecified whether generalized or localized, lower leg 02/01/2013    Palpitations 12/12/2011    Supraventricular tachycardia ([address] 12/12/2011    HLD (hyperlipidemia) 12/12/2011    Essential hypertension, benign 12/12/2011    Varicose veins 12/12/2011    Colon polyps     AVI (obstructive sleep apnea)     Heart palpitations     Hyperlipidemia with target LDL less than 130     BPH (benign prostatic hypertrophy)     Lower back pain     Knee pain, right     Right sided sciatica            PAST MEDICAL HISTORY     Past Medical History:   Diagnosis Date    Arthritis     left knee and lt. hand    Atrial fibrillation (HCC)     BPH (benign prostatic hyperplasia)     Chronic pain     Back pain    Colon polyps     DJD (degenerative joint disease) of lumbar spine     laminectomy 1979, 1991, 2015    ED (erectile dysfunction) 6/18/2014    Heart palpitations     Dr. Kyle Avitia    Herniated nucleus pulposus, C3-4 12/2015    Dr. Nicolasa Campbell    Hyperlipidemia     Hypertension     IFG (impaired fasting glucose)     Insomnia     Knee pain, right     Normal cardiac stress test 9/21/15    OA (osteoarthritis) of knee     Dr. Elina Hernandez    Obesity     Onychomycosis 2018    Dr Blaire Patel. lamisil    AVI on CPAP     Dr. Kendal Mares    Seasonal allergic rhinitis     Spinal stenosis in cervical region 12/2015    Spinal stenosis of lumbar region     MRI 12/2012. Dr. Nicolasa Campbell    SVT (supraventricular tachycardia) [facility])     Dr. Kyle Griffin Varicose veins     vein stripping 10/10           PAST SURGICAL HISTORY     Past Surgical History:   Procedure Laterality Date    COLONOSCOPY  2008    2 polyps. repeat 2011.   Dr. Forrest Marie  2007    7 polyps per pt     COLONOSCOPY N/A 6/15/2016    COLONOSCOPY performed by Em Garnett MD at [address] [place] [address] [place], COLON, DIAGNOSTIC      return in     HX BLADDER REPAIR      polyp removal    HX CERVICAL FUSION  16    ACDF C3-C4 Dr. Shahida Lyon HX COLONOSCOPY  11    Dr Maru Moore, smal polyp. repeat 2016    HX KNEE ARTHROSCOPY      right, Dr. Marj Recio ARTHROSCOPY  2012    left, Dr Marj Recio ARTHROSCOPY  2013    right    HX KNEE REPLACEMENT  2013    HX LUMBAR FUSION  2015    L3-L4, Dr. Betty Nava      left, Dr Magdalena Mejía    bladder polyp removed with cystoscopy    HX VEIN STRIPPING  2010    Dr. Daisy Alonzo, bilateral    SHOULDER SURG [address] UNLISTED      left DECOMPRESSION, Dr. Malachi Navarro  2013    VASCULAR SURGERY PROCEDURE UNLIST      bilateral vein stripping          ALLERGIES     Allergies   Allergen Reactions    Percocet [Oxycodone-Acetaminophen] Other (comments)     hallucinations    Penicillin G Rash     Did okay with keflex - no reaction with that. FAMILY HISTORY     Family History   Problem Relation Age of Onset   Arvil Fitch Cancer Mother         liver    Cancer Father         leukemia    Cancer Sister         lung cancer    negative for cardiac disease       SOCIAL HISTORY     Social History     Socioeconomic History    Marital status:      Spouse name: Farrah Kaiser Number of children: 3    Years of education: Not on file    Highest education level: Not on file   Tobacco Use    Smoking status: Former Smoker     Packs/day: 0.25     Years: 19.00     Pack years: 4.75     Types: Cigarettes     Last attempt to quit: 1980     Years since quittin.7    Smokeless tobacco: Never Used    Tobacco comment: quit ~   Substance and Sexual Activity    Alcohol use:  Yes     Alcohol/week: 2.0 - 3.0 standard drinks     Types: 2 - 3 Glasses of wine per week     Comment: socially    Drug use: No    Sexual activity: Yes   Other Topics Concern     Service Yes     Comment: [employer]   Social History Narrative    ** Merged History Encounter **         1 child, 2 stepchildren    fishes         MEDICATIONS     Current Outpatient Medications   Medication Sig    lisinopril (PRINIVIL, ZESTRIL) 40 mg tablet Take 1 Tab by mouth daily.  zaleplon (SONATA) 10 mg capsule Take 1 Cap by mouth nightly. Max Daily Amount: 10 mg.    amLODIPine (NORVASC) 5 mg tablet Take 1 Tab by mouth daily.  metoprolol succinate (TOPROL-XL) 25 mg XL tablet TAKE ONE-HALF (1/2) TABLET TWICE A DAY    ELIQUIS 5 mg tablet TAKE 1 TABLET TWICE A DAY    terazosin (HYTRIN) 5 mg capsule TAKE 1 CAPSULE DAILY    atorvastatin (LIPITOR) 20 mg tablet TAKE 1 TABLET DAILY    levocetirizine (XYZAL) 5 mg tablet TAKE 1 TABLET DAILY    gabapentin (NEURONTIN) 300 mg capsule Take 2 Caps by mouth nightly.  ipratropium (ATROVENT) 0.03 % nasal spray SPRAY TWICE INTO BOTH NOSTRILS THREE TIMES DAILY    melatonin 5 mg cap capsule Take 1 Cap by mouth nightly. TAKE 2 HOURS BEFORE BEDTIME     No current facility-administered medications for this visit. I have reviewed the nurses notes, vitals, problem list, allergy list, medical history, family, social history and medications. REVIEW OF SYMPTOMS      General: Pt denies excessive weight gain or loss. Pt is able to conduct ADL's  HEENT: Denies blurred vision, headaches, hearing loss, epistaxis and difficulty swallowing. Respiratory: Denies cough, congestion, +shortness of breath, +MCRAE, no wheezing or stridor.   Cardiovascular: Denies precordial pain, palpitations, +edema no PND  Gastrointestinal: Denies poor appetite, indigestion, abdominal pain or blood in stool  Genitourinary: Denies hematuria, dysuria, increased urinary frequency  Musculoskeletal: Denies joint pain or swelling from muscles or joints  Neurologic: Denies tremor, paresthesias, headache, or sensory motor disturbance  Psychiatric: Denies confusion, insomnia, depression  Integumentray: Denies rash, itching or ulcers. Hematologic: Denies easy bruising, bleeding       PHYSICAL EXAMINATION      There were no vitals filed for this visit. General: Well developed, in no acute distress. HEENT: No jaundice, oral mucosa moist, no oral ulcers  Neck: Supple, no stiffness, no lymphadenopathy, supple  Heart:  Normal S1/S2 negative S3 or S4. Regular, no murmur, gallop or rub, no jugular venous distention  Respiratory: Clear bilaterally x 4, no wheezing or rales  Abdomen:   Soft, non-tender, bowel sounds are active.   Extremities:  4+ bilateral pitting edema, normal cap refill, no cyanosis. Musculoskeletal: No clubbing, no deformities  Neuro: A&Ox3, speech clear, gait stable, cooperative, no focal neurologic deficits  Skin: Skin color is normal. No rashes or lesions. Non diaphoretic, moist.  Vascular: 2+ pulses symmetric in all extremities       DIAGNOSTIC DATA      EKG:        LABORATORY DATA      Lab Results   Component Value Date/Time    WBC 6.0 02/12/2019 08:52 AM    HGB 13.9 02/12/2019 08:52 AM    HCT 41.5 02/12/2019 08:52 AM    PLATELET 857 21/77/5686 08:52 AM    MCV 92 02/12/2019 08:52 AM      Lab Results   Component Value Date/Time    Sodium 146 (H) 02/12/2019 08:52 AM    Potassium 4.4 02/12/2019 08:52 AM    Chloride 106 02/12/2019 08:52 AM    CO2 25 02/12/2019 08:52 AM    Anion gap 8 08/19/2016 03:36 AM    Glucose 106 (H) 02/12/2019 08:52 AM    BUN 20 02/12/2019 08:52 AM    Creatinine 1.43 (H) 02/12/2019 08:52 AM    BUN/Creatinine ratio 14 02/12/2019 08:52 AM    GFR est AA 55 (L) 02/12/2019 08:52 AM    GFR est non-AA 48 (L) 02/12/2019 08:52 AM    Calcium 8.7 02/12/2019 08:52 AM    Bilirubin, total 0.6 02/12/2019 08:52 AM    AST (SGOT) 17 02/12/2019 08:52 AM    Alk.  phosphatase 51 02/12/2019 08:52 AM    Protein, total 6.5 02/12/2019 08:52 AM    Albumin 4.3 02/12/2019 08:52 AM Globulin 3.0 08/08/2016 10:28 AM    A-G Ratio 2.0 02/12/2019 08:52 AM    ALT (SGPT) 14 02/12/2019 08:52 AM           ASSESSMENT      1. Supraventricular tachycardia  2. Hypertension  3. Hyperlipidemia  4. Venous insufficiency  5. First degree AV block    6. AVI on CPAP  7. Atrial fibrillation                        Persistent   8. Edema  9. Bradycardia  10. PVCs  12. Shortness of breath  13. Dizziness       PLAN     Obtain bloodwork including CBC, CMP, TSH; venous ultrasound of lower extremities, echocardiogram and nuclear stress test given PVCs, fatigue/dizziness and bradycardia. Recommend removal of loop recorder (25404) followed by single chamber pacemaker (07987/54753) with [employer] given symptomatic bradycardia. Lasix 20 mg daily. FOLLOW-UP     Post procedure      Thank you, Shelly Carter MD for allowing me to participate in the care of this extraordinarily pleasant male. Please do not hesitate to contact me for further questions/concerns.          Isabel Pacheco MD  Cardiac Electrophysiology / Cardiology    [place] [address].  [address], [facility] [place] .    [address] [facility]  [phone] / [phone] Fax   [phone] / [phone] Fax

## 2019-09-06 NOTE — PROGRESS NOTES
Room # 2    Swelling in lower legs and fatigue,  Wants to discuss pacemaker    Visit Vitals  /62 (BP 1 Location: Left arm, BP Patient Position: Sitting)   Pulse (!) 58   Resp 18   Ht 6' 5\" (1.956 m)   Wt 290 lb 9.6 oz (131.8 kg)   SpO2 97%   BMI 34.46 kg/m²

## 2019-09-09 ENCOUNTER — TELEPHONE (OUTPATIENT)
Dept: INTERNAL MEDICINE CLINIC | Age: 74
End: 2019-09-09

## 2019-09-09 NOTE — TELEPHONE ENCOUNTER
----- Message from Daryn Jorge sent at 9/9/2019 10:10 AM EDT -----  Regarding: Dr. Yenifer Scott: [phone]  Caller/relationship: Pt's wife Berenice Fung  Best contact: [phone]  Preferred date/time:  Scheduled appt date/time: 9/18/19 1:20pm  Reason for appt: Follow up for medication  Additional details: Pt's wife advised that Dr. Jad Lancaster instructed them to schedule an appt ASAP through [address]

## 2019-09-09 NOTE — TELEPHONE ENCOUNTER
----- Message from Ramandeep Stuart sent at 9/9/2019  9:59 AM EDT -----  Regarding: Dr. Shirley Bojorquez: [phone]  Appointment Request From: Radha Muñiz    With Provider: Eusebia Her MD [[facility]]    Preferred Date Range: 9/9/2019 - 9/11/2019    Preferred Times: Any time    Reason for visit: Request an Appointment    Comments:  per Dr Adan Underwood request following/answer on medication question via My Chart wants to see me ASAP and to speak to the nurse if I  could not  get an appointment ASAP

## 2019-09-10 ENCOUNTER — DOCUMENTATION ONLY (OUTPATIENT)
Dept: CARDIOLOGY CLINIC | Age: 74
End: 2019-09-10

## 2019-09-11 ENCOUNTER — HOSPITAL ENCOUNTER (OUTPATIENT)
Dept: LAB | Age: 74
Discharge: HOME | End: 2019-09-11
Payer: MEDICARE

## 2019-09-11 PROCEDURE — 36415 COLL VENOUS BLD VENIPUNCTURE: CPT

## 2019-09-11 PROCEDURE — 80053 COMPREHEN METABOLIC PANEL: CPT

## 2019-09-11 PROCEDURE — 84443 ASSAY THYROID STIM HORMONE: CPT

## 2019-09-11 PROCEDURE — 85027 COMPLETE CBC AUTOMATED: CPT

## 2019-09-12 LAB
ALBUMIN SERPL-MCNC: 4.2 G/DL (ref 3.5–4.8)
ALBUMIN/GLOB SERPL: 1.8 {RATIO} (ref 1.2–2.2)
ALP SERPL-CCNC: 49 IU/L (ref 39–117)
ALT SERPL-CCNC: 15 IU/L (ref 0–44)
AST SERPL-CCNC: 21 IU/L (ref 0–40)
BILIRUB SERPL-MCNC: 0.5 MG/DL (ref 0–1.2)
BUN SERPL-MCNC: 22 MG/DL (ref 8–27)
BUN/CREAT SERPL: 18 (ref 10–24)
CALCIUM SERPL-MCNC: 9 MG/DL (ref 8.6–10.2)
CHLORIDE SERPL-SCNC: 102 MMOL/L (ref 96–106)
CO2 SERPL-SCNC: 23 MMOL/L (ref 20–29)
CREAT SERPL-MCNC: 1.22 MG/DL (ref 0.76–1.27)
ERYTHROCYTE [DISTWIDTH] IN BLOOD BY AUTOMATED COUNT: 14.1 % (ref 12.3–15.4)
GLOBULIN SER CALC-MCNC: 2.3 G/DL (ref 1.5–4.5)
GLUCOSE SERPL-MCNC: 90 MG/DL (ref 65–99)
HCT VFR BLD AUTO: 40.7 % (ref 37.5–51)
HGB BLD-MCNC: 13.7 G/DL (ref 13–17.7)
INTERPRETATION: NORMAL
MCH RBC QN AUTO: 30.9 PG (ref 26.6–33)
MCHC RBC AUTO-ENTMCNC: 33.7 G/DL (ref 31.5–35.7)
MCV RBC AUTO: 92 FL (ref 79–97)
PLATELET # BLD AUTO: 171 X10E3/UL (ref 150–450)
POTASSIUM SERPL-SCNC: 4.3 MMOL/L (ref 3.5–5.2)
PROT SERPL-MCNC: 6.5 G/DL (ref 6–8.5)
RBC # BLD AUTO: 4.43 X10E6/UL (ref 4.14–5.8)
SODIUM SERPL-SCNC: 141 MMOL/L (ref 134–144)
TSH SERPL DL<=0.005 MIU/L-ACNC: 2.32 UIU/ML (ref 0.45–4.5)
WBC # BLD AUTO: 5.9 X10E3/UL (ref 3.4–10.8)

## 2019-09-12 RX ORDER — AMLODIPINE BESYLATE 5 MG/1
5 TABLET ORAL DAILY
Qty: 90 TAB | Refills: 1 | Status: SHIPPED | OUTPATIENT
Start: 2019-09-12 | End: 2019-10-22 | Stop reason: SDUPTHER

## 2019-09-18 ENCOUNTER — OFFICE VISIT (OUTPATIENT)
Dept: INTERNAL MEDICINE CLINIC | Age: 74
End: 2019-09-18

## 2019-09-18 VITALS
OXYGEN SATURATION: 96 % | RESPIRATION RATE: 18 BRPM | DIASTOLIC BLOOD PRESSURE: 68 MMHG | WEIGHT: 286 LBS | TEMPERATURE: 98 F | HEART RATE: 44 BPM | BODY MASS INDEX: 33.77 KG/M2 | HEIGHT: 77 IN | SYSTOLIC BLOOD PRESSURE: 110 MMHG

## 2019-09-18 DIAGNOSIS — F51.01 PRIMARY INSOMNIA: Primary | ICD-10-CM

## 2019-09-18 DIAGNOSIS — M25.472 ANKLE EDEMA, BILATERAL: ICD-10-CM

## 2019-09-18 DIAGNOSIS — M25.471 ANKLE EDEMA, BILATERAL: ICD-10-CM

## 2019-09-18 RX ORDER — CLONAZEPAM 2 MG/1
2 TABLET ORAL
Qty: 15 TAB | Refills: 0 | Status: SHIPPED | OUTPATIENT
Start: 2019-09-18 | End: 2019-10-02

## 2019-09-19 NOTE — PROGRESS NOTES
HISTORY OF PRESENT ILLNESS    Chief Complaint   Patient presents with    Insomnia       Presents for follow-up    Presents with chronic severe insomnia. History of sleep apnea and reports compliance with CPAP. He sleeps for about 2 hours then wakes up and has difficulty falling back asleep most nights per week. He has been taking Sonata 10 mg at bedtime. Says that he drinks some gin or other alcohol after he has been up for a while and sometimes can fall back asleep with it. Otherwise cannot fall back asleep. He has had some occasional back spasm but does not think this makes him up. Has been on multiple medications for severe insomnia over the years. Is taking Ambien CR, Lunesta, trazodone without significant improvement. Took Valium in the past for back spasm which may or may not have helped. Saw cardiology for edema of legs. Echo, US legs, stress test all ok. Takes norvasc. Blood pressure 110/68, pulse (!) 44, temperature 98 °F (36.7 °C), temperature source Oral, resp. rate 18, height 6' 5\" (1.956 m), weight 286 lb (129.7 kg), SpO2 96 %. Review of Systems   All other systems reviewed and are negative, except as noted in HPI    Past Medical and Surgical History   has a past medical history of Arthritis, Atrial fibrillation ([address] BPH (benign prostatic hyperplasia), Chronic pain, Colon polyps, DJD (degenerative joint disease) of lumbar spine, ED (erectile dysfunction) (6/18/2014), Heart palpitations, Herniated nucleus pulposus, C3-4 (12/2015), Hyperlipidemia, Hypertension, IFG (impaired fasting glucose), Insomnia, Knee pain, right, Normal cardiac stress test (9/21/15), OA (osteoarthritis) of knee, Obesity, Onychomycosis (2018), AVI on CPAP, Seasonal allergic rhinitis, Spinal stenosis in cervical region (12/2015), Spinal stenosis of lumbar region, SVT (supraventricular tachycardia) ([address] and Varicose veins.    has a past surgical history that includes pr sinus surgery proc unlisted (1999); hx bladder repair; colonoscopy (2008); colonoscopy (2007); hx vein stripping (9/2010); endoscopy, colon, diagnostic (2011); vascular surgery procedure unlist (2008); hx lumbar laminectomy (1979); hx lumbar laminectomy (1991); hx colonoscopy (4/27/11); hx lumbar fusion (9/2015); hx cervical fusion (1/16/16); colonoscopy (N/A, 6/15/2016); hx urological (1993); hx knee replacement (11/4/2013); hx mohs procedure; pr shoulder surg proc unlisted; hx knee arthroscopy (2005); hx knee arthroscopy (02/01/2012); hx knee arthroscopy (1/2013); and pr total knee arthroplasty (11/2013). reports that he quit smoking about 39 years ago. His smoking use included cigarettes. He has a 4.75 pack-year smoking history. He has never used smokeless tobacco. He reports that he drinks about 5.0 standard drinks of alcohol per week. He reports that he does not use drugs. family history includes Cancer in his father, mother, and sister. Physical Exam   Nursing note and vitals reviewed. Blood pressure 110/68, pulse (!) 44, temperature 98 °F (36.7 °C), temperature source Oral, resp. rate 18, height 6' 5\" (1.956 m), weight 286 lb (129.7 kg), SpO2 96 %. Constitutional:  No distress. Eyes: Conjunctivae are normal.   Ears:  Hearing grossly intact  Cardiovascular: Normal rate. regular rhythm, no murmurs or gallops  No edema  Pulmonary/Chest: Effort normal.   CTAB  Musculoskeletal: moves all 4 extremities   Neurological: Alert and oriented to person, place, and time. Skin: No rash noted. Psychiatric: Normal mood and affect. Behavior is normal.     ASSESSMENT and PLAN  Diagnoses and all orders for this visit:    1. Primary insomnia  This is chronic, very severe. Sleep apnea does not appear the main issue. Back spasm also may contribute. Alcohol use also may contribute. Discussed that medication therapy may be helpful, but needs to avoid alcohol if taking. He voices understanding.   Alcohol along with sedated therapy can lead to extreme sedation, aspiration, death. Discontinue Sonata. Trial of clonazepam.  Consider PTSD? Consider trial of prazosin as next step. -     clonazePAM (KLONOPIN) 2 mg tablet; Take 1 Tab by mouth nightly. Max Daily Amount: 2 mg. Replaces zaplon for insomnia    2. Ankle edema, bilateral  Stable, mild    lab results and schedule of future lab studies reviewed with patient  reviewed medications and side effects in detail    Return to clinic for further evaluation if new symptoms develop        Current Outpatient Medications   Medication Sig    clonazePAM (KLONOPIN) 2 mg tablet Take 1 Tab by mouth nightly. Max Daily Amount: 2 mg. Replaces zaplon for insomnia    amLODIPine (NORVASC) 5 mg tablet Take 1 Tab by mouth daily.  furosemide (LASIX) 20 mg tablet Take once daily    lisinopril (PRINIVIL, ZESTRIL) 40 mg tablet Take 1 Tab by mouth daily.  metoprolol succinate (TOPROL-XL) 25 mg XL tablet TAKE ONE-HALF (1/2) TABLET TWICE A DAY (Patient taking differently: Take 25 mg by mouth daily. TAKE ONE-HALF (1/2) TABLET TWICE A DAY)    ELIQUIS 5 mg tablet TAKE 1 TABLET TWICE A DAY    terazosin (HYTRIN) 5 mg capsule TAKE 1 CAPSULE DAILY    atorvastatin (LIPITOR) 20 mg tablet TAKE 1 TABLET DAILY    levocetirizine (XYZAL) 5 mg tablet TAKE 1 TABLET DAILY    gabapentin (NEURONTIN) 300 mg capsule Take 2 Caps by mouth nightly.  ipratropium (ATROVENT) 0.03 % nasal spray SPRAY TWICE INTO BOTH NOSTRILS THREE TIMES DAILY (Patient taking differently: as needed.)     No current facility-administered medications for this visit.

## 2019-09-27 RX ORDER — SODIUM CHLORIDE 0.9 % (FLUSH) 0.9 %
5-40 SYRINGE (ML) INJECTION EVERY 8 HOURS
Status: CANCELLED | OUTPATIENT
Start: 2019-09-27

## 2019-09-27 RX ORDER — SODIUM CHLORIDE 0.9 % (FLUSH) 0.9 %
5-40 SYRINGE (ML) INJECTION AS NEEDED
Status: CANCELLED | OUTPATIENT
Start: 2019-09-27

## 2019-10-02 ENCOUNTER — TELEPHONE (OUTPATIENT)
Dept: INTERNAL MEDICINE CLINIC | Age: 74
End: 2019-10-02

## 2019-10-02 ENCOUNTER — OFFICE VISIT (OUTPATIENT)
Dept: CARDIOLOGY CLINIC | Age: 74
End: 2019-10-02

## 2019-10-02 VITALS
HEIGHT: 77 IN | HEART RATE: 42 BPM | DIASTOLIC BLOOD PRESSURE: 70 MMHG | SYSTOLIC BLOOD PRESSURE: 120 MMHG | WEIGHT: 284.8 LBS | OXYGEN SATURATION: 97 % | BODY MASS INDEX: 33.63 KG/M2

## 2019-10-02 DIAGNOSIS — I87.2 VENOUS INSUFFICIENCY: Primary | ICD-10-CM

## 2019-10-02 DIAGNOSIS — F51.01 PRIMARY INSOMNIA: ICD-10-CM

## 2019-10-02 RX ORDER — CLONAZEPAM 2 MG/1
2 TABLET ORAL
Qty: 30 TAB | Refills: 0 | Status: CANCELLED | OUTPATIENT
Start: 2019-10-02

## 2019-10-02 RX ORDER — CLONAZEPAM 2 MG/1
4 TABLET ORAL
Qty: 60 TAB | Refills: 0 | OUTPATIENT
Start: 2019-10-02 | End: 2019-11-18 | Stop reason: SDUPTHER

## 2019-10-02 NOTE — TELEPHONE ENCOUNTER
Pt states the Clonazepam  2 mg (takes 2) is working well for his insomnia , will update later this month.

## 2019-10-02 NOTE — PROGRESS NOTES
CardioVascular Consult    Patient: Soni Sanford MRN: [id number]  SSN: [id number]    YOB: 1945  Age: 66 y.o. Sex: male       Subjective:      Primary Care Provider: Dillan Bonilla MD      Soni Sanford is a 66 y.o.  male who presents for assessment of venous insufficiency. This consultation was requested by Dr. Elizabeth Dawkins. Mr. Emil Miranda is a 65 yo WM with a history of HTN, SVT, Afib, AVI on CPAP, dyslipidemia who presents for assessment of venous insufficiency. The patient has symptomatic bradycardia and is scheduled for PPM on 10/4/2019. He has a history of bilateral GSV ablation in 2008. Symptoms improved at that time. Unfortunately, he has had recurrent of discomfort in the calves over the past few years. He has also noted superficial varicosities. Mild dependent edema and nocturia. No chest pain or shortness of breath. Fatigue and mild lightheadedness at times. No syncope. No fevers or chills. No h/o DVT. Past Medical History:   Diagnosis Date    Arthritis     left knee and lt. hand    Atrial fibrillation (HCC)     BPH (benign prostatic hyperplasia)     Chronic pain     Back pain    Colon polyps     DJD (degenerative joint disease) of lumbar spine     laminectomy 1979, 1991, 2015    ED (erectile dysfunction) 6/18/2014    Heart palpitations     Dr. Donavan Rubio    Herniated nucleus pulposus, C3-4 12/2015    Dr. Zeke Stewart    Hyperlipidemia     Hypertension     IFG (impaired fasting glucose)     Insomnia     severe. has treated, AVI, back pain    Knee pain, right     Normal cardiac stress test 9/21/15    OA (osteoarthritis) of knee     Dr. Grant Mackey Obesity     Onychomycosis 2018    Dr Ana Castaneda. lamisil    AVI on CPAP     Dr. Daphne Bojorquez    Seasonal allergic rhinitis     Spinal stenosis in cervical region 12/2015    Spinal stenosis of lumbar region     MRI 12/2012.   Dr. Zeke Stewart    SVT (supraventricular tachycardia) [facility])     Dr. Dayanara Jin veins     vein stripping 10/10      Past Surgical History:   Procedure Laterality Date    COLONOSCOPY  2008    2 polyps. repeat . Dr. Dafne Dong  2007    7 polyps per pt     COLONOSCOPY N/A 6/15/2016    COLONOSCOPY performed by Shantell Ariza MD at [address] [place] [address], COLON, DIAGNOSTIC      return in     HX BLADDER REPAIR      polyp removal    HX CERVICAL FUSION  16    ACDF C3-C4 Dr. Anisha Zuluaga    HX COLONOSCOPY  11    Dr Elaina Steiner, smal polyp. repeat 2016    HX KNEE ARTHROSCOPY      right, Dr. Saskia Gates ARTHROSCOPY  2012    left, Dr Saskia Gates ARTHROSCOPY  2013    right    HX KNEE REPLACEMENT  2013    HX LUMBAR FUSION  2015    L3-L4, Dr. Len Molina HX MOHS PROCEDURES      left, Dr Rubén Alonso    bladder polyp removed with cystoscopy    HX VEIN STRIPPING  2010    Dr. Irene Elliott, bilateral    SHOULDER SURG [address] UNLISTED      left DECOMPRESSION, Dr. Fernando Porter  2013    VASCULAR SURGERY PROCEDURE UNLIST  2008    bilateral vein stripping     Family History   Problem Relation Age of Onset   Lanelle Delay Cancer Mother         liver    Cancer Father         leukemia    Cancer Sister         lung cancer      Social History     Tobacco Use    Smoking status: Former Smoker     Packs/day: 0.25     Years: 19.00     Pack years: 4.75     Types: Cigarettes     Last attempt to quit: 1980     Years since quittin.7    Smokeless tobacco: Never Used    Tobacco comment: quit ~   Substance Use Topics    Alcohol use: Yes     Alcohol/week: 5.0 standard drinks     Types: 5 Glasses of wine per week     Comment: socially      Current Outpatient Medications   Medication Sig    clonazePAM (KLONOPIN) 2 mg tablet Take 1 Tab by mouth nightly. Max Daily Amount: 2 mg.  Replaces zaplon for insomnia    amLODIPine (NORVASC) 5 mg tablet Take 1 Tab by mouth daily.  furosemide (LASIX) 20 mg tablet Take once daily    lisinopril (PRINIVIL, ZESTRIL) 40 mg tablet Take 1 Tab by mouth daily.  metoprolol succinate (TOPROL-XL) 25 mg XL tablet TAKE ONE-HALF (1/2) TABLET TWICE A DAY (Patient taking differently: Take 25 mg by mouth daily. TAKE ONE-HALF (1/2) TABLET TWICE A DAY)    ELIQUIS 5 mg tablet TAKE 1 TABLET TWICE A DAY    terazosin (HYTRIN) 5 mg capsule TAKE 1 CAPSULE DAILY    atorvastatin (LIPITOR) 20 mg tablet TAKE 1 TABLET DAILY    levocetirizine (XYZAL) 5 mg tablet TAKE 1 TABLET DAILY    gabapentin (NEURONTIN) 300 mg capsule Take 2 Caps by mouth nightly.  ipratropium (ATROVENT) 0.03 % nasal spray SPRAY TWICE INTO BOTH NOSTRILS THREE TIMES DAILY (Patient taking differently: as needed.)     No current facility-administered medications for this visit. Allergies   Allergen Reactions    Percocet [Oxycodone-Acetaminophen] Other (comments)     hallucinations    Penicillin G Rash     Did okay with keflex - no reaction with that. Review of Symptoms:  Constitutional: No fevers or chills. Fatigue. HEET: No trauma. No visual changes. No hearing loss. No sore throat. Neck: No adenopathy or swelling. Heart: No chest pain or palpitations. Lungs: No shortness of breath. No cough. Abdomen: No pain. No nausea of vomiting. No BRBPR or melena. No diarrhea. Urology: No dysuria or hematuria. Ext: Edema and  heaviness. Skin: No acute rashes or ulcers. Endocrine: No heat or cold intolerance. Neuro: No transient neurologic deficits. Subjective:     Visit Vitals  /70 (BP 1 Location: Left arm, BP Patient Position: Sitting)   Pulse (!) 42   Ht 6' 5\" (1.956 m)   Wt 284 lb 12.8 oz (129.2 kg)   SpO2 97%   BMI 33.77 kg/m²     Physical Exam: Overweight  Head: Normocephalic, atraumatic. Eyes: Pupils equal, round, reactive to light and accomodation. , Extra ocular muscles intact.  Sclera anicteric. Ears: Grossly responsive to sound. Neck: No adenopathy. No bruits. Throat: No sores or erythema. Heart: Regular rate and rhythm. Normal S1 and S2. No murmurs, gallops, or rub. Lungs: Clear to auscultation bilaterally. No wheezing, rales, or rhonchi. Abdomen: Soft, non-tender. No guarding or rebound. No hepatosplenomegaly. Bowel sounds active. Ext: Varicosities. Diminished pulses  Skin: Stasis dermatitis in legs. . Warm and dry. No rash. Neuro: Cranial nerves II through XII intact. Motor and sensory grossly intact. Affect: Appropriate. Alert and interactive.      19   DUPLEX LOWER EXT VENOUS BILAT 2019    Narrative *  Right Leg:  Mild venous insufficiency is detected within saphenofemoral   junction and within the calf of the greater saphenous vein. No evidence   of deep or superficial venous thrombosis within the right common femoral,   femoral, popliteal, posterior tibial or greater saphenous vein. *  Left Leg:  Significant venous insufficiency is detected within the calf   of the greater saphenous vein. No evidence of deep vein or superficial   venous thrombosis within the left common femoral, femoral, popliteal,   posterior tibial or greater saphenous vein.        Signed by: Natividad Garcia MD       [address] SPECT STRESS & REST   Order# [id number]   Reading physician: Gino Bazan DO Ordering physician: Nam Szymanski MD Study date: 19   Patient Information     Patient Name  Ulyses Kayser MRN  [id number] Sex  Male     1945 (66 y.o.)   Reason for Exam   Priority: Routine   dyspnea   Dx: Chronic fatigue [R53.82 (ICD-10-CM)]   Comments:    Vitals     Weight Height BSA (calculated - sq m) BP Pulse (Heart Rate)   290 lb (131.5 kg) 6' 5\" (1.956 m) 2.67 sq meters     Reading Providers      Reading Role Read Date   Gino Bazan DO ECG Howes, SPECT Howes 2019   Procedure Conclusion     Nuclear Stress Test     Negative myocardial perfusion imaging. There is no prior study available for comparison. .   Interpretation Summary     · Gated SPECT: Left ventricular function post-stress was normal. Calculated ejection fraction is 55%. There is no evidence of transient ischemic dilation (TID). · Baseline ECG: Normal sinus rhythm. · No ECG changes with Lexiscan. .  · Left ventricular perfusion is normal.  · Negative myocardial perfusion imaging. Stress Findings     ECG Baseline ECG: Normal sinus rhythm. There was no ST segment deviation noted during rest. There were no arrhythmias during stress. There was no ST segment deviation noted during stress. There were no arrhythmias during recovery. Stress Findings A pharmacological stress test was performed without low-level exercise. The patient reached the end of the protocol. The patient reported dyspnea on exertion during stress. Resolved within 3 minutes in stress test.   Blood pressure demonstrated a normal response to exercise. Resting BP WNL. Heart rate demonstrated maximal response to stress. No ECG changes with Lexiscan. . 1 Day 2/ lexiscan Cardiolite. Myocardial perfusion imaging was performed at rest 60 minutes following the intravenous Cardiolite injection. During stress, Kathleen Maxwell was injected intravenously over 10 seconds and then Cardiolite administered intravenously 10 seconds later. Gaited post stress tomographic imaging performed 60 minutes after injection.    Stress Measurements     Baseline Vitals   Baseline HR 53 bpm      Baseline  mmHg      Stress Base Diastolic BP 76 mmHg      O2 sat rest 95 %       Peak Stress Vitals   Post peak HR 74 bpm      Stress Base Systolic  mmHg      Stress Base Diastolic BP 74 mmHg      O2 sat peak 97 %      Stress Rate Pressure Product 9,028 bpm*mmHg       Exercise Data   Target  bpm      Percent HR 51 %         Nuclear Stress Findings     Nuclear Stress Function Left ventricular function post-stress was normal. Wall motion was normal at stress. Calculated ejection fraction is 55%. There is no evidence of transient ischemic dilation (TID). Nuclear Perfusion Left ventricular perfusion is normal.   Procedure Staff     Technologist/Clinician: Mahnaz Renee RN  Supporting Staff: Jose Jones  Performing Physician/Midlevel: Nikky Figueroa     Exam Completion Date/Time: 9/27/19  3:40 PM   Procedure Related Medications     Medication     regadenoson (LEXISCAN) injection 0.4 mg (Completed)   Most Recent Administration:     User Action Time Recorded Time Dose Route Site Comment Action Reason   Mahnaz Renee RN 09/24/19 1321 09/24/19 1507 0.4 mg IntraVENous   Given    Full Administration Report              Medication     technetium sestamibi TC 99M (CARDIOLITE) injection 16.3 millicurie (Completed)   Most Recent Administration:     User Action Time Recorded Time Dose Route Site Comment Action Reason   Jose Jones 09/24/19 1315 26/62/34 0743 06.3 millicurie IntraVENous   Given    Full Administration Report              Medication     technetium sestamibi TC 99M (CARDIOLITE) injection 21.4 millicurie (Completed)   Most Recent Administration:     User Action Time Recorded Time Dose Route Site Comment Action Reason   Brandy Varma 09/27/19 1400 68/32/63 2855 46.0 millicurie IntraVENous   Given    Full Administration Report              Signed     Electronically signed by Angeles Jeronimo DO on 9/30/19 at [phone] EDT     09/11/19   ECHO ADULT COMPLETE 09/12/2019 9/12/2019    Narrative · Left Ventricle: Normal cavity size, wall thickness and systolic function   (ejection fraction normal). Estimated left ventricular ejection fraction   is 61 - 65%. Biplane method used to measure ejection fraction. No regional   wall motion abnormality noted. Inconclusive left ventricular diastolic   function. · Left Atrium: Moderately dilated left atrium. · Right Ventricle: Mildly dilated right ventricle.   · Aortic Valve: Mild aortic valve sclerosis with no significant stenosis. Signed by: Shad Alonzo MD     Lab Results   Component Value Date/Time    Cholesterol, total 133 02/12/2019 08:52 AM    HDL Cholesterol 43 02/12/2019 08:52 AM    LDL, calculated 74 02/12/2019 08:52 AM    VLDL, calculated 16 02/12/2019 08:52 AM    Triglyceride 78 02/12/2019 08:52 AM    CHOL/HDL Ratio 3.3 10/14/2010 09:43 AM     Lab Results   Component Value Date/Time    TSH 2.320 09/11/2019 04:18 PM     Lab Results   Component Value Date/Time    Hemoglobin A1c 5.6 02/12/2019 08:52 AM                Assessment/Plan        ICD-10-CM ICD-9-CM    1. Venous insufficiency I87.2 459.81        Mr. Gilles Valera is a 75-year-old white male who presents for assessment of bilateral calf weakness. He has a known history of venous insufficiency status post GSV ablation proximally 10 years ago. Lower extremity venous ultrasound confirms the presence of insufficiency calf veins. Venous stasis dermatitis ulcerations. I discussed the physiology and treatment of venous insufficiency in detail patient and his wife. We discussed conservative strategies including support stockings or socks, elevation of his legs, diuretics, anticoagulation, and exercise. His bradycardia is likely also contributing to poor circulation, and the scheduled PPM may also be of benefit. Finally, we discussed venous ablation or stripping if he fails conservative therapy. The patient and his wife understand. All question answered. I have provided a script for 20 mmHg pressure stocking should OTC stockings prove insufficient. We discussed the risk for DVT, which should be low as he is on anticoagulation for atrial fibrillation. I will see the patient back in 3 months to review his progress.       Eula Espinoza MD  10/2/2019, 11:52 AM    [facility] Office:   [place] Office:  [address] Cassius Trevizo [address] [facility]  [address]     [address] [facility] [address] [facility], [id number] [place] [address]  P: [phone]    P: [phone]  F: [phone]    F: [phone]

## 2019-10-02 NOTE — TELEPHONE ENCOUNTER
He was supposed to update me on how 4 mg (two 2 mg pills worked last night). Ok to call in #60, no RF.   Update me later this month as well

## 2019-10-02 NOTE — PATIENT INSTRUCTIONS
Please wear compression stockings    Please elevate your legs    Please work on your diet and exercise.

## 2019-10-02 NOTE — TELEPHONE ENCOUNTER
Patient called requesting a refill on his Klonopin 2 MG Tablet - 2 times a day. Patient is requesting 60 tabs with refills.      [facility] [address] [place] [address] Dr ALLEN AT [facility]  [phone]

## 2019-10-03 ENCOUNTER — TELEPHONE (OUTPATIENT)
Dept: INTERNAL MEDICINE CLINIC | Age: 74
End: 2019-10-03

## 2019-10-03 NOTE — TELEPHONE ENCOUNTER
Spoke with pt @ 5:30 pm, advised prior auth. Was sent via fax, cover my meds and will call in the am to check on status pt is ok with this and is appreciative of our help.

## 2019-10-03 NOTE — TELEPHONE ENCOUNTER
Per patient's wife , a PA needs to be completed on patient's Clonazepam medication . Wife would like to speak with the nurse , she can be reached at [phone].   States patient is completely out of the medication

## 2019-10-03 NOTE — TELEPHONE ENCOUNTER
Patient's wife is calling back, she's very concerned with this  matter , states she has been waiting to hear back from the  nurse, patient was advised of msge below, PA is being worked on and patient can pay out of pocket for the medication.

## 2019-10-04 ENCOUNTER — HOSPITAL ENCOUNTER (OUTPATIENT)
Age: 74
Setting detail: OBSERVATION
Discharge: HOME | End: 2019-10-05
Attending: INTERNAL MEDICINE | Admitting: INTERNAL MEDICINE
Payer: MEDICARE

## 2019-10-04 ENCOUNTER — APPOINTMENT (OUTPATIENT)
Dept: GENERAL RADIOLOGY | Age: 74
End: 2019-10-04
Attending: INTERNAL MEDICINE
Payer: MEDICARE

## 2019-10-04 DIAGNOSIS — R00.1 BRADYCARDIA: ICD-10-CM

## 2019-10-04 PROBLEM — I49.5 TACHY-BRADY SYNDROME (HCC): Status: ACTIVE | Noted: 2019-10-04

## 2019-10-04 PROCEDURE — 77030018729 HC ELECTRD DEFIB PAD CARD -B: Performed by: INTERNAL MEDICINE

## 2019-10-04 PROCEDURE — 74011250636 HC RX REV CODE- 250/636: Performed by: INTERNAL MEDICINE

## 2019-10-04 PROCEDURE — C1893 INTRO/SHEATH, FIXED,NON-PEEL: HCPCS | Performed by: INTERNAL MEDICINE

## 2019-10-04 PROCEDURE — 99218 HC RM OBSERVATION: CPT

## 2019-10-04 PROCEDURE — C1785 PMKR, DUAL, RATE-RESP: HCPCS | Performed by: INTERNAL MEDICINE

## 2019-10-04 PROCEDURE — 71046 X-RAY EXAM CHEST 2 VIEWS: CPT

## 2019-10-04 PROCEDURE — C1898 LEAD, PMKR, OTHER THAN TRANS: HCPCS | Performed by: INTERNAL MEDICINE

## 2019-10-04 PROCEDURE — A4565 SLINGS: HCPCS | Performed by: INTERNAL MEDICINE

## 2019-10-04 PROCEDURE — 77030012935 HC DRSG AQUACEL BMS -B: Performed by: INTERNAL MEDICINE

## 2019-10-04 PROCEDURE — 77030031139 HC SUT VCRL2 J&J -A: Performed by: INTERNAL MEDICINE

## 2019-10-04 PROCEDURE — 74011250637 HC RX REV CODE- 250/637: Performed by: NURSE PRACTITIONER

## 2019-10-04 PROCEDURE — 33207 INSERT HEART PM VENTRICULAR: CPT | Performed by: INTERNAL MEDICINE

## 2019-10-04 PROCEDURE — 33208 INSRT HEART PM ATRIAL & VENT: CPT | Performed by: INTERNAL MEDICINE

## 2019-10-04 PROCEDURE — 74011250637 HC RX REV CODE- 250/637: Performed by: INTERNAL MEDICINE

## 2019-10-04 PROCEDURE — 99153 MOD SED SAME PHYS/QHP EA: CPT | Performed by: INTERNAL MEDICINE

## 2019-10-04 PROCEDURE — 99152 MOD SED SAME PHYS/QHP 5/>YRS: CPT | Performed by: INTERNAL MEDICINE

## 2019-10-04 PROCEDURE — 77030018547 HC SUT ETHBND1 J&J -B: Performed by: INTERNAL MEDICINE

## 2019-10-04 PROCEDURE — 77030018673: Performed by: INTERNAL MEDICINE

## 2019-10-04 PROCEDURE — 33286 RMVL SUBQ CAR RHYTHM MNTR: CPT | Performed by: INTERNAL MEDICINE

## 2019-10-04 PROCEDURE — 77030037713 HC CLOSR DEV INCIS ZIP STRY -B: Performed by: INTERNAL MEDICINE

## 2019-10-04 PROCEDURE — 77030002933 HC SUT MCRYL J&J -A: Performed by: INTERNAL MEDICINE

## 2019-10-04 DEVICE — IMPLANTABLE DEVICE: Type: IMPLANTABLE DEVICE | Status: FUNCTIONAL

## 2019-10-04 DEVICE — IPG W1DR01 AZURE XT DR MRI WL USA
Type: IMPLANTABLE DEVICE | Status: FUNCTIONAL
Brand: AZURE™ XT DR MRI SURESCAN™

## 2019-10-04 DEVICE — LEAD 5076-58 MRI US RCMCRD
Type: IMPLANTABLE DEVICE | Status: FUNCTIONAL
Brand: CAPSUREFIX NOVUS MRI™ SURESCAN®

## 2019-10-04 RX ORDER — AMLODIPINE BESYLATE 5 MG/1
5 TABLET ORAL DAILY
Status: DISCONTINUED | OUTPATIENT
Start: 2019-10-04 | End: 2019-10-05 | Stop reason: HOSPADM

## 2019-10-04 RX ORDER — SODIUM CHLORIDE 0.9 % (FLUSH) 0.9 %
5-40 SYRINGE (ML) INJECTION AS NEEDED
Status: DISCONTINUED | OUTPATIENT
Start: 2019-10-04 | End: 2019-10-05 | Stop reason: HOSPADM

## 2019-10-04 RX ORDER — TERAZOSIN 5 MG/1
5 CAPSULE ORAL DAILY
Status: DISCONTINUED | OUTPATIENT
Start: 2019-10-04 | End: 2019-10-05 | Stop reason: HOSPADM

## 2019-10-04 RX ORDER — SODIUM CHLORIDE 0.9 % (FLUSH) 0.9 %
5-40 SYRINGE (ML) INJECTION EVERY 8 HOURS
Status: DISCONTINUED | OUTPATIENT
Start: 2019-10-04 | End: 2019-10-05 | Stop reason: HOSPADM

## 2019-10-04 RX ORDER — METOPROLOL SUCCINATE 50 MG/1
50 TABLET, EXTENDED RELEASE ORAL DAILY
Status: DISCONTINUED | OUTPATIENT
Start: 2019-10-04 | End: 2019-10-05 | Stop reason: HOSPADM

## 2019-10-04 RX ORDER — SODIUM CHLORIDE 0.9 % (FLUSH) 0.9 %
5-40 SYRINGE (ML) INJECTION EVERY 8 HOURS
Status: DISCONTINUED | OUTPATIENT
Start: 2019-10-04 | End: 2019-10-04 | Stop reason: HOSPADM

## 2019-10-04 RX ORDER — DOXYCYCLINE 100 MG/1
100 CAPSULE ORAL 2 TIMES DAILY
Qty: 10 CAP | Refills: 0 | Status: SHIPPED | OUTPATIENT
Start: 2019-10-04 | End: 2019-10-09

## 2019-10-04 RX ORDER — MIDAZOLAM HYDROCHLORIDE 1 MG/ML
INJECTION, SOLUTION INTRAMUSCULAR; INTRAVENOUS AS NEEDED
Status: DISCONTINUED | OUTPATIENT
Start: 2019-10-04 | End: 2019-10-04 | Stop reason: HOSPADM

## 2019-10-04 RX ORDER — VANCOMYCIN/0.9 % SOD CHLORIDE 1.5G/250ML
1500 PLASTIC BAG, INJECTION (ML) INTRAVENOUS EVERY 12 HOURS
Status: COMPLETED | OUTPATIENT
Start: 2019-10-04 | End: 2019-10-05

## 2019-10-04 RX ORDER — CLONAZEPAM 1 MG/1
4 TABLET ORAL
Status: DISCONTINUED | OUTPATIENT
Start: 2019-10-04 | End: 2019-10-05 | Stop reason: HOSPADM

## 2019-10-04 RX ORDER — FUROSEMIDE 20 MG/1
20 TABLET ORAL DAILY
Status: DISCONTINUED | OUTPATIENT
Start: 2019-10-04 | End: 2019-10-05 | Stop reason: HOSPADM

## 2019-10-04 RX ORDER — METOPROLOL SUCCINATE 50 MG/1
50 TABLET, EXTENDED RELEASE ORAL DAILY
Qty: 30 TAB | Refills: 0 | Status: SHIPPED | OUTPATIENT
Start: 2019-10-04 | End: 2019-10-04 | Stop reason: SDUPTHER

## 2019-10-04 RX ORDER — METOPROLOL SUCCINATE 25 MG/1
25 TABLET, EXTENDED RELEASE ORAL DAILY
Status: DISCONTINUED | OUTPATIENT
Start: 2019-10-04 | End: 2019-10-04

## 2019-10-04 RX ORDER — GENTAMICIN SULFATE 80 MG/100ML
80 INJECTION, SOLUTION INTRAVENOUS ONCE
Status: COMPLETED | OUTPATIENT
Start: 2019-10-04 | End: 2019-10-04

## 2019-10-04 RX ORDER — LORATADINE 10 MG/1
10 TABLET ORAL DAILY
Status: DISCONTINUED | OUTPATIENT
Start: 2019-10-05 | End: 2019-10-05 | Stop reason: HOSPADM

## 2019-10-04 RX ORDER — TRAMADOL HYDROCHLORIDE 50 MG/1
50 TABLET ORAL
Status: DISCONTINUED | OUTPATIENT
Start: 2019-10-04 | End: 2019-10-05 | Stop reason: HOSPADM

## 2019-10-04 RX ORDER — LISINOPRIL 20 MG/1
40 TABLET ORAL DAILY
Status: DISCONTINUED | OUTPATIENT
Start: 2019-10-04 | End: 2019-10-05 | Stop reason: HOSPADM

## 2019-10-04 RX ORDER — FENTANYL CITRATE 50 UG/ML
INJECTION, SOLUTION INTRAMUSCULAR; INTRAVENOUS AS NEEDED
Status: DISCONTINUED | OUTPATIENT
Start: 2019-10-04 | End: 2019-10-04 | Stop reason: HOSPADM

## 2019-10-04 RX ORDER — SODIUM CHLORIDE 0.9 % (FLUSH) 0.9 %
5-40 SYRINGE (ML) INJECTION AS NEEDED
Status: DISCONTINUED | OUTPATIENT
Start: 2019-10-04 | End: 2019-10-04 | Stop reason: HOSPADM

## 2019-10-04 RX ORDER — GABAPENTIN 300 MG/1
600 CAPSULE ORAL
Status: DISCONTINUED | OUTPATIENT
Start: 2019-10-04 | End: 2019-10-05 | Stop reason: HOSPADM

## 2019-10-04 RX ORDER — METOPROLOL SUCCINATE 50 MG/1
TABLET, EXTENDED RELEASE ORAL
Qty: 90 TAB | Refills: 0 | Status: SHIPPED | OUTPATIENT
Start: 2019-10-04 | End: 2019-10-23 | Stop reason: SDUPTHER

## 2019-10-04 RX ORDER — VANCOMYCIN/0.9 % SOD CHLORIDE 1.5G/250ML
1500 PLASTIC BAG, INJECTION (ML) INTRAVENOUS ONCE
Status: COMPLETED | OUTPATIENT
Start: 2019-10-04 | End: 2019-10-04

## 2019-10-04 RX ORDER — ACETAMINOPHEN 325 MG/1
650 TABLET ORAL
Status: DISCONTINUED | OUTPATIENT
Start: 2019-10-04 | End: 2019-10-05 | Stop reason: HOSPADM

## 2019-10-04 RX ORDER — ONDANSETRON 2 MG/ML
4 INJECTION INTRAMUSCULAR; INTRAVENOUS
Status: DISCONTINUED | OUTPATIENT
Start: 2019-10-04 | End: 2019-10-05 | Stop reason: HOSPADM

## 2019-10-04 RX ORDER — ATORVASTATIN CALCIUM 20 MG/1
20 TABLET, FILM COATED ORAL DAILY
Status: DISCONTINUED | OUTPATIENT
Start: 2019-10-04 | End: 2019-10-05 | Stop reason: HOSPADM

## 2019-10-04 RX ADMIN — Medication 1500 MG: at 17:19

## 2019-10-04 RX ADMIN — Medication 10 ML: at 14:00

## 2019-10-04 RX ADMIN — CLONAZEPAM 4 MG: 1 TABLET ORAL at 22:07

## 2019-10-04 RX ADMIN — METOPROLOL SUCCINATE 50 MG: 50 TABLET, EXTENDED RELEASE ORAL at 22:08

## 2019-10-04 RX ADMIN — Medication 10 ML: at 22:10

## 2019-10-04 RX ADMIN — TRAMADOL HYDROCHLORIDE 50 MG: 50 TABLET, FILM COATED ORAL at 22:08

## 2019-10-04 RX ADMIN — TRAMADOL HYDROCHLORIDE 50 MG: 50 TABLET, FILM COATED ORAL at 15:30

## 2019-10-04 RX ADMIN — APIXABAN 5 MG: 5 TABLET, FILM COATED ORAL at 22:08

## 2019-10-04 RX ADMIN — GENTAMICIN SULFATE 80 MG: 80 INJECTION, SOLUTION INTRAVENOUS at 11:49

## 2019-10-04 RX ADMIN — GABAPENTIN 600 MG: 300 CAPSULE ORAL at 22:07

## 2019-10-04 NOTE — PROCEDURES
Cardiac Electrophysiology Report      PATIENT INFORMATION     Patient Name: Darci Burkitt  MRN: [id number]                  Study Date: 10/4/2019    YOB: 1945   Age: 66 y.o. Gender: male      Procedure:  Oleg Guzman    Referring Physician:  Darin Cordova MD         STAFF     Duty Name   Electrophysiologist Caitlin Quick MD   Monitor Elen Garcias RN   Circulator Victorina Rose RN; FRENCH Valderrama       PATIENT HISTORY     66year old male with atrial fibrillation, ILR who has experienced fatigue felt to be potentially secondary to bradycardia. He now presents for implantation of a pacemaker. Of note, he was found to be in sinus rhythm today and thus is planned for a dual chamber pacemaker. PROCEDURE     The patient was brought to the Cardiac Electrophysiology laboratory in a post-absorptive, fasting state. Informed consent was obtained. A peripheral IV was in place. Continuous electrocardiographic, blood pressure, O2 saturation and  CO2 monitoring was initiated. Intravenous antibiotics were administered pre-operatively. Self-adhesive cardioversion patches were positioned on the chest.  Conscious sedation was effectuated according to protocol. The patient was then prepped and draped in the usual sterile fashion. A left subclavian venogram was performed and demonstrated patency of the vein. A 50/50 mixture of lidocaine (1%) with epinephrine and bupivicaine (0.5%) was utilized for local anesthesia. An incision was performed medial to the left delto-pectoral groove. Sharp and blunt dissection was carried down to the level of the pre-pectoralis fascia. Hemostasis was maintained with electrocautery. Extra-thoracic, subclavian venous access was obtained twice and sheaths were placed using the modified Seldinger technique.  Permanent pace/sense leads were advanced under fluoroscopic guidance and positioned in the right atrium and ventricle where stable pacing and sensing characteristics were encountered. The sheaths were peeled away and the leads were anchored to the pre-pectoralis fascia using O-ethibond non-absorbable suture material. A device pocket was then fashioned and flushed copiously with antibiotic solution. A pacemaker generator was connected to the leads and the generator was placed into the pocket. The device was secured to the pocket using O-ethibond, non-absorbable suture material. The pocket was then closed in three layers using 2-O vicryl, 3-O monocryl absorbable suture material and a zipline. The skin was closed using a sub-cuticular technique. A bio-occlusive dressing were applied to the skin. The patient remained hemodynamically stable, tolerated the procedure well and was transferred in stable condition. There were no immediate complications encountered during the procedure. There was minimal blood loss and no specimen were removed. LEAD & GENERATOR DATA       Model # Serial #   Generator Medtronic [id number]   Atrial Lead Medtronic 8420 [id number]   Ventricular Lead Medtronic 6180 [id number]       PACE/SENSE DATA      Sensed Wave (mV) Threshold (V) Impedance (Ohms)   Atrium 1.8    Ventricle 7.25 1.125 1216       FINAL PROGRAMMING     Mode Lower Rate (ppm) Upper Rate (ppm)   AAIR-DDDR 60 130       MEDICATION SUMMARY     Medication Route Unit Total   Gentamycin IV mg 80   Vancomycin IV grams 2   Fentanyl IV micrograms 100   Versed IV grams 10       RADIOLOGY SUMMARY      Total   Fluoro Time (minutes) 7.8      Dose Area Product (mGy) 221       CONCLUSIONS     1. Successful implantation of a dual-chamber pacemaker. 2. IV antibiotics x 24 hours for 3 doses followed by Keflex 500 mg po tid x 5 days. 3. Monitor overnight on telemetry; increase toprol to 50 mg daily  4. CXR (PA & lateral) and device interrogation in AM.  5. Possible discharge in AM.  6. Wound check in EP clinic in 10 days.    7. Follow up in EP clinic in 1 month or earlier if necessary. 8. Follow up with  Fco Lovett MD  as scheduled. Thank you, Fco Lovett MD for involving me in the care of this extraordinarily pleasant male.        Lauren Wallace MD  Cardiac Electrophysiology / Cardiology    [facility]  [address], [address], [address]  [place] [address]        [facility]  [phone] / [phone] Fax      [phone] / [phone] Fax

## 2019-10-04 NOTE — ACP (ADVANCE CARE PLANNING)
requested per In Basket request for Advance Directive (AMD) consult. Patient states he already has already completed an AMD years ago and does not wish to change it or update it at this time. Visited by Rev. Estela Miller MDiv, [facility], [facility]   paging service: [phone]

## 2019-10-04 NOTE — Clinical Note
TRANSFER - IN REPORT:  
 
Verbal report received from: Chelsey. Report consisted of patient's Situation, Background, Assessment and  
Recommendations(SBAR). Opportunity for questions and clarification was provided. Assessment completed upon patient's arrival to unit and care assumed. Patient transported with a Registered Nurse.

## 2019-10-04 NOTE — PROGRESS NOTES
Pt arrived to unit, VSS but requesting pain medications. No PRN pain medications available. Called office and left page for [facility]. Awaiting response and/or new orders.

## 2019-10-04 NOTE — H&P
HISTORY OF PRESENTING ILLNESS      Simeon Nuñez is a 66 y.o. male with HTN, SVT, AF, AVI on CPAP, hyperlipidemia, bradycardia (Cardizem was reduced due to bradycardia in the past) here for follow-up of his atrial fibrillation and bradycardia. His metoprolol was decreased d/t pause noted on ILR and baseline bradycardia. His Amlodipine was increased to 10 mg and his blood pressures at home had been normotensive. He has lower extremity edema and also reports bilateral exertional thigh pain. Exercise ABIs were normal. ILR downloads how an increase of AF episodes over the past two months and now show PVCs. He had one 3 second pause in July and remains bradycardic. He reported exertional shortness of breath/fatigue has slightly worsened.  Last visit we arranged for an exercise treadmill test to determine whether he exhibited an appropriate heart rate response to exercise to determine whether he has chronotropic incompetence as an underlying etiology for his fatigue.  Treadmill exercise test demonstrated that he achieved 79% of his predicted heart rate. Given the remains unclear whether patient has chronotropic incompetence driving his fatigue, I recommended weight loss and exercise. Should his fatigue persist we would potentially consider pacemaker implantation in the future.  He now presents with continued worsening fatigue and now has significant lower extremity edema as well.          ACTIVE PROBLEM LIST           Patient Active Problem List     Diagnosis Date Noted    Advanced care planning/counseling discussion 02/03/2017    Paroxysmal atrial fibrillation ([address] 09/28/2016    BPH (benign prostatic hyperplasia) 08/17/2016    Insomnia 08/17/2016    Osteoarthritis of left knee 08/15/2016    Spinal stenosis in cervical region 01/05/2016    Herniated nucleus pulposus, C3-4 01/05/2016    Normal cardiac stress test 09/21/2015    ED (erectile dysfunction) 06/18/2014    IFG (impaired fasting glucose)    SVT (supraventricular tachycardia) (HCC)      Right knee DJD 11/04/2013    Spinal stenosis of lumbar region      Tear of medial cartilage or meniscus of knee, current 02/01/2013    Osteoarthrosis, unspecified whether generalized or localized, lower leg 02/01/2013    Palpitations 12/12/2011    Supraventricular tachycardia ([address] 12/12/2011    HLD (hyperlipidemia) 12/12/2011    Essential hypertension, benign 12/12/2011    Varicose veins 12/12/2011    Colon polyps      AVI (obstructive sleep apnea)      Heart palpitations      Hyperlipidemia with target LDL less than 130      BPH (benign prostatic hypertrophy)      Lower back pain      Knee pain, right      Right sided sciatica               PAST MEDICAL HISTORY           Past Medical History:   Diagnosis Date    Arthritis       left knee and lt. hand    Atrial fibrillation (HCC)      BPH (benign prostatic hyperplasia)      Chronic pain       Back pain    Colon polyps      DJD (degenerative joint disease) of lumbar spine       laminectomy 1979, 1991, 2015    ED (erectile dysfunction) 6/18/2014    Heart palpitations       Dr. Jania Fragoso    Herniated nucleus pulposus, C3-4 12/2015     Dr. Christine Park Hyperlipidemia      Hypertension      IFG (impaired fasting glucose)      Insomnia      Knee pain, right      Normal cardiac stress test 9/21/15    OA (osteoarthritis) of knee       Dr. Tanner Molina    Obesity      Onychomycosis 2018     Dr Doll Sender. lamisil    AVI on CPAP       Dr. Evonne Cash    Seasonal allergic rhinitis      Spinal stenosis in cervical region 12/2015    Spinal stenosis of lumbar region       MRI 12/2012. Dr. Tushar Anderson    SVT (supraventricular tachycardia) [facility])       Dr. Jania Dogana Helsy Simpson  Varicose veins       vein stripping 10/10             PAST SURGICAL HISTORY            Past Surgical History:   Procedure Laterality Date    COLONOSCOPY   2008     2 polyps. repeat 2011.   Dr. Ryan Rutledge   2007     7 polyps per pt     COLONOSCOPY N/A 6/15/2016     COLONOSCOPY performed by Noy Pineda MD at [address] [place] [address] [place], COLON, DIAGNOSTIC        return in     HX BLADDER REPAIR         polyp removal    HX CERVICAL FUSION   16     ACDF C3-C4 Dr. Bernarda Deleon    HX COLONOSCOPY   11     Dr Blanca Tucker, [facility] polyp.   repeat 2016    HX KNEE ARTHROSCOPY        right, Dr. Sumeet Reilly    HX KNEE ARTHROSCOPY   2012     left, Dr Sumeet Reilly    HX KNEE ARTHROSCOPY   2013     right    HX KNEE REPLACEMENT   2013    HX LUMBAR FUSION   2015     L3-L4, Dr. Bernarda Deleon    HX [facility] HX LUMBAR LAMINECTOMY       HX MOHS PROCEDURES         left, Dr Cem Damian     bladder polyp removed with cystoscopy    HX VEIN STRIPPING   2010     Dr. Dorian Tapia, bilateral    SHOULDER SURG [address] UNLISTED         left DECOMPRESSION, Dr. Darrius Chilel   2013    VASCULAR SURGERY PROCEDURE UNLIST        bilateral vein stripping             ALLERGIES            Allergies   Allergen Reactions    Percocet [Oxycodone-Acetaminophen] Other (comments)       hallucinations    Penicillin G Rash       Did okay with keflex - no reaction with that.            FAMILY HISTORY            Family History   Problem Relation Age of Onset    Cancer Mother           liver    Cancer Father           leukemia    Cancer Sister           lung cancer    negative for cardiac disease         SOCIAL HISTORY      Social History               Socioeconomic History    Marital status:        Spouse name: Diana Alvarez Number of children: 3    Years of education: Not on file    Highest education level: Not on file   Tobacco Use    Smoking status: Former Smoker       Packs/day: 0.25       Years: 19.00       Pack years: 4.75       Types: Cigarettes       Last attempt to quit: 1980       Years since quittin.7    Smokeless tobacco: Never Used    Tobacco comment: quit ~1980   Substance and Sexual Activity    Alcohol use: Yes       Alcohol/week: 2.0 - 3.0 standard drinks       Types: 2 - 3 Glasses of wine per week       Comment: socially    Drug use: No    Sexual activity: Yes   Other Topics Concern     Service Yes       Comment: What the Trend   Social History Narrative     ** Merged History Encounter **           1 child, 2 stepchildren     fishes               MEDICATIONS           Current Outpatient Medications   Medication Sig    lisinopril (PRINIVIL, ZESTRIL) 40 mg tablet Take 1 Tab by mouth daily.  zaleplon (SONATA) 10 mg capsule Take 1 Cap by mouth nightly. Max Daily Amount: 10 mg.    amLODIPine (NORVASC) 5 mg tablet Take 1 Tab by mouth daily.  metoprolol succinate (TOPROL-XL) 25 mg XL tablet TAKE ONE-HALF (1/2) TABLET TWICE A DAY    ELIQUIS 5 mg tablet TAKE 1 TABLET TWICE A DAY    terazosin (HYTRIN) 5 mg capsule TAKE 1 CAPSULE DAILY    atorvastatin (LIPITOR) 20 mg tablet TAKE 1 TABLET DAILY    levocetirizine (XYZAL) 5 mg tablet TAKE 1 TABLET DAILY    gabapentin (NEURONTIN) 300 mg capsule Take 2 Caps by mouth nightly.  ipratropium (ATROVENT) 0.03 % nasal spray SPRAY TWICE INTO BOTH NOSTRILS THREE TIMES DAILY    melatonin 5 mg cap capsule Take 1 Cap by mouth nightly. TAKE 2 HOURS BEFORE BEDTIME      No current facility-administered medications for this visit.          I have reviewed the nurses notes, vitals, problem list, allergy list, medical history, family, social history and medications.         REVIEW OF SYMPTOMS      General: Pt denies excessive weight gain or loss. Pt is able to conduct ADL's  HEENT: Denies blurred vision, headaches, hearing loss, epistaxis and difficulty swallowing. Respiratory: Denies cough, congestion, +shortness of breath, +MCRAE, no wheezing or stridor.   Cardiovascular: Denies precordial pain, palpitations, +edema no PND  Gastrointestinal: Denies poor appetite, indigestion, abdominal pain or blood in stool  Genitourinary: Denies hematuria, dysuria, increased urinary frequency  Musculoskeletal: Denies joint pain or swelling from muscles or joints  Neurologic: Denies tremor, paresthesias, headache, or sensory motor disturbance  Psychiatric: Denies confusion, insomnia, depression  Integumentray: Denies rash, itching or ulcers. Hematologic: Denies easy bruising, bleeding         PHYSICAL EXAMINATION      There were no vitals filed for this visit. General: Well developed, in no acute distress. HEENT: No jaundice, oral mucosa moist, no oral ulcers  Neck: Supple, no stiffness, no lymphadenopathy, supple  Heart:  Normal S1/S2 negative S3 or S4. Regular, no murmur, gallop or rub, no jugular venous distention  Respiratory: Clear bilaterally x 4, no wheezing or rales  Abdomen:   Soft, non-tender, bowel sounds are active.   Extremities:  4+ bilateral pitting edema, normal cap refill, no cyanosis. Musculoskeletal: No clubbing, no deformities  Neuro: A&Ox3, speech clear, gait stable, cooperative, no focal neurologic deficits  Skin: Skin color is normal. No rashes or lesions.  Non diaphoretic, moist.  Vascular: 2+ pulses symmetric in all extremities         DIAGNOSTIC DATA      EKG:          LABORATORY DATA            Lab Results   Component Value Date/Time     WBC 6.0 02/12/2019 08:52 AM     HGB 13.9 02/12/2019 08:52 AM     HCT 41.5 02/12/2019 08:52 AM     PLATELET 689 12/51/9236 08:52 AM     MCV 92 02/12/2019 08:52 AM            Lab Results   Component Value Date/Time     Sodium 146 (H) 02/12/2019 08:52 AM     Potassium 4.4 02/12/2019 08:52 AM     Chloride 106 02/12/2019 08:52 AM     CO2 25 02/12/2019 08:52 AM     Anion gap 8 08/19/2016 03:36 AM     Glucose 106 (H) 02/12/2019 08:52 AM     BUN 20 02/12/2019 08:52 AM     Creatinine 1.43 (H) 02/12/2019 08:52 AM     BUN/Creatinine ratio 14 02/12/2019 08:52 AM     GFR est AA 55 (L) 02/12/2019 08:52 AM     GFR est non-AA 48 (L) 02/12/2019 08:52 AM     Calcium 8.7 02/12/2019 08:52 AM     Bilirubin, total 0.6 02/12/2019 08:52 AM     AST (SGOT) 17 02/12/2019 08:52 AM     Alk. phosphatase 51 02/12/2019 08:52 AM     Protein, total 6.5 02/12/2019 08:52 AM     Albumin 4.3 02/12/2019 08:52 AM     Globulin 3.0 08/08/2016 10:28 AM     A-G Ratio 2.0 02/12/2019 08:52 AM     ALT (SGPT) 14 02/12/2019 08:52 AM             ASSESSMENT      1. Supraventricular tachycardia  2. Hypertension  3. Hyperlipidemia  4. Venous insufficiency  5. First degree AV block    6. AVI on CPAP  7. Atrial fibrillation                        Persistent   8. Edema  9. Bradycardia  10. PVCs  12. Shortness of breath  13.  Dizziness         PLAN      Removal of loop recorder (98294) followed by single chamber pacemaker (39594/[id number]) with Medtronic given symptomatic bradycardia                Fausto Conner MD  Cardiac Electrophysiology / Cardiology     [address] [place] [address], [address] [place] [address]  [phone] / [phone] Fax                                    [phone] / [phone] Fax

## 2019-10-04 NOTE — Clinical Note
A Bovie was used. Blend setting: blend. Mode: monopolar. Coagulation Settin. Cut Settin. Site (pad location): lateral thigh. Laterality: right.

## 2019-10-04 NOTE — DISCHARGE INSTRUCTIONS
Pacemaker  Discharge Instructions    Please make sure you have received your Temporary Pacemaker identification card with your discharge instructions      MEDICATIONS         Take only the medications prescribed to you at discharge. ACTIVITY         Return to your normal activity, except as noted below. o Do not lift anything heavier than 10 pounds for 4 weeks with the affected arm. This is how long it takes the muscles to heal, and the leads inside your heart to stabilize their position. o Do not reach above your head with the affected arm for 4 weeks, doing so increases the risk of lead dislodgement.    o It is, however, important to move the affected arm to prevent shoulder stiffness and locking. o Avoid tight clothes or unnecessary pressure over your incision (such as bra straps or seat belts). If it is tender or sensitive to clothing, cover the incision with a soft dressing or pad.  o Questions about driving are individualized and should be discussed with one of the EP Physicians prior to discharge. SHOWERING         Leave the bandage over your incision for 10 days after the Pacemaker implant. You bandage will be removed in clinic in 10 days.  It is important to keep the bandaged area clean and dry. You may shower around the site until the bandage is removed in clinic. Thereafter, you may shower after the bandage is removed, washing it gently with soap and water. Do not apply any lotions, powders, or perfumes to the incision line.  Avoid submerging your incision in water (tub baths, hot tubs, or swimming) for four weeks.  Underneath the dressing. o If you have white steri-strips over your incision (underneath the gauze dressing), they will curl up at the end and fall off, usually within 10 days. Do not pull them off.  - OR -   o You may have a different type of closure for the incision.   If Dermabond Adhesive was used to close your incision, you will receive a separate instruction sheet. DISCHARGE PRECAUTIONS         Record your temperature every day, at the same time, for 3 weeks after your implant. A temperature of 100.5 F, or higher, can be the first sign of infection. This should be reported to your Doctor immediately.  You can have an MRI after 6 weeks. You must be aware that any strong magnet or magnetic field can affect your Pacemaker. In general, be careful of metal detectors, heavy machinery, and any area where arc-welding is performed. Avoid metal detectors such as the ones in security checkpoints at [facility] or [address] [place] [address]. When approaching a security checkpoint show your Pacemaker ID Card to security personnel and ask to be hand searched.  Always tell your doctor or dentist that you have a Pacemaker. Antibiotics may be prescribed before certain procedures.  If you use a cell phone, hold it on the opposite side from where your Pacemaker is implanted.  Your temporary identification will be given to you with these instructions. Keep your Pacemaker card in your wallet or on your person at all times. You should receive your permanent card in 8 weeks. If you do not receive your permanent card, please call the office at [phone]. TAKING YOUR PULSE         Take your pulse the same time every day, preferably in the morning.  Sit down and rest for 5 minutes prior to taking your pulse.  Take your pulse for 1 full minute, use a clock or stop watch with a second hand.  To feel your pulse, use the first two fingers of one hand; place them on the thumb side of the wrist of the opposite hand. The pulse will be steady, regular and throbbing.  Call the EP Lab Doctors if your pulse is less than 40 beats per minute. SYMPTOMS THAT NEED TO BE REPORTED IMMEDIATELY         Temperature more than 100.4 F     Redness or warmth at the incision site, or pain for longer than the first 5 days after the implant.      Drainage from the incision site.  Swelling around the incision site.  Shortness of breath.  Rapid heart rate or palpitations.  Dizziness, lightheadedness, fainting.  Slow pulse below 40 beats per minute.  REMEMBER: If you feel something is an emergency or cannot be handled over the phone, call 911 or go to the closest emergency room.           Sola Ricardo MD  Cardiac Electrophysiology / Cardiology    [address], [facility] 102 [facility] 200  [address]         [address] [facility]  [phone] / [phone] Fax       [phone] / [phone] Fax

## 2019-10-04 NOTE — PROGRESS NOTES
Spiritual Care Assessment/Progress Note  Jay Epps      NAME: Akanksha Baltazar      MRN: [id number]  AGE: 66 y.o. SEX: male  Mormon Affiliation: Faith   Language: English     10/4/2019     Total Time (in minutes): 24     Spiritual Assessment begun in [facility] 5M1 MED SURG 1 through conversation with:         [x]Patient        [x] Family    [] Friend(s)        Reason for Consult: Advance medical directive consult, Initial/Spiritual assessment, patient floor     Spiritual beliefs: (Please include comment if needed)     [x] Identifies with a ira tradition: Twilla Primes         [] Supported by a ira community:            [] Claims no spiritual orientation:           [] Seeking spiritual identity:                [] Adheres to an individual form of spirituality:           [] Not able to assess:                           Identified resources for coping:      [] Prayer                               [] Music                  [] Guided Imagery     [x] Family/friends                 [] Pet visits     [] Devotional reading                         [] Unknown     [] Other:                                             Interventions offered during this visit: (See comments for more details)    Patient Interventions: Advance medical directive consult, Affirmation of emotions/emotional suffering, Affirmation of ira, Catharsis/review of pertinent events in supportive environment, Coping skills reviewed/reinforced, Iconic (affirming the presence of God/Higher Power)     Family/Friend(s):  Affirmation of emotions/emotional suffering, Affirmation of ira, Catharsis/review of pertinent events in supportive environment, Coping skills reviewed/reinforced, Iconic (affirming the presence of God/Higher Power), Advance medical directive consult     Plan of Care:     [] Support spiritual and/or cultural needs    [x] Support AMD and/or advance care planning process      [] Support grieving process   [] Coordinate Rites and/or Rituals    [] Coordination with community clergy   [] No spiritual needs identified at this time   [] Detailed Plan of Care below (See Comments)  [] Make referral to Music Therapy  [] Make referral to Pet Therapy     [] Make referral to Addiction services  [] [facility]  [] Make referral to Spiritual Care Partner  [] No future visits requested        [x] Follow up visits as needed     Comments:  visit for initial spiritual assessment and Advance Directive (AMD) consult per In Basket request.  Patient sitting up in bed reading book, wife at bedside reading book. Good eye contact, smiling, friendly. Says he is feeling pretty good right now. Provided spiritual presence and listening as he spoke briefly of his present thoughts, feelings, and concerns. Spoke briefly about his health.  requested per In Basket request for Advance Directive (AMD) consult. Patient states he already has already completed an AMD years ago and does not wish to change it or update it at this time. Both appeared comforted and encouraged as a result of this visit and expressed gratitude for this visit.      Visited by Rev. Luna Muro MDiv, [facility], [facility]   paging service: [phone])

## 2019-10-04 NOTE — PROCEDURES
Cardiac Electrophysiology Report      PATIENT INFORMATION     Patient Name: Emilia Pearson  MRN: [id number]        Study Date: 10/4/2019    YOB: 1945   Age: 66 y.o. Gender: male      Procedure:  Loop Recorder Removala    Referring Physician:  Tre Ba MD        STAFF     Duty Name   Electrophysiologist Fly Brower MD   Monitor Kel Pathak RN   Circulator Suha Rojas RN; FRENCH Ramos       PATIENT HISTORY     66year old male with tachycardia/bradycardia presenting for removal of his loop recorder prior to pacemaker implantation. PROCEDURE     The patient was brought to the Cardiac Electrophysiology laboratory in a post-absorptive, fasting state. Informed consent was obtained. A peripheral IV was in place. Continuous electrocardiographic, blood pressure, O2 saturation and  CO2 monitoring was initiated. Pre-operative antibiotics were administered pre-operatively. Self-adhesive cardioversion patches were positioned on the chest.  Conscious sedation was effectuated according to protocol. The patient was then prepped and draped in the usual sterile fashion. A 50/50 mixture of lidocaine (1%) with epinephrine and bupivicaine (0.5%) was utilized for local anesthesia. An incision was delivered over the loop recorder. The loop recorder was retrieved. 3-0 monocryl and a zipline were utilized for skin closure. The patient remained hemodynamically stable, tolerated the procedure well and was transferred in stable condition. There were no immediate complications encountered during the procedure. There was minimal blood loss and no specimen were removed. CONCLUSIONS     1. Successful removal of loop recorder. 2. Wound check in EP clinic in 10 days. 3. Oral antibiotics x 5 days. 4.  Follow up in EP clinic in 1 month or earlier if necessary. 5. Follow up with  Tre Ba MD as scheduled.        Thank you Tre Ba MD for allowing me to participate in the care of this extraordinarily pleasant male.         Elizabeth Dawkins MD  Cardiac Electrophysiology / Cardiology    [facility]  [address] [facility], [address] [facility], [address]  [place], [address] [place] [address]  [phone] / [phone] Fax [phone] / [phone] Fax

## 2019-10-04 NOTE — PROGRESS NOTES
Called Dr. Biju Caceres office regarding patient requesting pain medication. Awaiting a return call.

## 2019-10-04 NOTE — PROGRESS NOTES
Patient arrived. ID and allergies verified verbally with patient. Pt voices understanding of procedure to be performed. Consent obtained. Pt prepped for procedure. 09:10 TRANSFER - OUT REPORT:    Verbal report given to JOVITA Orozco(name) on Edman Favre  being transferred to (unit) for routine progression of care       Report consisted of patients Situation, Background, Assessment and   Recommendations(SBAR). Information from the following report(s) Procedure Summary was reviewed with the receiving nurse. Lines:   Peripheral IV 10/04/19 Left Antecubital (Active)   Site Assessment Clean, dry, & intact 10/4/2019  7:36 AM        Opportunity for questions and clarification was provided. Patient transported with:   Registered Nurse  10:50 TRANSFER - IN REPORT:    Verbal report received from JOVITA Hernandez(name) on Edman Favre  being received from (unit) for routine progression of care      Report consisted of patients Situation, Background, Assessment and   Recommendations(SBAR). Information from the following report(s) Procedure Summary was reviewed with the receiving nurse. Opportunity for questions and clarification was provided. Assessment completed upon patients arrival to unit and care assumed. 13:20 TRANSFER - OUT REPORT:    Verbal report given to RN(name) on Edman Favre  being transferred to [facility](unit) for routine progression of care       Report consisted of patients Situation, Background, Assessment and   Recommendations(SBAR). Information from the following report(s) Procedure Summary was reviewed with the receiving nurse. Lines:   Peripheral IV 10/04/19 Left Antecubital (Active)   Site Assessment Clean, dry, & intact 10/4/2019  7:36 AM        Opportunity for questions and clarification was provided.       Patient transported with:   Monitor  Registered Nurse

## 2019-10-05 VITALS
RESPIRATION RATE: 18 BRPM | SYSTOLIC BLOOD PRESSURE: 109 MMHG | HEART RATE: 61 BPM | HEIGHT: 78 IN | TEMPERATURE: 98.6 F | OXYGEN SATURATION: 90 % | BODY MASS INDEX: 32.88 KG/M2 | DIASTOLIC BLOOD PRESSURE: 72 MMHG | WEIGHT: 284.17 LBS

## 2019-10-05 PROCEDURE — 74011250636 HC RX REV CODE- 250/636: Performed by: INTERNAL MEDICINE

## 2019-10-05 PROCEDURE — 99218 HC RM OBSERVATION: CPT

## 2019-10-05 PROCEDURE — 74011250637 HC RX REV CODE- 250/637: Performed by: INTERNAL MEDICINE

## 2019-10-05 RX ADMIN — Medication 20 ML: at 14:00

## 2019-10-05 RX ADMIN — FUROSEMIDE 20 MG: 20 TABLET ORAL at 09:28

## 2019-10-05 RX ADMIN — TERAZOSIN HYDROCHLORIDE ANHYDROUS 5 MG: 5 CAPSULE ORAL at 09:28

## 2019-10-05 RX ADMIN — ATORVASTATIN CALCIUM 20 MG: 20 TABLET, FILM COATED ORAL at 09:29

## 2019-10-05 RX ADMIN — LORATADINE 10 MG: 10 TABLET ORAL at 09:29

## 2019-10-05 RX ADMIN — Medication 10 ML: at 05:08

## 2019-10-05 RX ADMIN — LISINOPRIL 40 MG: 20 TABLET ORAL at 09:28

## 2019-10-05 RX ADMIN — METOPROLOL SUCCINATE 50 MG: 50 TABLET, EXTENDED RELEASE ORAL at 09:28

## 2019-10-05 RX ADMIN — APIXABAN 5 MG: 5 TABLET, FILM COATED ORAL at 09:29

## 2019-10-05 RX ADMIN — Medication 1500 MG: at 05:08

## 2019-10-05 RX ADMIN — AMLODIPINE BESYLATE 5 MG: 5 TABLET ORAL at 09:29

## 2019-10-05 NOTE — ROUTINE PROCESS
Bedside and Verbal shift change report given to Mary Gomez RN by Inez Davis RN. Report included the following information SBAR, Kardex, Recent Results and Med Rec Status.

## 2019-10-05 NOTE — PROGRESS NOTES
Feels good post PPM implant  Pacer interrogation fine today  VSS  Pacer site looks good but dressing not removed    Home today, followups scheduled

## 2019-10-05 NOTE — PROGRESS NOTES
I have reviewed discharge instructions with the patient and spouse. The patient and spouse verbalized understanding. IV's removed. Pt given information on pacemaker and how to contact [employer] if any questions arise. No further questions at this time. Electronic signature obtained. Pt wheeled down in wheelchair for transportation home.

## 2019-10-05 NOTE — PROGRESS NOTES
CMS Note  10/05/2019    Patient received and signed the Observation letter. Patient was given a copy for their record.   Amalia York CMS

## 2019-10-11 ENCOUNTER — TELEPHONE (OUTPATIENT)
Dept: CARDIOLOGY CLINIC | Age: 74
End: 2019-10-11

## 2019-10-11 ENCOUNTER — OFFICE VISIT (OUTPATIENT)
Dept: CARDIOLOGY CLINIC | Age: 74
End: 2019-10-11

## 2019-10-11 DIAGNOSIS — Z51.89 VISIT FOR WOUND CHECK: Primary | ICD-10-CM

## 2019-10-11 NOTE — TELEPHONE ENCOUNTER
Patient states that she has a question regarding the patients appointment today.      Phone: [phone]

## 2019-10-11 NOTE — PROGRESS NOTES
Patient noted to have significant bruising and a hematoma but incision is intact. Will have patient text me a picture of the site this Monday.        Skipper Bernheim, MD

## 2019-10-21 ENCOUNTER — TELEPHONE (OUTPATIENT)
Dept: INTERNAL MEDICINE CLINIC | Age: 74
End: 2019-10-21

## 2019-10-21 NOTE — TELEPHONE ENCOUNTER
Brittni Cartagena sent to [facility]          Pt calling requesting a refill for \"Gabapentin\" - [address] on file- Pt is completely out og this medication. Best Contact: [phone].

## 2019-10-22 RX ORDER — AMLODIPINE BESYLATE 5 MG/1
5 TABLET ORAL DAILY
Qty: 90 TAB | Refills: 1 | Status: SHIPPED | OUTPATIENT
Start: 2019-10-22 | End: 2019-11-22 | Stop reason: SDUPTHER

## 2019-10-22 RX ORDER — AMLODIPINE BESYLATE 5 MG/1
5 TABLET ORAL DAILY
Qty: 90 TAB | Refills: 2 | Status: SHIPPED | COMMUNITY
Start: 2019-10-22 | End: 2020-10-30

## 2019-10-23 RX ORDER — METOPROLOL SUCCINATE 50 MG/1
50 TABLET, EXTENDED RELEASE ORAL DAILY
Qty: 90 TAB | Refills: 3 | Status: SHIPPED | OUTPATIENT
Start: 2019-10-23 | End: 2021-01-04

## 2019-10-23 NOTE — TELEPHONE ENCOUNTER
Requested Prescriptions     Signed Prescriptions Disp Refills    metoprolol succinate (TOPROL-XL) 50 mg XL tablet 90 Tab 3     Sig: Take 1 Tab by mouth daily. Authorizing Provider: Yolie Millan     Ordering User: Elizabeth Saab     Refill for # 80 with 3 refills sent to [facility] per verbal order Dr. Aislinn Woodward.

## 2019-11-22 ENCOUNTER — OFFICE VISIT (OUTPATIENT)
Dept: CARDIOLOGY CLINIC | Age: 74
End: 2019-11-22

## 2019-11-22 ENCOUNTER — CLINICAL SUPPORT (OUTPATIENT)
Dept: CARDIOLOGY CLINIC | Age: 74
End: 2019-11-22

## 2019-11-22 VITALS
HEART RATE: 60 BPM | DIASTOLIC BLOOD PRESSURE: 62 MMHG | WEIGHT: 288 LBS | RESPIRATION RATE: 16 BRPM | OXYGEN SATURATION: 94 % | SYSTOLIC BLOOD PRESSURE: 110 MMHG | HEIGHT: 78 IN | BODY MASS INDEX: 33.32 KG/M2

## 2019-11-22 DIAGNOSIS — I48.0 PAROXYSMAL ATRIAL FIBRILLATION (HCC): ICD-10-CM

## 2019-11-22 DIAGNOSIS — I10 ESSENTIAL HYPERTENSION, BENIGN: Primary | ICD-10-CM

## 2019-11-22 DIAGNOSIS — I49.5 TACHY-BRADY SYNDROME (HCC): ICD-10-CM

## 2019-11-22 DIAGNOSIS — Z95.0 PACEMAKER: Primary | ICD-10-CM

## 2019-11-22 DIAGNOSIS — Z95.0 PACEMAKER: ICD-10-CM

## 2019-11-22 NOTE — PROGRESS NOTES
HISTORY OF PRESENTING ILLNESS      Justin Leslie is a 66 y.o. male with atrial fibrillation, ILR who has experienced fatigue felt to be potentially secondary to bradycardia. He now presents for implantation of a pacemaker. He underwent dual chamber pacemaker and his metoprolol was increased. He returns for follow up today and denies cardiac complaints.  His device interrogation demonstrates normal functioning and incision has healed well.         ACTIVE PROBLEM LIST     Patient Active Problem List    Diagnosis Date Noted    Tachy-viv syndrome ([address] 10/04/2019     Priority: 1 - One    Advanced care planning/counseling discussion 02/03/2017    Paroxysmal atrial fibrillation ([address] 09/28/2016    BPH (benign prostatic hyperplasia) 08/17/2016    Insomnia 08/17/2016    Osteoarthritis of left knee 08/15/2016    Spinal stenosis in cervical region 01/05/2016    Herniated nucleus pulposus, C3-4 01/05/2016    Normal cardiac stress test 09/21/2015    ED (erectile dysfunction) 06/18/2014    IFG (impaired fasting glucose)     SVT (supraventricular tachycardia) ([address]     Right knee DJD 11/04/2013    Spinal stenosis of lumbar region     Tear of medial cartilage or meniscus of knee, current 02/01/2013    Osteoarthrosis, unspecified whether generalized or localized, lower leg 02/01/2013    Palpitations 12/12/2011    Supraventricular tachycardia ([address] 12/12/2011    HLD (hyperlipidemia) 12/12/2011    Essential hypertension, benign 12/12/2011    Varicose veins 12/12/2011    Colon polyps     AVI (obstructive sleep apnea)     Heart palpitations     Hyperlipidemia with target LDL less than 130     BPH (benign prostatic hypertrophy)     Lower back pain     Knee pain, right     Right sided sciatica            PAST MEDICAL HISTORY     Past Medical History:   Diagnosis Date    Arthritis     left knee and lt. hand    Atrial fibrillation (HCC)     BPH (benign prostatic hyperplasia)     Chronic pain Back pain    Colon polyps     DJD (degenerative joint disease) of lumbar spine     laminectomy 1979, 1991, 2015    ED (erectile dysfunction) 6/18/2014    Heart palpitations     Dr. Gerard Dietrich    Herniated nucleus pulposus, C3-4 12/2015    Dr. James Turner    Hyperlipidemia     Hypertension     IFG (impaired fasting glucose)     Insomnia     severe. has treated, AVI, back pain    Knee pain, right     Normal cardiac stress test 9/21/15    OA (osteoarthritis) of knee     Dr. Kelsie Lamb Obesity     Onychomycosis 2018    Dr [place] Sample. lamisil    AVI on CPAP     Dr. Dalia Olsen    Seasonal allergic rhinitis     Spinal stenosis in cervical region 12/2015    Spinal stenosis of lumbar region     MRI 12/2012. Dr. James Turner    SVT (supraventricular tachycardia) [facility])     Dr. Gerard Dietrich [address] Promise Stallworth Varicose veins     vein stripping 10/10           PAST SURGICAL HISTORY     Past Surgical History:   Procedure Laterality Date    COLONOSCOPY  2008    2 polyps. repeat 2011. Dr. Jairon Landon  2007    7 polyps per pt     COLONOSCOPY N/A 6/15/2016    COLONOSCOPY performed by Priscilla Downey MD at [address] [place] [address] [place], COLON, DIAGNOSTIC  2011    return in 2016    HX BLADDER REPAIR      polyp removal    HX CERVICAL FUSION  1/16/16    ACDF C3-C4 Dr. James Turner    HX COLONOSCOPY  4/27/11    Dr Ren Johns, smal polyp.   repeat 4/2016    HX KNEE ARTHROSCOPY  2005    right, Dr. Zakiya Palumbo ARTHROSCOPY  02/01/2012    left, Dr Zakiya Palumbo ARTHROSCOPY  1/2013    right    HX KNEE REPLACEMENT  11/4/2013    HX LUMBAR FUSION  9/2015    L3-L4, Dr. Kyaw Shin      left, Dr Marisol Ramirez    bladder polyp removed with cystoscopy    HX VEIN STRIPPING  9/2010    Dr. Jim Reid, bilateral    MA INS NEW/RPLC PRM PACEMAKER W/TRANSV ELTRD VENTR N/A 10/4/2019    INSERT PPM SINGLE VENTRICULAR/[employer] performed by Mithcell Arroyo MD EDGARDO at [address] CATH LAB    [place] INS NEW/RPLCMT PRM PM W/TRANSV ELTRD ATRIAL&VENT N/A 10/4/2019    Insert Ppm Dual performed by Beverly Montes MD at [address] CATH LAB    [place] REMOVAL SUBCUTANEOUS CARDIAC RHYTHM MONITOR N/A 10/4/2019    LOOP RECORDER REMOVAL performed by Beverly Montes MD at [address] CATH LAB    SHOULDER SURG [address] UNLISTED      left DECOMPRESSION, Dr. Yamile Kohler  2013    VASCULAR SURGERY PROCEDURE UNLIST      bilateral vein stripping          ALLERGIES     Allergies   Allergen Reactions    Percocet [Oxycodone-Acetaminophen] Other (comments)     hallucinations    Penicillin G Rash     Did okay with keflex - no reaction with that. FAMILY HISTORY     Family History   Problem Relation Age of Onset   [facility] Cancer Mother         liver    Cancer Father         leukemia    Cancer Sister         lung cancer    negative for cardiac disease       SOCIAL HISTORY     Social History     Socioeconomic History    Marital status:      Spouse name: Christopher Ambriz Number of children: 3    Years of education: Not on file    Highest education level: Not on file   Tobacco Use    Smoking status: Former Smoker     Packs/day: 0.25     Years: 19.00     Pack years: 4.75     Types: Cigarettes     Last attempt to quit: 1980     Years since quittin.9    Smokeless tobacco: Never Used    Tobacco comment: quit ~   Substance and Sexual Activity    Alcohol use: Yes     Alcohol/week: 5.0 standard drinks     Types: 5 Glasses of wine per week     Comment: socially    Drug use: No    Sexual activity: Yes   Other Topics Concern     Service Yes     Comment: [employer]   Social History Narrative    ** Merged History Encounter **         1 child, 2 stepchildren    fishes         MEDICATIONS     Current Outpatient Medications   Medication Sig    clonazePAM (KLONOPIN) 2 mg tablet Take 2 Tabs by mouth nightly.  Max Daily Amount: 4 mg. Replaces zaplon for insomnia    metoprolol succinate (TOPROL-XL) 50 mg XL tablet Take 1 Tab by mouth daily.  amLODIPine (NORVASC) 5 mg tablet Take 1 Tab by mouth daily.  amLODIPine (NORVASC) 5 mg tablet Take 1 Tab by mouth daily.  furosemide (LASIX) 20 mg tablet Take once daily    lisinopril (PRINIVIL, ZESTRIL) 40 mg tablet Take 1 Tab by mouth daily.  ELIQUIS 5 mg tablet TAKE 1 TABLET TWICE A DAY    terazosin (HYTRIN) 5 mg capsule TAKE 1 CAPSULE DAILY    atorvastatin (LIPITOR) 20 mg tablet TAKE 1 TABLET DAILY    levocetirizine (XYZAL) 5 mg tablet TAKE 1 TABLET DAILY    gabapentin (NEURONTIN) 300 mg capsule Take 2 Caps by mouth nightly.  ipratropium (ATROVENT) 0.03 % nasal spray SPRAY TWICE INTO BOTH NOSTRILS THREE TIMES DAILY (Patient taking differently: as needed.)     No current facility-administered medications for this visit. I have reviewed the nurses notes, vitals, problem list, allergy list, medical history, family, social history and medications. REVIEW OF SYMPTOMS      General: Pt denies excessive weight gain or loss. Pt is able to conduct ADL's  HEENT: Denies blurred vision, headaches, hearing loss, epistaxis and difficulty swallowing. Respiratory: Denies cough, congestion, shortness of breath, MCRAE, wheezing or stridor. Cardiovascular: Denies precordial pain, palpitations, edema or PND  Gastrointestinal: Denies poor appetite, indigestion, abdominal pain or blood in stool  Genitourinary: Denies hematuria, dysuria, increased urinary frequency  Musculoskeletal: Denies joint pain or swelling from muscles or joints  Neurologic: Denies tremor, paresthesias, headache, or sensory motor disturbance  Psychiatric: Denies confusion, insomnia, depression  Integumentray: Denies rash, itching or ulcers. Hematologic: Denies easy bruising, bleeding       PHYSICAL EXAMINATION      There were no vitals filed for this visit. General: Well developed, in no acute distress.   HEENT: No jaundice, oral mucosa moist, no oral ulcers  Neck: Supple, no stiffness, no lymphadenopathy, supple  Heart:  Normal S1/S2 negative S3 or S4. Regular, no murmur, gallop or rub, no jugular venous distention  Respiratory: Clear bilaterally x 4, no wheezing or rales  Abdomen:   Soft, non-tender, bowel sounds are active.   Extremities:  No edema, normal cap refill, no cyanosis. Musculoskeletal: No clubbing, no deformities  Neuro: A&Ox3, speech clear, gait stable, cooperative, no focal neurologic deficits  Skin: Skin color is normal. No rashes or lesions. Non diaphoretic, moist.  Vascular: 2+ pulses symmetric in all extremities       DIAGNOSTIC DATA      EKG:        LABORATORY DATA      Lab Results   Component Value Date/Time    WBC 5.9 09/11/2019 04:18 PM    HGB 13.7 09/11/2019 04:18 PM    HCT 40.7 09/11/2019 04:18 PM    PLATELET 793 34/31/2437 04:18 PM    MCV 92 09/11/2019 04:18 PM      Lab Results   Component Value Date/Time    Sodium 141 09/11/2019 04:18 PM    Potassium 4.3 09/11/2019 04:18 PM    Chloride 102 09/11/2019 04:18 PM    CO2 23 09/11/2019 04:18 PM    Anion gap 8 08/19/2016 03:36 AM    Glucose 90 09/11/2019 04:18 PM    BUN 22 09/11/2019 04:18 PM    Creatinine 1.22 09/11/2019 04:18 PM    BUN/Creatinine ratio 18 09/11/2019 04:18 PM    GFR est AA 66 09/11/2019 04:18 PM    GFR est non-AA 57 (L) 09/11/2019 04:18 PM    Calcium 9.0 09/11/2019 04:18 PM    Bilirubin, total 0.5 09/11/2019 04:18 PM    AST (SGOT) 21 09/11/2019 04:18 PM    Alk. phosphatase 49 09/11/2019 04:18 PM    Protein, total 6.5 09/11/2019 04:18 PM    Albumin 4.2 09/11/2019 04:18 PM    Globulin 3.0 08/08/2016 10:28 AM    A-G Ratio 1.8 09/11/2019 04:18 PM    ALT (SGPT) 15 09/11/2019 04:18 PM           ASSESSMENT      1. Supraventricular tachycardia  2. Hypertension  3. Hyperlipidemia  4. Venous insufficiency  5. First degree AV block    6. AVI on CPAP  7. Atrial fibrillation                        Persistent   8. Edema  9. Bradycardia  10. PVCs  12. Shortness of breath  13. Dizziness        PLAN     Continue monitoring device clinic       FOLLOW-UP     1 year        Thank you, Erma Schwab, MD and Dr. Marlee Stewart for allowing me to participate in the care of this extraordinarily pleasant male. Please do not hesitate to contact me for further questions/concerns.          Raquel Husain MD  Cardiac Electrophysiology / Cardiology    [address]  [address]  [phone] / [phone] Fax   [phone] / [phone] Fax

## 2019-11-22 NOTE — PROGRESS NOTES
ROOM # 1  Check incision  Denies any cardiac complaints at this time.    Visit Vitals  /62 (BP 1 Location: Left arm, BP Patient Position: Sitting)   Pulse 60   Resp 16   Ht 6' 6\" (1.981 m)   Wt 288 lb (130.6 kg)   SpO2 94%   BMI 33.28 kg/m²

## 2019-11-25 RX ORDER — FUROSEMIDE 20 MG/1
20 TABLET ORAL DAILY
Qty: 90 TAB | Refills: 1 | Status: SHIPPED | OUTPATIENT
Start: 2019-11-25 | End: 2020-05-11

## 2019-11-25 NOTE — TELEPHONE ENCOUNTER
Requested Prescriptions     Signed Prescriptions Disp Refills    furosemide (LASIX) 20 mg tablet 90 Tab 1     Sig: Take 1 Tab by mouth daily. Take once daily     Authorizing Provider: Stanley Minor     Ordering User: Jackson JULIAN     Refill per verbal order Dr. Colin Dietz.

## 2020-01-16 ENCOUNTER — TELEPHONE (OUTPATIENT)
Dept: CARDIOLOGY CLINIC | Age: 75
End: 2020-01-16

## 2020-01-16 NOTE — TELEPHONE ENCOUNTER
Patient's wife, Andres Gonzalez, has a few questions about the patient's pacemaker. She stated that they plan on traveling in the near future and is asking if the patient's pacemaker is able to go through a metal detector. Please advise.     Phone #: [phone] or [phone]  Thanks

## 2020-01-20 NOTE — TELEPHONE ENCOUNTER
Spoke to pts wife & informed her that when he goes thru security that he needs to show them his pacer card so he doesn't go thru metal detector & use wand. Also verified to her that he does have a MRI compatible pacer.

## 2020-02-10 RX ORDER — ATORVASTATIN CALCIUM 20 MG/1
TABLET, FILM COATED ORAL
Qty: 90 TAB | Refills: 4 | Status: SHIPPED | OUTPATIENT
Start: 2020-02-10

## 2020-02-12 ENCOUNTER — TELEPHONE (OUTPATIENT)
Dept: CARDIOLOGY CLINIC | Age: 75
End: 2020-02-12

## 2020-02-12 NOTE — TELEPHONE ENCOUNTER
Patients wife is calling because patient is going to have an MRI done at [facility] and they are requesting the make, model and setting of the patients pacemaker to be faxed to [phone].     Phone: [phone]

## 2020-02-13 NOTE — TELEPHONE ENCOUNTER
Faxed device info to [employer] to inform pt is MRI compatible. Also noted they need to fax us the MRI order form for Dr Kanwal Alfredo to sign.     Received confirmation the fax went thru to [address]

## 2020-02-14 ENCOUNTER — TELEPHONE (OUTPATIENT)
Dept: CARDIOLOGY CLINIC | Age: 75
End: 2020-02-14

## 2020-02-14 NOTE — TELEPHONE ENCOUNTER
Patient would like to know if there is an update on the form that was faxed, needed to be signed by Dr Devika Naranjo and then faxed to Silvio Marino. She would like a call.      Phone: [phone]

## 2020-02-19 ENCOUNTER — OFFICE VISIT (OUTPATIENT)
Dept: CARDIOLOGY CLINIC | Age: 75
End: 2020-02-19

## 2020-02-19 VITALS
BODY MASS INDEX: 34.02 KG/M2 | WEIGHT: 294 LBS | DIASTOLIC BLOOD PRESSURE: 92 MMHG | SYSTOLIC BLOOD PRESSURE: 138 MMHG | HEIGHT: 78 IN | HEART RATE: 64 BPM

## 2020-02-19 DIAGNOSIS — G47.33 OSA (OBSTRUCTIVE SLEEP APNEA): ICD-10-CM

## 2020-02-19 DIAGNOSIS — E78.2 MIXED HYPERLIPIDEMIA: ICD-10-CM

## 2020-02-19 DIAGNOSIS — I87.2 VENOUS INSUFFICIENCY: ICD-10-CM

## 2020-02-19 DIAGNOSIS — I10 ESSENTIAL HYPERTENSION, BENIGN: Primary | ICD-10-CM

## 2020-02-19 DIAGNOSIS — I48.0 PAROXYSMAL ATRIAL FIBRILLATION (HCC): ICD-10-CM

## 2020-02-19 NOTE — PROGRESS NOTES
CardioVascular Progress Note    Patient: Bill Sharma MRN: [id number]  SSN: [id number]    YOB: 1945  Age: 66 y.o. Sex: male       Subjective:        Bill Sharma is a 66 y.o.  male who presents for assessment of venous insufficiency. Mr. Georgia Savage is a 65 yo WM with a history of HTN, SVT, Afib, AVI on CPAP, dyslipidemia who presents for assessment of venous insufficiency. The patient has symptomatic bradycardia and underwent PPM insertion 10/4/2019 with Dr. Tahira Morales. He has a history of bilateral GSV ablation in 2008. Symptoms improved at that time. Unfortunately, he has had recurrent of discomfort in the calves over the past few years. He has also noted superficial varicosities. Mild dependent edema and nocturia. No chest pain or shortness of breath. Fatigue and mild lightheadedness at times. No syncope. No fevers or chills. No h/o DVT. Since his last visit he has been wearing support stockings. These help, but he still has some discomfort. Unfortunately, he is not exercising or attending to diet, and this has caused weight gain. Past Medical History:   Diagnosis Date    Arthritis     left knee and lt. hand    Atrial fibrillation (HCC)     BPH (benign prostatic hyperplasia)     Chronic pain     Back pain    Colon polyps     DJD (degenerative joint disease) of lumbar spine     laminectomy 1979, 1991, 2015    ED (erectile dysfunction) 6/18/2014    Heart palpitations     Dr. Bartolo Werner    Herniated nucleus pulposus, C3-4 12/2015    Dr. Bobo Gordon    Hyperlipidemia     Hypertension     IFG (impaired fasting glucose)     Insomnia     severe. has treated, AVI, back pain    Knee pain, right     Normal cardiac stress test 9/21/15    OA (osteoarthritis) of knee     Dr. Roberto Tom Obesity     Onychomycosis 2018    Dr Emilee Olivarez.   lamisil    AVI on CPAP     Dr. Justina Felton    Seasonal allergic rhinitis     Spinal stenosis in cervical region 12/2015    Spinal stenosis of lumbar region     MRI 12/2012. Dr. Savi Monteiro    SVT (supraventricular tachycardia) [facility])     Dr. Rosa Isela Harrylas [place] Dr Umanzor Sang Varicose veins     vein stripping 10/10      Past Surgical History:   Procedure Laterality Date    COLONOSCOPY  2008    2 polyps. repeat 2011. Dr. Gamal Moran  2007    7 polyps per pt     COLONOSCOPY N/A 6/15/2016    COLONOSCOPY performed by Nelson Magdaleno MD at [facility]. [address], COLON, DIAGNOSTIC  2011    return in 2016    HX BLADDER REPAIR      polyp removal    HX CERVICAL FUSION  1/16/16    ACDF C3-C4 Dr. Savi Monteiro    HX COLONOSCOPY  4/27/11    Dr Lalito Vinson, smal polyp. repeat 4/2016    HX KNEE ARTHROSCOPY  2005    right, Dr. Char Swann    HX KNEE ARTHROSCOPY  02/01/2012    left, Dr Char Swann    HX KNEE ARTHROSCOPY  1/2013    right    HX KNEE REPLACEMENT  11/4/2013    HX LUMBAR FUSION  9/2015    L3-L4, Dr. Parminder Vasques      left, Dr Mable Bustillos    bladder polyp removed with cystoscopy    HX VEIN STRIPPING  9/2010    Dr. Ruma Quick, bilateral    DC INS NEW/RPLC PRM PACEMAKER W/TRANSV ELTRD VENTR N/A 10/4/2019    INSERT PPM SINGLE VENTRICULAR/[employer] performed by Holly Ramirez MD at [address] CATH LAB    DC INS NEW/RPLCMT PRM PM W/TRANSV ELTRD ATRIAL&VENT N/A 10/4/2019    Insert Ppm Dual performed by Holly Ramirez MD at [address] CATH LAB    DC REMOVAL SUBCUTANEOUS CARDIAC RHYTHM MONITOR N/A 10/4/2019    LOOP RECORDER REMOVAL performed by Holly Ramirez MD at [address] CATH LAB    SHOULDER SURG [address] UNLISTED      left DECOMPRESSION, Dr. Judi Richmond  11/2013    VASCULAR SURGERY PROCEDURE UNLIST  2008    bilateral vein stripping        Current Outpatient Medications   Medication Sig    atorvastatin (LIPITOR) 20 mg tablet TAKE 1 TABLET DAILY    clonazePAM (KLONOPIN) 2 mg tablet Take 2 Tabs by mouth nightly. Max Daily Amount: 4 mg. Replaces zaplon for insomnia    metoprolol succinate (TOPROL-XL) 50 mg XL tablet Take 1 Tab by mouth daily.  amLODIPine (NORVASC) 5 mg tablet Take 1 Tab by mouth daily.  lisinopril (PRINIVIL, ZESTRIL) 40 mg tablet Take 1 Tab by mouth daily.  ELIQUIS 5 mg tablet TAKE 1 TABLET TWICE A DAY    terazosin (HYTRIN) 5 mg capsule TAKE 1 CAPSULE DAILY    levocetirizine (XYZAL) 5 mg tablet TAKE 1 TABLET DAILY    gabapentin (NEURONTIN) 300 mg capsule Take 2 Caps by mouth nightly.  ipratropium (ATROVENT) 0.03 % nasal spray SPRAY TWICE INTO BOTH NOSTRILS THREE TIMES DAILY (Patient taking differently: as needed.)    furosemide (LASIX) 20 mg tablet Take 1 Tab by mouth daily. Take once daily     No current facility-administered medications for this visit. Allergies   Allergen Reactions    Percocet [Oxycodone-Acetaminophen] Other (comments)     hallucinations    Penicillin G Rash     Did okay with keflex - no reaction with that. Subjective:     Visit Vitals  BP (!) 138/92   Pulse 64   Ht 6' 6\" (1.981 m)   Wt 294 lb (133.4 kg)   BMI 33.98 kg/m²     Physical Exam: Overweight  Head: Normocephalic, atraumatic. Eyes: Pupils equal, round, reactive to light and accomodation. , Extra ocular muscles intact. Sclera anicteric. Ears: Grossly responsive to sound. Neck: No adenopathy. No bruits. Throat: No sores or erythema. Heart: Regular rate and rhythm. Normal S1 and S2. No murmurs, gallops, or rub. Lungs: Clear to auscultation bilaterally. No wheezing, rales, or rhonchi. Abdomen: Soft, non-tender. No guarding or rebound. No hepatosplenomegaly. Bowel sounds active. Ext: Varicosities. Diminished pulses. Trace edema  Skin: Stasis dermatitis in legs. . Warm and dry. No rash. Neuro: Cranial nerves II through XII intact. Motor and sensory grossly intact. Affect: Appropriate.  Alert and interactive.      09/11/19   DUPLEX LOWER EXT VENOUS BILAT 09/12/2019 9/12/2019    Narrative * Right Leg:  Mild venous insufficiency is detected within saphenofemoral   junction and within the calf of the greater saphenous vein. No evidence   of deep or superficial venous thrombosis within the right common femoral,   femoral, popliteal, posterior tibial or greater saphenous vein. *  Left Leg:  Significant venous insufficiency is detected within the calf   of the greater saphenous vein. No evidence of deep vein or superficial   venous thrombosis within the left common femoral, femoral, popliteal,   posterior tibial or greater saphenous vein. Signed by: MD Jeannette Kathleen  Nuclear Stress Test   Negative myocardial perfusion imaging. There is no prior study available for comparison. .   Interpretation Summary     · Gated SPECT: Left ventricular function post-stress was normal. Calculated ejection fraction is 55%. There is no evidence of transient ischemic dilation (TID). · Baseline ECG: Normal sinus rhythm. · No ECG changes with Lexiscan. .  · Left ventricular perfusion is normal.  · Negative myocardial perfusion imaging. Nuclear Stress Findings     Nuclear Stress Function Left ventricular function post-stress was normal. Wall motion was normal at stress. Calculated ejection fraction is 55%. There is no evidence of transient ischemic dilation (TID). Nuclear Perfusion Left ventricular perfusion is normal.     Electronically signed by Letha Tse DO on 9/30/19 at [phone] EDT     09/11/19   ECHO ADULT COMPLETE 09/12/2019 9/12/2019    Narrative · Left Ventricle: Normal cavity size, wall thickness and systolic function   (ejection fraction normal). Estimated left ventricular ejection fraction   is 61 - 65%. Biplane method used to measure ejection fraction. No regional   wall motion abnormality noted. Inconclusive left ventricular diastolic   function. · Left Atrium: Moderately dilated left atrium. · Right Ventricle: Mildly dilated right ventricle.   · Aortic Valve: Mild aortic valve sclerosis with no significant stenosis. Signed by: Gricelda Talley MD     Lab Results   Component Value Date/Time    Cholesterol, total 133 02/12/2019 08:52 AM    HDL Cholesterol 43 02/12/2019 08:52 AM    LDL, calculated 74 02/12/2019 08:52 AM    VLDL, calculated 16 02/12/2019 08:52 AM    Triglyceride 78 02/12/2019 08:52 AM    CHOL/HDL Ratio 3.3 10/14/2010 09:43 AM     Lab Results   Component Value Date/Time    TSH 2.320 09/11/2019 04:18 PM     Lab Results   Component Value Date/Time    Hemoglobin A1c 5.6 02/12/2019 08:52 AM        Assessment/Plan        ICD-10-CM ICD-9-CM    1. Essential hypertension, benign I10 401.1    2. Venous insufficiency I87.2 459.81    3. Paroxysmal atrial fibrillation (HCC) I48.0 427.31    4. AVI (obstructive sleep apnea) G47.33 327.23    5. Mixed hyperlipidemia E78.2 272.2        Mr. Ben Gimenez is a 61-year-old white male who presents for assessment of bilateral calf weakness. He has a known history of venous insufficiency status post GSV ablation proximally 10 years ago. Lower extremity venous ultrasound confirms the presence of insufficiency calf veins. Venous stasis dermatitis ulcerations. He had hoped the PPM would make him feel much better, which it hasn't. I explained the rationale for the PPM to control tachy-viv syndrome and symptomatic bradycardia with PAF. We discussed conservative strategies to improve circulation, including the support stockings, exercise, weight loss, reduction of salt/fat. I reviewed pathophysiology of venous engorgement and leakage/ insufficiency. His discomfort is in the thighs and calves. We are considering ablation of calf veins; however, I think continued medical therapy is still warranted. I will see the patient back in 4 months to review his progress. I have asked him to lose 15 pounds in that time. 30 min/day exercise. Mediterranean diet.       Santos Sibley MD  2/19/2020, 11:52 AM    [facility] [address] Office:   [facility] Office:  [address] Cassius Trevizo [facility] [place]  [address]     [facility] [facility], [address] [place] [address]  P: [phone]    P: [phone]  F: [phone]    F: [phone]

## 2020-02-24 ENCOUNTER — TELEPHONE (OUTPATIENT)
Dept: CARDIOLOGY CLINIC | Age: 75
End: 2020-02-24

## 2020-02-24 NOTE — TELEPHONE ENCOUNTER
Jeison Sepulveda is calling in regards to getting an order for the patient to have an MRI.  Please advise      GXJVQ:[phone]

## 2020-02-24 NOTE — TELEPHONE ENCOUNTER
Spoke with Talya at [employer]. She reports she thought she was calling Ortho to see what MRI they wanted. Instructed her to call back if she needs anything else.

## 2020-04-20 ENCOUNTER — TELEPHONE (OUTPATIENT)
Dept: INTERNAL MEDICINE CLINIC | Age: 75
End: 2020-04-20

## 2020-04-20 RX ORDER — LEVOCETIRIZINE DIHYDROCHLORIDE 5 MG/1
TABLET, FILM COATED ORAL
Qty: 90 TAB | Refills: 3 | Status: SHIPPED | OUTPATIENT
Start: 2020-04-20

## 2020-05-06 RX ORDER — APIXABAN 5 MG/1
TABLET, FILM COATED ORAL
Qty: 180 TAB | Refills: 3 | Status: SHIPPED | OUTPATIENT
Start: 2020-05-06 | End: 2021-06-08

## 2020-05-06 RX ORDER — TERAZOSIN 5 MG/1
CAPSULE ORAL
Qty: 90 CAP | Refills: 3 | Status: SHIPPED | OUTPATIENT
Start: 2020-05-06 | End: 2020-10-15 | Stop reason: SDUPTHER

## 2020-05-06 NOTE — TELEPHONE ENCOUNTER
Requested Prescriptions     Signed Prescriptions Disp Refills    Eliquis 5 mg tablet 180 Tab 3     Sig: TAKE 1 TABLET TWICE A DAY     Authorizing Provider: Renato Whitaker     Ordering User: Teresa Bernal     Refill per verbal order Dr. Eneida Beasley.

## 2020-05-11 RX ORDER — FUROSEMIDE 20 MG/1
TABLET ORAL
Qty: 90 TAB | Refills: 3 | Status: SHIPPED | OUTPATIENT
Start: 2020-05-11 | End: 2020-06-17

## 2020-05-14 DIAGNOSIS — F51.01 PRIMARY INSOMNIA: ICD-10-CM

## 2020-05-14 RX ORDER — CLONAZEPAM 2 MG/1
TABLET ORAL
Qty: 60 TAB | Refills: 0 | Status: SHIPPED | OUTPATIENT
Start: 2020-05-14 | End: 2020-05-25

## 2020-05-14 NOTE — TELEPHONE ENCOUNTER
Reached out to patient to assist w/ scheduling a medication management appointment per PCP.  Patient has been scheduled for 07/23 @ 9:40 - thank you

## 2020-05-14 NOTE — TELEPHONE ENCOUNTER
Refill sent, but please schedule appointment in the next 2 months for medication management and further refills.

## 2020-06-02 ENCOUNTER — HOSPITAL ENCOUNTER (OUTPATIENT)
Dept: MRI IMAGING | Age: 75
Discharge: HOME | End: 2020-06-02
Attending: PAIN MEDICINE
Payer: MEDICARE

## 2020-06-02 DIAGNOSIS — M47.816 LUMBAR SPONDYLOSIS: ICD-10-CM

## 2020-06-02 DIAGNOSIS — M48.061 SPINAL STENOSIS, LUMBAR REGION, WITHOUT NEUROGENIC CLAUDICATION: ICD-10-CM

## 2020-06-02 DIAGNOSIS — M54.50 LOW BACK PAIN: ICD-10-CM

## 2020-06-02 PROCEDURE — 72148 MRI LUMBAR SPINE W/O DYE: CPT

## 2020-06-16 ENCOUNTER — TELEPHONE (OUTPATIENT)
Dept: CARDIOLOGY CLINIC | Age: 75
End: 2020-06-16

## 2020-06-17 ENCOUNTER — OFFICE VISIT (OUTPATIENT)
Dept: CARDIOLOGY CLINIC | Age: 75
End: 2020-06-17

## 2020-06-17 VITALS
WEIGHT: 286.2 LBS | RESPIRATION RATE: 16 BRPM | DIASTOLIC BLOOD PRESSURE: 62 MMHG | BODY MASS INDEX: 33.79 KG/M2 | SYSTOLIC BLOOD PRESSURE: 110 MMHG | HEIGHT: 77 IN

## 2020-06-17 DIAGNOSIS — G47.33 OSA (OBSTRUCTIVE SLEEP APNEA): ICD-10-CM

## 2020-06-17 DIAGNOSIS — I48.0 PAROXYSMAL ATRIAL FIBRILLATION (HCC): ICD-10-CM

## 2020-06-17 DIAGNOSIS — I73.9 PERIPHERAL VASCULAR DISEASE (HCC): ICD-10-CM

## 2020-06-17 DIAGNOSIS — I87.2 VENOUS INSUFFICIENCY: Primary | ICD-10-CM

## 2020-06-17 DIAGNOSIS — I49.5 TACHY-BRADY SYNDROME (HCC): ICD-10-CM

## 2020-06-17 DIAGNOSIS — I47.1 SUPRAVENTRICULAR TACHYCARDIA (HCC): ICD-10-CM

## 2020-06-17 DIAGNOSIS — I10 ESSENTIAL HYPERTENSION, BENIGN: ICD-10-CM

## 2020-06-17 DIAGNOSIS — I47.1 SVT (SUPRAVENTRICULAR TACHYCARDIA) (HCC): ICD-10-CM

## 2020-06-17 RX ORDER — CHLORHEXIDINE GLUCONATE 1.2 MG/ML
15 RINSE ORAL
COMMUNITY
Start: 2020-05-11

## 2020-06-17 RX ORDER — TRAZODONE HYDROCHLORIDE 50 MG/1
50 TABLET ORAL 2 TIMES DAILY
Status: ON HOLD | COMMUNITY
Start: 2020-06-11 | End: 2020-10-26

## 2020-06-17 RX ORDER — FUROSEMIDE 20 MG/1
20 TABLET ORAL 2 TIMES DAILY
Qty: 180 TAB | Refills: 1 | Status: ON HOLD | OUTPATIENT
Start: 2020-06-17 | End: 2020-10-26

## 2020-06-17 RX ORDER — DICLOFENAC SODIUM 10 MG/G
4 GEL TOPICAL DAILY PRN
COMMUNITY
Start: 2020-02-06

## 2020-06-17 NOTE — PROGRESS NOTES
CardioVascular Progress Note    Patient: Ninfa Lopez MRN: [id number]  SSN: [id number]    YOB: 1945  Age: 66 y.o. Sex: male       Subjective:        Ninfa Lopez is a 66 y.o.  male who presents for assessment of venous insufficiency. Mr. La Mistry is a 67 yo WM with a history of HTN, SVT, Afib, AVI on CPAP, dyslipidemia who presents for assessment of venous insufficiency. The patient has symptomatic bradycardia and underwent PPM insertion 10/4/2019 with Dr. Frankie Martin. He has a history of bilateral GSV ablation in 2008. Symptoms improved at that time. Unfortunately, he has had recurrent of discomfort in the calves over the past few years. He has also noted superficial varicosities. Mild dependent edema and nocturia. No chest pain or shortness of breath. No syncope. No fevers or chills. No h/o DVT. Since his last visit he has not been wearing support stockings- hard to get on and side tracked taking care of wife. Unfortunately, he is not exercising or attending to diet, and this has caused weight gain. No fevers or chills. No exposure to Covid-19 that he is aware of. Lasix increases frequency, but not volume. Taking care of wife who has had some fractures recently- two back surgeries and arm. Past Medical History:   Diagnosis Date    Arthritis     left knee and lt. hand    Atrial fibrillation (HCC)     BPH (benign prostatic hyperplasia)     Chronic pain     Back pain    Colon polyps     DJD (degenerative joint disease) of lumbar spine     laminectomy 1979, 1991, 2015    ED (erectile dysfunction) 6/18/2014    Heart palpitations     Dr. Micah Mckeon    Herniated nucleus pulposus, C3-4 12/2015    Dr. Ben Ford    Hyperlipidemia     Hypertension     IFG (impaired fasting glucose)     Insomnia     severe.   has treated, AVI, back pain    Knee pain, right     Normal cardiac stress test 9/21/15    OA (osteoarthritis) of knee     Dr. Syl Cuba    Obesity     Onychomycosis 2018 Dr Prashanth Montana. lamisil    AVI on CPAP     Dr. Kerwin Regalado    Seasonal allergic rhinitis     Spinal stenosis in cervical region 12/2015    Spinal stenosis of lumbar region     MRI 12/2012. Dr. Emerson Aceves    SVT (supraventricular tachycardia) [facility])     Dr. Molly Brennan Varicose veins     vein stripping 10/10      Past Surgical History:   Procedure Laterality Date    COLONOSCOPY  2008    2 polyps. repeat 2011. Dr. Tony Allen  2007    7 polyps per pt     COLONOSCOPY N/A 6/15/2016    COLONOSCOPY performed by Vicente Hui MD at [facility]. [address], COLON, DIAGNOSTIC  2011    return in 2016    HX BLADDER REPAIR      polyp removal    HX CERVICAL FUSION  1/16/16    ACDF C3-C4 Dr. Emerson Aceves    HX COLONOSCOPY  4/27/11    [facility]   repeat 4/2016    HX KNEE ARTHROSCOPY  2005    right, Dr. Chen Galvan    HX KNEE ARTHROSCOPY  02/01/2012    left, Dr Horne Primrose ARTHROSCOPY  1/2013    right    HX KNEE REPLACEMENT  11/4/2013    HX LUMBAR FUSION  9/2015    L3-L4, Dr. Jennifer Jorgensen      left, Dr Toni Dacosta    bladder polyp removed with cystoscopy    HX VEIN STRIPPING  9/2010    Dr. Nataliia Duque, bilateral    IA INS NEW/RPLC PRM PACEMAKER W/TRANSV ELTRD VENTR N/A 10/4/2019    INSERT PPM SINGLE VENTRICULAR/[employer] performed by Morgan Deal MD at [address] CATH LAB    IA INS NEW/RPLCMT PRM PM W/TRANSV ELTRD ATRIAL&VENT N/A 10/4/2019    Insert Ppm Dual performed by Morgan Deal MD at [address] CATH LAB    IA REMOVAL SUBCUTANEOUS CARDIAC RHYTHM MONITOR N/A 10/4/2019    LOOP RECORDER REMOVAL performed by Morgan Deal MD at [address] CATH LAB    SHOULDER SURG [address] UNLISTED      left DECOMPRESSION, Dr. Kirill Sampson  11/2013    VASCULAR SURGERY PROCEDURE UNLIST  2008    bilateral vein stripping        Current Outpatient Medications   Medication Sig    traZODone (DESYREL) 50 mg tablet Take 50 mg by mouth two (2) times a day.  diclofenac (VOLTAREN) 1 % gel Apply 4 g to affected area daily as needed.  chlorhexidine (PERIDEX) 0.12 % solution Take 15 mL by mouth every twelve (12) hours.  furosemide (LASIX) 20 mg tablet Take 1 Tab by mouth two (2) times a day.  Eliquis 5 mg tablet TAKE 1 TABLET TWICE A DAY    terazosin (HYTRIN) 5 mg capsule TAKE 1 CAPSULE DAILY    levocetirizine (XYZAL) 5 mg tablet TAKE 1 TABLET DAILY    atorvastatin (LIPITOR) 20 mg tablet TAKE 1 TABLET DAILY    metoprolol succinate (TOPROL-XL) 50 mg XL tablet Take 1 Tab by mouth daily.  amLODIPine (NORVASC) 5 mg tablet Take 1 Tab by mouth daily.  lisinopril (PRINIVIL, ZESTRIL) 40 mg tablet Take 1 Tab by mouth daily.  gabapentin (NEURONTIN) 300 mg capsule Take 2 Caps by mouth nightly. (Patient taking differently: Take 600 mg by mouth as needed.)    clonazePAM (KlonoPIN) 2 mg tablet TAKE 2 TABLETS BY MOUTH NIGHTLY. NOT TO EXCEED 2 TABLETS DAILY (Patient not taking: Reported on 6/17/2020)    ipratropium (ATROVENT) 0.03 % nasal spray SPRAY TWICE INTO BOTH NOSTRILS THREE TIMES DAILY (Patient not taking: No sig reported)     No current facility-administered medications for this visit. Allergies   Allergen Reactions    Percocet [Oxycodone-Acetaminophen] Other (comments)     hallucinations    Penicillin G Rash     Did okay with keflex - no reaction with that. Subjective:     Visit Vitals  /62 (BP 1 Location: Left arm, BP Patient Position: Sitting)   Resp 16   Ht 6' 5\" (1.956 m)   Wt 286 lb 3.2 oz (129.8 kg)   BMI 33.94 kg/m²     Physical Exam: Overweight  Head: Normocephalic, atraumatic. Eyes: Pupils equal, round, reactive to light and accomodation. , Extra ocular muscles intact. Sclera anicteric. Ears: Grossly responsive to sound. Neck: No adenopathy. No bruits. Throat: No sores or erythema.   Heart: Regular rate and rhythm. Normal S1 and S2. No murmurs, gallops, or rub. Lungs: Clear to auscultation bilaterally. No wheezing, rales, or rhonchi. Abdomen: Soft, non-tender. No guarding or rebound. No hepatosplenomegaly. Bowel sounds active. Ext: Varicosities. Diminished pulses. 2+ pitting edema to calves  Skin: Stasis dermatitis in legs. . Warm and dry. No rash. Neuro: Cranial nerves II through XII intact. Motor and sensory grossly intact. Affect: Appropriate. Alert and interactive.      09/11/19   DUPLEX LOWER EXT VENOUS BILAT 09/12/2019 9/12/2019    Narrative *  Right Leg:  Mild venous insufficiency is detected within saphenofemoral   junction and within the calf of the greater saphenous vein. No evidence   of deep or superficial venous thrombosis within the right common femoral,   femoral, popliteal, posterior tibial or greater saphenous vein. *  Left Leg:  Significant venous insufficiency is detected within the calf   of the greater saphenous vein. No evidence of deep vein or superficial   venous thrombosis within the left common femoral, femoral, popliteal,   posterior tibial or greater saphenous vein. Signed by: Leopoldo Vizcaino MD       Sharyle Living  Nuclear Stress Test   Negative myocardial perfusion imaging. There is no prior study available for comparison. .   Interpretation Summary     · Gated SPECT: Left ventricular function post-stress was normal. Calculated ejection fraction is 55%. There is no evidence of transient ischemic dilation (TID). · Baseline ECG: Normal sinus rhythm. · No ECG changes with Lexiscan. .  · Left ventricular perfusion is normal.  · Negative myocardial perfusion imaging. Nuclear Stress Findings     Nuclear Stress Function Left ventricular function post-stress was normal. Wall motion was normal at stress. Calculated ejection fraction is 55%. There is no evidence of transient ischemic dilation (TID).    Nuclear Perfusion Left ventricular perfusion is normal. Electronically signed by Mariel Oakes DO on 9/30/19 at [phone] EDT     09/11/19   ECHO ADULT COMPLETE 09/12/2019 9/12/2019    Narrative · Left Ventricle: Normal cavity size, wall thickness and systolic function   (ejection fraction normal). Estimated left ventricular ejection fraction   is 61 - 65%. Biplane method used to measure ejection fraction. No regional   wall motion abnormality noted. Inconclusive left ventricular diastolic   function. · Left Atrium: Moderately dilated left atrium. · Right Ventricle: Mildly dilated right ventricle. · Aortic Valve: Mild aortic valve sclerosis with no significant stenosis. Signed by: Stephanie Dawn MD     Lab Results   Component Value Date/Time    Cholesterol, total 133 02/12/2019 08:52 AM    HDL Cholesterol 43 02/12/2019 08:52 AM    LDL, calculated 74 02/12/2019 08:52 AM    VLDL, calculated 16 02/12/2019 08:52 AM    Triglyceride 78 02/12/2019 08:52 AM    CHOL/HDL Ratio 3.3 10/14/2010 09:43 AM     Lab Results   Component Value Date/Time    TSH 2.320 09/11/2019 04:18 PM     Lab Results   Component Value Date/Time    Hemoglobin A1c 5.6 02/12/2019 08:52 AM                Assessment/Plan        ICD-10-CM ICD-9-CM    1. Venous insufficiency I87.2 459.81 AMB POC EKG ROUTINE W/ 12 LEADS, INTER & REP   2. Supraventricular tachycardia (HCC) I47.1 427.89 AMB POC EKG ROUTINE W/ 12 LEADS, INTER & REP      METABOLIC PANEL, BASIC   3. Tachy-viv syndrome (HCC) I49.5 427.81 AMB POC EKG ROUTINE W/ 12 LEADS, INTER & REP      METABOLIC PANEL, BASIC   4. Essential hypertension, benign I10 401.1    5. Peripheral vascular disease (HCC) I73.9 443.9    6. Paroxysmal atrial fibrillation (HCC) I48.0 427.31    7. SVT (supraventricular tachycardia) (HCC) I47.1 427.89    8. AVI (obstructive sleep apnea) G47.33 327.23        Mr. Yanira Palacios is a 78-year-old white male who presents for assessment of bilateral calf weakness.   He has a known history of venous insufficiency status post GSV ablation proximally 10 years ago. Lower extremity venous ultrasound confirms the presence of insufficiency calf veins. Venous stasis dermatitis but no ulcers. Overall stable since last visit. Mild progress, but still limiting due to edema and tightness. We discussed conservative strategies to improve circulation, including the support stockings, exercise, weight loss, reduction of salt/fat. I reviewed pathophysiology of venous engorgement and leakage/ insufficiency. His discomfort is in the thighs and calves. We are considering ablation of calf veins; however, I think continued medical therapy is still warranted. Discussed usage of support stockings and ACE wraps to mobilize fluid. He would also benefit from home sequential compression device and increased diuresis. Increase lasix to BID  BMP 1 weeks- assess renal function and potassium  I have asked him to lose 15 pounds in that time. 30 min/day exercise. Mediterranean diet.   RTC 4 weeks      Jeimy Brambila MD  6/17/2020, 11:52 AM    [facility] Office:   [facility] Office:  [address] [place] [address] Dr Cassius Trevizo [address] [facility] [address]  [address] [place] [address]  P: [phone]    P: [phone]  F: [phone]    F: [phone]

## 2020-06-17 NOTE — PROGRESS NOTES
Room: 9    Identified pt with two pt identifiers(name and ). Reviewed record in preparation for visit and have obtained necessary documentation. All patient medications has been reviewed. Chief Complaint   Patient presents with    Leg Swelling     Bilateral leg swelling more consistent in the right leg     Hypertension    Cholesterol Problem       Health Maintenance Due   Topic    Shingrix Vaccine Age 49> (1 of 2)    GLAUCOMA SCREENING Q2Y     Medicare Yearly Exam     Lipid Screen        Vitals:    20 1247   BP: 110/62   Resp: 16   Weight: 286 lb 3.2 oz (129.8 kg)   Height: 6' 5\" (1.956 m)   PainSc:   0 - No pain       1. Have you traveled outside of the [address] [place] [address] in the last month No    2. Have you been in close contact with someone who is suspected to have COVID-19 or has tested positive. No    3. Do you have any signs or symptoms? No    4. Have you been to the ER, urgent care clinic since your last visit? Hospitalized since your last visit? No    5. Have you seen or consulted any other health care providers outside of the [address] [place] [address] since your last visit? Include any pap smears or colon screening. No      Patient is accompanied by self I have received verbal consent from Chacorta Mcclendon to discuss any/all medical information while they are present in the room.

## 2020-07-14 ENCOUNTER — TELEPHONE (OUTPATIENT)
Dept: INTERNAL MEDICINE CLINIC | Age: 75
End: 2020-07-14

## 2020-07-15 ENCOUNTER — VIRTUAL VISIT (OUTPATIENT)
Dept: CARDIOLOGY CLINIC | Age: 75
End: 2020-07-15

## 2020-07-15 DIAGNOSIS — G47.33 OSA (OBSTRUCTIVE SLEEP APNEA): ICD-10-CM

## 2020-07-15 DIAGNOSIS — E78.2 MIXED HYPERLIPIDEMIA: ICD-10-CM

## 2020-07-15 DIAGNOSIS — I47.1 SUPRAVENTRICULAR TACHYCARDIA (HCC): ICD-10-CM

## 2020-07-15 DIAGNOSIS — I87.2 VENOUS INSUFFICIENCY: Primary | ICD-10-CM

## 2020-07-15 DIAGNOSIS — I10 ESSENTIAL HYPERTENSION, BENIGN: ICD-10-CM

## 2020-07-15 NOTE — PROGRESS NOTES
Attempted to reach patient by telephone.  A message was left for return call to schedule 4 month follow up

## 2020-07-15 NOTE — PROGRESS NOTES
[facility] Cardiology Telemedicine Encounter                                                         Pursuant to the emergency declaration under the [facility], [facility] waiver authority and the [employer] Act, this Virtual  Visit was conducted, with patient's consent, to reduce the patient's risk of exposure to COVID-19 and provide continuity of care for an established patient. He and/or his healthcare decision maker is aware that this patient-initiated Telehealth encounter is a billable service, with coverage as determined by his insurance carrier. He is aware that he may receive a bill and has provided verbal consent to proceed. Services were provided through a video synchronous discussion virtually to substitute for in-person clinic visit. Due to this being a TeleHealth evaluation, many elements of the physical examination are unable to be assessed. Due to patient preference this visit was held over the phone. Subjective/HPI:   Dann Aschoff is a 66 y.o. male who was seen by synchronous (real-time) audio-video technology on 7/15/2020. Phone call only per patient preference. Mr. Andreea Cardoza is a 65 yo WM with a history of HTN, SVT, Afib, AVI on CPAP, dyslipidemia who presents for assessment of venous insufficiency. The patient has symptomatic bradycardia and underwent PPM insertion 10/4/2019 with Dr. Wyatt Zarco. He has a history of bilateral GSV ablation in 2008. Symptoms improved at that time. Unfortunately, he has had recurrent of discomfort in the calves over the past few years. He has also noted superficial varicosities. Mild dependent edema and nocturia.     Since last visit he has done well. No further edema. Using lasix as needed. Lost 20 pounds. Feels much better overall. Not wearing support stockings. Had lab work done through Dr. Charles Coreas office. Not exercising. Treadmill in storage room.     No chest pain or shortness of breath. No syncope. No fevers or chills.     Taking care of wife who has had some fractures recently- two back surgeries and arm. Making progress- out of wheelchair. PCP Provider  Suly Roca MD    Past Medical History:   Diagnosis Date    Arthritis     left knee and lt. hand    Atrial fibrillation (HCC)     BPH (benign prostatic hyperplasia)     Chronic pain     Back pain    Colon polyps     DJD (degenerative joint disease) of lumbar spine     laminectomy 1979, 1991, 2015    ED (erectile dysfunction) 6/18/2014    Heart palpitations     Dr. Floridalma Melvin    Herniated nucleus pulposus, C3-4 12/2015    Dr. Franca Rollins    Hyperlipidemia     Hypertension     IFG (impaired fasting glucose)     Insomnia     severe. has treated, AVI, back pain    Knee pain, right     Normal cardiac stress test 9/21/15    OA (osteoarthritis) of knee     Dr. Nury Salinas Obesity     Onychomycosis 2018    Dr Alexandria Soulier. lamisil    AVI on CPAP     Dr. Winston Cruz    Seasonal allergic rhinitis     Spinal stenosis in cervical region 12/2015    Spinal stenosis of lumbar region     MRI 12/2012. Dr. Franca Rollins    SVT [facility]supraventricular tachycardia) [facility])     Dr. Floridalma Aguirre Read Dr Anuel Newberry Varicose veins     vein stripping 10/10      Past Surgical History:   Procedure Laterality Date    COLONOSCOPY  2008    2 polyps. repeat 2011. Dr. Toney Damon  2007    7 polyps per pt     COLONOSCOPY N/A 6/15/2016    COLONOSCOPY performed by Zeke Bolton MD at [address] [place] [address], COLON, DIAGNOSTIC  2011    return in 2016    HX BLADDER REPAIR      polyp removal    HX CERVICAL FUSION  1/16/16    ACDF C3-C4 Dr. Franca Rollins    HX COLONOSCOPY  4/27/11    Dr Ethan Naranjo, [facility] polyp.   repeat 4/2016    HX KNEE ARTHROSCOPY  2005    right, Dr. Bhavna Hernandez    HX KNEE ARTHROSCOPY  02/01/2012    left, Dr Bhavna Hernandez    HX KNEE ARTHROSCOPY  1/2013    right    HX KNEE REPLACEMENT  11/4/2013    HX LUMBAR FUSION  9/2015    L3-L4,  Barbi    HX LUMBAR LAMINECTOMY  1979    HX LUMBAR LAMINECTOMY  1991    HX MOHS PROCEDURES      left, Dr Price Both    bladder polyp removed with cystoscopy    HX VEIN STRIPPING  9/2010    Dr. Lelia Vitale, bilateral    [place] INS NEW/RPLC PRM PACEMAKER W/TRANSV ELTRD VENTR N/A 10/4/2019    INSERT PPM SINGLE VENTRICULAR/[employer] performed by Billie Washington MD at [address] CATH LAB    [place] INS NEW/RPLCMT PRM PM W/TRANSV ELTRD ATRIAL&VENT N/A 10/4/2019    Insert Ppm Dual performed by Billie Washington MD at [address] CATH LAB    [place] REMOVAL SUBCUTANEOUS CARDIAC RHYTHM MONITOR N/A 10/4/2019    LOOP RECORDER REMOVAL performed by Billie Washington MD at [address] CATH LAB    SHOULDER SURG [address] UNLISTED      left DECOMPRESSION, Dr. Tigre Bueno  11/2013    VASCULAR SURGERY PROCEDURE UNLIST  2008    bilateral vein stripping     Allergies   Allergen Reactions    Percocet [Oxycodone-Acetaminophen] Other (comments)     hallucinations    Penicillin G Rash     Did okay with keflex - no reaction with that. Family History   Problem Relation Age of Onset   [address] [place] [address] Cancer Mother         liver    Cancer Father         leukemia    Cancer Sister         lung cancer      Current Outpatient Medications   Medication Sig    traZODone (DESYREL) 50 mg tablet Take 50 mg by mouth two (2) times a day.  diclofenac (VOLTAREN) 1 % gel Apply 4 g to affected area daily as needed.  chlorhexidine (PERIDEX) 0.12 % solution Take 15 mL by mouth every twelve (12) hours.  furosemide (LASIX) 20 mg tablet Take 1 Tab by mouth two (2) times a day.  (Patient taking differently: Take 20 mg by mouth daily as needed.)    Eliquis 5 mg tablet TAKE 1 TABLET TWICE A DAY    terazosin (HYTRIN) 5 mg capsule TAKE 1 CAPSULE DAILY    levocetirizine (XYZAL) 5 mg tablet TAKE 1 TABLET DAILY    atorvastatin (LIPITOR) 20 mg tablet TAKE 1 TABLET DAILY    metoprolol succinate (TOPROL-XL) 50 mg XL tablet Take 1 Tab by mouth daily.  amLODIPine (NORVASC) 5 mg tablet Take 1 Tab by mouth daily.  lisinopril (PRINIVIL, ZESTRIL) 40 mg tablet Take 1 Tab by mouth daily.  gabapentin (NEURONTIN) 300 mg capsule Take 2 Caps by mouth nightly. (Patient taking differently: Take 600 mg by mouth as needed.)    clonazePAM (KlonoPIN) 2 mg tablet TAKE 2 TABLETS BY MOUTH NIGHTLY. NOT TO EXCEED 2 TABLETS DAILY (Patient not taking: Reported on 2020)    ipratropium (ATROVENT) 0.03 % nasal spray SPRAY TWICE INTO BOTH NOSTRILS THREE TIMES DAILY (Patient not taking: No sig reported)     No current facility-administered medications for this visit. There were no vitals filed for this visit. Social History     Socioeconomic History    Marital status:      Spouse name: Ngoc Martin Number of children: 3    Years of education: Not on file    Highest education level: Not on file   Occupational History    Not on file   Social Needs    Financial resource strain: Not on file    Food insecurity     Worry: Not on file     Inability: Not on file   [employer] needs     Medical: Not on file     Non-medical: Not on file   Tobacco Use    Smoking status: Former Smoker     Packs/day: 0.25     Years: 19.00     Pack years: 4.75     Types: Cigarettes     Last attempt to quit: 1980     Years since quittin.5    Smokeless tobacco: Never Used    Tobacco comment: quit ~   Substance and Sexual Activity    Alcohol use:  Yes     Alcohol/week: 5.0 standard drinks     Types: 5 Glasses of wine per week     Comment: socially    Drug use: No    Sexual activity: Yes   Lifestyle    Physical activity     Days per week: Not on file     Minutes per session: Not on file    Stress: Not on file   Relationships    Social connections     Talks on phone: Not on file     Gets together: Not on file     Attends Yazidism service: Not on file     Active member of club or organization: Not on file     Attends meetings of clubs or organizations: Not on file     Relationship status: Not on file    Intimate partner violence     Fear of current or ex partner: Not on file     Emotionally abused: Not on file     Physically abused: Not on file     Forced sexual activity: Not on file   Other Topics Concern     Service Yes     Comment: army    Blood Transfusions Not Asked    Caffeine Concern Not Asked    Occupational Exposure Not Asked    Hobby Hazards Not Asked    Sleep Concern Not Asked    Stress Concern Not Asked    Weight Concern Not Asked    Special Diet Not Asked    Back Care Not Asked    Exercise Not Asked    Bike Helmet Not Asked   [address] Not Asked    Self-Exams Not Asked   Social History Narrative    ** Merged History Encounter **         1 child, 2 stepchildren    fishes       Review of Symptoms  11 systems reviewed, negative other than as stated in the HPI    Physical Exam:    Due to this being a TeleHealth evaluation, many elements of the physical examination are unable to be assessed.      273 pounds dressed    General: Well developed, in no acute distress, cooperative and alert      Cardiology Labs:  Lab Results   Component Value Date/Time    Cholesterol, total 133 02/12/2019 08:52 AM    HDL Cholesterol 43 02/12/2019 08:52 AM    LDL, calculated 74 02/12/2019 08:52 AM    Triglyceride 78 02/12/2019 08:52 AM    CHOL/HDL Ratio 3.3 10/14/2010 09:43 AM       Lab Results   Component Value Date/Time    Hemoglobin A1c 5.6 02/12/2019 08:52 AM        Lab Results   Component Value Date/Time    Sodium 141 09/11/2019 04:18 PM    Potassium 4.3 09/11/2019 04:18 PM    Chloride 102 09/11/2019 04:18 PM    CO2 23 09/11/2019 04:18 PM    Anion gap 8 08/19/2016 03:36 AM    Glucose 90 09/11/2019 04:18 PM    BUN 22 09/11/2019 04:18 PM    Creatinine 1.22 09/11/2019 04:18 PM    BUN/Creatinine ratio 18 09/11/2019 04:18 PM    GFR est AA 66 09/11/2019 04:18 PM    GFR est non-AA 57 (L) 09/11/2019 04:18 PM    Calcium 9.0 09/11/2019 04:18 PM    Bilirubin, total 0.5 09/11/2019 04:18 PM    Alk. phosphatase 49 09/11/2019 04:18 PM    Protein, total 6.5 09/11/2019 04:18 PM    Albumin 4.2 09/11/2019 04:18 PM    Globulin 3.0 08/08/2016 10:28 AM    A-G Ratio 1.8 09/11/2019 04:18 PM    ALT (SGPT) 15 09/11/2019 04:18 PM        Lab Results   Component Value Date/Time    TSH 2.320 09/11/2019 04:18 PM        EKG Results     None        09/11/19   ECHO ADULT COMPLETE 09/12/2019 9/12/2019    Narrative · Left Ventricle: Normal cavity size, wall thickness and systolic function   (ejection fraction normal). Estimated left ventricular ejection fraction   is 61 - 65%. Biplane method used to measure ejection fraction. No regional   wall motion abnormality noted. Inconclusive left ventricular diastolic   function. · Left Atrium: Moderately dilated left atrium. · Right Ventricle: Mildly dilated right ventricle. · Aortic Valve: Mild aortic valve sclerosis with no significant stenosis. Signed by: Donn Conde MD             Assessment:     Diagnoses and all orders for this visit:    1. Venous insufficiency    2. Supraventricular tachycardia ([address]    3. Essential hypertension, benign    4. AVI (obstructive sleep apnea)    5. Mixed hyperlipidemia        ICD-10-CM ICD-9-CM    1. Venous insufficiency  I87.2 459.81    2. Supraventricular tachycardia (HCC)  I47.1 427.89    3. Essential hypertension, benign  I10 401.1    4. AVI (obstructive sleep apnea)  G47.33 327.23    5. Mixed hyperlipidemia  E78.2 272.2           Plan:     Mr. Lanning Simmonds is a 66-year-old white male who presents for assessment of bilateral calf weakness. He has a known history of venous insufficiency status post GSV ablation proximally 10 years ago. Lower extremity venous ultrasound confirms the presence of insufficiency calf veins. Venous stasis dermatitis but no ulcers. Improved symptoms and edema since last visit due to weight loss. Agree with using lasix as needed.  Reviewed conservative strategies of support stockings and elevation. Encouraged continued attention to diet and exercise to continue losing weight. Reviewed his diet. Encouraged walking before weather gets too hot. RTC 4 months      We discussed the expected course, resolution and complications of the diagnosis(es) in detail. Medication risks, benefits, costs, interactions, and alternatives were discussed as indicated. I advised him to contact the office if his condition worsens, changes or fails to improve as anticipated. He expressed understanding with the diagnosis(es) and plan    [place] MD [place]    Greater than 25 minutes was spent in direct video/phone patient care, planning and chart review.

## 2020-07-16 ENCOUNTER — TELEPHONE (OUTPATIENT)
Dept: CARDIOLOGY CLINIC | Age: 75
End: 2020-07-16

## 2020-07-16 NOTE — TELEPHONE ENCOUNTER
Patient's wife, Julio Cesar Cardona, is returning a call to schedule a follow up appointment with Dr. Nidia Holman. Please advise.     Phone #: [phone]  Thanks

## 2020-07-17 DIAGNOSIS — I48.0 PAROXYSMAL ATRIAL FIBRILLATION (HCC): ICD-10-CM

## 2020-07-17 DIAGNOSIS — I47.1 SVT (SUPRAVENTRICULAR TACHYCARDIA) (HCC): ICD-10-CM

## 2020-07-17 DIAGNOSIS — I10 ESSENTIAL HYPERTENSION: ICD-10-CM

## 2020-07-17 RX ORDER — LISINOPRIL 40 MG/1
TABLET ORAL
Qty: 90 TAB | Refills: 3 | Status: SHIPPED | OUTPATIENT
Start: 2020-07-17 | End: 2021-07-12

## 2020-10-15 RX ORDER — TERAZOSIN 5 MG/1
CAPSULE ORAL
Qty: 90 CAP | Refills: 3 | Status: SHIPPED | OUTPATIENT
Start: 2020-10-15

## 2020-10-25 ENCOUNTER — APPOINTMENT (OUTPATIENT)
Dept: GENERAL RADIOLOGY | Age: 75
DRG: 871 | End: 2020-10-25
Attending: EMERGENCY MEDICINE
Payer: MEDICARE

## 2020-10-25 ENCOUNTER — APPOINTMENT (OUTPATIENT)
Dept: CT IMAGING | Age: 75
DRG: 871 | End: 2020-10-25
Attending: FAMILY MEDICINE
Payer: MEDICARE

## 2020-10-25 ENCOUNTER — HOSPITAL ENCOUNTER (INPATIENT)
Age: 75
LOS: 5 days | Discharge: HOME | DRG: 871 | End: 2020-10-30
Attending: EMERGENCY MEDICINE | Admitting: FAMILY MEDICINE
Payer: MEDICARE

## 2020-10-25 DIAGNOSIS — D72.9 NEUTROPHILIC LEUKOCYTOSIS: ICD-10-CM

## 2020-10-25 DIAGNOSIS — A41.9 SEPSIS WITHOUT ACUTE ORGAN DYSFUNCTION, DUE TO UNSPECIFIED ORGANISM (HCC): Primary | ICD-10-CM

## 2020-10-25 DIAGNOSIS — I49.5 TACHY-BRADY SYNDROME (HCC): ICD-10-CM

## 2020-10-25 DIAGNOSIS — R78.81 BACTEREMIA DUE TO GRAM-NEGATIVE BACTERIA: ICD-10-CM

## 2020-10-25 LAB
BNP SERPL-MCNC: 3988 PG/ML
COMMENT, HOLDF: NORMAL
SAMPLES BEING HELD,HOLD: NORMAL

## 2020-10-25 PROCEDURE — 83880 ASSAY OF NATRIURETIC PEPTIDE: CPT

## 2020-10-25 PROCEDURE — 77030019905 HC CATH URETH INTMIT MDII -A

## 2020-10-25 PROCEDURE — 99285 EMERGENCY DEPT VISIT HI MDM: CPT

## 2020-10-25 PROCEDURE — 87086 URINE CULTURE/COLONY COUNT: CPT

## 2020-10-25 PROCEDURE — 93005 ELECTROCARDIOGRAM TRACING: CPT

## 2020-10-25 PROCEDURE — 83605 ASSAY OF LACTIC ACID: CPT

## 2020-10-25 PROCEDURE — 96361 HYDRATE IV INFUSION ADD-ON: CPT

## 2020-10-25 PROCEDURE — 36415 COLL VENOUS BLD VENIPUNCTURE: CPT

## 2020-10-25 PROCEDURE — 85025 COMPLETE CBC W/AUTO DIFF WBC: CPT

## 2020-10-25 PROCEDURE — 87040 BLOOD CULTURE FOR BACTERIA: CPT

## 2020-10-25 PROCEDURE — 74011250636 HC RX REV CODE- 250/636: Performed by: FAMILY MEDICINE

## 2020-10-25 PROCEDURE — 87186 SC STD MICRODIL/AGAR DIL: CPT

## 2020-10-25 PROCEDURE — 65660000000 HC RM CCU STEPDOWN

## 2020-10-25 PROCEDURE — 96374 THER/PROPH/DIAG INJ IV PUSH: CPT

## 2020-10-25 PROCEDURE — 74011250636 HC RX REV CODE- 250/636: Performed by: EMERGENCY MEDICINE

## 2020-10-25 PROCEDURE — 74011000636 HC RX REV CODE- 636: Performed by: EMERGENCY MEDICINE

## 2020-10-25 PROCEDURE — 71260 CT THORAX DX C+: CPT

## 2020-10-25 PROCEDURE — 81001 URINALYSIS AUTO W/SCOPE: CPT

## 2020-10-25 PROCEDURE — 87635 SARS-COV-2 COVID-19 AMP PRB: CPT

## 2020-10-25 PROCEDURE — 74011000250 HC RX REV CODE- 250: Performed by: EMERGENCY MEDICINE

## 2020-10-25 PROCEDURE — 84484 ASSAY OF TROPONIN QUANT: CPT

## 2020-10-25 PROCEDURE — 71045 X-RAY EXAM CHEST 1 VIEW: CPT

## 2020-10-25 PROCEDURE — 87077 CULTURE AEROBIC IDENTIFY: CPT

## 2020-10-25 PROCEDURE — 80053 COMPREHEN METABOLIC PANEL: CPT

## 2020-10-25 PROCEDURE — 74011250637 HC RX REV CODE- 250/637: Performed by: EMERGENCY MEDICINE

## 2020-10-25 PROCEDURE — 2709999900 HC NON-CHARGEABLE SUPPLY

## 2020-10-25 PROCEDURE — 77030040831 HC BAG URINE DRNG MDII -A

## 2020-10-25 RX ORDER — SODIUM CHLORIDE 0.9 % (FLUSH) 0.9 %
5-10 SYRINGE (ML) INJECTION AS NEEDED
Status: DISCONTINUED | OUTPATIENT
Start: 2020-10-25 | End: 2020-10-30 | Stop reason: HOSPADM

## 2020-10-25 RX ORDER — ONDANSETRON 2 MG/ML
4 INJECTION INTRAMUSCULAR; INTRAVENOUS
Status: DISCONTINUED | OUTPATIENT
Start: 2020-10-25 | End: 2020-10-30 | Stop reason: HOSPADM

## 2020-10-25 RX ORDER — ACETAMINOPHEN 650 MG/1
650 SUPPOSITORY RECTAL
Status: DISCONTINUED | OUTPATIENT
Start: 2020-10-25 | End: 2020-10-30 | Stop reason: HOSPADM

## 2020-10-25 RX ORDER — ACETAMINOPHEN 325 MG/1
650 TABLET ORAL
Status: DISCONTINUED | OUTPATIENT
Start: 2020-10-25 | End: 2020-10-30 | Stop reason: HOSPADM

## 2020-10-25 RX ORDER — MIDODRINE HYDROCHLORIDE 5 MG/1
5 TABLET ORAL
Status: COMPLETED | OUTPATIENT
Start: 2020-10-25 | End: 2020-10-25

## 2020-10-25 RX ORDER — POLYETHYLENE GLYCOL 3350 17 G/17G
17 POWDER, FOR SOLUTION ORAL DAILY PRN
Status: DISCONTINUED | OUTPATIENT
Start: 2020-10-25 | End: 2020-10-30 | Stop reason: HOSPADM

## 2020-10-25 RX ORDER — ACETAMINOPHEN 500 MG
1000 TABLET ORAL ONCE
Status: COMPLETED | OUTPATIENT
Start: 2020-10-25 | End: 2020-10-25

## 2020-10-25 RX ORDER — SODIUM CHLORIDE 0.9 % (FLUSH) 0.9 %
5-40 SYRINGE (ML) INJECTION AS NEEDED
Status: DISCONTINUED | OUTPATIENT
Start: 2020-10-25 | End: 2020-10-30 | Stop reason: HOSPADM

## 2020-10-25 RX ORDER — PROMETHAZINE HYDROCHLORIDE 25 MG/1
12.5 TABLET ORAL
Status: DISCONTINUED | OUTPATIENT
Start: 2020-10-25 | End: 2020-10-30 | Stop reason: HOSPADM

## 2020-10-25 RX ORDER — SODIUM CHLORIDE 0.9 % (FLUSH) 0.9 %
5-40 SYRINGE (ML) INJECTION EVERY 8 HOURS
Status: DISCONTINUED | OUTPATIENT
Start: 2020-10-25 | End: 2020-10-30 | Stop reason: HOSPADM

## 2020-10-25 RX ORDER — SODIUM CHLORIDE 9 MG/ML
50 INJECTION, SOLUTION INTRAVENOUS CONTINUOUS
Status: DISCONTINUED | OUTPATIENT
Start: 2020-10-25 | End: 2020-10-28

## 2020-10-25 RX ORDER — ENOXAPARIN SODIUM 100 MG/ML
40 INJECTION SUBCUTANEOUS DAILY
Status: DISCONTINUED | OUTPATIENT
Start: 2020-10-26 | End: 2020-10-25

## 2020-10-25 RX ADMIN — MIDODRINE HYDROCHLORIDE 5 MG: 5 TABLET ORAL at 16:13

## 2020-10-25 RX ADMIN — ACETAMINOPHEN 1000 MG: 500 TABLET ORAL at 13:05

## 2020-10-25 RX ADMIN — SODIUM CHLORIDE 673 ML: 900 INJECTION, SOLUTION INTRAVENOUS at 14:40

## 2020-10-25 RX ADMIN — SODIUM CHLORIDE 1000 ML: 900 INJECTION, SOLUTION INTRAVENOUS at 14:07

## 2020-10-25 RX ADMIN — SODIUM CHLORIDE 50 ML/HR: 900 INJECTION, SOLUTION INTRAVENOUS at 16:12

## 2020-10-25 RX ADMIN — SODIUM CHLORIDE 1000 ML: 900 INJECTION, SOLUTION INTRAVENOUS at 13:04

## 2020-10-25 RX ADMIN — IOPAMIDOL 100 ML: 612 INJECTION, SOLUTION INTRAVENOUS at 16:17

## 2020-10-25 RX ADMIN — CEFTRIAXONE 1 G: 1 INJECTION, POWDER, FOR SOLUTION INTRAMUSCULAR; INTRAVENOUS at 13:24

## 2020-10-25 NOTE — ED PROVIDER NOTES
Mr. He Castillo is a 78-year-old gentleman with a history of BPH who presents to the emergency department with increased urinary frequency, chills, confusion, and low SPO2 reading at home. Patient's family member reports that he was little bit more confused this morning when she checked his oxygen saturation it was in the 80s; this improved once he sat up and took deep breaths. She also noted that he has had increasing frequency, dark urine, and dysuria despite recently completing a course of ciprofloxacin for a urinary tract infection. He has been fatigued, lightheaded, and dizzy. He has not had shortness of breath or cough. The history is provided by the patient and a relative. Past Medical History:   Diagnosis Date    Arthritis     left knee and lt. hand    Atrial fibrillation (HCC)     BPH (benign prostatic hyperplasia)     Chronic pain     Back pain    Colon polyps     DJD (degenerative joint disease) of lumbar spine     laminectomy 1979, 1991, 2015    ED (erectile dysfunction) 6/18/2014    Heart palpitations     Dr. Arpita Gerardo    Herniated nucleus pulposus, C3-4 12/2015    Dr. Fiona Salamanca    Hyperlipidemia     Hypertension     IFG (impaired fasting glucose)     Insomnia     severe. has treated, AVI, back pain    Knee pain, right     Normal cardiac stress test 9/21/15    OA (osteoarthritis) of knee     Dr. Rafaela Galindo Obesity     Onychomycosis 2018    Dr Nicko Camacho. lamisil    AVI on CPAP     Dr. Fiona De Souza    Seasonal allergic rhinitis     Spinal stenosis in cervical region 12/2015    Spinal stenosis of lumbar region     MRI 12/2012. Dr. Fiona Salamanca    SVT [facility]supraventricular tachycardia) [facility])     Dr. Arpita Abdi Varicose veins     vein stripping 10/10       Past Surgical History:   Procedure Laterality Date    COLONOSCOPY  2008    2 polyps. repeat 2011.   Dr. Norman Solis  2007    7 polyps per pt     COLONOSCOPY N/A 6/15/2016    COLONOSCOPY performed by Enid Sun Alisson Johns MD at [address], DIAGNOSTIC  2011    return in 2016    HX BLADDER REPAIR      polyp removal    HX CERVICAL FUSION  1/16/16    ACDF C3-C4 Dr. William Manuel HX COLONOSCOPY  4/27/11    Dr Alisson Johns, smal polyp.   repeat 4/2016    HX KNEE ARTHROSCOPY  2005    right, Dr. Radha Calixto    HX KNEE ARTHROSCOPY  02/01/2012    left, Dr Radha Calixto    HX KNEE ARTHROSCOPY  1/2013    right    HX KNEE REPLACEMENT  11/4/2013    HX LUMBAR FUSION  9/2015    L3-L4, Dr. Kane Aguilar      left, Dr Easton Valero    bladder polyp removed with cystoscopy    HX VEIN STRIPPING  9/2010    Dr. Gabriella Loomis, bilateral    [place] INS NEW/RPLC PRM PACEMAKER W/TRANSV ELTRD VENTR N/A 10/4/2019    INSERT PPM SINGLE VENTRICULAR/[employer] performed by Consuelo Baum MD at [address] CATH LAB    [place] INS NEW/RPLCMT PRM PM W/TRANSV ELTRD ATRIAL&VENT N/A 10/4/2019    Insert Ppm Dual performed by Consuelo Baum MD at [address] CATH LAB    [place] REMOVAL SUBCUTANEOUS CARDIAC RHYTHM MONITOR N/A 10/4/2019    LOOP RECORDER REMOVAL performed by Consuelo Baum MD at [address] CATH LAB    SHOULDER SURG [address] UNLISTED      left DECOMPRESSION, Dr. Naresh Durand  11/2013    VASCULAR SURGERY PROCEDURE UNLIST  2008    bilateral vein stripping         Family History:   Problem Relation Age of Onset   George Jolley Cancer Mother         liver    Cancer Father         leukemia    Cancer Sister         lung cancer       Social History     Socioeconomic History    Marital status:      Spouse name: Geraldine Tran Number of children: 3    Years of education: Not on file    Highest education level: Not on file   Occupational History    Not on file   Social Needs    Financial resource strain: Not on file    Food insecurity     Worry: Not on file     Inability: Not on file    Transportation needs     Medical: Not on file     Non-medical: Not on file   Tobacco Use    Smoking status: Former Smoker     Packs/day: 0.25     Years: 19.00     Pack years: 4.75     Types: Cigarettes     Last attempt to quit: 1980     Years since quittin.8    Smokeless tobacco: Never Used    Tobacco comment: quit ~   Substance and Sexual Activity    Alcohol use: Yes     Alcohol/week: 5.0 standard drinks     Types: 5 Glasses of wine per week     Comment: socially    Drug use: No    Sexual activity: Yes   Lifestyle    Physical activity     Days per week: Not on file     Minutes per session: Not on file    Stress: Not on file   Relationships    Social connections     Talks on phone: Not on file     Gets together: Not on file     Attends Anglican service: Not on file     Active member of club or organization: Not on file     Attends meetings of clubs or organizations: Not on file     Relationship status: Not on file    Intimate partner violence     Fear of current or ex partner: Not on file     Emotionally abused: Not on file     Physically abused: Not on file     Forced sexual activity: Not on file   Other Topics Concern     Service Yes     Comment: army    Blood Transfusions Not Asked    Caffeine Concern Not Asked    Occupational Exposure Not Asked   Wendy Heymann Hazards Not Asked    Sleep Concern Not Asked    Stress Concern Not Asked    Weight Concern Not Asked    Special Diet Not Asked    Back Care Not Asked    Exercise Not Asked    Bike Helmet Not Asked    Seat Belt Not Asked    Self-Exams Not Asked   Social History Narrative    ** Merged History Encounter **         1 child, 2 stepchildren    fishes         ALLERGIES: Percocet [oxycodone-acetaminophen] and Penicillin g    Review of Systems   Constitutional: Positive for fatigue. Negative for chills and fever. Respiratory: Negative for cough and shortness of breath. Cardiovascular: Negative for chest pain.    Gastrointestinal: Negative for abdominal pain, constipation, diarrhea and vomiting. Genitourinary: Positive for dysuria, frequency and urgency. Neurological: Positive for dizziness, weakness and light-headedness. Psychiatric/Behavioral: Positive for confusion. All other systems reviewed and are negative. Vitals:    10/25/20 1152 10/25/20 1224   BP: 118/65    Pulse: 91    Resp: 16    Temp: 99.1 °F (37.3 °C) (!) 101 °F (38.3 °C)   SpO2: 96%    Weight: 119.7 kg (264 lb)    Height: 6' 5\" (1.956 m)             Physical Exam  Vitals signs and nursing note reviewed. Constitutional:       General: He is not in acute distress. Appearance: He is well-developed. HENT:      Head: Normocephalic and atraumatic. Mouth/Throat:      Mouth: Mucous membranes are dry. Eyes:      Conjunctiva/sclera: Conjunctivae normal.      Pupils: Pupils are equal, round, and reactive to light. Neck:      Musculoskeletal: Normal range of motion. Cardiovascular:      Rate and Rhythm: Regular rhythm. Tachycardia present. Pulmonary:      Effort: Pulmonary effort is normal.   Abdominal:      General: There is no distension. Palpations: Abdomen is soft. Tenderness: There is no abdominal tenderness. Musculoskeletal: Normal range of motion. Skin:     General: Skin is warm and dry. Capillary Refill: Capillary refill takes less than 2 seconds. Neurological:      Mental Status: He is alert and oriented to person, place, and time. Psychiatric:         Mood and Affect: Mood normal.         Behavior: Behavior normal.          MDM  Number of Diagnoses or Management Options  Sepsis without acute organ dysfunction, due to unspecified organism [facility]):   Diagnosis management comments: Pt presents with sepsis likely from a UTI. Received abx and IV fluids with improvement in vital signs. Admitted to the hospital for additional management.   DDX:  PNA, pericarditis, electrolyte abnormality, dehydration, influenza, UTI, pyelonephritis, gastroenteritis, diverticulitis, cholecystitis           Procedures        EKG performed at 1255  Atrial fibrillation, 97 bpm, diffuse T wave inversions, no STEMI  EKG interpreted by Frankie Carver MD          IMPRESSION:  1. Sepsis without acute organ dysfunction, due to unspecified organism ([facility] [address]      - Broad Spectrum Antibiotics ordered: Ceftriaxone  - Repeat lactic acid ordered for time 1430  - Re-assessment performed at time 2:01 PM and clinical condition stable. - Hypotension or Lactic Acidosis present (SBP<90, MAP<65, Lactate >4): YES IVF:  30cc/kg ideal Body Weight, pt obese with BMI >30  - Persistent Hypotension despite IVF resuscitation: NO  Vasopressors: Not indicated due to Septic Shock not present    Plan:  Admit to Telemetry    Total critical care time spent exclusive of procedures:  35 minutes    Frankie Carver MD      Perfect Serve Consult for Admission  2:03 PM    ED Room Number: ER19/19  Patient Name and age:  Raiza Smith 66 y.o.  male  Working Diagnosis:   1.  Sepsis without acute organ dysfunction, due to unspecified organism ([facility] [address] [address]        COVID-19 Suspicion:  yes - fever  Sepsis present:  yes  Reassessment needed: no  Code Status:  Full Code  Readmission: no  Isolation Requirements:  no  Recommended Level of Care:  telemetry  Department:[facility] ED - [phone]  Other:  Recently completed Cipro course for UTI

## 2020-10-25 NOTE — ED NOTES
Patient observed shaking from chills. Patient also reports that he had been having chills for the past 2 nights. Patient also has pacer on EMS rhythm strip.

## 2020-10-25 NOTE — H&P
[facility]    History & Physical    Date of service: 10/25/2020    Patient name: Silvana Andrew  MRN: [id number]  YOB: 1945  Age: 66 y.o. Primary care provider:  Chelsea Zavala MD     Source of Information: patient, medical records                               Chief complain: Weakness    History of present illness  Silvana Andrew is a 66 y.o. male who presented with weakness since this morning. He states he was trying to get out of bed this morning to go to the bathroom but was too weak. He went to bed last night feeling a little bit dizzy however was otherwise feeling okay. According to his wife he was having darker colored urine with some noticeable blood. He recently completed a 10-day course of Cipro for UTI and was feeling better. His wife states that she checked his oxygen levels and it was in the low 80s. It improved after she asked him to take some deep breaths. He denies any shortness of breath, or cough. He did not have a fever at home however had a fever of 101 upon arrival to the emergency department. He denies any nausea, vomiting, abdominal pain, or diarrhea. Past Medical History:   Diagnosis Date    Arthritis     left knee and lt. hand    Atrial fibrillation (HCC)     BPH (benign prostatic hyperplasia)     Chronic pain     Back pain    Colon polyps     DJD (degenerative joint disease) of lumbar spine     laminectomy 1979, 1991, 2015    ED (erectile dysfunction) 6/18/2014    Heart palpitations     Dr. Pelon Hussein    Herniated nucleus pulposus, C3-4 12/2015    Dr. Karla Henning    Hyperlipidemia     Hypertension     IFG (impaired fasting glucose)     Insomnia     severe. has treated, AVI, back pain    Knee pain, right     Normal cardiac stress test 9/21/15    OA (osteoarthritis) of knee     Dr. Brii Geiger Obesity     Onychomycosis 2018    Dr Hector Mayes.   lamisil    AVI on CPAP     Dr. Hamilton Harvey    Seasonal allergic rhinitis     Spinal stenosis in cervical region 12/2015    Spinal stenosis of lumbar region     MRI 12/2012. Dr. Tr Angulo    SVT (supraventricular tachycardia) [facility])     Dr. Rakel Hermosillo Varicose veins     vein stripping 10/10      Past Surgical History:   Procedure Laterality Date    COLONOSCOPY  2008    2 polyps. repeat 2011. Dr. Hamzah Vaughn  2007    7 polyps per pt     COLONOSCOPY N/A 6/15/2016    COLONOSCOPY performed by Elli Luevano MD at [address] [place] [address] [place], COLON, DIAGNOSTIC  2011    return in 2016    HX BLADDER REPAIR      polyp removal    HX CERVICAL FUSION  1/16/16    ACDF C3-C4 Dr. Tr Angulo    HX COLONOSCOPY  4/27/11    Dr Torey Freitas, smal polyp. repeat 4/2016    HX KNEE ARTHROSCOPY  2005    right, Dr. Omid Junior    HX KNEE ARTHROSCOPY  02/01/2012    left, Dr Cass Jj ARTHROSCOPY  1/2013    right    HX KNEE REPLACEMENT  11/4/2013    HX LUMBAR FUSION  9/2015    L3-L4, Dr. Tl Gallardo      left, Dr Mariel Sharma    bladder polyp removed with cystoscopy    HX VEIN STRIPPING  9/2010    Dr. Pradip Anthony, bilateral    AL INS NEW/RPLC PRM PACEMAKER W/TRANSV ELTRD VENTR N/A 10/4/2019    INSERT PPM SINGLE VENTRICULAR/[employer] performed by Bryan Dover MD at [address] [facility] CATH LAB    AL INS NEW/RPLCMT PRM PM W/TRANSV ELTRD ATRIAL&VENT N/A 10/4/2019    Insert Ppm Dual performed by Bryan Dover MD at [address] CATH LAB    [address] N/A 10/4/2019    LOOP RECORDER REMOVAL performed by Bryan Dover MD at [address] CATH LAB    SHOULDER SURG [address] UNLISTED      left DECOMPRESSION, Dr. Travis Pool  11/2013    VASCULAR SURGERY PROCEDURE UNLIST  2008    bilateral vein stripping     Prior to Admission medications    Medication Sig Start Date End Date Taking?  Authorizing Provider   terazosin (HYTRIN) 5 mg capsule TAKE 1 CAPSULE DAILY 10/15/20   Judy Rey MD   lisinopriL (PRINIVIL, ZESTRIL) 40 mg tablet TAKE 1 TABLET DAILY 7/17/20   Korin Montoya NP   traZODone (DESYREL) 50 mg tablet Take 50 mg by mouth two (2) times a day. 6/11/20   Provider, Historical   diclofenac (VOLTAREN) 1 % gel Apply 4 g to affected area daily as needed. 2/6/20   Provider, Historical   chlorhexidine (PERIDEX) 0.12 % solution Take 15 mL by mouth every twelve (12) hours. 5/11/20   Provider, Historical   furosemide (LASIX) 20 mg tablet Take 1 Tab by mouth two (2) times a day. Patient taking differently: Take 20 mg by mouth daily as needed. 6/17/20   Shania Ramos MD   clonazePAM (KlonoPIN) 2 mg tablet TAKE 2 TABLETS BY MOUTH NIGHTLY. NOT TO EXCEED 2 TABLETS DAILY  Patient not taking: Reported on 6/17/2020 5/25/20   Marbella Khoury MD   Eliquis 5 mg tablet TAKE 1 TABLET TWICE A DAY 5/6/20   Deysi Austin MD   levocetirizine (XYZAL) 5 mg tablet TAKE 1 TABLET DAILY 4/20/20   Judy Rey MD   atorvastatin (LIPITOR) 20 mg tablet TAKE 1 TABLET DAILY 2/10/20   Judy Rey MD   metoprolol succinate (TOPROL-XL) 50 mg XL tablet Take 1 Tab by mouth daily. 10/23/19   Deysi Austin MD   amLODIPine (NORVASC) 5 mg tablet Take 1 Tab by mouth daily. 10/22/19   Judy Rey MD   gabapentin (NEURONTIN) 300 mg capsule Take 2 Caps by mouth nightly. Patient taking differently: Take 600 mg by mouth as needed. 3/2/19   Judy Rey MD   ipratropium (ATROVENT) 0.03 % nasal spray SPRAY TWICE INTO BOTH NOSTRILS THREE TIMES DAILY  Patient not taking: No sig reported 2/11/19   Judy Rey MD     Allergies   Allergen Reactions    Percocet [Oxycodone-Acetaminophen] Other (comments)     hallucinations    Penicillin G Rash     Did okay with keflex - no reaction with that.       Family History   Problem Relation Age of Onset   [facility] Cancer Mother         liver    Cancer Father         leukemia    Cancer Sister         lung cancer Social history    Social History     Tobacco Use   Smoking Status Former Smoker    Packs/day: 0.25    Years: 19.00    Pack years: 4.75    Types: Cigarettes    Last attempt to quit: 1980    Years since quittin.8   Smokeless Tobacco Never Used   Tobacco Comment    quit ~       Social History     Substance and Sexual Activity   Alcohol Use Yes    Alcohol/week: 5.0 standard drinks    Types: 5 Glasses of wine per week    Comment: socially       Code status  Code status discussed with the patient/caregivers. Prior    Review of systems    A comprehensive review of systems was negative except for that written in the History of Present Illness. Physical Examination   Visit Vitals  /60   Pulse 75   Temp 99.9 °F (37.7 °C)   Resp 30   Ht 6' 5\" (1.956 m)   Wt 119.7 kg (264 lb)   SpO2 93%   BMI 31.31 kg/m²      O2 Flow Rate (L/min): 4 l/min   O2 Device: Nasal cannula    General:  Alert, cooperative, no distress   Head:  Normocephalic, without obvious abnormality, atraumatic   Eyes:  Conjunctivae/corneas clear.  PERRL, EOMs intact   Head/Neck: Nares normal. No nasal drainage or sinus tenderness  Lips, mucosa, and tongue normal   Teeth and gums normal  Clear oropharynx  Trachea midline  No palpable adenopathy  No thyroid enlargement, tenderness     Lungs:   Symmetrical chest expansion and respiratory effort  Clear to auscultation bilaterally       Heart:  Regular rhythm   Sounds normal; no murmur, rub or gallop   Abdomen:   Soft, no tenderness  Bowel sounds normal  No masses or hepatosplenomegaly     Back: No CVA tenderness   Extremities: Extremities normal, atraumatic  No cyanosis or edema     Skin: No rashes or ulcers   Musculo-      skeletal: Gait not tested  Normal symmetry, ROM, strength and tone   Neuro: Cranial nerves grossly normal     Psych: Alert, oriented x3  Normal affect, judgement and insight     Data Review    24 Hour Results:  Recent Results (from the past 24 hour(s))   EKG, 12 LEAD, INITIAL    Collection Time: 10/25/20 12:55 PM   Result Value Ref Range    Ventricular Rate 97 BPM    Atrial Rate 105 BPM    QRS Duration 90 ms    Q-T Interval 326 ms    QTC Calculation (Bezet) 414 ms    Calculated R Axis 17 degrees    Calculated T Axis -62 degrees    Diagnosis       Atrial fibrillation  ST & T wave abnormality, consider inferior ischemia  ST & T wave abnormality, consider anterolateral ischemia  Abnormal ECG  When compared with ECG of 18-AUG-2016 12:54,  T wave inversion now evident in Inferior leads  T wave inversion now evident in Anterolateral leads       No results for input(s): WBC, HGB, HCT, PLT, HGBEXT, HCTEXT, PLTEXT in the last 72 hours. No results for input(s): NA, K, CL, CO2, GLU, BUN, CREA, CA, MG, PHOS, ALB, TBIL, TBILI, ALT, INR, INREXT in the last 72 hours. No lab exists for component: SGOT    Imaging  Xr Chest Port    Result Date: 10/25/2020  PORTABLE CHEST RADIOGRAPH/S: 10/25/2020 12:18 PM INDICATION: Altered mental status. COMPARISON: 10/4/2019, 8/8/2016; CT chest 3/3/2006. TECHNIQUE: Portable frontal upright radiograph/s of the chest. FINDINGS: The lungs are hypoinflated and there is bibasilar atelectasis. The heart is enlarged, even given portable technique. A pacemaker is in place. The central airways are patent. No pneumothorax and no large pleural effusion. Sutural anchors in the left humeral head and glenoid. Elevation of the left humeral head. 2 level cervical fusion. IMPRESSION: Shallow volumes and bibasilar atelectasis. Cardiomegaly.          Assessment and Plan     Urinary tract infection  -Recently treated for UTI with Cipro for 10 days  -Given ceftriaxone in the ED, will continue this for now  -Follow urine cultures and blood cultures    Sepsis  -Secondary to above  -Continue with gentle hydration and monitor    Acute hypoxic respiratory failure  -Only 4 L nasal cannula  -History of cardiomyopathy, check BNP  -Check CT chest  -Check Covid    Symptomatic bradycardia  -S/p PPM insertion October 2019    Venous insufficiency  -S/p greater saphenous vein ablation  -Continue with Lasix as needed    Hypertension  -Continue lisinopril, amlodipine, metoprolol    Atrial fibrillation  -Continue with metoprolol and Eliquis    Obstructive sleep apnea-  -Continue CPAP      Diet: Cardiac  Activity: As tolerated  DVT prophylaxis: Eliquis  Isolation precautions: Droplet plus       Signed by:  Darris Epley, MD    October 25, 2020 at 3:39 PM

## 2020-10-25 NOTE — ED NOTES
Verbal report given to Tianna RN(name) on Amelie Oris for routine progression of care    Report consisted of patient's Situation, Background, Assessment and Recommendations (SBAR)    Information from the following report(s)  SBAR and MAR was reviewed with the receiving nurse. Opportunity for questions and clarification was provided.       Last Filed Values:  Temp: (!) 101 °F (38.3 °C) (10/25/20 1224)  Pulse (Heart Rate): 90 (10/25/20 1400)  Resp Rate: (!) 33 (10/25/20 1400)  O2 Sat (%): 93 % (10/25/20 1400)  BP: 105/68 (10/25/20 1400)  MAP (Monitor): 77 (10/25/20 1400)  MAP (Calculated): 80 (10/25/20 1400)  Level of Consciousness: Alert (10/25/20 1224)      No results found for: WBC, LAC    Repeat LA:  Time Due 1430    Blood Cultures Drawn:  yes    Fluid Resuscitation:  Total needed 2673, Status infusing    All Antibiotics Started:  yes, Dose Due 10/26    VS x 2 post-fluid resuscitation:   no    Vasopressor Infusion:  no   Provider Reassessment needed and notified:  no , Due after fluids are complete    Additional Interventions/Comments:

## 2020-10-25 NOTE — ED TRIAGE NOTES
Patient arrives via EMS with c/o UTI, increased urinary frequency, and increased general weakness. Patient reports increased weakness with the increased frequency and was unable to get out of bed this morning. Patient's daughter called EMS with c/o AMS and SOB, but patient was 96% RA for EMS. EMS reports dark urine. Patient has hx of UTI and just finished abx. Patient is hard of hearing.

## 2020-10-26 LAB
ALBUMIN SERPL-MCNC: 3.6 G/DL (ref 3.5–5)
ALBUMIN/GLOB SERPL: 1 {RATIO} (ref 1.1–2.2)
ALP SERPL-CCNC: 48 U/L (ref 45–117)
ALT SERPL-CCNC: 21 U/L (ref 12–78)
ANION GAP SERPL CALC-SCNC: 4 MMOL/L (ref 5–15)
ANION GAP SERPL CALC-SCNC: 9 MMOL/L (ref 5–15)
APPEARANCE UR: ABNORMAL
AST SERPL-CCNC: 13 U/L (ref 15–37)
ATRIAL RATE: 105 BPM
BACTERIA URNS QL MICRO: ABNORMAL /HPF
BASOPHILS # BLD: 0.1 K/UL (ref 0–0.1)
BASOPHILS NFR BLD: 0 % (ref 0–1)
BILIRUB SERPL-MCNC: 1.2 MG/DL (ref 0.2–1)
BILIRUB UR QL: NEGATIVE
BNP SERPL-MCNC: 4154 PG/ML
BUN SERPL-MCNC: 18 MG/DL (ref 6–20)
BUN SERPL-MCNC: 22 MG/DL (ref 6–20)
BUN/CREAT SERPL: 13 (ref 12–20)
BUN/CREAT SERPL: 18 (ref 12–20)
CALCIUM SERPL-MCNC: 7.5 MG/DL (ref 8.5–10.1)
CALCIUM SERPL-MCNC: 8.5 MG/DL (ref 8.5–10.1)
CALCULATED R AXIS, ECG10: 17 DEGREES
CALCULATED T AXIS, ECG11: -62 DEGREES
CHLORIDE SERPL-SCNC: 105 MMOL/L (ref 97–108)
CHLORIDE SERPL-SCNC: 108 MMOL/L (ref 97–108)
CO2 SERPL-SCNC: 25 MMOL/L (ref 21–32)
CO2 SERPL-SCNC: 26 MMOL/L (ref 21–32)
COLOR UR: ABNORMAL
COMMENT, HOLDF: NORMAL
CREAT SERPL-MCNC: 1.25 MG/DL (ref 0.7–1.3)
CREAT SERPL-MCNC: 1.39 MG/DL (ref 0.7–1.3)
DIAGNOSIS, 93000: NORMAL
DIFFERENTIAL METHOD BLD: ABNORMAL
EOSINOPHIL # BLD: 0 K/UL (ref 0–0.4)
EOSINOPHIL NFR BLD: 0 % (ref 0–7)
EPITH CASTS URNS QL MICRO: ABNORMAL /LPF
ERYTHROCYTE [DISTWIDTH] IN BLOOD BY AUTOMATED COUNT: 13.8 % (ref 11.5–14.5)
ERYTHROCYTE [DISTWIDTH] IN BLOOD BY AUTOMATED COUNT: 14.1 % (ref 11.5–14.5)
GLOBULIN SER CALC-MCNC: 3.5 G/DL (ref 2–4)
GLUCOSE SERPL-MCNC: 109 MG/DL (ref 65–100)
GLUCOSE SERPL-MCNC: 113 MG/DL (ref 65–100)
GLUCOSE UR STRIP.AUTO-MCNC: NEGATIVE MG/DL
HCT VFR BLD AUTO: 34.9 % (ref 36.6–50.3)
HCT VFR BLD AUTO: 41.6 % (ref 36.6–50.3)
HGB BLD-MCNC: 11.6 G/DL (ref 12.1–17)
HGB BLD-MCNC: 14 G/DL (ref 12.1–17)
HGB UR QL STRIP: ABNORMAL
HYALINE CASTS URNS QL MICRO: ABNORMAL /LPF (ref 0–5)
IMM GRANULOCYTES # BLD AUTO: 0.2 K/UL (ref 0–0.04)
IMM GRANULOCYTES NFR BLD AUTO: 1 % (ref 0–0.5)
KETONES UR QL STRIP.AUTO: ABNORMAL MG/DL
LACTATE SERPL-SCNC: 1.2 MMOL/L (ref 0.4–2)
LACTATE SERPL-SCNC: 4.1 MMOL/L (ref 0.4–2)
LEUKOCYTE ESTERASE UR QL STRIP.AUTO: ABNORMAL
LYMPHOCYTES # BLD: 0.3 K/UL (ref 0.8–3.5)
LYMPHOCYTES NFR BLD: 2 % (ref 12–49)
MCH RBC QN AUTO: 30.2 PG (ref 26–34)
MCH RBC QN AUTO: 30.8 PG (ref 26–34)
MCHC RBC AUTO-ENTMCNC: 33.2 G/DL (ref 30–36.5)
MCHC RBC AUTO-ENTMCNC: 33.7 G/DL (ref 30–36.5)
MCV RBC AUTO: 90.9 FL (ref 80–99)
MCV RBC AUTO: 91.4 FL (ref 80–99)
MONOCYTES # BLD: 0.8 K/UL (ref 0–1)
MONOCYTES NFR BLD: 4 % (ref 5–13)
NEUTS SEG # BLD: 18.5 K/UL (ref 1.8–8)
NEUTS SEG NFR BLD: 93 % (ref 32–75)
NITRITE UR QL STRIP.AUTO: POSITIVE
NRBC # BLD: 0 K/UL (ref 0–0.01)
NRBC # BLD: 0 K/UL (ref 0–0.01)
NRBC BLD-RTO: 0 PER 100 WBC
NRBC BLD-RTO: 0 PER 100 WBC
OTHER,OTHU: ABNORMAL
PH UR STRIP: 5.5 [PH] (ref 5–8)
PLATELET # BLD AUTO: 146 K/UL (ref 150–400)
PLATELET # BLD AUTO: 188 K/UL (ref 150–400)
PMV BLD AUTO: 11.1 FL (ref 8.9–12.9)
PMV BLD AUTO: 11.7 FL (ref 8.9–12.9)
POTASSIUM SERPL-SCNC: 3.4 MMOL/L (ref 3.5–5.1)
POTASSIUM SERPL-SCNC: 3.5 MMOL/L (ref 3.5–5.1)
PROT SERPL-MCNC: 7.1 G/DL (ref 6.4–8.2)
PROT UR STRIP-MCNC: 100 MG/DL
Q-T INTERVAL, ECG07: 326 MS
QRS DURATION, ECG06: 90 MS
QTC CALCULATION (BEZET), ECG08: 414 MS
RBC # BLD AUTO: 3.84 M/UL (ref 4.1–5.7)
RBC # BLD AUTO: 4.55 M/UL (ref 4.1–5.7)
RBC #/AREA URNS HPF: ABNORMAL /HPF (ref 0–5)
SAMPLES BEING HELD,HOLD: NORMAL
SODIUM SERPL-SCNC: 138 MMOL/L (ref 136–145)
SODIUM SERPL-SCNC: 139 MMOL/L (ref 136–145)
SP GR UR REFRACTOMETRY: 1.01 (ref 1–1.03)
TROPONIN I SERPL-MCNC: <0.05 NG/ML
UROBILINOGEN UR QL STRIP.AUTO: 1 EU/DL (ref 0.2–1)
VENTRICULAR RATE, ECG03: 97 BPM
WBC # BLD AUTO: 12.8 K/UL (ref 4.1–11.1)
WBC # BLD AUTO: 19.8 K/UL (ref 4.1–11.1)
WBC URNS QL MICRO: >100 /HPF (ref 0–4)

## 2020-10-26 PROCEDURE — 80048 BASIC METABOLIC PNL TOTAL CA: CPT

## 2020-10-26 PROCEDURE — 85027 COMPLETE CBC AUTOMATED: CPT

## 2020-10-26 PROCEDURE — 74011250636 HC RX REV CODE- 250/636: Performed by: FAMILY MEDICINE

## 2020-10-26 PROCEDURE — 74011250637 HC RX REV CODE- 250/637: Performed by: FAMILY MEDICINE

## 2020-10-26 PROCEDURE — 99223 1ST HOSP IP/OBS HIGH 75: CPT | Performed by: INTERNAL MEDICINE

## 2020-10-26 PROCEDURE — 74011000258 HC RX REV CODE- 258: Performed by: FAMILY MEDICINE

## 2020-10-26 PROCEDURE — 65660000000 HC RM CCU STEPDOWN

## 2020-10-26 RX ORDER — GABAPENTIN 300 MG/1
600 CAPSULE ORAL
Status: DISCONTINUED | OUTPATIENT
Start: 2020-10-26 | End: 2020-10-26 | Stop reason: DRUGHIGH

## 2020-10-26 RX ORDER — TRAZODONE HYDROCHLORIDE 50 MG/1
50 TABLET ORAL 2 TIMES DAILY
Status: DISCONTINUED | OUTPATIENT
Start: 2020-10-26 | End: 2020-10-26 | Stop reason: DRUGHIGH

## 2020-10-26 RX ORDER — FUROSEMIDE 20 MG/1
20 TABLET ORAL
COMMUNITY
End: 2021-10-27 | Stop reason: SDUPTHER

## 2020-10-26 RX ORDER — TRAZODONE HYDROCHLORIDE 150 MG/1
150 TABLET ORAL
COMMUNITY

## 2020-10-26 RX ORDER — GABAPENTIN 300 MG/1
300 CAPSULE ORAL
Status: DISCONTINUED | OUTPATIENT
Start: 2020-10-26 | End: 2020-10-30 | Stop reason: HOSPADM

## 2020-10-26 RX ORDER — GABAPENTIN 300 MG/1
300 CAPSULE ORAL
COMMUNITY

## 2020-10-26 RX ADMIN — Medication 10 ML: at 10:03

## 2020-10-26 RX ADMIN — Medication 10 ML: at 21:23

## 2020-10-26 RX ADMIN — GABAPENTIN 300 MG: 300 CAPSULE ORAL at 21:15

## 2020-10-26 RX ADMIN — SODIUM CHLORIDE 50 ML/HR: 900 INJECTION, SOLUTION INTRAVENOUS at 00:57

## 2020-10-26 RX ADMIN — Medication 10 ML: at 21:15

## 2020-10-26 RX ADMIN — APIXABAN 5 MG: 5 TABLET, FILM COATED ORAL at 10:03

## 2020-10-26 RX ADMIN — ACETAMINOPHEN 650 MG: 325 TABLET ORAL at 21:15

## 2020-10-26 RX ADMIN — SODIUM CHLORIDE 50 ML/HR: 900 INJECTION, SOLUTION INTRAVENOUS at 22:48

## 2020-10-26 RX ADMIN — TRAZODONE HYDROCHLORIDE 150 MG: 100 TABLET ORAL at 21:14

## 2020-10-26 RX ADMIN — APIXABAN 5 MG: 5 TABLET, FILM COATED ORAL at 21:15

## 2020-10-26 RX ADMIN — CEFTRIAXONE 2 G: 2 INJECTION, POWDER, FOR SOLUTION INTRAMUSCULAR; INTRAVENOUS at 10:03

## 2020-10-26 NOTE — PROGRESS NOTES
TRANSFER - IN REPORT:    Verbal report received on 10/25/2020 at 2330 from Kg Gaming RN (name) on Dennise Cordero  being received from ED (unit) for routine progression of care      Report consisted of patients Situation, Background, Assessment and   Recommendations(SBAR). Information from the following report(s) SBAR, Kardex, ED Summary, Intake/Output, MAR, Recent Results, Med Rec Status and Cardiac Rhythm intermittent Afib with pacing on demand was reviewed with the receiving nurse. Opportunity for questions and clarification was provided. 0030 Assessment completed upon patients arrival to unit and care assumed. Patient on 4L NC for SOB, has condom catheter with pramod colored urine draining. Used restroom on arrival to have a bowel movement. 0500: Patient was sleeping. Labs sent. Bedside and Verbal shift change report given to Darnell Park RN (oncoming nurse) by Bertha Elliott RN (offgoing nurse). Report included the following information SBAR, Kardex, ED Summary, Intake/Output, MAR, Recent Results, Med Rec Status and Cardiac Rhythm intermittent afib with pacing on demand.

## 2020-10-26 NOTE — PROGRESS NOTES
[facility]: MED RECONCILIATION    Comments/Recommendations:   Med rec interview conducted via phone  Patient appears to be a good historian and reports good compliance  Patient reports that he fills new prescriptions at [address] [facility], but gets most of his regular prescriptions through mail order ([facility]). Many of his medications (including apixaban) appear to have last been filled in May 2020. Patient reports that he is still taking these medications although they appear not to have bee filled recently. Patient could have had some medications left over or there were prescriptions filled that were not reported on Rx query. Patient knowledgeable with medications and why he takes them, so no reason to believe that he is not taking these medications. Medications added:     none    Medications removed:    Clonazepam 2 mg x 2 tablets QHS (this was apparently dc'd and patient's dose of trazodone was increased)    Medications adjusted:    Gabapentin 300 mg PO QHS (instead of 600 mg QHS)  Trazodone 300 mg QHS - Pt reports he has been taking two of the 150 mg tablets (300 mg total) for sleep but is considering cutting back to 1.5 tablets (225 mg) d/t grogginess    Information obtained from: patient, Rx query    Significant PMH/Disease States:   Past Medical History:   Diagnosis Date    Arthritis     left knee and lt. hand    Atrial fibrillation (HCC)     BPH (benign prostatic hyperplasia)     Chronic pain     Back pain    Colon polyps     DJD (degenerative joint disease) of lumbar spine     laminectomy 1979, 1991, 2015    ED (erectile dysfunction) 6/18/2014    Heart palpitations     Dr. Caro Saxena    Herniated nucleus pulposus, C3-4 12/2015    Dr. Miriam John    Hyperlipidemia     Hypertension     IFG (impaired fasting glucose)     Insomnia     severe.   has treated, AVI, back pain    Knee pain, right     Normal cardiac stress test 9/21/15    OA (osteoarthritis) of knee     Dr. Dori Antunez    Obesity Onychomycosis 2018    Dr Pedro Bartholomew. lamisil    AVI on CPAP     Dr. Sapna Mcdowell    Seasonal allergic rhinitis     Spinal stenosis in cervical region 12/2015    Spinal stenosis of lumbar region     MRI 12/2012. Dr. Julio Zacarias    SVT (supraventricular tachycardia) [facility])     Dr. Chandrakant Javier veins     vein stripping 10/10     Chief Complaint for this Admission:   Chief Complaint   Patient presents with    Bladder Infection    Urinary Frequency    Dizziness    Fatigue     Allergies: Percocet [oxycodone-acetaminophen] and Penicillin g    Prior to Admission Medications:     Medication Documentation Review Audit       Reviewed by Hipolito Herrera PHARMD (Pharmacist) on 10/26/20 at [phone]      Medication Sig Documenting Provider Last Dose Status Taking? amLODIPine (NORVASC) 5 mg tablet Take 1 Tab by mouth daily. Ana Koroma MD 10/24/2020 am Active Yes   atorvastatin (LIPITOR) 20 mg tablet TAKE 1 TABLET DAILY [facility] 10/24/2020 am Active Yes   chlorhexidine (PERIDEX) 0.12 % solution Take 15 mL by mouth two (2) times daily as needed. Provider, Historical  Active Yes   diclofenac (VOLTAREN) 1 % gel Apply 4 g to affected area daily as needed. Provider, Historical  Active Yes           Med Note (Silver Constant   Mon Oct 26, 2020  1:16 AM) PRN for knees, taken last approximately six months ago   Eliquis 5 mg tablet TAKE 1 TABLET TWICE A DAY Jaxson Austin MD 10/24/2020 pm Active Yes   furosemide (LASIX) 20 mg tablet Take 20 mg by mouth daily as needed. Provider, Historical 10/24/2020 1900 Active Yes   gabapentin (NEURONTIN) 300 mg capsule Take 300 mg by mouth nightly. Provider, Historical 10/24/2020 2200 Active Yes   levocetirizine (XYZAL) 5 mg tablet TAKE 1 TABLET DAILY [facility] 10/24/2020 am Active Yes   lisinopriL (PRINIVIL, ZESTRIL) 40 mg tablet TAKE 1 TABLET DAILY Vernell Zavala NP 10/24/2020 am Active Yes   metoprolol succinate (TOPROL-XL) 50 mg XL tablet Take 1 Tab by mouth daily.  Shannno Wise MD EDGARDO 10/24/2020 am Active Yes   terazosin (HYTRIN) 5 mg capsule TAKE 1 CAPSULE DAILY [facility] MD Kimberli 10/24/2020 0830 Active No   traZODone (DESYREL) 150 mg tablet Take 300 mg by mouth nightly. Provider, Historical 10/24/2020 pm Active Yes           Med Note (Ana Maria Oz   Mon Oct 26, 2020  8:56 AM) Pt reports he has been taking two of the 150 mg tablets (300 mg total) for sleep but is considering cutting back to 1.5 tablets (225 mg) d/t bay                  Thank you for the consult,  Aleksander ESPANA, [facility]

## 2020-10-26 NOTE — ED NOTES
Verbal report given to JOVITA Fiore(name) on Naga Alonso being transferred to 3rd floor(unit) for routine progression of care    Report consisted of patient's Situation, Background, Assessment and Recommendations (SBAR)    Information from the following report(s)  SBAR, ED Summary, Intake/Output, MAR, Recent Results and Cardiac Rhythm Afib Paced was reviewed with the receiving nurse. Opportunity for questions and clarification was provided.     Patient transported with:  Monitor  O2 @ 4 liters  Registered Nurse    Last Filed Values:  Temp: 99.1 °F (37.3 °C) (10/25/20 2245)  Pulse (Heart Rate): 67 (10/25/20 2245)  Resp Rate: 28 (10/25/20 2245)  O2 Sat (%): 97 % (10/25/20 2245)  BP: 107/69 (10/25/20 2245)  MAP (Monitor): 77 (10/25/20 2245)  MAP (Calculated): 80 (10/25/20 1400)  Level of Consciousness: Alert (10/25/20 2245)      No results found for: WBC, LAC    Repeat LA:  Time Due NA    Blood Cultures Drawn:  yes    Fluid Resuscitation:  Total needed 2673ml, Status completed, amount 2673    All Antibiotics Started:  yes, Dose Due no      VS x 2 post-fluid resuscitation:   yes    Vasopressor Infusion:  no no    Provider Reassessment needed and notified:  no , Due no    Additional Interventions/Comments:  No  Visit Vitals  /69 (BP 1 Location: Right arm, BP Patient Position: At rest)   Pulse 67   Temp 99.1 °F (37.3 °C)   Resp 28   Ht 6' 5\" (1.956 m)   Wt 119.7 kg (264 lb)   SpO2 97%   BMI 31.31 kg/m²

## 2020-10-26 NOTE — PROGRESS NOTES
Daily Progress Note: 10/26/2020  Adelita Jenkins MD    Assessment/Plan:   Urinary tract infection  -Recently treated for UTI with Cipro for 10 days  -Given ceftriaxone in the ED, will continue this for now  -Follow urine cultures and blood cultures     Sepsis  -Secondary to above  -Continue with gentle hydration and monitor     Acute hypoxic respiratory failure  -On 4 L nasal cannula  -History of cardiomyopathy, check BNP  -CT chest neg  -Check Covid     Symptomatic bradycardia  -S/p PPM insertion October 2019     Venous insufficiency  -S/p greater saphenous vein ablation  -Continue with Lasix as needed     Hypertension but now hypotensive due to sepsis  -Holding lisinopril, amlodipine, metoprolol     Atrial fibrillation  -Continue Eliquis  -Holding Metoprolol     Obstructive sleep apnea-  -Continue CPAP             Problem List:  Problem List as of 10/26/2020 Date Reviewed: 7/15/2020          Codes Class Noted - Resolved    Tachy-viv syndrome ([facility] ICD-10-CM: I49.5  ICD-9-CM: 427.81  10/4/2019 - Present        Sepsis ([facility] ICD-10-CM: A41.9  ICD-9-CM: 038.9, 995.91  10/25/2020 - Present        Peripheral vascular disease ([facility] ICD-10-CM: I73.9  ICD-9-CM: 443.9  6/17/2020 - Present        Advanced care planning/counseling discussion ICD-10-CM: Z71.89  ICD-9-CM: V65.49  2/3/2017 - Present    Overview Signed 2/3/2017  1:51 PM by Lillie Ballard RN     Patient states that a completed AMD is at home. NN encouraged patient to bring a copy to the office for scanning into the medical record. Patient verbalized understanding & agreement.                Paroxysmal atrial fibrillation (HCC) ICD-10-CM: I48.0  ICD-9-CM: 427.31  9/28/2016 - Present        BPH (benign prostatic hyperplasia) ICD-10-CM: N40.0  ICD-9-CM: 600.00  8/17/2016 - Present        Insomnia ICD-10-CM: G47.00  ICD-9-CM: 780.52  8/17/2016 - Present        Osteoarthritis of left knee ICD-10-CM: M17.12  ICD-9-CM: 715.96 8/15/2016 - Present        Spinal stenosis in cervical region ICD-10-CM: M48.02  ICD-9-CM: 723.0  1/5/2016 - Present        Herniated nucleus pulposus, C3-4 ICD-10-CM: M50.21  ICD-9-CM: 722.0  1/5/2016 - Present        Normal cardiac stress test ICD-10-CM: JED1201  ICD-9-CM: Marilou Paiz  9/21/2015 - Present        IFG (impaired fasting glucose) ICD-10-CM: R73.01  ICD-9-CM: 790.21  Unknown - Present        ED (erectile dysfunction) ICD-10-CM: N52.9  ICD-9-CM: 607.84  6/18/2014 - Present        SVT (supraventricular tachycardia) (HCC) ICD-10-CM: I47.1  ICD-9-CM: 427.89  Unknown - Present        Right knee DJD ICD-10-CM: M17.11  ICD-9-CM: 715.96  11/4/2013 - Present        Spinal stenosis of lumbar region ICD-10-CM: M48.061  ICD-9-CM: 724.02  Unknown - Present        Tear of medial cartilage or meniscus of knee, current ICD-10-CM: [id number]  ICD-9-CM: 836.0  2/1/2013 - Present        Osteoarthrosis, unspecified whether generalized or localized, lower leg ICD-10-CM: M17.10  ICD-9-CM: 715.96  2/1/2013 - Present        Palpitations ICD-10-CM: R00.2  ICD-9-CM: 785.1  12/12/2011 - Present        Supraventricular tachycardia ([address] ICD-10-CM: I47.1  ICD-9-CM: 427.89  12/12/2011 - Present        HLD (hyperlipidemia) ICD-10-CM: E78.5  ICD-9-CM: 272.4  12/12/2011 - Present        Essential hypertension, benign ICD-10-CM: I10  ICD-9-CM: 401.1  12/12/2011 - Present        Venous insufficiency ICD-10-CM: I87.2  ICD-9-CM: 459.81  12/12/2011 - Present        Colon polyps ICD-10-CM: K63.5  ICD-9-CM: 211.3  Unknown - Present        AVI (obstructive sleep apnea) ICD-10-CM: G47.33  ICD-9-CM: 327.23  Unknown - Present        Heart palpitations ICD-10-CM: R00.2  ICD-9-CM: 785.1  Unknown - Present    Overview Signed 7/27/2010  9:41 AM by MD Dr. Ifeoma Juárez             Hyperlipidemia with target LDL less than 130 ICD-10-CM: E78.5  ICD-9-CM: 272.4  Unknown - Present        BPH (benign prostatic hypertrophy) ICD-10-CM: N40.0  ICD-9-CM: 600.00  Unknown - Present        Lower back pain ICD-10-CM: M54.5  ICD-9-CM: 724.2  Unknown - Present    Overview Signed 7/27/2010  9:41 AM by Brendan Montesinos MD     hx lumbar laminectomyx2 w good results             Knee pain, right ICD-10-CM: M25.561  ICD-9-CM: 719.46  Unknown - Present        Right sided sciatica ICD-10-CM: M54.31  ICD-9-CM: 724.3  Unknown - Present        RESOLVED: Tear of lateral cartilage or meniscus of knee, current ICD-10-CM: I26.996T  ICD-9-CM: 836.1  2/1/2013 - 3/19/2013        RESOLVED: Hypertension ICD-10-CM: I10  ICD-9-CM: 401.9  Unknown - 2/4/2017              Subjective:   Humaira Jalloh is a 66 y.o. male who presented with weakness since this morning. He states he was trying to get out of bed this morning to go to the bathroom but was too weak. He went to bed last night feeling a little bit dizzy however was otherwise feeling okay. According to his wife he was having darker colored urine with some noticeable blood. He recently completed a 10-day course of Cipro for UTI and was feeling better. His wife states that she checked his oxygen levels and it was in the low 80s. It improved after she asked him to take some deep breaths. He denies any shortness of breath, or cough. He did not have a fever at home however had a fever of 101 upon arrival to the emergency department. He denies any nausea, vomiting, abdominal pain, or diarrhea. (Dr Jewell Martinez)    10/26: Feeling better this am. BP are still a bit low and holding home BP meds. Will add back as appropriate. Cultures pending. LA has improved and WBC is better. Tmax 101 yesterday and low grade this am.     Review of Systems:   A comprehensive review of systems was negative except for that written in the HPI.     Objective:   Physical Exam:     Visit Vitals  /68 (BP 1 Location: Right arm, BP Patient Position: At rest)   Pulse 71   Temp 99.2 °F (37.3 °C)   Resp 20   Ht 6' 5\" (1.956 m)   Wt 121.4 kg (267 lb 9.6 oz)   SpO2 96%   BMI 31.73 kg/m²    O2 Flow Rate (L/min): 4 l/min O2 Device: Nasal cannula    Temp (24hrs), Av.4 °F (37.4 °C), Min:98.6 °F (37 °C), Max:101 °F (38.3 °C)    No intake/output data recorded. 10/24 1901 - 10/26 0700  In: 200 [P.O.:200]  Out: 750 [Urine:750]    General:  Alert, cooperative, no distress, appears stated age. Head:  Normocephalic, without obvious abnormality, atraumatic. Eyes:  Conjunctivae/corneas clear. PERRL, EOMs intact. Nose: Nares normal. Septum midline. Mucosa normal. No drainage or sinus tenderness. Throat: Lips, mucosa, and tongue moist..   Neck: Supple, symmetrical, trachea midline, no adenopathy, thyroid: no enlargement/tenderness/nodules, no carotid bruit and no JVD. Back:   Symmetric, no curvature. ROM normal. No CVA tenderness. Lungs:   Clear to auscultation bilaterally. Chest wall:  No tenderness or deformity. Heart:  Regular rate and rhythm, S1, S2 normal, no murmur, click, rub or gallop. Abdomen:   Soft, non-tender. Bowel sounds normal. No masses,  No organomegaly. Extremities: no cyanosis or edema. No calf tenderness or cords. Pulses: 2+ and symmetric all extremities. Skin: Skin color, texture, turgor normal. No rashes or lesions   Neurologic: CNII-XII intact. Alert and oriented X 3. Fine motor of hands and fingers normal.   equal.  No cogwheeling or rigidity. Gait not tested at this time. Sensation grossly normal to touch. Gross motor of extremities normal.       Data Review:    FINDINGS:CT Chest 10/25/20  There is mild dependent atelectasis and mild subsegmental atelectasis in the  lower lung fields. The upper lungs are clear. Pulmonary, mediastinal beck, and  splenic calcifications are evidence of old granulomatous disease. The central  airways are patent. No pneumothorax or pleural effusion. The heart is enlarged. A pacemaker is in place. Trace pericardial fluid in the aortic recess is likely  physiologic. No thoracic lymphadenopathy.  Incidental note is made of hepatic  cysts and a probable partially imaged left renal cyst.     IMPRESSION: No acute process. Recent Days:  Recent Labs     10/26/20  0510 10/25/20  1208   WBC 12.8* 19.8*   HGB 11.6* 14.0   HCT 34.9* 41.6   * 188     Recent Labs     10/26/20  0510 10/25/20  1208    139   K 3.5 3.4*    105   CO2 26 25   * 109*   BUN 22* 18   CREA 1.25 1.39*   CA 7.5* 8.5   ALB  --  3.6   TBILI  --  1.2*   ALT  --  21     No results for input(s): PH, PCO2, PO2, HCO3, FIO2 in the last 72 hours. 24 Hour Results:  Recent Results (from the past 24 hour(s))   NT-PRO BNP    Collection Time: 10/25/20 12:08 PM   Result Value Ref Range    NT pro-BNP 3,988 (H) <450 PG/ML   CBC WITH AUTOMATED DIFF    Collection Time: 10/25/20 12:08 PM   Result Value Ref Range    WBC 19.8 (H) 4.1 - 11.1 K/uL    RBC 4.55 4.10 - 5.70 M/uL    HGB 14.0 12.1 - 17.0 g/dL    HCT 41.6 36.6 - 50.3 %    MCV 91.4 80.0 - 99.0 FL    MCH 30.8 26.0 - 34.0 PG    MCHC 33.7 30.0 - 36.5 g/dL    RDW 13.8 11.5 - 14.5 %    PLATELET 040 279 - 793 K/uL    MPV 11.1 8.9 - 12.9 FL    NRBC 0.0 0  WBC    ABSOLUTE NRBC 0.00 0.00 - 0.01 K/uL    NEUTROPHILS 93 (H) 32 - 75 %    LYMPHOCYTES 2 (L) 12 - 49 %    MONOCYTES 4 (L) 5 - 13 %    EOSINOPHILS 0 0 - 7 %    BASOPHILS 0 0 - 1 %    IMMATURE GRANULOCYTES 1 (H) 0.0 - 0.5 %    ABS. NEUTROPHILS 18.5 (H) 1.8 - 8.0 K/UL    ABS. LYMPHOCYTES 0.3 (L) 0.8 - 3.5 K/UL    ABS. MONOCYTES 0.8 0.0 - 1.0 K/UL    ABS. EOSINOPHILS 0.0 0.0 - 0.4 K/UL    ABS. BASOPHILS 0.1 0.0 - 0.1 K/UL    ABS. IMM.  GRANS. 0.2 (H) 0.00 - 0.04 K/UL    DF AUTOMATED     METABOLIC PANEL, COMPREHENSIVE    Collection Time: 10/25/20 12:08 PM   Result Value Ref Range    Sodium 139 136 - 145 mmol/L    Potassium 3.4 (L) 3.5 - 5.1 mmol/L    Chloride 105 97 - 108 mmol/L    CO2 25 21 - 32 mmol/L    Anion gap 9 5 - 15 mmol/L    Glucose 109 (H) 65 - 100 mg/dL    BUN 18 6 - 20 MG/DL    Creatinine 1.39 (H) 0.70 - 1.30 MG/DL BUN/Creatinine ratio 13 12 - 20      GFR est AA >60 >60 ml/min/1.73m2    GFR est non-AA 50 (L) >60 ml/min/1.73m2    Calcium 8.5 8.5 - 10.1 MG/DL    Bilirubin, total 1.2 (H) 0.2 - 1.0 MG/DL    ALT (SGPT) 21 12 - 78 U/L    AST (SGOT) 13 (L) 15 - 37 U/L    Alk. phosphatase 48 45 - 117 U/L    Protein, total 7.1 6.4 - 8.2 g/dL    Albumin 3.6 3.5 - 5.0 g/dL    Globulin 3.5 2.0 - 4.0 g/dL    A-G Ratio 1.0 (L) 1.1 - 2.2     TROPONIN I    Collection Time: 10/25/20 12:08 PM   Result Value Ref Range    Troponin-I, Qt. <0.05 <0.05 ng/mL   SAMPLES BEING HELD    Collection Time: 10/25/20 12:08 PM   Result Value Ref Range    SAMPLES BEING HELD 1BLU,1SST,1RD     COMMENT        Add-on orders for these samples will be processed based on acceptable specimen integrity and analyte stability, which may vary by analyte. LACTIC ACID    Collection Time: 10/25/20 12:35 PM   Result Value Ref Range    Lactic acid 4.1 (HH) 0.4 - 2.0 MMOL/L   SAMPLES BEING HELD    Collection Time: 10/25/20 12:53 PM   Result Value Ref Range    SAMPLES BEING HELD UC     COMMENT        Add-on orders for these samples will be processed based on acceptable specimen integrity and analyte stability, which may vary by analyte.    EKG, 12 LEAD, INITIAL    Collection Time: 10/25/20 12:55 PM   Result Value Ref Range    Ventricular Rate 97 BPM    Atrial Rate 105 BPM    QRS Duration 90 ms    Q-T Interval 326 ms    QTC Calculation (Bezet) 414 ms    Calculated R Axis 17 degrees    Calculated T Axis -62 degrees    Diagnosis       Atrial fibrillation  ST & T wave abnormality, consider inferior ischemia  ST & T wave abnormality, consider anterolateral ischemia  Abnormal ECG  When compared with ECG of 18-AUG-2016 12:54,  T wave inversion now evident in Inferior leads  T wave inversion now evident in Anterolateral leads     CULTURE, BLOOD, PAIRED    Collection Time: 10/25/20  1:01 PM    Specimen: Blood   Result Value Ref Range    Special Requests: NO SPECIAL REQUESTS      Culture result:        FOUR OF FOUR BOTTLES HAVE BEEN FLAGGED POSITIVE BY INSTRUMENT. BOTTLES HAVE BEEN SENT TO [address] [place] [address] LABORATORY TO ASSESS FOR POSSIBLE GROWTH.    LACTIC ACID    Collection Time: 10/25/20  2:45 PM   Result Value Ref Range    Lactic acid 1.2 0.4 - 2.0 MMOL/L   SARS-COV-2    Collection Time: 10/25/20  6:44 PM   Result Value Ref Range    Specimen source Nasopharyngeal      SARS-CoV-2 PENDING     SARS-CoV-2 PENDING     Specimen source Nasopharyngeal      COVID-19 rapid test PENDING     Specimen type NP Swab      Health status PENDING     COVID-19 PENDING    METABOLIC PANEL, BASIC    Collection Time: 10/26/20  5:10 AM   Result Value Ref Range    Sodium 138 136 - 145 mmol/L    Potassium 3.5 3.5 - 5.1 mmol/L    Chloride 108 97 - 108 mmol/L    CO2 26 21 - 32 mmol/L    Anion gap 4 (L) 5 - 15 mmol/L    Glucose 113 (H) 65 - 100 mg/dL    BUN 22 (H) 6 - 20 MG/DL    Creatinine 1.25 0.70 - 1.30 MG/DL    BUN/Creatinine ratio 18 12 - 20      GFR est AA >60 >60 ml/min/1.73m2    GFR est non-AA 56 (L) >60 ml/min/1.73m2    Calcium 7.5 (L) 8.5 - 10.1 MG/DL   CBC W/O DIFF    Collection Time: 10/26/20  5:10 AM   Result Value Ref Range    WBC 12.8 (H) 4.1 - 11.1 K/uL    RBC 3.84 (L) 4.10 - 5.70 M/uL    HGB 11.6 (L) 12.1 - 17.0 g/dL    HCT 34.9 (L) 36.6 - 50.3 %    MCV 90.9 80.0 - 99.0 FL    MCH 30.2 26.0 - 34.0 PG    MCHC 33.2 30.0 - 36.5 g/dL    RDW 14.1 11.5 - 14.5 %    PLATELET 410 (L) 502 - 400 K/uL    MPV 11.7 8.9 - 12.9 FL    NRBC 0.0 0  WBC    ABSOLUTE NRBC 0.00 0.00 - 0.01 K/uL       Medications reviewed  Current Facility-Administered Medications   Medication Dose Route Frequency    sodium chloride (NS) flush 5-10 mL  5-10 mL IntraVENous PRN    cefTRIAXone (ROCEPHIN) 1 g in sterile water (preservative free) 10 mL IV syringe  1 g IntraVENous Q24H    sodium chloride (NS) flush 5-40 mL  5-40 mL IntraVENous Q8H    sodium chloride (NS) flush 5-40 mL  5-40 mL IntraVENous PRN    acetaminophen (TYLENOL) tablet 650 mg  650 mg Oral Q6H PRN    Or    acetaminophen (TYLENOL) suppository 650 mg  650 mg Rectal Q6H PRN    polyethylene glycol (MIRALAX) packet 17 g  17 g Oral DAILY PRN    promethazine (PHENERGAN) tablet 12.5 mg  12.5 mg Oral Q6H PRN    Or    ondansetron (ZOFRAN) injection 4 mg  4 mg IntraVENous Q6H PRN    0.9% sodium chloride infusion  50 mL/hr IntraVENous CONTINUOUS       Care Plan discussed with: Patient/Family    Total time spent with patient: 30 minutes.     Meghan Abebe MD

## 2020-10-26 NOTE — CONSULTS
Infectious Disease Consult    Impression/Plan   · Gram-negative carlos bacteremia. With recent history of UTI, dysuria and frequency suspect this is due to a urinary tract infection. Improving on ceftriaxone with no more fevers and improvement of leukocytosis. Urine culture is pending. We will repeat blood cultures to document sterilization of blood in the setting of pacemaker. Renal ultrasound will be obtained once COVID-19 testing has been completed. Further recommendations pending results of above  · Penicillin allergy. This caused a rash approximately 25 years ago. He is able to tolerate cephalosporin antibiotics  · STEPHANIE. Resolving  · Hypoxic respiratory failure. Doubt Covid with negative CT scan. · Symptomatic bradycardia. Status post pacemaker placement 10/2019      Anti-infectives:   1. Ceftriaxone    Subjective:   Date of Consultation:  October 26, 2020  Date of Admission: 10/25/2020   Referring Physician:     Patient is a 66 y.o. male with a past medical history significant for atrial fibrillation, dyslipidemia, and obesity who was admitted to [place] [address] for evaluation of weakness. Patient states that on the day of admission he was too weak to get out of bed to go to the bathroom. Previous night he felt mild dizziness but otherwise was feeling okay. He does admit to dysuria and frequency over the last several days. Approximately a week and a half ago he completed a 10-day course of ciprofloxacin for a urinary tract infection. He denies any other specific complaints. He is found to be febrile in the emergency department with a temperature of 101 °F.  His white count was 19,000. He has been started on ceftriaxone. Blood cultures returned growing gram-negative rods and 4 out of 4 bottles.   The infectious diseases service has been asked to assist with antibiotic management     Patient Active Problem List   Diagnosis Code    Heart palpitations R00.2    Hyperlipidemia with target LDL less than 130 E78.5    BPH (benign prostatic hypertrophy) N40.0    Lower back pain M54.5    Knee pain, right M25.561    Right sided sciatica M54.31    AVI (obstructive sleep apnea) G47.33    Colon polyps K63.5    Palpitations R00.2    Supraventricular tachycardia (HCC) I47.1    HLD (hyperlipidemia) E78.5    Essential hypertension, benign I10    Venous insufficiency I87.2    Tear of medial cartilage or meniscus of knee, current [id number]    Osteoarthrosis, unspecified whether generalized or localized, lower leg M17.10    Spinal stenosis of lumbar region M48.061    Right knee DJD M17.11    IFG (impaired fasting glucose) R73.01    ED (erectile dysfunction) N52.9    SVT (supraventricular tachycardia) (HCC) I47.1    Spinal stenosis in cervical region M48.02    Herniated nucleus pulposus, C3-4 M50.21    Normal cardiac stress test [id number]    Osteoarthritis of left knee M17.12    BPH (benign prostatic hyperplasia) N40.0    Insomnia G47.00    Paroxysmal atrial fibrillation (HCC) I48.0    Advanced care planning/counseling discussion Z71.89    Tachy-viv syndrome (HCC) I49.5    Peripheral vascular disease (HCC) I73.9    Sepsis ([facility] A41.9     Past Medical History:   Diagnosis Date    Arthritis     left knee and lt. hand    Atrial fibrillation (HCC)     BPH (benign prostatic hyperplasia)     Chronic pain     Back pain    Colon polyps     DJD (degenerative joint disease) of lumbar spine     laminectomy 1979, 1991, 2015    ED (erectile dysfunction) 6/18/2014    Heart palpitations     Dr. Arpita Gerardo    Herniated nucleus pulposus, C3-4 12/2015    Dr. Jaimes Ratejosefa    Hyperlipidemia     Hypertension     IFG (impaired fasting glucose)     Insomnia     severe. has treated, AVI, back pain    Knee pain, right     Normal cardiac stress test 9/21/15    OA (osteoarthritis) of knee     Dr. Rafaela Galindo Obesity     Onychomycosis 2018    Dr Nicko Camacho.   lamisil    AVI on CPAP     Dr. Araceli Martin allergic rhinitis     Spinal stenosis in cervical region 2015    Spinal stenosis of lumbar region     MRI 2012. Dr. Jaciel Snowden    SVT (supraventricular tachycardia) [facility])     Dr. Vaughn Fernando Varicose veins     vein stripping 10/10      Family History   Problem Relation Age of Onset    Cancer Mother         liver    Cancer Father         leukemia    Cancer Sister         lung cancer      Social History     Tobacco Use    Smoking status: Former Smoker     Packs/day: 0.25     Years: 19.00     Pack years: 4.75     Types: Cigarettes     Last attempt to quit: 1980     Years since quittin.8    Smokeless tobacco: Never Used    Tobacco comment: quit ~   Substance Use Topics    Alcohol use: Yes     Alcohol/week: 5.0 standard drinks     Types: 5 Glasses of wine per week     Comment: socially     Past Surgical History:   Procedure Laterality Date    COLONOSCOPY      2 polyps. repeat . Dr. Rusty David  2007    7 polyps per pt     COLONOSCOPY N/A 6/15/2016    COLONOSCOPY performed by Woody Gordon MD at [address] [place] [address], [place], DIAGNOSTIC      return in     HX BLADDER REPAIR      polyp removal    HX CERVICAL FUSION  16    ACDF C3-C4 Dr. Jaciel Snowden    HX COLONOSCOPY  11    Dr Edie Law, smal polyp.   repeat 2016    HX KNEE ARTHROSCOPY      right, Dr. Markell Berrios    HX KNEE ARTHROSCOPY  2012    left, Dr Elmer Nelson ARTHROSCOPY  2013    right    HX KNEE REPLACEMENT  2013    HX LUMBAR FUSION  2015    L3-L4, Dr. Patsy Dover      left, Dr Minerva Morris    bladder polyp removed with cystoscopy    HX VEIN STRIPPING  2010    Dr. Edmond Chambers, bilateral    MO INS NEW/RPLC PRM PACEMAKER W/TRANSV ELTRD VENTR N/A 10/4/2019    INSERT PPM SINGLE VENTRICULAR/[employer] performed by Gurvinder Mullen MD at [address] [place] [address] CATH LAB    MO INS NEW/RPLCMT PRM PM W/TRANSV ELTRD ATRIAL&VENT N/A 10/4/2019    Insert Ppm Dual performed by Josette Lockwood MD at [address] CATH LAB    LA REMOVAL SUBCUTANEOUS CARDIAC RHYTHM MONITOR N/A 10/4/2019    LOOP RECORDER REMOVAL performed by Josette Lockwood MD at [address] CATH LAB    SHOULDER SURG [address] UNLISTED      left DECOMPRESSION, Dr. Maite Austin  11/2013    VASCULAR SURGERY PROCEDURE UNLIST  2008    bilateral vein stripping      Prior to Admission medications    Medication Sig Start Date End Date Taking? Authorizing Provider   gabapentin (NEURONTIN) 300 mg capsule Take 300 mg by mouth nightly. Yes Provider, Historical   furosemide (LASIX) 20 mg tablet Take 20 mg by mouth daily as needed. Yes Provider, Historical   traZODone (DESYREL) 150 mg tablet Take 300 mg by mouth nightly. Yes Provider, Historical   lisinopriL (PRINIVIL, ZESTRIL) 40 mg tablet TAKE 1 TABLET DAILY 7/17/20  Yes Maximiliano Nichols NP   diclofenac (VOLTAREN) 1 % gel Apply 4 g to affected area daily as needed. 2/6/20  Yes Provider, Historical   chlorhexidine (PERIDEX) 0.12 % solution Take 15 mL by mouth two (2) times daily as needed. 5/11/20  Yes Provider, Historical   Eliquis 5 mg tablet TAKE 1 TABLET TWICE A DAY 5/6/20  Yes Josette Lockwood MD   levocetirizine (XYZAL) 5 mg tablet TAKE 1 TABLET DAILY 4/20/20  Yes Anmol Rey MD   atorvastatin (LIPITOR) 20 mg tablet TAKE 1 TABLET DAILY 2/10/20  Yes Anmol Rey MD   metoprolol succinate (TOPROL-XL) 50 mg XL tablet Take 1 Tab by mouth daily. 10/23/19  Yes Josette Lockwood MD   amLODIPine (NORVASC) 5 mg tablet Take 1 Tab by mouth daily. 10/22/19  Yes Anmol Rey MD   terazosin (HYTRIN) 5 mg capsule TAKE 1 CAPSULE DAILY 10/15/20   Anmol Rey MD     Allergies   Allergen Reactions    Percocet [Oxycodone-Acetaminophen] Other (comments)     hallucinations    Penicillin G Rash     Did okay with keflex - no reaction with that. Review of Systems:  A comprehensive review of systems was negative except for that written in the History of Present Illness. Objective:   Blood pressure 121/79, pulse 77, temperature 98.4 °F (36.9 °C), resp. rate 20, height 6' 5\" (1.956 m), weight 267 lb 9.6 oz (121.4 kg), SpO2 98 %.   Temp (24hrs), Av.9 °F (37.2 °C), Min:97.7 °F (36.5 °C), Max:99.9 °F (37.7 °C)       Exam:    General:  Alert, cooperative, well noursished, well developed, appears stated age   Eyes:  Sclera anicteric   Mouth/Throat: Mucous membranes moist   Neck: Supple   Lungs:   Clear to auscultation anteriorly   CV:  Regular rate and rhythm   Abdomen:    Obese, nondistended   Extremities:    Skin:  No rash   Lymph nodes:    Musculoskeletal:    Lines/Devices:  Intact, no erythema, drainage or tenderness   Psych: Alert and oriented, normal mood affect given the setting       Data Review:   Recent Results (from the past 24 hour(s))   LACTIC ACID    Collection Time: 10/25/20  2:45 PM   Result Value Ref Range    Lactic acid 1.2 0.4 - 2.0 MMOL/L   SARS-COV-2    Collection Time: 10/25/20  6:44 PM   Result Value Ref Range    Specimen source Nasopharyngeal      SARS-CoV-2 PENDING     SARS-CoV-2 PENDING     Specimen source Nasopharyngeal      COVID-19 rapid test PENDING     Specimen type NP Swab      Health status PENDING     COVID-19 PENDING    METABOLIC PANEL, BASIC    Collection Time: 10/26/20  5:10 AM   Result Value Ref Range    Sodium 138 136 - 145 mmol/L    Potassium 3.5 3.5 - 5.1 mmol/L    Chloride 108 97 - 108 mmol/L    CO2 26 21 - 32 mmol/L    Anion gap 4 (L) 5 - 15 mmol/L    Glucose 113 (H) 65 - 100 mg/dL    BUN 22 (H) 6 - 20 MG/DL    Creatinine 1.25 0.70 - 1.30 MG/DL    BUN/Creatinine ratio 18 12 - 20      GFR est AA >60 >60 ml/min/1.73m2    GFR est non-AA 56 (L) >60 ml/min/1.73m2    Calcium 7.5 (L) 8.5 - 10.1 MG/DL   CBC W/O DIFF    Collection Time: 10/26/20  5:10 AM   Result Value Ref Range    WBC 12.8 (H) 4.1 - 11.1 K/uL    RBC 3.84 (L) 4.10 - 5.70 M/uL    HGB 11.6 (L) 12.1 - 17.0 g/dL    HCT 34.9 (L) 36.6 - 50.3 %    MCV 90.9 80.0 - 99.0 FL    MCH 30.2 26.0 - 34.0 PG    MCHC 33.2 30.0 - 36.5 g/dL    RDW 14.1 11.5 - 14.5 %    PLATELET 949 (L) 034 - 400 K/uL    MPV 11.7 8.9 - 12.9 FL    NRBC 0.0 0  WBC    ABSOLUTE NRBC 0.00 0.00 - 0.01 K/uL        Microbiology:      Studies:      Signed By: Jacqueline Etienne DO     October 26, 2020

## 2020-10-26 NOTE — PROGRESS NOTES
Bedside and Verbal shift change report given to Everton Unger RN (oncoming nurse) by Isai Kenyon RN (offgoing nurse). Report included the following information SBAR, Kardex, Intake/Output, MAR, Accordion and Recent Results. Bedside and Verbal shift change report given to Indra Alfaro RN (oncoming nurse) by Everton Unger RN (offgoing nurse). Report included the following information SBAR, Kardex, Intake/Output, MAR, Accordion and Recent Results.

## 2020-10-26 NOTE — PROGRESS NOTES
Problem: Falls - Risk of  Goal: *Absence of Falls  Description: Document Cailin Krishnanasin Fall Risk and appropriate interventions in the flowsheet.   Outcome: Progressing Towards Goal  Note: Fall Risk Interventions:  Mobility Interventions: Bed/chair exit alarm, Communicate number of staff needed for ambulation/transfer         Medication Interventions: Bed/chair exit alarm    Elimination Interventions: Call light in reach, Bed/chair exit alarm, Patient to call for help with toileting needs, Stay With Me (per policy)              Problem: Urinary Tract Infection - Adult  Goal: *Absence of infection signs and symptoms  Outcome: Not Progressing Towards Goal

## 2020-10-27 ENCOUNTER — APPOINTMENT (OUTPATIENT)
Dept: ULTRASOUND IMAGING | Age: 75
DRG: 871 | End: 2020-10-27
Attending: FAMILY MEDICINE
Payer: MEDICARE

## 2020-10-27 LAB
BACTERIA SPEC CULT: ABNORMAL
CC UR VC: ABNORMAL
HEALTH STATUS, XMCV2T: NORMAL
SARS-COV-2, COV2: NOT DETECTED
SERVICE CMNT-IMP: ABNORMAL
SOURCE, COVRS: NORMAL
SPECIMEN SOURCE, FCOV2M: NORMAL
SPECIMEN TYPE, XMCV1T: NORMAL

## 2020-10-27 PROCEDURE — 74011250637 HC RX REV CODE- 250/637: Performed by: FAMILY MEDICINE

## 2020-10-27 PROCEDURE — 77030040831 HC BAG URINE DRNG MDII -A

## 2020-10-27 PROCEDURE — 74011000258 HC RX REV CODE- 258: Performed by: FAMILY MEDICINE

## 2020-10-27 PROCEDURE — 2709999900 HC NON-CHARGEABLE SUPPLY

## 2020-10-27 PROCEDURE — 77010033678 HC OXYGEN DAILY

## 2020-10-27 PROCEDURE — 65660000000 HC RM CCU STEPDOWN

## 2020-10-27 PROCEDURE — 36415 COLL VENOUS BLD VENIPUNCTURE: CPT

## 2020-10-27 PROCEDURE — 77030027138 HC INCENT SPIROMETER -A

## 2020-10-27 PROCEDURE — 74011250636 HC RX REV CODE- 250/636: Performed by: FAMILY MEDICINE

## 2020-10-27 PROCEDURE — 87040 BLOOD CULTURE FOR BACTERIA: CPT

## 2020-10-27 PROCEDURE — 99232 SBSQ HOSP IP/OBS MODERATE 35: CPT | Performed by: INTERNAL MEDICINE

## 2020-10-27 PROCEDURE — 76770 US EXAM ABDO BACK WALL COMP: CPT

## 2020-10-27 RX ORDER — AMLODIPINE BESYLATE 5 MG/1
5 TABLET ORAL DAILY
Status: DISCONTINUED | OUTPATIENT
Start: 2020-10-28 | End: 2020-10-27

## 2020-10-27 RX ORDER — AMLODIPINE BESYLATE 5 MG/1
5 TABLET ORAL
Status: COMPLETED | OUTPATIENT
Start: 2020-10-27 | End: 2020-10-27

## 2020-10-27 RX ORDER — AMLODIPINE BESYLATE 5 MG/1
10 TABLET ORAL DAILY
Status: DISCONTINUED | OUTPATIENT
Start: 2020-10-28 | End: 2020-10-30 | Stop reason: HOSPADM

## 2020-10-27 RX ORDER — METOPROLOL SUCCINATE 50 MG/1
50 TABLET, EXTENDED RELEASE ORAL DAILY
Status: DISCONTINUED | OUTPATIENT
Start: 2020-10-28 | End: 2020-10-30

## 2020-10-27 RX ADMIN — AMLODIPINE BESYLATE 5 MG: 5 TABLET ORAL at 21:34

## 2020-10-27 RX ADMIN — Medication 10 ML: at 21:35

## 2020-10-27 RX ADMIN — TRAZODONE HYDROCHLORIDE 150 MG: 100 TABLET ORAL at 21:33

## 2020-10-27 RX ADMIN — GABAPENTIN 300 MG: 300 CAPSULE ORAL at 21:32

## 2020-10-27 RX ADMIN — SODIUM CHLORIDE 50 ML/HR: 900 INJECTION, SOLUTION INTRAVENOUS at 20:53

## 2020-10-27 RX ADMIN — APIXABAN 5 MG: 5 TABLET, FILM COATED ORAL at 10:09

## 2020-10-27 RX ADMIN — CEFTRIAXONE 2 G: 2 INJECTION, POWDER, FOR SOLUTION INTRAMUSCULAR; INTRAVENOUS at 10:09

## 2020-10-27 RX ADMIN — APIXABAN 5 MG: 5 TABLET, FILM COATED ORAL at 21:32

## 2020-10-27 RX ADMIN — ACETAMINOPHEN 650 MG: 325 TABLET ORAL at 21:38

## 2020-10-27 NOTE — ROUTINE PROCESS
Bedside shift change report given to April (oncoming nurse) by Paty Arnold (offgoing nurse). Report included the following information SBAR, Kardex, Intake/Output, MAR, Accordion and Recent Results.

## 2020-10-27 NOTE — PROGRESS NOTES
Daily Progress Note: 10/27/2020  Yony Palomino MD    Assessment/Plan:   Urinary tract infection  -Recently treated for UTI with Cipro for 10 days  -Given ceftriaxone in the ED, Increased to 2gm daily on 10/26  -BC and UC klebsiella  - Renal US     Sepsis  -Secondary to above  -Continue with gentle hydration and monitor     Acute hypoxic respiratory failure  -Off oxygen  -History of cardiomyopathy, check BNP  -CT chest neg  -Covid neg     Symptomatic bradycardia  -S/p PPM insertion October 2019     Venous insufficiency  -S/p greater saphenous vein ablation  -Continue with Lasix as needed     Hypertension but now hypotensive due to sepsis  -Restart amlodipine and metoprolol  -Holding Lisinopril     Atrial fibrillation  -Continue Eliquis  -Holding Metoprolol     Obstructive sleep apnea-  -Continue CPAP             Problem List:  Problem List as of 10/27/2020 Date Reviewed: 7/15/2020          Codes Class Noted - Resolved    Tachy-viv syndrome ([facility] ICD-10-CM: I49.5  ICD-9-CM: 427.81  10/4/2019 - Present        Sepsis ([facility] ICD-10-CM: A41.9  ICD-9-CM: 038.9, 995.91  10/25/2020 - Present        Peripheral vascular disease ([facility] ICD-10-CM: I73.9  ICD-9-CM: 443.9  6/17/2020 - Present        Advanced care planning/counseling discussion ICD-10-CM: Z71.89  ICD-9-CM: V65.49  2/3/2017 - Present    Overview Signed 2/3/2017  1:51 PM by Lorraine Olszewski, RN     Patient states that a completed AMD is at home. NN encouraged patient to bring a copy to the office for scanning into the medical record. Patient verbalized understanding & agreement.                Paroxysmal atrial fibrillation (HCC) ICD-10-CM: I48.0  ICD-9-CM: 427.31  9/28/2016 - Present        BPH (benign prostatic hyperplasia) ICD-10-CM: N40.0  ICD-9-CM: 600.00  8/17/2016 - Present        Insomnia ICD-10-CM: G47.00  ICD-9-CM: 780.52  8/17/2016 - Present        Osteoarthritis of left knee ICD-10-CM: M17.12  ICD-9-CM: 715.96  8/15/2016 - Present        Spinal stenosis in cervical region ICD-10-CM: M48.02  ICD-9-CM: 723.0  1/5/2016 - Present        Herniated nucleus pulposus, C3-4 ICD-10-CM: M50.21  ICD-9-CM: 722.0  1/5/2016 - Present        Normal cardiac stress test ICD-10-CM: [id number]  ICD-9-CM: Wilfr[place]  9/21/2015 - Present        IFG (impaired fasting glucose) ICD-10-CM: R73.01  ICD-9-CM: 790.21  Unknown - Present        ED (erectile dysfunction) ICD-10-CM: N52.9  ICD-9-CM: 607.84  6/18/2014 - Present        SVT (supraventricular tachycardia) (HCC) ICD-10-CM: I47.1  ICD-9-CM: 427.89  Unknown - Present        Right knee DJD ICD-10-CM: M17.11  ICD-9-CM: 715.96  11/4/2013 - Present        Spinal stenosis of lumbar region ICD-10-CM: M48.061  ICD-9-CM: 724.02  Unknown - Present        Tear of medial cartilage or meniscus of knee, current ICD-10-CM: [id number]  ICD-9-CM: 836.0  2/1/2013 - Present        Osteoarthrosis, unspecified whether generalized or localized, lower leg ICD-10-CM: M17.10  ICD-9-CM: 715.96  2/1/2013 - Present        Palpitations ICD-10-CM: R00.2  ICD-9-CM: 785.1  12/12/2011 - Present        Supraventricular tachycardia ([address] ICD-10-CM: I47.1  ICD-9-CM: 427.89  12/12/2011 - Present        HLD (hyperlipidemia) ICD-10-CM: E78.5  ICD-9-CM: 272.4  12/12/2011 - Present        Essential hypertension, benign ICD-10-CM: I10  ICD-9-CM: 401.1  12/12/2011 - Present        Venous insufficiency ICD-10-CM: I87.2  ICD-9-CM: 459.81  12/12/2011 - Present        Colon polyps ICD-10-CM: K63.5  ICD-9-CM: 211.3  Unknown - Present        AVI (obstructive sleep apnea) ICD-10-CM: G47.33  ICD-9-CM: 327.23  Unknown - Present        Heart palpitations ICD-10-CM: R00.2  ICD-9-CM: 785.1  Unknown - Present    Overview Signed 7/27/2010  9:41 AM by MD Dr. Alana Padilla             Hyperlipidemia with target LDL less than 130 ICD-10-CM: E78.5  ICD-9-CM: 272.4  Unknown - Present        BPH (benign prostatic hypertrophy) ICD-10-CM: N40.0  ICD-9-CM: 600.00 Unknown - Present        Lower back pain ICD-10-CM: M54.5  ICD-9-CM: 724.2  Unknown - Present    Overview Signed 7/27/2010  9:41 AM by Chas Fontana MD     hx lumbar laminectomyx2 w good results             Knee pain, right ICD-10-CM: M25.561  ICD-9-CM: 719.46  Unknown - Present        Right sided sciatica ICD-10-CM: M54.31  ICD-9-CM: 724.3  Unknown - Present        RESOLVED: Tear of lateral cartilage or meniscus of knee, current ICD-10-CM: X64.940N  ICD-9-CM: 836.1  2/1/2013 - 3/19/2013        RESOLVED: Hypertension ICD-10-CM: I10  ICD-9-CM: 401.9  Unknown - 2/4/2017              Subjective:   Taniya Grimaldo is a 66 y.o. male who presented with weakness since this morning. He states he was trying to get out of bed this morning to go to the bathroom but was too weak. He went to bed last night feeling a little bit dizzy however was otherwise feeling okay. According to his wife he was having darker colored urine with some noticeable blood. He recently completed a 10-day course of Cipro for UTI and was feeling better. His wife states that she checked his oxygen levels and it was in the low 80s. It improved after she asked him to take some deep breaths. He denies any shortness of breath, or cough. He did not have a fever at home however had a fever of 101 upon arrival to the emergency department. He denies any nausea, vomiting, abdominal pain, or diarrhea. (Dr Parks )    10/26: Feeling better this am. BP are still a bit low and holding home BP meds. Will add back as appropriate. Cultures pending. LA has improved and WBC is better. Tmax 101 yesterday and low grade this am.     10/27:Afebrile. Feeling better. BP higher now so will restart home meds. ID has seen and BC have been repeated. Initial blood cultures + Klebsiella. Needs repeat cultures. COVID neg. ID would like renal US done. Review of Systems:   A comprehensive review of systems was negative except for that written in the HPI.     Objective: Physical Exam:     Visit Vitals  /77 (BP 1 Location: Right arm, BP Patient Position: At rest)   Pulse 72   Temp 98.6 °F (37 °C)   Resp 18   Ht 6' 5\" (1.956 m)   Wt 268 lb 8 oz (121.8 kg)   SpO2 92%   BMI 31.84 kg/m²    O2 Flow Rate (L/min): 1 l/min O2 Device: Nasal cannula    Temp (24hrs), Av.4 °F (36.9 °C), Min:97.7 °F (36.5 °C), Max:99.4 °F (37.4 °C)    No intake/output data recorded. 10/25 1901 - 10/27 0700  In: 900 [P.O.:450]  Out: 1650 [Urine:1650]    General:  Alert, cooperative, no distress, appears stated age. Head:  Normocephalic, without obvious abnormality, atraumatic. Eyes:  Conjunctivae/corneas clear. PERRL, EOMs intact. Nose: Nares normal. Septum midline. Mucosa normal. No drainage or sinus tenderness. Throat: Lips, mucosa, and tongue moist..   Neck: Supple, symmetrical, trachea midline, no adenopathy, thyroid: no enlargement/tenderness/nodules, no carotid bruit and no JVD. Back:   Symmetric, no curvature. ROM normal. No CVA tenderness. Lungs:   Clear to auscultation bilaterally. Chest wall:  No tenderness or deformity. Heart:  Regular rate and rhythm, S1, S2 normal, no murmur, click, rub or gallop. Abdomen:   Soft, non-tender. Bowel sounds normal. No masses,  No organomegaly. Extremities: no cyanosis or edema. No calf tenderness or cords. Pulses: 2+ and symmetric all extremities. Skin: Skin color, texture, turgor normal. No rashes or lesions   Neurologic: CNII-XII intact. Alert and oriented X 3. Fine motor of hands and fingers normal.   equal.  No cogwheeling or rigidity. Gait not tested at this time. Sensation grossly normal to touch. Gross motor of extremities normal.       Data Review:    FINDINGS:CT Chest 10/25/20  There is mild dependent atelectasis and mild subsegmental atelectasis in the  lower lung fields. The upper lungs are clear. Pulmonary, mediastinal beck, and  splenic calcifications are evidence of old granulomatous disease.  The central  airways are patent. No pneumothorax or pleural effusion. The heart is enlarged. A pacemaker is in place. Trace pericardial fluid in the aortic recess is likely  physiologic. No thoracic lymphadenopathy. Incidental note is made of hepatic  cysts and a probable partially imaged left renal cyst.     IMPRESSION: No acute process. Recent Days:  Recent Labs     10/26/20  0510 10/25/20  1208   WBC 12.8* 19.8*   HGB 11.6* 14.0   HCT 34.9* 41.6   * 188     Recent Labs     10/26/20  0510 10/25/20  1208    139   K 3.5 3.4*    105   CO2 26 25   * 109*   BUN 22* 18   CREA 1.25 1.39*   CA 7.5* 8.5   ALB  --  3.6   TBILI  --  1.2*   ALT  --  21     No results for input(s): PH, PCO2, PO2, HCO3, FIO2 in the last 72 hours. 24 Hour Results:  No results found for this or any previous visit (from the past 24 hour(s)). Medications reviewed  Current Facility-Administered Medications   Medication Dose Route Frequency    apixaban (ELIQUIS) tablet 5 mg  5 mg Oral BID    cefTRIAXone (ROCEPHIN) 2 g in 0.9% sodium chloride (MBP/ADV) 50 mL  2 g IntraVENous Q24H    traZODone (DESYREL) tablet 150 mg  150 mg Oral QHS    gabapentin (NEURONTIN) capsule 300 mg  300 mg Oral QHS    sodium chloride (NS) flush 5-10 mL  5-10 mL IntraVENous PRN    sodium chloride (NS) flush 5-40 mL  5-40 mL IntraVENous Q8H    sodium chloride (NS) flush 5-40 mL  5-40 mL IntraVENous PRN    acetaminophen (TYLENOL) tablet 650 mg  650 mg Oral Q6H PRN    Or    acetaminophen (TYLENOL) suppository 650 mg  650 mg Rectal Q6H PRN    polyethylene glycol (MIRALAX) packet 17 g  17 g Oral DAILY PRN    promethazine (PHENERGAN) tablet 12.5 mg  12.5 mg Oral Q6H PRN    Or    ondansetron (ZOFRAN) injection 4 mg  4 mg IntraVENous Q6H PRN    0.9% sodium chloride infusion  50 mL/hr IntraVENous CONTINUOUS       Care Plan discussed with: Patient/Family    Total time spent with patient: 30 minutes.     Fredy Olmos MD

## 2020-10-27 NOTE — PROGRESS NOTES
Problem: Falls - Risk of  Goal: *Absence of Falls  Description: Document Meryl Barron Fall Risk and appropriate interventions in the flowsheet.   Outcome: Progressing Towards Goal  Note: Fall Risk Interventions:  Mobility Interventions: Bed/chair exit alarm, Patient to call before getting OOB         Medication Interventions: Bed/chair exit alarm, Patient to call before getting OOB, Teach patient to arise slowly    Elimination Interventions: Bed/chair exit alarm, Call light in reach

## 2020-10-27 NOTE — PROGRESS NOTES
0730 Bedside and Verbal shift change report given to Ester Cardona RN (oncoming nurse) by Charles Swann RN (offgoing nurse). Report included the following information SBAR and Kardex. 1000 Patient blood pressure is 162/102. Called to Dr Jen Schulz notified. No orders entered at this time, did review the blood pressures with MD.    1040 condom catheter replaced, cleaned area used large condom catheter, perineal care done and new bag placed. 1915 Bedside and Verbal shift change report given to Lulu Melgar RN (oncoming nurse) by Ester Dickinson RN (offgoing nurse). Report included the following information SBAR and Kardex.

## 2020-10-27 NOTE — PROGRESS NOTES
[facility] Progress Note           Bernardino Cisse DO    [phone] Office  [phone]  Fax    10/27/2020      Assessment & Plan:   · Klebsiella pneumoniae urosepsis. Continue ceftriaxone. As long as the organism isolated in the urine is the same that is isolated in the blood he may be discharged home on an oral quinolone antibiotic. Repeat blood cultures today. Renal ultrasound will be obtained today. · Penicillin allergy. This caused a rash approximately 25 years ago. He is able to tolerate cephalosporin antibiotics  · STEPHANIE. Resolving  · Hypoxic respiratory failure. Doubt Covid with negative CT scan. · Symptomatic bradycardia. Status post pacemaker placement 10/2019          Subjective:     No complaints    Objective:     Vitals:   Visit Vitals  BP (!) 148/97 (BP 1 Location: Right arm, BP Patient Position: At rest)   Pulse 70   Temp 99 °F (37.2 °C)   Resp 18   Ht 6' 5\" (1.956 m)   Wt 268 lb 8 oz (121.8 kg)   SpO2 95%   BMI 31.84 kg/m²        Tmax:  Temp (24hrs), Av.7 °F (37.1 °C), Min:98 °F (36.7 °C), Max:99.4 °F (37.4 °C)      Exam:   Patient is intubated:  no    Physical Examination:   General:  Alert, cooperative, no distress   Head:  Normocephalic, atraumatic. Eyes:  Conjunctivae clear   Neck: Supple       Lungs:   No distress. Chest wall:     Heart:     Abdomen:    Nondistended   Extremities: Moves all. Skin:  No rash   Neurologic: CNII-XII intact. Normal strength     Labs:        No lab exists for component: ITNL   Recent Labs     10/25/20  1208   TROIQ <0.05     Recent Labs     10/26/20  0510 10/25/20  1208    139   K 3.5 3.4*    105   CO2 26 25   BUN 22* 18   CREA 1.25 1.39*   * 109*   ALB  --  3.6   WBC 12.8* 19.8*   HGB 11.6* 14.0   HCT 34.9* 41.6   * 188     No results for input(s): INR, PTP, APTT, INREXT in the last 72 hours.   Needs: urine analysis, urine sodium, protein and creatinine  No results found for: DELMI, JASON      Cultures:     Lab Results   Component Value Date/Time    Specimen Description: NARES 10/17/2013 03:40 PM    Specimen Description: URINE 10/17/2013 03:38 PM     Lab Results   Component Value Date/Time    Culture result: (A) 10/25/2020 01:01 PM     KLEBSIELLA PNEUMONIAE GROWING IN ALL 4 BOTTLES DRAWN (SITES = [facility] AND [facility]) SENSITIVITY TO FOLLOW    Culture result: KLEBSIELLA PNEUMONIAE (A) 10/25/2020 12:53 PM    Culture result: NO GROWTH 1 DAY 08/08/2016 10:28 AM       Radiology:     Medications       Current Facility-Administered Medications   Medication Dose Route Frequency Last Dose    [START ON 10/28/2020] amLODIPine (NORVASC) tablet 5 mg  5 mg Oral DAILY      [START ON 10/28/2020] metoprolol succinate (TOPROL-XL) XL tablet 50 mg  50 mg Oral DAILY      apixaban (ELIQUIS) tablet 5 mg  5 mg Oral BID 5 mg at 10/27/20 1009    cefTRIAXone (ROCEPHIN) 2 g in 0.9% sodium chloride (MBP/ADV) 50 mL  2 g IntraVENous Q24H 2 g at 10/27/20 1009    traZODone (DESYREL) tablet 150 mg  150 mg Oral  mg at 10/26/20 2114    gabapentin (NEURONTIN) capsule 300 mg  300 mg Oral  mg at 10/26/20 2115    sodium chloride (NS) flush 5-10 mL  5-10 mL IntraVENous PRN 10 mL at 10/26/20 2123    sodium chloride (NS) flush 5-40 mL  5-40 mL IntraVENous Q8H Stopped at 10/26/20 2200    sodium chloride (NS) flush 5-40 mL  5-40 mL IntraVENous PRN      acetaminophen (TYLENOL) tablet 650 mg  650 mg Oral Q6H  mg at 10/26/20 2115    Or    acetaminophen (TYLENOL) suppository 650 mg  650 mg Rectal Q6H PRN      polyethylene glycol (MIRALAX) packet 17 g  17 g Oral DAILY PRN      promethazine (PHENERGAN) tablet 12.5 mg  12.5 mg Oral Q6H PRN      Or    ondansetron (ZOFRAN) injection 4 mg  4 mg IntraVENous Q6H PRN      0.9% sodium chloride infusion  50 mL/hr IntraVENous CONTINUOUS 50 mL/hr at 10/26/20 9568           Case discussed with:      Lynn Parks DO

## 2020-10-27 NOTE — PROGRESS NOTES
Bedside and Verbal shift change report given to [facility] RN (oncoming nurse) by Ester HAWKINS RN (offgoing nurse). Report included the following information SBAR, Kardex, Intake/Output, Accordion and Recent Results. SHIFT REPORT:     2053: spoke with Dr. Timothy Campuzano about elevated BP. Orders to give 5 mg PO novasc now and increase daily dose to 10. Blood cultures x2 sent to lab when only 1 set was needed, per Dr. Timothy Campuzano, okay to change lab order to run both sampes        Bedside and Verbal shift change report given to ---- (oncoming nurse) by [facility] RN  (offgoing nurse). Report included the following information SBAR, Kardex, Intake/Output, Accordion and Recent Results.

## 2020-10-27 NOTE — PROGRESS NOTES
2015-Bedside and Verbal shift change report given to Jignesh Mishra (oncoming nurse) by Mildred Mcmillan, RN(offgoing nurse). Report included the following information SBAR, Kardex and Cardiac Rhythm Paced. 0011- Bedside and Verbal shift change report given to Evaristo (oncoming nurse) by Sonny Waters (offgoing nurse). Report included the following information SBAR, Kardex and Cardiac Rhythm Paced.

## 2020-10-28 ENCOUNTER — APPOINTMENT (OUTPATIENT)
Dept: NON INVASIVE DIAGNOSTICS | Age: 75
DRG: 871 | End: 2020-10-28
Attending: INTERNAL MEDICINE
Payer: MEDICARE

## 2020-10-28 LAB
ALBUMIN SERPL-MCNC: 2.7 G/DL (ref 3.5–5)
ALBUMIN/GLOB SERPL: 0.8 {RATIO} (ref 1.1–2.2)
ALP SERPL-CCNC: 48 U/L (ref 45–117)
ALT SERPL-CCNC: 25 U/L (ref 12–78)
ANION GAP SERPL CALC-SCNC: 7 MMOL/L (ref 5–15)
AST SERPL-CCNC: 20 U/L (ref 15–37)
BACTERIA SPEC CULT: ABNORMAL
BASOPHILS # BLD: 0 K/UL (ref 0–0.1)
BASOPHILS NFR BLD: 1 % (ref 0–1)
BILIRUB SERPL-MCNC: 0.7 MG/DL (ref 0.2–1)
BUN SERPL-MCNC: 11 MG/DL (ref 6–20)
BUN/CREAT SERPL: 15 (ref 12–20)
CALCIUM SERPL-MCNC: 7.7 MG/DL (ref 8.5–10.1)
CHLORIDE SERPL-SCNC: 108 MMOL/L (ref 97–108)
CO2 SERPL-SCNC: 25 MMOL/L (ref 21–32)
CREAT SERPL-MCNC: 0.75 MG/DL (ref 0.7–1.3)
DIFFERENTIAL METHOD BLD: ABNORMAL
ECHO AO ROOT DIAM: 3.87 CM
ECHO AV AREA PEAK VELOCITY: 3.46 CM2
ECHO AV AREA VTI: 3.4 CM2
ECHO AV AREA/BSA PEAK VELOCITY: 1.4 CM2/M2
ECHO AV AREA/BSA VTI: 1.4 CM2/M2
ECHO AV MEAN GRADIENT: 2.87 MMHG
ECHO AV PEAK GRADIENT: 5.01 MMHG
ECHO AV PEAK VELOCITY: 111.96 CM/S
ECHO AV VTI: 21 CM
ECHO LA MAJOR AXIS: 5 CM
ECHO LA MINOR AXIS: 2 CM
ECHO LA VOL 2C: 102.32 ML (ref 18–58)
ECHO LA VOL 4C: 106.96 ML (ref 18–58)
ECHO LA VOL BP: 114.52 ML (ref 18–58)
ECHO LA VOL/BSA BIPLANE: 45.72 ML/M2 (ref 16–28)
ECHO LA VOLUME INDEX A2C: 40.85 ML/M2 (ref 16–28)
ECHO LA VOLUME INDEX A4C: 42.71 ML/M2 (ref 16–28)
ECHO LV INTERNAL DIMENSION DIASTOLIC: 4.01 CM (ref 4.2–5.9)
ECHO LV INTERNAL DIMENSION SYSTOLIC: 2.92 CM
ECHO LV IVSD: 1.56 CM (ref 0.6–1)
ECHO LV MASS 2D: 242.2 G (ref 88–224)
ECHO LV MASS INDEX 2D: 96.7 G/M2 (ref 49–115)
ECHO LV POSTERIOR WALL DIASTOLIC: 1.51 CM (ref 0.6–1)
ECHO LVOT DIAM: 2.25 CM
ECHO LVOT PEAK GRADIENT: 3.83 MMHG
ECHO LVOT PEAK VELOCITY: 97.8 CM/S
ECHO LVOT SV: 71.3 ML
ECHO LVOT VTI: 17.98 CM
ECHO MV A VELOCITY: 18.19 CM/S
ECHO MV E DECELERATION TIME (DT): 173.15 MS
ECHO MV E VELOCITY: 119.72 CM/S
ECHO MV E/A RATIO: 6.58
ECHO PV PEAK INSTANTANEOUS GRADIENT SYSTOLIC: 5.62 MMHG
ECHO RV INTERNAL DIMENSION: 4.97 CM
ECHO TV REGURGITANT MAX VELOCITY: 327.53 CM/S
ECHO TV REGURGITANT PEAK GRADIENT: 42.91 MMHG
EOSINOPHIL # BLD: 0.1 K/UL (ref 0–0.4)
EOSINOPHIL NFR BLD: 2 % (ref 0–7)
ERYTHROCYTE [DISTWIDTH] IN BLOOD BY AUTOMATED COUNT: 13.6 % (ref 11.5–14.5)
GLOBULIN SER CALC-MCNC: 3.3 G/DL (ref 2–4)
GLUCOSE SERPL-MCNC: 91 MG/DL (ref 65–100)
HCT VFR BLD AUTO: 36.4 % (ref 36.6–50.3)
HGB BLD-MCNC: 12.3 G/DL (ref 12.1–17)
IMM GRANULOCYTES # BLD AUTO: 0 K/UL (ref 0–0.04)
IMM GRANULOCYTES NFR BLD AUTO: 0 % (ref 0–0.5)
LVOT MG: 2.42 MMHG
LYMPHOCYTES # BLD: 1 K/UL (ref 0.8–3.5)
LYMPHOCYTES NFR BLD: 15 % (ref 12–49)
MCH RBC QN AUTO: 30.4 PG (ref 26–34)
MCHC RBC AUTO-ENTMCNC: 33.8 G/DL (ref 30–36.5)
MCV RBC AUTO: 90.1 FL (ref 80–99)
MONOCYTES # BLD: 0.6 K/UL (ref 0–1)
MONOCYTES NFR BLD: 9 % (ref 5–13)
NEUTS SEG # BLD: 4.9 K/UL (ref 1.8–8)
NEUTS SEG NFR BLD: 73 % (ref 32–75)
NRBC # BLD: 0 K/UL (ref 0–0.01)
NRBC BLD-RTO: 0 PER 100 WBC
PLATELET # BLD AUTO: 145 K/UL (ref 150–400)
PMV BLD AUTO: 11.6 FL (ref 8.9–12.9)
POTASSIUM SERPL-SCNC: 3.4 MMOL/L (ref 3.5–5.1)
PROT SERPL-MCNC: 6 G/DL (ref 6.4–8.2)
RBC # BLD AUTO: 4.04 M/UL (ref 4.1–5.7)
SERVICE CMNT-IMP: ABNORMAL
SODIUM SERPL-SCNC: 140 MMOL/L (ref 136–145)
WBC # BLD AUTO: 6.6 K/UL (ref 4.1–11.1)

## 2020-10-28 PROCEDURE — 80053 COMPREHEN METABOLIC PANEL: CPT

## 2020-10-28 PROCEDURE — 85025 COMPLETE CBC W/AUTO DIFF WBC: CPT

## 2020-10-28 PROCEDURE — 74011250637 HC RX REV CODE- 250/637: Performed by: FAMILY MEDICINE

## 2020-10-28 PROCEDURE — 99232 SBSQ HOSP IP/OBS MODERATE 35: CPT | Performed by: INTERNAL MEDICINE

## 2020-10-28 PROCEDURE — 74011250636 HC RX REV CODE- 250/636: Performed by: FAMILY MEDICINE

## 2020-10-28 PROCEDURE — 93306 TTE W/DOPPLER COMPLETE: CPT | Performed by: STUDENT IN AN ORGANIZED HEALTH CARE EDUCATION/TRAINING PROGRAM

## 2020-10-28 PROCEDURE — 65660000000 HC RM CCU STEPDOWN

## 2020-10-28 PROCEDURE — 93306 TTE W/DOPPLER COMPLETE: CPT

## 2020-10-28 PROCEDURE — 65270000029 HC RM PRIVATE

## 2020-10-28 PROCEDURE — 74011000258 HC RX REV CODE- 258: Performed by: FAMILY MEDICINE

## 2020-10-28 PROCEDURE — 36415 COLL VENOUS BLD VENIPUNCTURE: CPT

## 2020-10-28 RX ORDER — LISINOPRIL 20 MG/1
40 TABLET ORAL DAILY
Status: DISCONTINUED | OUTPATIENT
Start: 2020-10-28 | End: 2020-10-30 | Stop reason: HOSPADM

## 2020-10-28 RX ORDER — LANOLIN ALCOHOL/MO/W.PET/CERES
3 CREAM (GRAM) TOPICAL ONCE
Status: COMPLETED | OUTPATIENT
Start: 2020-10-29 | End: 2020-10-28

## 2020-10-28 RX ADMIN — TRAZODONE HYDROCHLORIDE 150 MG: 100 TABLET ORAL at 21:00

## 2020-10-28 RX ADMIN — APIXABAN 5 MG: 5 TABLET, FILM COATED ORAL at 08:00

## 2020-10-28 RX ADMIN — Medication 10 ML: at 10:00

## 2020-10-28 RX ADMIN — Medication 10 ML: at 13:11

## 2020-10-28 RX ADMIN — Medication 10 ML: at 21:05

## 2020-10-28 RX ADMIN — METOPROLOL SUCCINATE 50 MG: 50 TABLET, EXTENDED RELEASE ORAL at 08:00

## 2020-10-28 RX ADMIN — AMLODIPINE BESYLATE 10 MG: 5 TABLET ORAL at 08:00

## 2020-10-28 RX ADMIN — LISINOPRIL 40 MG: 20 TABLET ORAL at 08:00

## 2020-10-28 RX ADMIN — GABAPENTIN 300 MG: 300 CAPSULE ORAL at 21:00

## 2020-10-28 RX ADMIN — APIXABAN 5 MG: 5 TABLET, FILM COATED ORAL at 21:00

## 2020-10-28 RX ADMIN — CEFTRIAXONE 2 G: 2 INJECTION, POWDER, FOR SOLUTION INTRAMUSCULAR; INTRAVENOUS at 08:00

## 2020-10-28 RX ADMIN — Medication 3 MG: at 23:25

## 2020-10-28 NOTE — PROGRESS NOTES
0730-Bedside and Verbal shift change report given to Cleo Amaro RN (oncoming nurse) by Jeannette Quevedo RN (offgoing nurse). Report included the following information SBAR, Kardex, ED Summary, OR Summary, Procedure Summary, Intake/Output and MAR.     1444-TRANSFER - OUT REPORT:    Verbal report given to JOVITA Rodriguez(name) on Naga Alonso  being transferred to [facility](unit) for routine progression of care       Report consisted of patients Situation, Background, Assessment and   Recommendations(SBAR). Information from the following report(s) SBAR, Kardex, ED Summary, OR Summary, Procedure Summary, Intake/Output and MAR was reviewed with the receiving nurse. Lines:   Peripheral IV 10/25/20 Right Antecubital (Active)   Site Assessment Clean, dry, & intact 10/27/20 2004   Phlebitis Assessment 0 10/27/20 2004   Infiltration Assessment 0 10/27/20 2004   Dressing Status Clean, dry, & intact 10/27/20 2004   Dressing Type Transparent 10/27/20 2004   Hub Color/Line Status Green; Infusing 10/27/20 2004   Action Taken Open ports on tubing capped 10/27/20 2004   Alcohol Cap Used No 10/27/20 2004        Opportunity for questions and clarification was provided.       Patient transported with:   Monitor  Registered Nurse  Tech

## 2020-10-28 NOTE — PROGRESS NOTES
[facility] Infect[facility] Disease [facility] Progress Note           Ebonie Lomeli DO    [phone] Office  [phone]  Fax    10/28/2020      Assessment & Plan:   · Klebsiella pneumoniae urosepsis. Repeat blood cultures sterile to date. As long as blood cultures remain sterile through tomorrow we will plan discharge on levofloxacin 750 mg p.o. daily x10 days. Renal ultrasound significant only for cysts. Outpatient follow-up with urology next week  · Penicillin allergy. This caused a rash approximately 25 years ago. He is able to tolerate cephalosporin antibiotics  · STEPHANIE. Resolved  · Symptomatic bradycardia. Status post pacemaker placement 10/2019. Will check echocardiogram for completeness          Subjective:     No complaints    Objective:     Vitals:   Visit Vitals  BP (!) 147/93 (BP 1 Location: Right arm, BP Patient Position: At rest)   Pulse 78   Temp 98.2 °F (36.8 °C)   Resp 18   Ht 6' 5\" (1.956 m)   Wt 261 lb 6.4 oz (118.6 kg)   SpO2 97%   BMI 31.00 kg/m²        Tmax:  Temp (24hrs), Av.3 °F (36.8 °C), Min:97.9 °F (36.6 °C), Max:99 °F (37.2 °C)      Exam:   Patient is intubated:  no    Physical Examination:   General:  Alert, cooperative, no distress   Head:  Normocephalic, atraumatic. Eyes:  Conjunctivae clear   Neck: Supple       Lungs:   No distress. Chest wall:     Heart:     Abdomen:    Nondistended   Extremities: Moves all. Skin:  No rash   Neurologic: CNII-XII intact. Normal strength     Labs:        No lab exists for component: ITNL   Recent Labs     10/25/20  1208   TROIQ <0.05     Recent Labs     10/28/20  0350 10/26/20  0510 10/25/20  1208    138 139   K 3.4* 3.5 3.4*    108 105   CO2 25 26 25   BUN 11 22* 18   CREA 0.75 1.25 1.39*   GLU 91 113* 109*   ALB 2.7*  --  3.6   WBC 6.6 12.8* 19.8*   HGB 12.3 11.6* 14.0   HCT 36.4* 34.9* 41.6   * 146* 188     No results for input(s): INR, PTP, APTT, INREXT, INREXT in the last 72 hours.   Needs: urine analysis, urine sodium, protein and creatinine  No results found for: DELMI, CREAU      Cultures:     Lab Results   Component Value Date/Time    Specimen Description: NARES 10/17/2013 03:40 PM    Specimen Description: URINE 10/17/2013 03:38 PM     Lab Results   Component Value Date/Time    Culture result: NO GROWTH AFTER 11 HOURS 10/27/2020 03:14 PM    Culture result: NO GROWTH AFTER 18 HOURS 10/27/2020 02:45 PM    Culture result: (A) 10/25/2020 01:01 PM     KLEBSIELLA PNEUMONIAE GROWING IN ALL 4 BOTTLES DRAWN (SITES = [facility] AND [facility])       Radiology:     Medications       Current Facility-Administered Medications   Medication Dose Route Frequency Last Dose    lisinopriL (PRINIVIL, ZESTRIL) tablet 40 mg  40 mg Oral DAILY 40 mg at 10/28/20 0800    metoprolol succinate (TOPROL-XL) XL tablet 50 mg  50 mg Oral DAILY 50 mg at 10/28/20 0800    amLODIPine (NORVASC) tablet 10 mg  10 mg Oral DAILY 10 mg at 10/28/20 0800    apixaban (ELIQUIS) tablet 5 mg  5 mg Oral BID 5 mg at 10/28/20 0800    cefTRIAXone (ROCEPHIN) 2 g in 0.9% sodium chloride (MBP/ADV) 50 mL  2 g IntraVENous Q24H 2 g at 10/28/20 0800    traZODone (DESYREL) tablet 150 mg  150 mg Oral  mg at 10/27/20 2133    gabapentin (NEURONTIN) capsule 300 mg  300 mg Oral  mg at 10/27/20 2132    sodium chloride (NS) flush 5-10 mL  5-10 mL IntraVENous PRN 10 mL at 10/26/20 2123    sodium chloride (NS) flush 5-40 mL  5-40 mL IntraVENous Q8H 10 mL at 10/28/20 1000    sodium chloride (NS) flush 5-40 mL  5-40 mL IntraVENous PRN      acetaminophen (TYLENOL) tablet 650 mg  650 mg Oral Q6H  mg at 10/27/20 2138    Or    acetaminophen (TYLENOL) suppository 650 mg  650 mg Rectal Q6H PRN      polyethylene glycol (MIRALAX) packet 17 g  17 g Oral DAILY PRN      promethazine (PHENERGAN) tablet 12.5 mg  12.5 mg Oral Q6H PRN      Or    ondansetron (ZOFRAN) injection 4 mg  4 mg IntraVENous Q6H PRN             Case discussed with:      William Jarvis DO

## 2020-10-28 NOTE — PROGRESS NOTES
Daily Progress Note: 10/28/2020  Lona Beltran MD    Assessment/Plan:   Urinary tract infection  -Recently treated for UTI with Cipro for 10 days  -Given ceftriaxone in the ED, Increased to 2gm daily on 10/26  -BC and UC klebsiella- repeat BC neg at 15 hours  -Renal US shows medical renal disease     Sepsis  -Secondary to above  -Continue with gentle hydration and monitor     Acute hypoxic respiratory failure  -Off oxygen  -History of cardiomyopathy, check BNP  -CT chest neg  -Covid neg     Symptomatic bradycardia  -S/p PPM insertion October 2019     Venous insufficiency  -S/p greater saphenous vein ablation  -Continue with Lasix as needed     Hypertension but now hypotensive due to sepsis  -Restart amlodipine and metoprolol  -Restart  Lisinopril     Atrial fibrillation  -Continue Eliquis  -Restarted Metoprolol     Obstructive sleep apnea-  -Continue CPAP             Problem List:  Problem List as of 10/28/2020 Date Reviewed: 7/15/2020          Codes Class Noted - Resolved    Tachy-viv syndrome ([facility] [address] ICD-10-CM: I49.5  ICD-9-CM: 427.81  10/4/2019 - Present        Sepsis ([facility] [address] ICD-10-CM: A41.9  ICD-9-CM: 038.9, 995.91  10/25/2020 - Present        Peripheral vascular disease ([facility] [address] ICD-10-CM: I73.9  ICD-9-CM: 443.9  6/17/2020 - Present        Advanced care planning/counseling discussion ICD-10-CM: Z71.89  ICD-9-CM: V65.49  2/3/2017 - Present    Overview Signed 2/3/2017  1:51 PM by Quincy Rahman RN     Patient states that a completed AMD is at home. NN encouraged patient to bring a copy to the office for scanning into the medical record. Patient verbalized understanding & agreement.                Paroxysmal atrial fibrillation (HCC) ICD-10-CM: I48.0  ICD-9-CM: 427.31  9/28/2016 - Present        BPH (benign prostatic hyperplasia) ICD-10-CM: N40.0  ICD-9-CM: 600.00  8/17/2016 - Present        Insomnia ICD-10-CM: G47.00  ICD-9-CM: 780.52  8/17/2016 - Present        Osteoarthritis of left knee ICD-10-CM: M17.12  ICD-9-CM: 715.96  8/15/2016 - Present        Spinal stenosis in cervical region ICD-10-CM: M48.02  ICD-9-CM: 723.0  1/5/2016 - Present        Herniated nucleus pulposus, C3-4 ICD-10-CM: M50.21  ICD-9-CM: 722.0  1/5/2016 - Present        Normal cardiac stress test ICD-10-CM: [id number]  ICD-9-CM: Benny Levy  9/21/2015 - Present        IFG (impaired fasting glucose) ICD-10-CM: R73.01  ICD-9-CM: 790.21  Unknown - Present        ED (erectile dysfunction) ICD-10-CM: N52.9  ICD-9-CM: 607.84  6/18/2014 - Present        SVT (supraventricular tachycardia) (HCC) ICD-10-CM: I47.1  ICD-9-CM: 427.89  Unknown - Present        Right knee DJD ICD-10-CM: M17.11  ICD-9-CM: 715.96  11/4/2013 - Present        Spinal stenosis of lumbar region ICD-10-CM: M48.061  ICD-9-CM: 724.02  Unknown - Present        Tear of medial cartilage or meniscus of knee, current ICD-10-CM: [id number]  ICD-9-CM: 836.0  2/1/2013 - Present        Osteoarthrosis, unspecified whether generalized or localized, lower leg ICD-10-CM: M17.10  ICD-9-CM: 715.96  2/1/2013 - Present        Palpitations ICD-10-CM: R00.2  ICD-9-CM: 785.1  12/12/2011 - Present        Supraventricular tachycardia ([address] ICD-10-CM: I47.1  ICD-9-CM: 427.89  12/12/2011 - Present        HLD (hyperlipidemia) ICD-10-CM: E78.5  ICD-9-CM: 272.4  12/12/2011 - Present        Essential hypertension, benign ICD-10-CM: I10  ICD-9-CM: 401.1  12/12/2011 - Present        Venous insufficiency ICD-10-CM: I87.2  ICD-9-CM: 459.81  12/12/2011 - Present        Colon polyps ICD-10-CM: K63.5  ICD-9-CM: 211.3  Unknown - Present        AVI (obstructive sleep apnea) ICD-10-CM: G47.33  ICD-9-CM: 327.23  Unknown - Present        Heart palpitations ICD-10-CM: R00.2  ICD-9-CM: 785.1  Unknown - Present    Overview Signed 7/27/2010  9:41 AM by MD Dr. Diaz Maher             Hyperlipidemia with target LDL less than 130 ICD-10-CM: E78.5  ICD-9-CM: 272.4  Unknown - Present        BPH (benign prostatic hypertrophy) ICD-10-CM: N40.0  ICD-9-CM: 600.00  Unknown - Present        Lower back pain ICD-10-CM: M54.5  ICD-9-CM: 724.2  Unknown - Present    Overview Signed 7/27/2010  9:41 AM by Lianna Vinson MD     hx lumbar laminectomyx2 w good results             Knee pain, right ICD-10-CM: M25.561  ICD-9-CM: 719.46  Unknown - Present        Right sided sciatica ICD-10-CM: M54.31  ICD-9-CM: 724.3  Unknown - Present        RESOLVED: Tear of lateral cartilage or meniscus of knee, current ICD-10-CM: T71.080O  ICD-9-CM: 836.1  2/1/2013 - 3/19/2013        RESOLVED: Hypertension ICD-10-CM: I10  ICD-9-CM: 401.9  Unknown - 2/4/2017              Subjective:   Kyle Mckeon is a 66 y.o. male who presented with weakness since this morning. He states he was trying to get out of bed this morning to go to the bathroom but was too weak. He went to bed last night feeling a little bit dizzy however was otherwise feeling okay. According to his wife he was having darker colored urine with some noticeable blood. He recently completed a 10-day course of Cipro for UTI and was feeling better. His wife states that she checked his oxygen levels and it was in the low 80s. It improved after she asked him to take some deep breaths. He denies any shortness of breath, or cough. He did not have a fever at home however had a fever of 101 upon arrival to the emergency department. He denies any nausea, vomiting, abdominal pain, or diarrhea. (Dr Nicholas Tapia)    10/26: Feeling better this am. BP are still a bit low and holding home BP meds. Will add back as appropriate. Cultures pending. LA has improved and WBC is better. Tmax 101 yesterday and low grade this am.     10/27:Afebrile. Feeling better. BP higher now so will restart home meds. ID has seen and BC have been repeated. Initial blood cultures + Klebsiella. Needs repeat cultures. COVID neg. ID would like renal US done. 10/28: Repeat BC neg at 15 hours. BP up so will restart Lisinopril. Stop IVF. US with medical renal disease. No complaints this am.     Review of Systems:   A comprehensive review of systems was negative except for that written in the HPI. Objective:   Physical Exam:     Visit Vitals  BP (!) 146/101 (BP 1 Location: Right arm, BP Patient Position: At rest)   Pulse 85   Temp 97.9 °F (36.6 °C)   Resp 18   Ht 6' 5\" (1.956 m)   Wt 261 lb 6.4 oz (118.6 kg)   SpO2 95%   BMI 31.00 kg/m²    O2 Flow Rate (L/min): 1 l/min O2 Device: Room air    Temp (24hrs), Av.6 °F (37 °C), Min:97.9 °F (36.6 °C), Max:99 °F (37.2 °C)    No intake/output data recorded. 10/26 1901 - 10/28 0700  In: 12 [P.O.:910; I.V.:600]  Out: 5250 [Urine:5250]    General:  Alert, cooperative, no distress, appears stated age. Head:  Normocephalic, without obvious abnormality, atraumatic. Eyes:  Conjunctivae/corneas clear. PERRL, EOMs intact. Nose: Nares normal. Septum midline. Mucosa normal. No drainage or sinus tenderness. Throat: Lips, mucosa, and tongue moist..   Neck: Supple, symmetrical, trachea midline, no adenopathy, thyroid: no enlargement/tenderness/nodules, no carotid bruit and no JVD. Back:   Symmetric, no curvature. ROM normal. No CVA tenderness. Lungs:   Clear to auscultation bilaterally. Chest wall:  No tenderness or deformity. Heart:  Regular rate and rhythm, S1, S2 normal, no murmur, click, rub or gallop. Abdomen:   Soft, non-tender. Bowel sounds normal. No masses,  No organomegaly. Extremities: no cyanosis or edema. No calf tenderness or cords. Pulses: 2+ and symmetric all extremities. Skin: Skin color, texture, turgor normal. No rashes or lesions   Neurologic: CNII-XII intact. Alert and oriented X 3. Fine motor of hands and fingers normal.   equal.  No cogwheeling or rigidity. Gait not tested at this time. Sensation grossly normal to touch.   Gross motor of extremities normal.       Data Review:    FINDINGS:CT Chest 10/25/20  There is mild dependent atelectasis and mild subsegmental atelectasis in the  lower lung fields. The upper lungs are clear. Pulmonary, mediastinal beck, and  splenic calcifications are evidence of old granulomatous disease. The central  airways are patent. No pneumothorax or pleural effusion. The heart is enlarged. A pacemaker is in place. Trace pericardial fluid in the aortic recess is likely  physiologic. No thoracic lymphadenopathy. Incidental note is made of hepatic  cysts and a probable partially imaged left renal cyst.     IMPRESSION: No acute process. IMPRESSION Renal US  IMPRESSION: Diffusely increased renal echogenicity compatible with medical renal  disease with bilateral renal cysts. Recent Days:  Recent Labs     10/28/20  0350 10/26/20  0510 10/25/20  1208   WBC 6.6 12.8* 19.8*   HGB 12.3 11.6* 14.0   HCT 36.4* 34.9* 41.6   * 146* 188     Recent Labs     10/28/20  0350 10/26/20  0510 10/25/20  1208    138 139   K 3.4* 3.5 3.4*    108 105   CO2 25 26 25   GLU 91 113* 109*   BUN 11 22* 18   CREA 0.75 1.25 1.39*   CA 7.7* 7.5* 8.5   ALB 2.7*  --  3.6   TBILI 0.7  --  1.2*   ALT 25  --  21     No results for input(s): PH, PCO2, PO2, HCO3, FIO2 in the last 72 hours.     24 Hour Results:  Recent Results (from the past 24 hour(s))   CULTURE, BLOOD    Collection Time: 10/27/20  2:45 PM    Specimen: Blood   Result Value Ref Range    Special Requests: NO SPECIAL REQUESTS      Culture result: NO GROWTH AFTER 15 HOURS     CULTURE, BLOOD    Collection Time: 10/27/20  3:14 PM    Specimen: Blood   Result Value Ref Range    Special Requests: NO SPECIAL REQUESTS      Culture result: NO GROWTH AFTER 7 HOURS     CBC WITH AUTOMATED DIFF    Collection Time: 10/28/20  3:50 AM   Result Value Ref Range    WBC 6.6 4.1 - 11.1 K/uL    RBC 4.04 (L) 4.10 - 5.70 M/uL    HGB 12.3 12.1 - 17.0 g/dL    HCT 36.4 (L) 36.6 - 50.3 %    MCV 90.1 80.0 - 99.0 FL    MCH 30.4 26.0 - 34.0 PG    MCHC 33.8 30.0 - 36.5 g/dL    RDW 13.6 11.5 - 14.5 %    PLATELET 640 (L) 150 - 400 K/uL    MPV 11.6 8.9 - 12.9 FL    NRBC 0.0 0  WBC    ABSOLUTE NRBC 0.00 0.00 - 0.01 K/uL    NEUTROPHILS 73 32 - 75 %    LYMPHOCYTES 15 12 - 49 %    MONOCYTES 9 5 - 13 %    EOSINOPHILS 2 0 - 7 %    BASOPHILS 1 0 - 1 %    IMMATURE GRANULOCYTES 0 0.0 - 0.5 %    ABS. NEUTROPHILS 4.9 1.8 - 8.0 K/UL    ABS. LYMPHOCYTES 1.0 0.8 - 3.5 K/UL    ABS. MONOCYTES 0.6 0.0 - 1.0 K/UL    ABS. EOSINOPHILS 0.1 0.0 - 0.4 K/UL    ABS. BASOPHILS 0.0 0.0 - 0.1 K/UL    ABS. IMM. GRANS. 0.0 0.00 - 0.04 K/UL    DF AUTOMATED     METABOLIC PANEL, COMPREHENSIVE    Collection Time: 10/28/20  3:50 AM   Result Value Ref Range    Sodium 140 136 - 145 mmol/L    Potassium 3.4 (L) 3.5 - 5.1 mmol/L    Chloride 108 97 - 108 mmol/L    CO2 25 21 - 32 mmol/L    Anion gap 7 5 - 15 mmol/L    Glucose 91 65 - 100 mg/dL    BUN 11 6 - 20 MG/DL    Creatinine 0.75 0.70 - 1.30 MG/DL    BUN/Creatinine ratio 15 12 - 20      GFR est AA >60 >60 ml/min/1.73m2    GFR est non-AA >60 >60 ml/min/1.73m2    Calcium 7.7 (L) 8.5 - 10.1 MG/DL    Bilirubin, total 0.7 0.2 - 1.0 MG/DL    ALT (SGPT) 25 12 - 78 U/L    AST (SGOT) 20 15 - 37 U/L    Alk.  phosphatase 48 45 - 117 U/L    Protein, total 6.0 (L) 6.4 - 8.2 g/dL    Albumin 2.7 (L) 3.5 - 5.0 g/dL    Globulin 3.3 2.0 - 4.0 g/dL    A-G Ratio 0.8 (L) 1.1 - 2.2         Medications reviewed  Current Facility-Administered Medications   Medication Dose Route Frequency    metoprolol succinate (TOPROL-XL) XL tablet 50 mg  50 mg Oral DAILY    amLODIPine (NORVASC) tablet 10 mg  10 mg Oral DAILY    apixaban (ELIQUIS) tablet 5 mg  5 mg Oral BID    cefTRIAXone (ROCEPHIN) 2 g in 0.9% sodium chloride (MBP/ADV) 50 mL  2 g IntraVENous Q24H    traZODone (DESYREL) tablet 150 mg  150 mg Oral QHS    gabapentin (NEURONTIN) capsule 300 mg  300 mg Oral QHS    sodium chloride (NS) flush 5-10 mL  5-10 mL IntraVENous PRN    sodium chloride (NS) flush 5-40 mL  5-40 mL IntraVENous Q8H    sodium chloride (NS) flush 5-40 mL  5-40 mL IntraVENous PRN    acetaminophen (TYLENOL) tablet 650 mg  650 mg Oral Q6H PRN    Or    acetaminophen (TYLENOL) suppository 650 mg  650 mg Rectal Q6H PRN    polyethylene glycol (MIRALAX) packet 17 g  17 g Oral DAILY PRN    promethazine (PHENERGAN) tablet 12.5 mg  12.5 mg Oral Q6H PRN    Or    ondansetron (ZOFRAN) injection 4 mg  4 mg IntraVENous Q6H PRN    0.9% sodium chloride infusion  50 mL/hr IntraVENous CONTINUOUS       Care Plan discussed with: Patient/Family    Total time spent with patient: 30 minutes.     Remi Poe MD

## 2020-10-28 NOTE — PROGRESS NOTES
Reason for Admission: increased weakness and fever. RUR Score:  11%                    Plan for utilizing home health: To be determined. PCP: First and Last name:  Dr. Ian Rosenthal you a current patient: Yes/No: yes   Can you participate in a virtual visit with your               PCP: yes                    Current Advanced Directive/Advance Care Plan: full code at this time. Transition of Care Plan:       1- cm to continue to follow for any discharge planning needs. 2- anticipate family will provide transportation home  3- follow up with PCP after discharge    Care Management Interventions  PCP Verified by CM: Yes(Dr Ranjeet Montes)  Mode of Transport at Discharge:  Other (see comment)(family transport home. )  Current Support Network: Lives with Spouse  Confirm Follow Up Transport: Family  Discharge Location  Discharge Placement: Unable to determine at this time

## 2020-10-28 NOTE — PROGRESS NOTES
Problem: Falls - Risk of  Goal: *Absence of Falls  Description: Document Cici Carmichael Fall Risk and appropriate interventions in the flowsheet.   Outcome: Progressing Towards Goal  Note: Fall Risk Interventions:  Mobility Interventions: Bed/chair exit alarm, Patient to call before getting OOB         Medication Interventions: Bed/chair exit alarm, Patient to call before getting OOB, Teach patient to arise slowly    Elimination Interventions: Bed/chair exit alarm, Call light in reach              Problem: Patient Education: Go to Patient Education Activity  Goal: Patient/Family Education  Outcome: Progressing Towards Goal     Problem: Urinary Tract Infection - Adult  Goal: *Absence of infection signs and symptoms  Outcome: Progressing Towards Goal

## 2020-10-28 NOTE — PROGRESS NOTES
Bedside and Verbal shift change report given to Marcell Simmons RN (oncoming nurse) by aMrcelo Koenig RN (offgoing nurse). Report included the following information SBAR, Intake/Output, MAR and Recent Results.

## 2020-10-29 LAB
ALBUMIN SERPL-MCNC: 2.7 G/DL (ref 3.5–5)
ALBUMIN/GLOB SERPL: 0.8 {RATIO} (ref 1.1–2.2)
ALP SERPL-CCNC: 49 U/L (ref 45–117)
ALT SERPL-CCNC: 28 U/L (ref 12–78)
ANION GAP SERPL CALC-SCNC: 5 MMOL/L (ref 5–15)
AST SERPL-CCNC: 19 U/L (ref 15–37)
BASOPHILS # BLD: 0 K/UL (ref 0–0.1)
BASOPHILS NFR BLD: 1 % (ref 0–1)
BILIRUB SERPL-MCNC: 0.6 MG/DL (ref 0.2–1)
BUN SERPL-MCNC: 12 MG/DL (ref 6–20)
BUN/CREAT SERPL: 13 (ref 12–20)
CALCIUM SERPL-MCNC: 8.1 MG/DL (ref 8.5–10.1)
CHLORIDE SERPL-SCNC: 107 MMOL/L (ref 97–108)
CO2 SERPL-SCNC: 28 MMOL/L (ref 21–32)
CREAT SERPL-MCNC: 0.96 MG/DL (ref 0.7–1.3)
DIFFERENTIAL METHOD BLD: ABNORMAL
EOSINOPHIL # BLD: 0.2 K/UL (ref 0–0.4)
EOSINOPHIL NFR BLD: 3 % (ref 0–7)
ERYTHROCYTE [DISTWIDTH] IN BLOOD BY AUTOMATED COUNT: 13.4 % (ref 11.5–14.5)
GLOBULIN SER CALC-MCNC: 3.2 G/DL (ref 2–4)
GLUCOSE SERPL-MCNC: 93 MG/DL (ref 65–100)
HCT VFR BLD AUTO: 36.1 % (ref 36.6–50.3)
HGB BLD-MCNC: 11.9 G/DL (ref 12.1–17)
IMM GRANULOCYTES # BLD AUTO: 0 K/UL (ref 0–0.04)
IMM GRANULOCYTES NFR BLD AUTO: 1 % (ref 0–0.5)
LYMPHOCYTES # BLD: 1.2 K/UL (ref 0.8–3.5)
LYMPHOCYTES NFR BLD: 19 % (ref 12–49)
MCH RBC QN AUTO: 29.8 PG (ref 26–34)
MCHC RBC AUTO-ENTMCNC: 33 G/DL (ref 30–36.5)
MCV RBC AUTO: 90.5 FL (ref 80–99)
MONOCYTES # BLD: 0.7 K/UL (ref 0–1)
MONOCYTES NFR BLD: 11 % (ref 5–13)
NEUTS SEG # BLD: 4 K/UL (ref 1.8–8)
NEUTS SEG NFR BLD: 65 % (ref 32–75)
NRBC # BLD: 0 K/UL (ref 0–0.01)
NRBC BLD-RTO: 0 PER 100 WBC
PLATELET # BLD AUTO: 163 K/UL (ref 150–400)
PMV BLD AUTO: 11.4 FL (ref 8.9–12.9)
POTASSIUM SERPL-SCNC: 3.6 MMOL/L (ref 3.5–5.1)
PROT SERPL-MCNC: 5.9 G/DL (ref 6.4–8.2)
RBC # BLD AUTO: 3.99 M/UL (ref 4.1–5.7)
SODIUM SERPL-SCNC: 140 MMOL/L (ref 136–145)
WBC # BLD AUTO: 6.1 K/UL (ref 4.1–11.1)

## 2020-10-29 PROCEDURE — 80053 COMPREHEN METABOLIC PANEL: CPT

## 2020-10-29 PROCEDURE — 99221 1ST HOSP IP/OBS SF/LOW 40: CPT | Performed by: STUDENT IN AN ORGANIZED HEALTH CARE EDUCATION/TRAINING PROGRAM

## 2020-10-29 PROCEDURE — 74011250637 HC RX REV CODE- 250/637: Performed by: FAMILY MEDICINE

## 2020-10-29 PROCEDURE — 99232 SBSQ HOSP IP/OBS MODERATE 35: CPT | Performed by: INTERNAL MEDICINE

## 2020-10-29 PROCEDURE — 85025 COMPLETE CBC W/AUTO DIFF WBC: CPT

## 2020-10-29 PROCEDURE — 74011250636 HC RX REV CODE- 250/636: Performed by: FAMILY MEDICINE

## 2020-10-29 PROCEDURE — 65660000000 HC RM CCU STEPDOWN

## 2020-10-29 PROCEDURE — 74011000258 HC RX REV CODE- 258: Performed by: FAMILY MEDICINE

## 2020-10-29 PROCEDURE — 2709999900 HC NON-CHARGEABLE SUPPLY

## 2020-10-29 PROCEDURE — 36415 COLL VENOUS BLD VENIPUNCTURE: CPT

## 2020-10-29 RX ORDER — AMLODIPINE BESYLATE 10 MG/1
10 TABLET ORAL DAILY
Qty: 30 TAB | Refills: 0 | Status: SHIPPED | OUTPATIENT
Start: 2020-10-29 | End: 2020-10-29

## 2020-10-29 RX ORDER — LEVOFLOXACIN 750 MG/1
750 TABLET ORAL DAILY
Qty: 10 TAB | Refills: 0 | Status: SHIPPED | OUTPATIENT
Start: 2020-10-29 | End: 2020-11-08

## 2020-10-29 RX ORDER — AMLODIPINE BESYLATE 10 MG/1
TABLET ORAL
Qty: 90 TAB | Refills: 0 | Status: SHIPPED | OUTPATIENT
Start: 2020-10-29

## 2020-10-29 RX ADMIN — TRAZODONE HYDROCHLORIDE 150 MG: 100 TABLET ORAL at 21:49

## 2020-10-29 RX ADMIN — Medication 10 ML: at 21:50

## 2020-10-29 RX ADMIN — CEFTRIAXONE 2 G: 2 INJECTION, POWDER, FOR SOLUTION INTRAMUSCULAR; INTRAVENOUS at 09:11

## 2020-10-29 RX ADMIN — METOPROLOL SUCCINATE 50 MG: 50 TABLET, EXTENDED RELEASE ORAL at 09:20

## 2020-10-29 RX ADMIN — ACETAMINOPHEN 650 MG: 325 TABLET ORAL at 18:24

## 2020-10-29 RX ADMIN — ACETAMINOPHEN 650 MG: 325 TABLET ORAL at 09:35

## 2020-10-29 RX ADMIN — Medication 10 ML: at 05:01

## 2020-10-29 RX ADMIN — LISINOPRIL 40 MG: 20 TABLET ORAL at 09:21

## 2020-10-29 RX ADMIN — APIXABAN 5 MG: 5 TABLET, FILM COATED ORAL at 21:49

## 2020-10-29 RX ADMIN — GABAPENTIN 300 MG: 300 CAPSULE ORAL at 21:49

## 2020-10-29 RX ADMIN — AMLODIPINE BESYLATE 10 MG: 5 TABLET ORAL at 09:19

## 2020-10-29 RX ADMIN — APIXABAN 5 MG: 5 TABLET, FILM COATED ORAL at 09:19

## 2020-10-29 NOTE — PROGRESS NOTES
Bedside and Verbal shift change report given to Sherri Weller RN (oncoming nurse) by Elen Vila RN (offgoing nurse). Report included the following information SBAR, Intake/Output, MAR and Recent Results.  Noted NPO at midnight, FACUNDO planned for the am.

## 2020-10-29 NOTE — ROUTINE PROCESS
Hospital follow-up PCP transitional care appointment has been scheduled with LUIS ALBERTO Balbuena for Nov 2 @  11;15AM.  Pending patient discharge. Lin Love, Care Management Specialist. 
 
Hospital follow-up visit has been scheduled with [facility] for 10/31/2020 to address sepsis.   Office will contact patient prior to arrival.  Lin Love, Care Management Specialist.

## 2020-10-29 NOTE — PROGRESS NOTES
Problem: Falls - Risk of  Goal: *Absence of Falls  Description: Document Ivone Powell Fall Risk and appropriate interventions in the flowsheet.   Outcome: Progressing Towards Goal  Note: Fall Risk Interventions:  Mobility Interventions: Patient to call before getting OOB         Medication Interventions: Patient to call before getting OOB, Teach patient to arise slowly    Elimination Interventions: Call light in reach, Patient to call for help with toileting needs

## 2020-10-29 NOTE — PROGRESS NOTES
[facility] Progress Note           Marleen Pina DO    [phone] Office  [phone]  Fax    10/29/2020      Assessment & Plan:   · Klebsiella pneumoniae urosepsis. Repeat blood cultures sterile to date. TTE shows possible tricuspid valve/pacer wire vegetation. FACUNDO planned for tomorrow. Discharge plans will depend on FACUNDO results. Renal ultrasound significant only for cysts. Outpatient follow-up with urology next week  · Penicillin allergy. This caused a rash approximately 25 years ago. He is able to tolerate cephalosporin antibiotics  · STEPHANIE. Resolved  · Symptomatic bradycardia. Status post pacemaker placement 10/2019. FACUNDO planned as outlined above           Subjective:     No complaints    Objective:     Vitals:   Visit Vitals  BP (!) 149/94 (BP 1 Location: Right arm, BP Patient Position: At rest) Comment: nurse alerted    Pulse 65   Temp 98.7 °F (37.1 °C)   Resp 20   Ht 6' 5\" (1.956 m)   Wt 261 lb (118.4 kg)   SpO2 95%   BMI 30.95 kg/m²        Tmax:  Temp (24hrs), Av.2 °F (36.8 °C), Min:97.8 °F (36.6 °C), Max:98.7 °F (37.1 °C)      Exam:   Patient is intubated:  no    Physical Examination:   General:  Alert, cooperative, no distress   Head:  Normocephalic, atraumatic. Eyes:  Conjunctivae clear   Neck: Supple       Lungs:   No distress. Chest wall:     Heart:   RRR. No murmur   Abdomen:    Nondistended   Extremities: Moves all. Skin:  No rash   Neurologic: CNII-XII intact. Normal strength     Labs:        No lab exists for component: ITNL   No results for input(s): CPK, CKMB, TROIQ in the last 72 hours. Recent Labs     10/29/20  0504 10/28/20  0350    140   K 3.6 3.4*    108   CO2 28 25   BUN 12 11   CREA 0.96 0.75   GLU 93 91   ALB 2.7* 2.7*   WBC 6.1 6.6   HGB 11.9* 12.3   HCT 36.1* 36.4*    145*     No results for input(s): INR, PTP, APTT, INREXT, INREXT in the last 72 hours.   Needs: urine analysis, urine sodium, protein and creatinine  No results found for: DELMI, CREAU      Cultures:     Lab Results   Component Value Date/Time    Specimen Description: NARES 10/17/2013 03:40 PM    Specimen Description: URINE 10/17/2013 03:38 PM     Lab Results   Component Value Date/Time    Culture result: NO GROWTH 2 DAYS 10/27/2020 03:14 PM    Culture result: NO GROWTH 2 DAYS 10/27/2020 02:45 PM    Culture result: (A) 10/25/2020 01:01 PM     KLEBSIELLA PNEUMONIAE GROWING IN ALL 4 BOTTLES DRAWN (SITES = [facility])       Radiology:     Medications       Current Facility-Administered Medications   Medication Dose Route Frequency Last Dose    lisinopriL (PRINIVIL, ZESTRIL) tablet 40 mg  40 mg Oral DAILY 40 mg at 10/29/20 0921    metoprolol succinate (TOPROL-XL) XL tablet 50 mg  50 mg Oral DAILY 50 mg at 10/29/20 0920    amLODIPine (NORVASC) tablet 10 mg  10 mg Oral DAILY 10 mg at 10/29/20 0919    apixaban (ELIQUIS) tablet 5 mg  5 mg Oral BID 5 mg at 10/29/20 0919    cefTRIAXone (ROCEPHIN) 2 g in 0.9% sodium chloride (MBP/ADV) 50 mL  2 g IntraVENous Q24H 2 g at 10/29/20 0911    traZODone (DESYREL) tablet 150 mg  150 mg Oral  mg at 10/28/20 2100    gabapentin (NEURONTIN) capsule 300 mg  300 mg Oral  mg at 10/28/20 2100    sodium chloride (NS) flush 5-10 mL  5-10 mL IntraVENous PRN 10 mL at 10/26/20 2123    sodium chloride (NS) flush 5-40 mL  5-40 mL IntraVENous Q8H 10 mL at 10/29/20 0501    sodium chloride (NS) flush 5-40 mL  5-40 mL IntraVENous PRN      acetaminophen (TYLENOL) tablet 650 mg  650 mg Oral Q6H  mg at 10/29/20 0935    Or    acetaminophen (TYLENOL) suppository 650 mg  650 mg Rectal Q6H PRN      polyethylene glycol (MIRALAX) packet 17 g  17 g Oral DAILY PRN      promethazine (PHENERGAN) tablet 12.5 mg  12.5 mg Oral Q6H PRN      Or    ondansetron (ZOFRAN) injection 4 mg  4 mg IntraVENous Q6H PRN             Case discussed with: Dr. Jyotsna Goldstein, cardiology      Darlene Morrison,

## 2020-10-29 NOTE — DISCHARGE INSTRUCTIONS
Patient Discharge Instructions    Sebastian Ashby / [id number] : 1945    Admitted 10/25/2020 Discharged: 10/29/2020 11:26 AM     ACUTE DIAGNOSES:  Sepsis ([facility] [A41.9]    CHRONIC MEDICAL DIAGNOSES:  Problem List as of 10/29/2020 Date Reviewed: 7/15/2020          Codes Class Noted - Resolved    Tachy-viv syndrome ([facility] ICD-10-CM: I49.5  ICD-9-CM: 427.81  10/4/2019 - Present        Sepsis ([facility] ICD-10-CM: A41.9  ICD-9-CM: 038.9, 995.91  10/25/2020 - Present        Peripheral vascular disease ([facility] ICD-10-CM: I73.9  ICD-9-CM: 443.9  2020 - Present        Advanced care planning/counseling discussion ICD-10-CM: Z71.89  ICD-9-CM: V65.49  2/3/2017 - Present    Overview Signed 2/3/2017  1:51 PM by Edy Sargent RN     Patient states that a completed AMD is at home. NN encouraged patient to bring a copy to the office for scanning into the medical record. Patient verbalized understanding & agreement.                Paroxysmal atrial fibrillation (HCC) ICD-10-CM: I48.0  ICD-9-CM: 427.31  2016 - Present        BPH (benign prostatic hyperplasia) ICD-10-CM: N40.0  ICD-9-CM: 600.00  2016 - Present        Insomnia ICD-10-CM: G47.00  ICD-9-CM: 780.52  2016 - Present        Osteoarthritis of left knee ICD-10-CM: M17.12  ICD-9-CM: 715.96  8/15/2016 - Present        Spinal stenosis in cervical region ICD-10-CM: M48.02  ICD-9-CM: 723.0  2016 - Present        Herniated nucleus pulposus, C3-4 ICD-10-CM: M50.21  ICD-9-CM: 722.0  2016 - Present        Normal cardiac stress test ICD-10-CM: [id number]  ICD-9-CM: Maple Mao  2015 - Present        IFG (impaired fasting glucose) ICD-10-CM: R73.01  ICD-9-CM: 790.21  Unknown - Present        ED (erectile dysfunction) ICD-10-CM: N52.9  ICD-9-CM: 607.84  2014 - Present        SVT (supraventricular tachycardia) (HCC) ICD-10-CM: I47.1  ICD-9-CM: 427.89  Unknown - Present        Right knee DJD ICD-10-CM: M17.11  ICD-9-CM: 715.96  2013 - Present Spinal stenosis of lumbar region ICD-10-CM: M48.061  ICD-9-CM: 724.02  Unknown - Present        Tear of medial cartilage or meniscus of knee, current ICD-10-CM: [id number]  ICD-9-CM: 836.0  2/1/2013 - Present        Osteoarthrosis, unspecified whether generalized or localized, lower leg ICD-10-CM: M17.10  ICD-9-CM: 715.96  2/1/2013 - Present        Palpitations ICD-10-CM: R00.2  ICD-9-CM: 785.1  12/12/2011 - Present        Supraventricular tachycardia ([address] ICD-10-CM: I47.1  ICD-9-CM: 427.89  12/12/2011 - Present        HLD (hyperlipidemia) ICD-10-CM: E78.5  ICD-9-CM: 272.4  12/12/2011 - Present        Essential hypertension, benign ICD-10-CM: I10  ICD-9-CM: 401.1  12/12/2011 - Present        Venous insufficiency ICD-10-CM: I87.2  ICD-9-CM: 459.81  12/12/2011 - Present        Colon polyps ICD-10-CM: K63.5  ICD-9-CM: 211.3  Unknown - Present        AVI (obstructive sleep apnea) ICD-10-CM: G47.33  ICD-9-CM: 327.23  Unknown - Present        Heart palpitations ICD-10-CM: R00.2  ICD-9-CM: 785.1  Unknown - Present    Overview Signed 7/27/2010  9:41 AM by MD Dr. Kaye Love             Hyperlipidemia with target LDL less than 130 ICD-10-CM: E78.5  ICD-9-CM: 272.4  Unknown - Present        BPH (benign prostatic hypertrophy) ICD-10-CM: N40.0  ICD-9-CM: 600.00  Unknown - Present        Lower back pain ICD-10-CM: M54.5  ICD-9-CM: 724.2  Unknown - Present    Overview Signed 7/27/2010  9:41 AM by Amol Hester MD     hx lumbar laminectomyx2 w good results             Knee pain, right ICD-10-CM: M25.561  ICD-9-CM: 719.46  Unknown - Present        Right sided sciatica ICD-10-CM: M54.31  ICD-9-CM: 724.3  Unknown - Present        RESOLVED: Tear of lateral cartilage or meniscus of knee, current ICD-10-CM: C24.676I  ICD-9-CM: 836.1  2/1/2013 - 3/19/2013        RESOLVED: Hypertension ICD-10-CM: I10  ICD-9-CM: 401.9  Unknown - 2/4/2017              DISCHARGE MEDICATIONS:         · It is important that you take the medication exactly as they are prescribed. · Keep your medication in the bottles provided by the pharmacist and keep a list of the medication names, dosages, and times to be taken in your wallet. · Do not take other medications without consulting your doctor. DIET:  Resume previous diet    ACTIVITY: Activity as tolerated    ADDITIONAL INFORMATION: If you experience any of the following symptoms then please call your primary care physician or return to the emergency room if you cannot get hold of your doctor: Fever, chills, nausea, vomiting, diarrhea, change in mentation, falling, bleeding, shortness of breath. FOLLOW UP CARE:  Dr. Ally Sahu MD  you are to call and set up an appointment to see them with in 1 week. Follow-up with specialists at directed by them      Information obtained by :  I understand that if any problems occur once I am at home I am to contact my physician. I understand and acknowledge receipt of the instructions indicated above.                                                                                                                                            Physician's or R.N.'s Signature                                                                  Date/Time                                                                                                                                              Patient or Representative Signature                                                          Date/Time

## 2020-10-29 NOTE — PROGRESS NOTES
Bedside and Verbal shift change report given to Felicitas (oncoming nurse) by Dori Belcher (offgoing nurse). Report included the following information SBAR, Intake/Output, MAR and Recent Results.

## 2020-10-29 NOTE — CONSULTS
[facility] [address] [phone]  Mobile [phone]    Cardiology Consult Note    Date of  Admission: 10/25/2020 11:41 AM  PCP- MD Sharon Chadwick MD  :  1945   MRN:  [id number]       Reason for consult: FACUNDO  Admission Diagnosis: Sepsis [facility]) [A41.9]    Teri Peña is a 66 y.o. male admitted for Sepsis ([address] [A41.9]. Consult requested by Anjana Ram MD       Subjective:   Sepsis [facility]) [A41.9]        Impression Plan/Recommendation     1. Klebsiella pneumoniae urosepsis  2. Possible vegetation on tricuspid valve  3. Symptomatic bradycardia s/p PPM in situ (DOI 10/4/2019- dual chamber medtronic)  4. Hypertension   5. Persistent AF   6. AVI on CPAP               Dr Gian Berrios- cardiologists   OMAR Bowling · FACUNDO tomorrow at 0830  · NPO at midnight   · COVID negative 10/25  · No contraindication for FACUNDO   · He is aware if there is vegetation on the PPM lead he will have to have the leads and device extracted. He is not PPM dependent         10/25/20   ECHO ADULT COMPLETE 10/28/2020 10/28/2020    Narrative · LV: Estimated LVEF is 60 - 65%. Normal cavity size and systolic function   (ejection fraction normal). Mild concentric hypertrophy. Wall motion:   normal. Age-appropriate left ventricular diastolic function. · RV: Mildly dilated right ventricle. Pacer/ICD present. · LA: Moderately dilated left atrium. Left Atrium volume index is 45.72   mL/m2. · RA: Dilated right atrium. · AO: Mild sinuses of Valsalva and aortic root dilatation. · TV: Mild to moderate tricuspid valve regurgitation is present. Additional tricuspid valve findings: There is possible vegetation noted. · IVC: Severely elevated central venous pressure (15+ mmHg); IVC diameter   is larger than 21 mm and collapses less than 50% with respiration.      Suspicious findings for possible vegetation within ventricular side of   tricuspid valve/pacer wire, recommend FACUNDO for further evaluation. Signed by: Rosa Watts DO             Subjective:  Hesham Banks is a 66 y.o. male I am seeing for FACUNDO, his echo showed possible vegetation on tricuspid valve. He does have an dual chamber that was implanted last year for symptomatic bradycardia. He denies dentures, loose teeth, difficult swallowing or previous esophogeal surgeries. Past Medical History:   Diagnosis Date    Arthritis     left knee and lt. hand    Atrial fibrillation (HCC)     BPH (benign prostatic hyperplasia)     Chronic pain     Back pain    Colon polyps     DJD (degenerative joint disease) of lumbar spine     laminectomy 1979, 1991, 2015    ED (erectile dysfunction) 6/18/2014    Heart palpitations     Dr. Cathren Buerger    Herniated nucleus pulposus, C3-4 12/2015    Dr. Alex Wells    Hyperlipidemia     Hypertension     IFG (impaired fasting glucose)     Insomnia     severe. has treated, AVI, back pain    Knee pain, right     Normal cardiac stress test 9/21/15    OA (osteoarthritis) of knee     Dr. Juan Ceballos Obesity     Onychomycosis 2018    Dr Jesu Thomas. lamisil    AVI on CPAP     Dr. Óscar Anderson    Seasonal allergic rhinitis     Spinal stenosis in cervical region 12/2015    Spinal stenosis of lumbar region     MRI 12/2012. Dr. Alex Wells    SVT (supraventricular tachycardia) [facility])     Dr. Cathren Buerger [place] [facility] Dr Diaz Carreno Varicose veins     vein stripping 10/10      Past Surgical History:   Procedure Laterality Date    COLONOSCOPY  2008    2 polyps. repeat 2011. Dr. Javier Graves  2007    7 polyps per pt     COLONOSCOPY N/A 6/15/2016    COLONOSCOPY performed by Rosalia Schroeder MD at [address] [place] [address], COLON, DIAGNOSTIC  2011    return in 2016    HX BLADDER REPAIR      polyp removal    HX CERVICAL FUSION  1/16/16    ACDF C3-C4 Dr. Alex Wells    H[facility] COL[facility]  [facility]/27/11    [facility]   repeat 4/2016    HX KNEE ARTHROSCOPY  2005    right, Dr. Obed Burk    HX KNEE ARTHROSCOPY  02/01/2012    left, Dr Oebd Burk    HX KNEE ARTHROSCOPY  1/2013    right    HX KNEE REPLACEMENT  11/4/2013    HX LUMBAR FUSION  9/2015    L3-L4, Dr. Jeanie Burdick      left, Dr Irving Doan    bladder polyp removed with cystoscopy    HX VEIN STRIPPING  9/2010    Dr. Eugenia Tran, bilateral    NV INS NEW/RPLC PRM PACEMAKER W/TRANSV ELTRD VENTR N/A 10/4/2019    INSERT PPM SINGLE VENTRICULAR/Medtronic performed by Blanca Guan MD at [address] CATH LAB    NV INS NEW/RPLCMT PRM PM W/TRANSV ELTRD ATRIAL&VENT N/A 10/4/2019    Insert Ppm Dual performed by Blanca Guan MD at [facility] CATH LAB    NV REMOVAL SUBCUTANEOUS CARDIAC RHYTHM MONITOR N/A 10/4/2019    LOOP RECORDER REMOVAL performed by Blanca Guan MD at [address] CATH LAB    SHOULDER SURG [address] UNLISTED      left DECOMPRESSION, Dr. Kanu Thomas  11/2013    VASCULAR SURGERY PROCEDURE UNLIST  2008    bilateral vein stripping       Hospital Medications:  Current Facility-Administered Medications   Medication Dose Route Frequency    lisinopriL (PRINIVIL, ZESTRIL) tablet 40 mg  40 mg Oral DAILY    metoprolol succinate (TOPROL-XL) XL tablet 50 mg  50 mg Oral DAILY    amLODIPine (NORVASC) tablet 10 mg  10 mg Oral DAILY    apixaban (ELIQUIS) tablet 5 mg  5 mg Oral BID    cefTRIAXone (ROCEPHIN) 2 g in 0.9% sodium chloride (MBP/ADV) 50 mL  2 g IntraVENous Q24H    traZODone (DESYREL) tablet 150 mg  150 mg Oral QHS    gabapentin (NEURONTIN) capsule 300 mg  300 mg Oral QHS    sodium chloride (NS) flush 5-10 mL  5-10 mL IntraVENous PRN    sodium chloride (NS) flush 5-40 mL  5-40 mL IntraVENous Q8H    sodium chloride (NS) flush 5-40 mL  5-40 mL IntraVENous PRN    acetaminophen (TYLENOL) tablet 650 mg  650 mg Oral Q6H PRN    Or    acetaminophen (TYLENOL) suppository 650 mg  650 mg Rectal Q6H PRN    polyethylene glycol (MIRALAX) packet 17 g  17 g Oral DAILY PRN    promethazine (PHENERGAN) tablet 12.5 mg  12.5 mg Oral Q6H PRN    Or    ondansetron (ZOFRAN) injection 4 mg  4 mg IntraVENous Q6H PRN     No current facility-administered medications on file prior to encounter. Current Outpatient Medications on File Prior to Encounter   Medication Sig Dispense Refill    gabapentin (NEURONTIN) 300 mg capsule Take 300 mg by mouth nightly.  furosemide (LASIX) 20 mg tablet Take 20 mg by mouth daily as needed.  traZODone (DESYREL) 150 mg tablet Take 300 mg by mouth nightly.  lisinopriL (PRINIVIL, ZESTRIL) 40 mg tablet TAKE 1 TABLET DAILY 90 Tab 3    diclofenac (VOLTAREN) 1 % gel Apply 4 g to affected area daily as needed.  chlorhexidine (PERIDEX) 0.12 % solution Take 15 mL by mouth two (2) times daily as needed.  Eliquis 5 mg tablet TAKE 1 TABLET TWICE A  Tab 3    levocetirizine (XYZAL) 5 mg tablet TAKE 1 TABLET DAILY 90 Tab 3    atorvastatin (LIPITOR) 20 mg tablet TAKE 1 TABLET DAILY 90 Tab 4    metoprolol succinate (TOPROL-XL) 50 mg XL tablet Take 1 Tab by mouth daily. 90 Tab 3    amLODIPine (NORVASC) 5 mg tablet Take 1 Tab by mouth daily. 90 Tab 2    terazosin (HYTRIN) 5 mg capsule TAKE 1 CAPSULE DAILY 90 Cap 3         Allergies   Allergen Reactions    Percocet [Oxycodone-Acetaminophen] Other (comments)     hallucinations    Penicillin G Rash     Did okay with keflex - no reaction with that. Review of Symptoms:  Other systems reviewed and negative except as above. Physical Exam    Visit Vitals  BP (!) 149/94 (BP 1 Location: Right arm, BP Patient Position: At rest)   Pulse 65   Temp 98.7 °F (37.1 °C)   Resp 20   Ht 6' 5\" (1.956 m)   Wt 261 lb (118.4 kg)   SpO2 95%   BMI 30.95 kg/m²     General Appearance:  Well developed, elderly,alert and oriented x 3, and individual in no acute distress. Ears/Nose/Mouth/Throat:   Hearing grossly normal.Normal oral mucosa,no scleral icterus     Neck: Supple   Chest:   Lungs clear to auscultation bilaterally,no rales rhonchi or wheezing   Cardiovascular:  irregular rate and rhythm, no Murmur. Left sided ppm scar unremarkable - device prominent    Abdomen:   Soft, non-tender, bowel sounds are active. Extremities: No edema bilaterally. Pulses detected, no varicosities   Skin: Warm and dry. No bruising  Neuro  Moves all extermities and neurologically intact                                                       Cardiographics    Telemetry:AF v rate 60-70's demand   ECG: AF v rate 97 bpm  Cardiac testing      No results for input(s): CPK, CKMB, TROIQ in the last 72 hours. No lab exists for component: CKQMB, CPKMB    Recent Labs     10/29/20  0504 10/28/20  0350    140   K 3.6 3.4*   CO2 28 25   BUN 12 11   CREA 0.96 0.75   GLU 93 91   WBC 6.1 6.6   HGB 11.9* 12.3   HCT 36.1* 36.4*    145*       I have discussed the diagnosis with the patient and the intended plan as seen in the above orders. Questions were answered concerning future plans. I have discussed medication side effects and warnings with the patient as well. Ortencia Ganser is in agreement to the plan listed above and wishes to proceed. he  was instructed not to smoke, eat heart healthy diet  and to exercise. Thank you for this consult.     Lorrie Cabrera NP

## 2020-10-29 NOTE — PROGRESS NOTES
Transition of Care Plan - RUR 13%:     1. CM following  2. FACUNDO planned in the a.m. 3. Home when stable   4. Wife to transport  5.  Outpatient f/u with PCP and [address] Shan Marlow LCSW

## 2020-10-29 NOTE — DISCHARGE SUMMARY
Physician Discharge Summary Patient ID:   
Tran Major 
[id number] 
22 y.o. 
1945 Merary Orta MD 
 
Admit date: 10/25/2020 Discharge date and time: 10/29/2020 Admission Diagnoses: Sepsis ([facility] [A41.9] Chronic Diagnoses:   
Problem List as of 10/29/2020 Date Reviewed: 7/15/2020 Codes Class Noted - Resolved Tachy-viv syndrome ([facility] ICD-10-CM: I49.5 ICD-9-CM: 427.81  10/4/2019 - Present Sepsis ([facility] ICD-10-CM: A41.9 ICD-9-CM: 038.9, 995.91  10/25/2020 - Present Peripheral vascular disease ([facility] ICD-10-CM: I73.9 ICD-9-CM: 443.9  6/17/2020 - Present Advanced care planning/counseling discussion ICD-10-CM: Z71.89 ICD-9-CM: V65.49  2/3/2017 - Present Overview Signed 2/3/2017  1:51 PM by Destini Hebert RN Patient states that a completed AMD is at home. NN encouraged patient to bring a copy to the office for scanning into the medical record. Patient verbalized understanding & agreement. Paroxysmal atrial fibrillation (HCC) ICD-10-CM: I48.0 ICD-9-CM: 427.31  9/28/2016 - Present BPH (benign prostatic hyperplasia) ICD-10-CM: N40.0 ICD-9-CM: 600.00  8/17/2016 - Present Insomnia ICD-10-CM: G47.00 ICD-9-CM: 780.52  8/17/2016 - Present Osteoarthritis of left knee ICD-10-CM: M17.12 
ICD-9-CM: 715.96  8/15/2016 - Present Spinal stenosis in cervical region ICD-10-CM: M48.02 
ICD-9-CM: 723.0  1/5/2016 - Present Herniated nucleus pulposus, C3-4 ICD-10-CM: M50.21 ICD-9-CM: 722.0  1/5/2016 - Present Normal cardiac stress test ICD-10-CM: [place] ICD-9-CM: [place]  9/21/2015 - Present IFG (impaired fasting glucose) ICD-10-CM: R73.01 
ICD-9-CM: 790.21  Unknown - Present ED (erectile dysfunction) ICD-10-CM: N52.9 ICD-9-CM: 607.84  6/18/2014 - Present SVT (supraventricular tachycardia) (HCC) ICD-10-CM: I47.1 ICD-9-CM: 427.89  Unknown - Present Right knee DJD ICD-10-CM: M17.11 ICD-9-CM: 715.96  11/4/2013 - Present Spinal stenosis of lumbar region ICD-10-CM: M48.061 
ICD-9-CM: 724.02  Unknown - Present Tear of medial cartilage or meniscus of knee, current ICD-10-CM: [id number] ICD-9-CM: 836.0  2/1/2013 - Present Osteoarthrosis, unspecified whether generalized or localized, lower leg ICD-10-CM: M17.10 ICD-9-CM: 715.96  2/1/2013 - Present Palpitations ICD-10-CM: R00.2 ICD-9-CM: 785.1  12/12/2011 - Present Supraventricular tachycardia ([address] ICD-10-CM: I47.1 ICD-9-CM: 427.89  12/12/2011 - Present HLD (hyperlipidemia) ICD-10-CM: E78.5 ICD-9-CM: 272.4  12/12/2011 - Present Essential hypertension, benign ICD-10-CM: I10 
ICD-9-CM: 401.1  12/12/2011 - Present Venous insufficiency ICD-10-CM: I87.2 ICD-9-CM: 459.81  12/12/2011 - Present Colon polyps ICD-10-CM: K63.5 ICD-9-CM: 211.3  Unknown - Present AVI (obstructive sleep apnea) ICD-10-CM: G47.33 
ICD-9-CM: 327.23  Unknown - Present Heart palpitations ICD-10-CM: R00.2 ICD-9-CM: 785.1  Unknown - Present Overview Signed 7/27/2010  9:41 AM by MD Dr. Diaz Maher Hyperlipidemia with target LDL less than 130 ICD-10-CM: E78.5 ICD-9-CM: 272.4  Unknown - Present BPH (benign prostatic hypertrophy) ICD-10-CM: N40.0 ICD-9-CM: 600.00  Unknown - Present Lower back pain ICD-10-CM: M54.5 ICD-9-CM: 724.2  Unknown - Present Overview Signed 7/27/2010  9:41 AM by Mickey Loyola MD  
  hx lumbar laminectomyx2 w good results Knee pain, right ICD-10-CM: M25.561 ICD-9-CM: 719.46  Unknown - Present Right sided sciatica ICD-10-CM: M54.31 
ICD-9-CM: 724.3  Unknown - Present RESOLVED: Tear of lateral cartilage or meniscus of knee, current ICD-10-CM: B15.633K 
ICD-9-CM: 836.1  2/1/2013 - 3/19/2013 RESOLVED: Hypertension ICD-10-CM: I10 
ICD-9-CM: 401.9  Unknown - 2/4/2017 Discharge Medications:  
Current Discharge Medication List  
  
START taking these medications Details  
levoFLOXacin (LEVAQUIN) 750 mg tablet Take 1 Tab by mouth daily for 10 days. Qty: 10 Tab, Refills: 0 CONTINUE these medications which have CHANGED Details  
amLODIPine (NORVASC) 10 mg tablet TAKE 1 TABLET BY MOUTH DAILY Qty: 90 Tab, Refills: 0 Comments: **Patient requests 90 days supply** CONTINUE these medications which have NOT CHANGED Details  
gabapentin (NEURONTIN) 300 mg capsule Take 300 mg by mouth nightly. furosemide (LASIX) 20 mg tablet Take 20 mg by mouth daily as needed. traZODone (DESYREL) 150 mg tablet Take 300 mg by mouth nightly. lisinopriL (PRINIVIL, ZESTRIL) 40 mg tablet TAKE 1 TABLET DAILY Qty: 90 Tab, Refills: 3 Associated Diagnoses: Essential hypertension; SVT (supraventricular tachycardia) ([facility] Paroxysmal atrial fibrillation (HCC) diclofenac (VOLTAREN) 1 % gel Apply 4 g to affected area daily as needed. chlorhexidine (PERIDEX) 0.12 % solution Take 15 mL by mouth two (2) times daily as needed. Eliquis 5 mg tablet TAKE 1 TABLET TWICE A DAY Qty: 180 Tab, Refills: 3  
  
levocetirizine (XYZAL) 5 mg tablet TAKE 1 TABLET DAILY Qty: 90 Tab, Refills: 3  
  
atorvastatin (LIPITOR) 20 mg tablet TAKE 1 TABLET DAILY Qty: 90 Tab, Refills: 4  
  
metoprolol succinate (TOPROL-XL) 50 mg XL tablet Take 1 Tab by mouth daily. Qty: 90 Tab, Refills: 3 Comments: **Patient requests 90 days supply**  
  
terazosin (HYTRIN) 5 mg capsule TAKE 1 CAPSULE DAILY Qty: 90 Cap, Refills: 3 Follow up Care: 1. Terri Tapia MD with in 1 weeks 2. Urology specialists as directed. Diet:  Resume previous diet Disposition: 
Home. Advanced Directive: 
 
Discharge Exam: [See today's progress note.] CONSULTATIONS: ID Significant Diagnostic Studies:  
Recent Labs 10/29/20 
7616 10/28/20 
0350 WBC 6.1 6.6 HGB 11.9* 12.3 HCT 36.1* 36.4*  
 145* Recent Labs 10/29/20 
3517 10/28/20 
0350  140  
K 3.6 3.4*  
 108 CO2 28 25 BUN 12 11 CREA 0.96 0.75 GLU 93 91  
CA 8.1* 7.7* Recent Labs 10/29/20 
0391 10/28/20 
0350 ALT 28 25 AP 49 48 TBILI 0.6 0.7 TP 5.9* 6.0* ALB 2.7* 2.7*  
GLOB 3.2 3.3 Lab Results Component Value Date/Time TSH 2.320 09/11/2019 04:18 PM  
 
 
HOSPITAL COURSE & TREATMENT RENDERED:  
Urinary tract infection with Sepsis 
-Recently treated for UTI with Cipro for 10 days 
-Given ceftriaxone in the ED, Increased to 2gm daily on 10/26 
-BC and UC klebsiella- repeat BC neg so far at 2 days 
-Renal US shows medical renal disease 
   
Acute hypoxic respiratory failure 
-Off oxygen 
-History of cardiomyopathy, check BNP 
-CT chest neg 
-Covid neg 
  
Symptomatic bradycardia 
-S/p PPM insertion October 2019 
  
Venous insufficiency 
-S/p greater saphenous vein ablation 
-Continue with Lasix as needed 
  
Hypertension but now hypotensive due to sepsis 
-Restart amlodipine and metoprolol 
-Restart  Lisinopril 
  
Atrial fibrillation 
-Continue Eliquis 
-Restarted Metoprolol 
  
Obstructive sleep apnea- 
-Continue CPAP 
  
  
   
  
Subjective:  
Binu Yu a 66 y. o. male who presented with weakness since this morning.  He states he was trying to get out of bed this morning to go to the bathroom but was too weak.  He went to bed last night feeling a little bit dizzy however was otherwise feeling okay.  According to his wife he was having darker colored urine with some noticeable blood.  He recently completed a 10-day course of Cipro for UTI and was feeling better.  His wife states that she checked his oxygen levels and it was in the low 80s.  It improved after she asked him to take some deep breaths.  He denies any shortness of breath, or cough.  He did not have a fever at home however had a fever of 101 upon arrival to the emergency department. Radha Maya denies any nausea, vomiting, abdominal pain, or diarrhea. (Dr Cas Patel) 
  
10/26: Feeling better this am. BP are still a bit low and holding home BP meds. Will add back as appropriate. Cultures pending. LA has improved and WBC is better. Tmax 101 yesterday and low grade this am.  
  
10/27:Afebrile. Feeling better. BP higher now so will restart home meds. ID has seen and BC have been repeated. Initial blood cultures + Klebsiella. Needs repeat cultures. COVID neg. ID would like renal US done.  
  
10/28: Repeat BC neg at 15 hours. BP up so will restart Lisinopril. Stop IVF. US with medical renal disease. No complaints this am.  
  
10/29: Repeat BC still neg at 2 days. BP better. Will d/c home with po antibiotics and f/u with Urology.  
  
Review of Systems: A comprehensive review of systems was negative except for that written in the HPI. 
  
Objective:  
Physical Exam:  
  
Visit Vitals /88 (BP 1 Location: Left arm, BP Patient Position: At rest) Pulse 68 Temp 97.8 °F (36.6 °C) Resp 18 Ht 6' 5\" (1.956 m) Wt 261 lb (118.4 kg) SpO2 91% BMI 30.95 kg/m² O2 Flow Rate (L/min): 1 l/min O2 Device: Room air 
  
Temp (24hrs), Av.1 °F (36.7 °C), Min:97.8 °F (36.6 °C), Max:98.3 °F (36.8 °C) No intake/output data recorded. 10/27 0701 - 10/28 1900 In: 1500 [P.O.:900; I.V.:600] Out: Dacia Lindsey [[id number]] 
  General:  Alert, cooperative, no distress, appears stated age. Head:  Normocephalic, without obvious abnormality, atraumatic. Eyes:  Conjunctivae/corneas clear. PERRL, EOMs intact. Nose: Nares normal. Septum midline. Mucosa normal. No drainage or sinus tenderness. Throat: Lips, mucosa, and tongue moist.. Neck: Supple, symmetrical, trachea midline, no adenopathy, thyroid: no enlargement/tenderness/nodules, no carotid bruit and no JVD. Back:   Symmetric, no curvature. ROM normal. No CVA tenderness. Lungs:   Clear to auscultation bilaterally. Chest wall:  No tenderness or deformity. Heart:  Regular rate and rhythm, S1, S2 normal, no murmur, click, rub or gallop. Abdomen:   Soft, non-tender. Bowel sounds normal. No masses,  No organomegaly. Extremities: no cyanosis or edema. No calf tenderness or cords. Pulses: 2+ and symmetric all extremities. Skin: Skin color, texture, turgor normal. No rashes or lesions Neurologic: CNII-XII intact. Alert and oriented X 3. Fine motor of hands and fingers normal.   equal.  No cogwheeling or rigidity. Gait not tested at this time. Sensation grossly normal to touch. Gross motor of extremities normal.    
  
Data Review: FINDINGS:CT Chest 10/25/20 There is mild dependent atelectasis and mild subsegmental atelectasis in the 
lower lung fields. The upper lungs are clear. Pulmonary, mediastinal beck, and 
splenic calcifications are evidence of old granulomatous disease. The central 
airways are patent. No pneumothorax or pleural effusion. The heart is enlarged. A pacemaker is in place. Trace pericardial fluid in the aortic recess is likely 
physiologic. No thoracic lymphadenopathy. Incidental note is made of hepatic 
cysts and a probable partially imaged left renal cyst. 
  
IMPRESSION: No acute process.  
  
IMPRESSION Renal US IMPRESSION: Diffusely increased renal echogenicity compatible with medical renal 
disease with bilateral renal cysts. Signed: 
Shabnam Nichols MD 
10/29/2020 
11:28 AM .

## 2020-10-29 NOTE — PROGRESS NOTES
Daily Progress Note: 10/29/2020  Deepti Madrid MD    Assessment/Plan:   Urinary tract infection with Sepsis  -Recently treated for UTI with Cipro for 10 days  -Given ceftriaxone in the ED, Increased to 2gm daily on 10/26  -BC and UC klebsiella- repeat BC neg so far at 2 days  -Renal US shows medical renal disease  -ECHO with possible vegetation present- for FACUNDO in am      Acute hypoxic respiratory failure  -Off oxygen  -History of cardiomyopathy, check BNP  -CT chest neg  -Covid neg     Symptomatic bradycardia  -S/p PPM insertion October 2019     Venous insufficiency  -S/p greater saphenous vein ablation  -Continue with Lasix as needed     Hypertension but now hypotensive due to sepsis  -Restart amlodipine and metoprolol  -Restart  Lisinopril     Atrial fibrillation  -Continue Eliquis  -Restarted Metoprolol     Obstructive sleep apnea-  -Continue CPAP             Problem List:  Problem List as of 10/29/2020 Date Reviewed: 7/15/2020          Codes Class Noted - Resolved    Tachy-viv syndrome ([facility] ICD-10-CM: I49.5  ICD-9-CM: 427.81  10/4/2019 - Present        Sepsis ([facility] ICD-10-CM: A41.9  ICD-9-CM: 038.9, 995.91  10/25/2020 - Present        Peripheral vascular disease ([facility] ICD-10-CM: I73.9  ICD-9-CM: 443.9  6/17/2020 - Present        Advanced care planning/counseling discussion ICD-10-CM: Z71.89  ICD-9-CM: V65.49  2/3/2017 - Present    Overview Signed 2/3/2017  1:51 PM by William Garcia RN     Patient states that a completed AMD is at home. NN encouraged patient to bring a copy to the office for scanning into the medical record. Patient verbalized understanding & agreement.                Paroxysmal atrial fibrillation (HCC) ICD-10-CM: I48.0  ICD-9-CM: 427.31  9/28/2016 - Present        BPH (benign prostatic hyperplasia) ICD-10-CM: N40.0  ICD-9-CM: 600.00  8/17/2016 - Present        Insomnia ICD-10-CM: G47.00  ICD-9-CM: 780.52  8/17/2016 - Present        Osteoarthritis of left knee ICD-10-CM: M17.12  ICD-9-CM: 715.96  8/15/2016 - Present        Spinal stenosis in cervical region ICD-10-CM: M48.02  ICD-9-CM: 723.0  1/5/2016 - Present        Herniated nucleus pulposus, C3-4 ICD-10-CM: M50.21  ICD-9-CM: 722.0  1/5/2016 - Present        Normal cardiac stress test ICD-10-CM: [id number]  ICD-9-CM: Marilou Chencho  9/21/2015 - Present        IFG (impaired fasting glucose) ICD-10-CM: R73.01  ICD-9-CM: 790.21  Unknown - Present        ED (erectile dysfunction) ICD-10-CM: N52.9  ICD-9-CM: 607.84  6/18/2014 - Present        SVT (supraventricular tachycardia) (HCC) ICD-10-CM: I47.1  ICD-9-CM: 427.89  Unknown - Present        Right knee DJD ICD-10-CM: M17.11  ICD-9-CM: 715.96  11/4/2013 - Present        Spinal stenosis of lumbar region ICD-10-CM: M48.061  ICD-9-CM: 724.02  Unknown - Present        Tear of medial cartilage or meniscus of knee, current ICD-10-CM: [id number]  ICD-9-CM: 836.0  2/1/2013 - Present        Osteoarthrosis, unspecified whether generalized or localized, lower leg ICD-10-CM: M17.10  ICD-9-CM: 715.96  2/1/2013 - Present        Palpitations ICD-10-CM: R00.2  ICD-9-CM: 785.1  12/12/2011 - Present        Supraventricular tachycardia ([address] ICD-10-CM: I47.1  ICD-9-CM: 427.89  12/12/2011 - Present        HLD (hyperlipidemia) ICD-10-CM: E78.5  ICD-9-CM: 272.4  12/12/2011 - Present        Essential hypertension, benign ICD-10-CM: I10  ICD-9-CM: 401.1  12/12/2011 - Present        Venous insufficiency ICD-10-CM: I87.2  ICD-9-CM: 459.81  12/12/2011 - Present        Colon polyps ICD-10-CM: K63.5  ICD-9-CM: 211.3  Unknown - Present        AVI (obstructive sleep apnea) ICD-10-CM: G47.33  ICD-9-CM: 327.23  Unknown - Present        Heart palpitations ICD-10-CM: R00.2  ICD-9-CM: 785.1  Unknown - Present    Overview Signed 7/27/2010  9:41 AM by MD Dr. Ifeoma Juráez             Hyperlipidemia with target LDL less than 130 ICD-10-CM: E78.5  ICD-9-CM: 272.4  Unknown - Present        BPH (benign prostatic hypertrophy) ICD-10-CM: N40.0  ICD-9-CM: 600.00  Unknown - Present        Lower back pain ICD-10-CM: M54.5  ICD-9-CM: 724.2  Unknown - Present    Overview Signed 7/27/2010  9:41 AM by Serge Monge MD     hx lumbar laminectomyx2 w good results             Knee pain, right ICD-10-CM: M25.561  ICD-9-CM: 719.46  Unknown - Present        Right sided sciatica ICD-10-CM: M54.31  ICD-9-CM: 724.3  Unknown - Present        RESOLVED: Tear of lateral cartilage or meniscus of knee, current ICD-10-CM: X71.797R  ICD-9-CM: 836.1  2/1/2013 - 3/19/2013        RESOLVED: Hypertension ICD-10-CM: I10  ICD-9-CM: 401.9  Unknown - 2/4/2017              Subjective:   Melissa Barillas is a 66 y.o. male who presented with weakness since this morning. He states he was trying to get out of bed this morning to go to the bathroom but was too weak. He went to bed last night feeling a little bit dizzy however was otherwise feeling okay. According to his wife he was having darker colored urine with some noticeable blood. He recently completed a 10-day course of Cipro for UTI and was feeling better. His wife states that she checked his oxygen levels and it was in the low 80s. It improved after she asked him to take some deep breaths. He denies any shortness of breath, or cough. He did not have a fever at home however had a fever of 101 upon arrival to the emergency department. He denies any nausea, vomiting, abdominal pain, or diarrhea. (Dr Luna Persaud)    10/26: Feeling better this am. BP are still a bit low and holding home BP meds. Will add back as appropriate. Cultures pending. LA has improved and WBC is better. Tmax 101 yesterday and low grade this am.     10/27:Afebrile. Feeling better. BP higher now so will restart home meds. ID has seen and BC have been repeated. Initial blood cultures + Klebsiella. Needs repeat cultures. COVID neg. ID would like renal US done. 10/28: Repeat BC neg at 15 hours. BP up so will restart Lisinopril. Stop IVF. US with medical renal disease. No complaints this am.     10/29: Repeat BC still neg at 2 days. BP better. Had ECHO yesterday and possible vegetation present. For FACUNDO in am.     Review of Systems:   A comprehensive review of systems was negative except for that written in the HPI. Objective:   Physical Exam:     Visit Vitals  /88 (BP 1 Location: Left arm, BP Patient Position: At rest)   Pulse 68   Temp 97.8 °F (36.6 °C)   Resp 18   Ht 6' 5\" (1.956 m)   Wt 261 lb (118.4 kg)   SpO2 91%   BMI 30.95 kg/m²    O2 Flow Rate (L/min): 1 l/min O2 Device: Room air    Temp (24hrs), Av.1 °F (36.7 °C), Min:97.8 °F (36.6 °C), Max:98.3 °F (36.8 °C)    No intake/output data recorded. 10/27 0701 - 10/28 1900  In: 1500 [P.O.:900; I.V.:600]  Out: 3802 [Urine:4975]    General:  Alert, cooperative, no distress, appears stated age. Head:  Normocephalic, without obvious abnormality, atraumatic. Eyes:  Conjunctivae/corneas clear. PERRL, EOMs intact. Nose: Nares normal. Septum midline. Mucosa normal. No drainage or sinus tenderness. Throat: Lips, mucosa, and tongue moist..   Neck: Supple, symmetrical, trachea midline, no adenopathy, thyroid: no enlargement/tenderness/nodules, no carotid bruit and no JVD. Back:   Symmetric, no curvature. ROM normal. No CVA tenderness. Lungs:   Clear to auscultation bilaterally. Chest wall:  No tenderness or deformity. Heart:  Regular rate and rhythm, S1, S2 normal, no murmur, click, rub or gallop. Abdomen:   Soft, non-tender. Bowel sounds normal. No masses,  No organomegaly. Extremities: no cyanosis or edema. No calf tenderness or cords. Pulses: 2+ and symmetric all extremities. Skin: Skin color, texture, turgor normal. No rashes or lesions   Neurologic: CNII-XII intact. Alert and oriented X 3. Fine motor of hands and fingers normal.   equal.  No cogwheeling or rigidity. Gait not tested at this time. Sensation grossly normal to touch.   Gross motor of extremities normal.       Data Review:    FINDINGS:CT Chest 10/25/20  There is mild dependent atelectasis and mild subsegmental atelectasis in the  lower lung fields. The upper lungs are clear. Pulmonary, mediastinal beck, and  splenic calcifications are evidence of old granulomatous disease. The central  airways are patent. No pneumothorax or pleural effusion. The heart is enlarged. A pacemaker is in place. Trace pericardial fluid in the aortic recess is likely  physiologic. No thoracic lymphadenopathy. Incidental note is made of hepatic  cysts and a probable partially imaged left renal cyst.     IMPRESSION: No acute process. IMPRESSION Renal US  IMPRESSION: Diffusely increased renal echogenicity compatible with medical renal  disease with bilateral renal cysts. Interpretation Summary ECHO 10/28/20       · LV: Estimated LVEF is 60 - 65%. Normal cavity size and systolic function (ejection fraction normal). Mild concentric hypertrophy. Wall motion: normal. Age-appropriate left ventricular diastolic function. · RV: Mildly dilated right ventricle. Pacer/ICD present. · LA: Moderately dilated left atrium. Left Atrium volume index is 45.72 mL/m2. · RA: Dilated right atrium. · AO: Mild sinuses of Valsalva and aortic root dilatation. · TV: Mild to moderate tricuspid valve regurgitation is present. Additional tricuspid valve findings: There is possible vegetation noted. · IVC: Severely elevated central venous pressure (15+ mmHg); IVC diameter is larger than 21 mm and collapses less than 50% with respiration. Suspicious findings for possible vegetation within ventricular side of tricuspid valve/pacer wire, recommend FACUNDO for further evaluation.        Recent Days:  Recent Labs     10/29/20  0504 10/28/20  0350   WBC 6.1 6.6   HGB 11.9* 12.3   HCT 36.1* 36.4*    145*     Recent Labs     10/29/20  0504 10/28/20  0350    140   K 3.6 3.4*    108   CO2 28 25   GLU 93 91   BUN 12 11   CREA 0.96 0. 75   CA 8.1* 7.7*   ALB 2.7* 2.7*   TBILI 0.6 0.7   ALT 28 25     No results for input(s): PH, PCO2, PO2, HCO3, FIO2 in the last 72 hours.     24 Hour Results:  Recent Results (from the past 24 hour(s))   ECHO ADULT COMPLETE    Collection Time: 10/28/20  2:17 PM   Result Value Ref Range    IVSd 1.56 (A) 0.6 - 1.0 cm    LVIDd 4.01 (A) 4.2 - 5.9 cm    LVIDs 2.92 cm    LVOT d 2.25 cm    LVPWd 1.51 (A) 0.6 - 1.0 cm    LVOT Peak Gradient 3.83 mmHg    Left Ventricular Outflow Tract Mean Gradient 2.42 mmHg    LVOT SV 71.3 mL    LVOT Peak Velocity 97.80 cm/s    LVOT VTI 17.98 cm    RVIDd 4.97 cm    Left Atrium Major Axis 5.00 cm    LA Volume 114.52 18 - 58 mL    LA Vol 2C 102.32 (A) 18 - 58 mL    LA Vol 4C 106.96 (A) 18 - 58 mL    Aortic Valve Area by Continuity of Peak Velocity 3.46 cm2    Aortic Valve Area by Continuity of VTI 3.40 cm2    AoV PG 5.01 mmHg    Aortic Valve Systolic Mean Gradient 1.64 mmHg    Aortic Valve Systolic Peak Velocity 262.32 cm/s    AoV VTI 21.00 cm    MV A Colton 18.19 cm/s    Mitral Valve E Wave Deceleration Time 173.15 ms    MV E Colton 119.72 cm/s    TR Max Velocity 327.53 cm/s    Pulmonic Valve Systolic Peak Instantaneous Gradient 5.62 mmHg    Triscuspid Valve Regurgitation Peak Gradient 42.91 mmHg    Ao Root D 3.87 cm    MV E/A 6.58     LV Mass .2 88 - 224 g    LV Mass AL Index 96.7 49 - 115 g/m2    Left Atrium Minor Axis 2.00 cm    LA Vol Index 45.72 16 - 28 ml/m2    LA Vol Index 40.85 16 - 28 ml/m2    LA Vol Index 42.71 16 - 28 ml/m2    SHARMILA/BSA Pk Colton 1.4 cm2/m2    SHARMILA/BSA VTI 1.4 cm2/m2   CBC WITH AUTOMATED DIFF    Collection Time: 10/29/20  5:04 AM   Result Value Ref Range    WBC 6.1 4.1 - 11.1 K/uL    RBC 3.99 (L) 4.10 - 5.70 M/uL    HGB 11.9 (L) 12.1 - 17.0 g/dL    HCT 36.1 (L) 36.6 - 50.3 %    MCV 90.5 80.0 - 99.0 FL    MCH 29.8 26.0 - 34.0 PG    MCHC 33.0 30.0 - 36.5 g/dL    RDW 13.4 11.5 - 14.5 %    PLATELET 326 472 - 134 K/uL    MPV 11.4 8.9 - 12.9 FL    NRBC 0.0 0  WBC ABSOLUTE NRBC 0.00 0.00 - 0.01 K/uL    NEUTROPHILS 65 32 - 75 %    LYMPHOCYTES 19 12 - 49 %    MONOCYTES 11 5 - 13 %    EOSINOPHILS 3 0 - 7 %    BASOPHILS 1 0 - 1 %    IMMATURE GRANULOCYTES 1 (H) 0.0 - 0.5 %    ABS. NEUTROPHILS 4.0 1.8 - 8.0 K/UL    ABS. LYMPHOCYTES 1.2 0.8 - 3.5 K/UL    ABS. MONOCYTES 0.7 0.0 - 1.0 K/UL    ABS. EOSINOPHILS 0.2 0.0 - 0.4 K/UL    ABS. BASOPHILS 0.0 0.0 - 0.1 K/UL    ABS. IMM. GRANS. 0.0 0.00 - 0.04 K/UL    DF AUTOMATED     METABOLIC PANEL, COMPREHENSIVE    Collection Time: 10/29/20  5:04 AM   Result Value Ref Range    Sodium 140 136 - 145 mmol/L    Potassium 3.6 3.5 - 5.1 mmol/L    Chloride 107 97 - 108 mmol/L    CO2 28 21 - 32 mmol/L    Anion gap 5 5 - 15 mmol/L    Glucose 93 65 - 100 mg/dL    BUN 12 6 - 20 MG/DL    Creatinine 0.96 0.70 - 1.30 MG/DL    BUN/Creatinine ratio 13 12 - 20      GFR est AA >60 >60 ml/min/1.73m2    GFR est non-AA >60 >60 ml/min/1.73m2    Calcium 8.1 (L) 8.5 - 10.1 MG/DL    Bilirubin, total 0.6 0.2 - 1.0 MG/DL    ALT (SGPT) 28 12 - 78 U/L    AST (SGOT) 19 15 - 37 U/L    Alk.  phosphatase 49 45 - 117 U/L    Protein, total 5.9 (L) 6.4 - 8.2 g/dL    Albumin 2.7 (L) 3.5 - 5.0 g/dL    Globulin 3.2 2.0 - 4.0 g/dL    A-G Ratio 0.8 (L) 1.1 - 2.2         Medications reviewed  Current Facility-Administered Medications   Medication Dose Route Frequency    lisinopriL (PRINIVIL, ZESTRIL) tablet 40 mg  40 mg Oral DAILY    metoprolol succinate (TOPROL-XL) XL tablet 50 mg  50 mg Oral DAILY    amLODIPine (NORVASC) tablet 10 mg  10 mg Oral DAILY    apixaban (ELIQUIS) tablet 5 mg  5 mg Oral BID    cefTRIAXone (ROCEPHIN) 2 g in 0.9% sodium chloride (MBP/ADV) 50 mL  2 g IntraVENous Q24H    traZODone (DESYREL) tablet 150 mg  150 mg Oral QHS    gabapentin (NEURONTIN) capsule 300 mg  300 mg Oral QHS    sodium chloride (NS) flush 5-10 mL  5-10 mL IntraVENous PRN    sodium chloride (NS) flush 5-40 mL  5-40 mL IntraVENous Q8H    sodium chloride (NS) flush 5-40 mL  5-40 mL IntraVENous PRN    acetaminophen (TYLENOL) tablet 650 mg  650 mg Oral Q6H PRN    Or    acetaminophen (TYLENOL) suppository 650 mg  650 mg Rectal Q6H PRN    polyethylene glycol (MIRALAX) packet 17 g  17 g Oral DAILY PRN    promethazine (PHENERGAN) tablet 12.5 mg  12.5 mg Oral Q6H PRN    Or    ondansetron (ZOFRAN) injection 4 mg  4 mg IntraVENous Q6H PRN       Care Plan discussed with: Patient/Family    Total time spent with patient: 30 minutes.     Kinsey Burns MD

## 2020-10-30 ENCOUNTER — APPOINTMENT (OUTPATIENT)
Dept: NON INVASIVE DIAGNOSTICS | Age: 75
DRG: 871 | End: 2020-10-30
Attending: NURSE PRACTITIONER
Payer: MEDICARE

## 2020-10-30 PROCEDURE — 96374 THER/PROPH/DIAG INJ IV PUSH: CPT

## 2020-10-30 PROCEDURE — 93312 ECHO TRANSESOPHAGEAL: CPT | Performed by: INTERNAL MEDICINE

## 2020-10-30 PROCEDURE — 74011000258 HC RX REV CODE- 258: Performed by: FAMILY MEDICINE

## 2020-10-30 PROCEDURE — 99152 MOD SED SAME PHYS/QHP 5/>YRS: CPT | Performed by: INTERNAL MEDICINE

## 2020-10-30 PROCEDURE — B24BZZ4 ULTRASONOGRAPHY OF HEART WITH AORTA, TRANSESOPHAGEAL: ICD-10-PCS | Performed by: INTERNAL MEDICINE

## 2020-10-30 PROCEDURE — 99152 MOD SED SAME PHYS/QHP 5/>YRS: CPT

## 2020-10-30 PROCEDURE — 74011250636 HC RX REV CODE- 250/636: Performed by: INTERNAL MEDICINE

## 2020-10-30 PROCEDURE — 74011250637 HC RX REV CODE- 250/637: Performed by: FAMILY MEDICINE

## 2020-10-30 PROCEDURE — 93325 DOPPLER ECHO COLOR FLOW MAPG: CPT | Performed by: INTERNAL MEDICINE

## 2020-10-30 PROCEDURE — 99232 SBSQ HOSP IP/OBS MODERATE 35: CPT | Performed by: INTERNAL MEDICINE

## 2020-10-30 PROCEDURE — 74011250636 HC RX REV CODE- 250/636: Performed by: FAMILY MEDICINE

## 2020-10-30 PROCEDURE — 93320 DOPPLER ECHO COMPLETE: CPT | Performed by: INTERNAL MEDICINE

## 2020-10-30 PROCEDURE — 74011000250 HC RX REV CODE- 250: Performed by: INTERNAL MEDICINE

## 2020-10-30 RX ORDER — LIDOCAINE 50 MG/G
OINTMENT TOPICAL
Status: DISCONTINUED | OUTPATIENT
Start: 2020-10-30 | End: 2020-10-30 | Stop reason: ALTCHOICE

## 2020-10-30 RX ORDER — FENTANYL CITRATE 50 UG/ML
25-200 INJECTION, SOLUTION INTRAMUSCULAR; INTRAVENOUS
Status: DISCONTINUED | OUTPATIENT
Start: 2020-10-30 | End: 2020-10-30 | Stop reason: ALTCHOICE

## 2020-10-30 RX ORDER — MIDAZOLAM HYDROCHLORIDE 1 MG/ML
.5-1 INJECTION, SOLUTION INTRAMUSCULAR; INTRAVENOUS
Status: DISCONTINUED | OUTPATIENT
Start: 2020-10-30 | End: 2020-10-30 | Stop reason: ALTCHOICE

## 2020-10-30 RX ORDER — SODIUM CHLORIDE 9 MG/ML
10 INJECTION INTRAMUSCULAR; INTRAVENOUS; SUBCUTANEOUS
Status: DISCONTINUED | OUTPATIENT
Start: 2020-10-30 | End: 2020-10-30 | Stop reason: ALTCHOICE

## 2020-10-30 RX ORDER — METOPROLOL SUCCINATE 25 MG/1
25 TABLET, EXTENDED RELEASE ORAL DAILY
Qty: 90 TAB | Refills: 0 | Status: SHIPPED | OUTPATIENT
Start: 2020-10-31 | End: 2020-11-20 | Stop reason: DRUGHIGH

## 2020-10-30 RX ORDER — LIDOCAINE HYDROCHLORIDE 20 MG/ML
15 SOLUTION OROPHARYNGEAL
Status: DISCONTINUED | OUTPATIENT
Start: 2020-10-30 | End: 2020-10-30 | Stop reason: ALTCHOICE

## 2020-10-30 RX ORDER — LIDOCAINE HYDROCHLORIDE 20 MG/ML
JELLY TOPICAL
Status: COMPLETED | OUTPATIENT
Start: 2020-10-30 | End: 2020-10-30

## 2020-10-30 RX ADMIN — FENTANYL CITRATE 50 MCG: 50 INJECTION, SOLUTION INTRAMUSCULAR; INTRAVENOUS at 09:18

## 2020-10-30 RX ADMIN — MIDAZOLAM HYDROCHLORIDE 2 MG: 1 INJECTION, SOLUTION INTRAMUSCULAR; INTRAVENOUS at 09:18

## 2020-10-30 RX ADMIN — SODIUM CHLORIDE 10 ML: 9 INJECTION INTRAMUSCULAR; INTRAVENOUS; SUBCUTANEOUS at 09:24

## 2020-10-30 RX ADMIN — Medication 10 ML: at 06:08

## 2020-10-30 RX ADMIN — METOPROLOL SUCCINATE 75 MG: 50 TABLET, EXTENDED RELEASE ORAL at 12:19

## 2020-10-30 RX ADMIN — LIDOCAINE: 50 OINTMENT TOPICAL at 08:50

## 2020-10-30 RX ADMIN — MIDAZOLAM HYDROCHLORIDE 1 MG: 1 INJECTION, SOLUTION INTRAMUSCULAR; INTRAVENOUS at 09:22

## 2020-10-30 RX ADMIN — AMLODIPINE BESYLATE 10 MG: 5 TABLET ORAL at 12:18

## 2020-10-30 RX ADMIN — LISINOPRIL 40 MG: 20 TABLET ORAL at 12:17

## 2020-10-30 RX ADMIN — LIDOCAINE HYDROCHLORIDE 15 ML: 20 SOLUTION ORAL; TOPICAL at 08:47

## 2020-10-30 RX ADMIN — CEFTRIAXONE 2 G: 2 INJECTION, POWDER, FOR SOLUTION INTRAMUSCULAR; INTRAVENOUS at 12:22

## 2020-10-30 RX ADMIN — APIXABAN 5 MG: 5 TABLET, FILM COATED ORAL at 12:19

## 2020-10-30 RX ADMIN — LIDOCAINE HYDROCHLORIDE: 20 JELLY TOPICAL at 09:22

## 2020-10-30 NOTE — PROGRESS NOTES
TRANSFER - OUT REPORT:    Verbal report given to Lauren Joe RN(name) on Naga Alonso being transferred to 5th Floor(unit) for routine post - op       Report consisted of patient's Situation, Background, Assessment and   Recommendations(SBAR). Information from the following report(s) Procedure Summary was reviewed with the receiving nurse. Opportunity for questions and clarification was provided.       Patient transported with:   Registered Nurse

## 2020-10-30 NOTE — PROGRESS NOTES
BRIEF PROCEDURE NOTE    Date of Procedure: 10/30/2020   Preoperative Diagnosis: Evaluate for endocarditis  Postoperative Diagnosis: No echo evidence of endocarditis   Procedure:FACUNDO  Cardiologist: Ismael Baig MD  Anesthesia: local (benzocaine spray, Viscous lidocaine gargle) + IV sedation (Midazolam 3  mg, Fentanyl  50 mcg)  Estimated Blood Loss: Minimal    Informed consent obtained prior to procedure. Appropriate time out performed. Findings:     MV: Nml; Mild MR  TV: Nml;; Mild TR  AV: Trileaflet; Nml  PV: Nml    LA: Dilated TYLER - no thrombus  RA: Nml  RV: Nml ; Pacer lead in situ  LV: Nml  LVEF: 55-60%  IAS - Bubble study neg ; No PFO  Aorta :Nml size ; Mild Atheroma    No echocardiographic evidence of endocarditis    Complications:  None, no oropharyngeal trauma, VSS at end of procedure.

## 2020-10-30 NOTE — PROGRESS NOTES
[facility] Progress Note           Aleksandra Long DO    [phone] Office  [phone]  Fax    10/30/2020      Assessment & Plan:   · Klebsiella pneumoniae urosepsis. Repeat blood cultures sterile to date. TTE shows possible tricuspid valve/pacer wire vegetation. FACUNDO negative for vegetation. Plan discharge on 10 days of levofloxacin. Patient to follow-up with cardiology for repeat echocardiogram.  I discussed potential side effects of levofloxacin with the patient. He will contact me with any problems   · Penicillin allergy. This caused a rash approximately 25 years ago. He is able to tolerate cephalosporin antibiotics  · STEPHANIE. Resolved  · Symptomatic bradycardia. Status post pacemaker placement 10/2019. Subjective:     No complaints    Objective:     Vitals:   Visit Vitals  /80 (BP 1 Location: Right arm, BP Patient Position: At rest)   Pulse 76   Temp 98.1 °F (36.7 °C)   Resp 18   Ht 6' 5\" (1.956 m)   Wt 257 lb 15 oz (117 kg)   SpO2 97%   BMI 30.59 kg/m²        Tmax:  Temp (24hrs), Av °F (36.7 °C), Min:97.7 °F (36.5 °C), Max:98.3 °F (36.8 °C)      Exam:   Patient is intubated:  no    Physical Examination:   General:  Alert, cooperative, no distress   Head:  Normocephalic, atraumatic. Eyes:  Conjunctivae clear   Neck: Supple       Lungs:   No distress. Chest wall:     Heart:     Abdomen:    Nondistended   Extremities: Moves all. Skin:  No rash   Neurologic: CNII-XII intact. Normal strength     Labs:        No lab exists for component: ITNL   No results for input(s): CPK, CKMB, TROIQ in the last 72 hours. Recent Labs     10/29/20  0504 10/28/20  0350    140   K 3.6 3.4*    108   CO2 28 25   BUN 12 11   CREA 0.96 0.75   GLU 93 91   ALB 2.7* 2.7*   WBC 6.1 6.6   HGB 11.9* 12.3   HCT 36.1* 36.4*    145*     No results for input(s): INR, PTP, APTT, INREXT, INREXT in the last 72 hours.   Needs: urine analysis, urine sodium, protein and creatinine  No results found for: DELMI, CREAU      Cultures:     Lab Results   Component Value Date/Time    Specimen Description: NARES 10/17/2013 03:40 PM    Specimen Description: URINE 10/17/2013 03:38 PM     Lab Results   Component Value Date/Time    Culture result: NO GROWTH 3 DAYS 10/27/2020 03:14 PM    Culture result: NO GROWTH 3 DAYS 10/27/2020 02:45 PM    Culture result: (A) 10/25/2020 01:01 PM     KLEBSIELLA PNEUMONIAE GROWING IN ALL 4 BOTTLES DRAWN (SITES = [facility] AND [facility])       Radiology:     Medications       Current Facility-Administered Medications   Medication Dose Route Frequency Last Dose    metoprolol succinate (TOPROL-XL) XL tablet 75 mg  75 mg Oral DAILY 75 mg at 10/30/20 1219    lisinopriL (PRINIVIL, ZESTRIL) tablet 40 mg  40 mg Oral DAILY 40 mg at 10/30/20 1217    amLODIPine (NORVASC) tablet 10 mg  10 mg Oral DAILY 10 mg at 10/30/20 1218    apixaban (ELIQUIS) tablet 5 mg  5 mg Oral BID 5 mg at 10/30/20 1219    cefTRIAXone (ROCEPHIN) 2 g in 0.9% sodium chloride (MBP/ADV) 50 mL  2 g IntraVENous Q24H 2 g at 10/30/20 1222    traZODone (DESYREL) tablet 150 mg  150 mg Oral  mg at 10/29/20 2149    gabapentin (NEURONTIN) capsule 300 mg  300 mg Oral  mg at 10/29/20 2149    sodium chloride (NS) flush 5-10 mL  5-10 mL IntraVENous PRN 10 mL at 10/26/20 2123    sodium chloride (NS) flush 5-40 mL  5-40 mL IntraVENous Q8H 10 mL at 10/30/20 0608    sodium chloride (NS) flush 5-40 mL  5-40 mL IntraVENous PRN      acetaminophen (TYLENOL) tablet 650 mg  650 mg Oral Q6H  mg at 10/29/20 1824    Or    acetaminophen (TYLENOL) suppository 650 mg  650 mg Rectal Q6H PRN      polyethylene glycol (MIRALAX) packet 17 g  17 g Oral DAILY PRN      promethazine (PHENERGAN) tablet 12.5 mg  12.5 mg Oral Q6H PRN      Or    ondansetron (ZOFRAN) injection 4 mg  4 mg IntraVENous Q6H PRN             Case discussed with: Dr. Kassandra Gaffney, cardiology      Farooq Stallworth DO

## 2020-10-30 NOTE — PROGRESS NOTES
Transition Plan of Care  RUR 13%    1:54 PM  Patient is ready for discharge with no needs and PO ABX. Dispatch health updated on AVS.  NORMA Shaver        9:50 AM  Update based on the echocardiogram today patient may need IV ABX    NORMA Shaver         Plan  Patient is ready for discharge with no needs. Wife to transport. Care Management Interventions  PCP Verified by CM: Yes(Dr Leo Godfrey)  Mode of Transport at Discharge:  Other (see comment)(family transport home. )  Current Support Network: Lives with Spouse  Confirm Follow Up Transport: Family  Discharge Location  Discharge Placement: Home  Debora Grimm [place]

## 2020-10-30 NOTE — DISCHARGE SUMMARY
Physician Discharge Summary     Patient ID:    Raegan Contreras  [id number]  66 y.o.  1945  Nakia Gillima MD    Admit date: 10/25/2020    Discharge date and time: 10/30/2020    Admission Diagnoses: Sepsis [facility]) [A41.9]    Chronic Diagnoses:    Problem List as of 10/30/2020 Date Reviewed: 7/15/2020          Codes Class Noted - Resolved    Tachy-viv syndrome ([facility] ICD-10-CM: I49.5  ICD-9-CM: 427.81  10/4/2019 - Present        Sepsis ([facility] [address] ICD-10-CM: A41.9  ICD-9-CM: 038.9, 995.91  10/25/2020 - Present        Peripheral vascular disease ([facility] ICD-10-CM: I73.9  ICD-9-CM: 443.9  6/17/2020 - Present        Advanced care planning/counseling discussion ICD-10-CM: Z71.89  ICD-9-CM: V65.49  2/3/2017 - Present    Overview Signed 2/3/2017  1:51 PM by Diana Alonso RN     Patient states that a completed AMD is at home. NN encouraged patient to bring a copy to the office for scanning into the medical record. Patient verbalized understanding & agreement.                Paroxysmal atrial fibrillation (HCC) ICD-10-CM: I48.0  ICD-9-CM: 427.31  9/28/2016 - Present        BPH (benign prostatic hyperplasia) ICD-10-CM: N40.0  ICD-9-CM: 600.00  8/17/2016 - Present        Insomnia ICD-10-CM: G47.00  ICD-9-CM: 780.52  8/17/2016 - Present        Osteoarthritis of left knee ICD-10-CM: M17.12  ICD-9-CM: 715.96  8/15/2016 - Present        Spinal stenosis in cervical region ICD-10-CM: M48.02  ICD-9-CM: 723.0  1/5/2016 - Present        Herniated nucleus pulposus, C3-4 ICD-10-CM: M50.21  ICD-9-CM: 722.0  1/5/2016 - Present        Normal cardiac stress test ICD-10-CM: [id number]  ICD-9-CM: Krystyna Nilsa  9/21/2015 - Present        IFG (impaired fasting glucose) ICD-10-CM: R73.01  ICD-9-CM: 790.21  Unknown - Present        ED (erectile dysfunction) ICD-10-CM: N52.9  ICD-9-CM: 607.84  6/18/2014 - Present        SVT (supraventricular tachycardia) (HCC) ICD-10-CM: I47.1  ICD-9-CM: 427.89  Unknown - Present        Right knee DJD ICD-10-CM: M17.11  ICD-9-CM: 715.96  11/4/2013 - Present        Spinal stenosis of lumbar region ICD-10-CM: M48.061  ICD-9-CM: 724.02  Unknown - Present        Tear of medial cartilage or meniscus of knee, current ICD-10-CM: [id number]  ICD-9-CM: 836.0  2/1/2013 - Present        Osteoarthrosis, unspecified whether generalized or localized, lower leg ICD-10-CM: M17.10  ICD-9-CM: 715.96  2/1/2013 - Present        Palpitations ICD-10-CM: R00.2  ICD-9-CM: 785.1  12/12/2011 - Present        Supraventricular tachycardia ([address] ICD-10-CM: I47.1  ICD-9-CM: 427.89  12/12/2011 - Present        HLD (hyperlipidemia) ICD-10-CM: E78.5  ICD-9-CM: 272.4  12/12/2011 - Present        Essential hypertension, benign ICD-10-CM: I10  ICD-9-CM: 401.1  12/12/2011 - Present        Venous insufficiency ICD-10-CM: I87.2  ICD-9-CM: 459.81  12/12/2011 - Present        Colon polyps ICD-10-CM: K63.5  ICD-9-CM: 211.3  Unknown - Present        AVI (obstructive sleep apnea) ICD-10-CM: G47.33  ICD-9-CM: 327.23  Unknown - Present        Heart palpitations ICD-10-CM: R00.2  ICD-9-CM: 785.1  Unknown - Present    Overview Signed 7/27/2010  9:41 AM by MD Dr. Adriana Rodriguez             Hyperlipidemia with target LDL less than 130 ICD-10-CM: E78.5  ICD-9-CM: 272.4  Unknown - Present        BPH (benign prostatic hypertrophy) ICD-10-CM: N40.0  ICD-9-CM: 600.00  Unknown - Present        Lower back pain ICD-10-CM: M54.5  ICD-9-CM: 724.2  Unknown - Present    Overview Signed 7/27/2010  9:41 AM by Serge Monge MD     hx lumbar laminectomyx2 w good results             Knee pain, right ICD-10-CM: M25.561  ICD-9-CM: 719.46  Unknown - Present        Right sided sciatica ICD-10-CM: M54.31  ICD-9-CM: 724.3  Unknown - Present        RESOLVED: Tear of lateral cartilage or meniscus of knee, current ICD-10-CM: M99.944C  ICD-9-CM: 836.1  2/1/2013 - 3/19/2013        RESOLVED: Hypertension ICD-10-CM: I10  ICD-9-CM: 401.9  Unknown - 2/4/2017              Discharge Medications: Current Discharge Medication List      START taking these medications    Details   levoFLOXacin (LEVAQUIN) 750 mg tablet Take 1 Tab by mouth daily for 10 days. Qty: 10 Tab, Refills: 0         CONTINUE these medications which have CHANGED    Details   amLODIPine (NORVASC) 10 mg tablet TAKE 1 TABLET BY MOUTH DAILY  Qty: 90 Tab, Refills: 0    Comments: **Patient requests 90 days supply**         CONTINUE these medications which have NOT CHANGED    Details   gabapentin (NEURONTIN) 300 mg capsule Take 300 mg by mouth nightly. furosemide (LASIX) 20 mg tablet Take 20 mg by mouth daily as needed. traZODone (DESYREL) 150 mg tablet Take 300 mg by mouth nightly. lisinopriL (PRINIVIL, ZESTRIL) 40 mg tablet TAKE 1 TABLET DAILY  Qty: 90 Tab, Refills: 3    Associated Diagnoses: Essential hypertension; SVT (supraventricular tachycardia) ([address] Paroxysmal atrial fibrillation (HCC)      diclofenac (VOLTAREN) 1 % gel Apply 4 g to affected area daily as needed. chlorhexidine (PERIDEX) 0.12 % solution Take 15 mL by mouth two (2) times daily as needed. Eliquis 5 mg tablet TAKE 1 TABLET TWICE A DAY  Qty: 180 Tab, Refills: 3      levocetirizine (XYZAL) 5 mg tablet TAKE 1 TABLET DAILY  Qty: 90 Tab, Refills: 3      atorvastatin (LIPITOR) 20 mg tablet TAKE 1 TABLET DAILY  Qty: 90 Tab, Refills: 4      metoprolol succinate (TOPROL-XL) 50 mg XL tablet Take 1 Tab by mouth daily. Qty: 90 Tab, Refills: 3    Comments: **Patient requests 90 days supply**      terazosin (HYTRIN) 5 mg capsule TAKE 1 CAPSULE DAILY  Qty: 90 Cap, Refills: 3              Follow up Care:    1. Felipe Kline MD with in 1 weeks  2. Urology specialists as directed. Diet:  Cardiac Diet and Resume previous diet    Disposition:  Home.     Advanced Directive:    Discharge Exam:  [See today's progress note.]    CONSULTATIONS: Cardiology and ID    Significant Diagnostic Studies:   Recent Labs     10/29/20  0504 10/28/20  0350   WBC 6.1 6.6 HGB 11.9* 12.3   HCT 36.1* 36.4*    145*     Recent Labs     10/29/20  0504 10/28/20  0350    140   K 3.6 3.4*    108   CO2 28 25   BUN 12 11   CREA 0.96 0.75   GLU 93 91   CA 8.1* 7.7*     Recent Labs     10/29/20  0504 10/28/20  0350   ALT 28 25   AP 49 48   TBILI 0.6 0.7   TP 5.9* 6.0*   ALB 2.7* 2.7*   GLOB 3.2 3.3       Lab Results   Component Value Date/Time    TSH 2.320 09/11/2019 04:18 PM       HOSPITAL COURSE & TREATMENT RENDERED:   Urinary tract infection with Sepsis  -Recently treated for UTI with Cipro for 10 days  -Given ceftriaxone in the ED, Increased to 2gm daily on 10/26  -BC and UC klebsiella- repeat BC neg   -Renal US shows medical renal disease  -ECHO with possible vegetation present- FACUNDO negative for vegetation      Acute hypoxic respiratory failure  -Off oxygen  -History of cardiomyopathy, check BNP  -CT chest neg  -Covid neg     Symptomatic bradycardia  -S/p PPM insertion October 2019     Venous insufficiency  -S/p greater saphenous vein ablation  -Continue with Lasix as needed     Hypertension but now hypotensive due to sepsis  -Restart amlodipine and metoprolol  -Restart  Lisinopril     Atrial fibrillation  -Continue Eliquis  -Restarted Metoprolol     Obstructive sleep apnea-  -Continue CPAP                 Subjective:   Leona Escamilla a 66 y. o. male who presented with weakness since this morning.  He states he was trying to get out of bed this morning to go to the bathroom but was too weak.  He went to bed last night feeling a little bit dizzy however was otherwise feeling okay.  According to his wife he was having darker colored urine with some noticeable blood.  He recently completed a 10-day course of Cipro for UTI and was feeling better.  His wife states that she checked his oxygen levels and it was in the low 80s.  It improved after she asked him to take some deep breaths.  He denies any shortness of breath, or cough.  He did not have a fever at home however had a fever of 101 upon arrival to the emergency department. [facility] denies any nausea, vomiting, abdominal pain, or diarrhea. (Dr Yuni Arredondo)     10/26: Feeling better this am. BP are still a bit low and holding home BP meds. Will add back as appropriate. Cultures pending. LA has improved and WBC is better. Tmax 101 yesterday and low grade this am.      10/27:Afebrile. Feeling better. BP higher now so will restart home meds. ID has seen and BC have been repeated. Initial blood cultures + Klebsiella. Needs repeat cultures. COVID neg. ID would like renal US done.      10/28: Repeat BC neg at 15 hours. BP up so will restart Lisinopril. Stop IVF. US with medical renal disease. No complaints this am.      10/29: Repeat BC still neg at 2 days. BP better. Had ECHO yesterday and possible vegetation present. For FACUNDO in am.      10/30: For FACUNDO today. BP with SBP in the 140-150s. Will increase BB.      1:20: FACUNDO negative for vegetation. For d/c today.      Review of Systems:   A comprehensive review of systems was negative except for that written in the HPI.     Objective:   Physical Exam:      Visit Vitals  BP (!) 155/92 (BP 1 Location: Right arm, BP Patient Position: At rest)   Pulse 61   Temp 98 °F (36.7 °C)   Resp 16   Ht 6' 5\" (1.956 m)   Wt 260 lb 5.8 oz (118.1 kg)   SpO2 96%   BMI 30.87 kg/m²    O2 Flow Rate (L/min): 1 l/min O2 Device: Room air     Temp (24hrs), Av.2 °F (36.8 °C), Min:97.7 °F (36.5 °C), Max:98.7 °F (37.1 °C)    10/29 1901 - 10/30 0700  In: 200 [P.O.:200]  Out: -    10/28 07 - 10/29 1900  In: 1440 [P.O.:1440]  Out: 1325 [Urine:1325]     General:  Alert, cooperative, no distress, appears stated age. Head:  Normocephalic, without obvious abnormality, atraumatic. Eyes:  Conjunctivae/corneas clear. PERRL, EOMs intact. Nose: Nares normal. Septum midline. Mucosa normal. No drainage or sinus tenderness.    Throat: Lips, mucosa, and tongue moist..   Neck: Supple, symmetrical, trachea midline, no adenopathy, thyroid: no enlargement/tenderness/nodules, no carotid bruit and no JVD. Back:   Symmetric, no curvature. ROM normal. No CVA tenderness. Lungs:   Clear to auscultation bilaterally. Chest wall:  No tenderness or deformity. Heart:  Regular rate and rhythm, S1, S2 normal, no murmur, click, rub or gallop. Abdomen:   Soft, non-tender. Bowel sounds normal. No masses,  No organomegaly. Extremities: no cyanosis or edema. No calf tenderness or cords. Pulses: 2+ and symmetric all extremities. Skin: Skin color, texture, turgor normal. No rashes or lesions   Neurologic: CNII-XII intact. Alert and oriented X 3. Fine motor of hands and fingers normal.   equal.  No cogwheeling or rigidity. Gait not tested at this time. Sensation grossly normal to touch. Gross motor of extremities normal.        Data Review:    FINDINGS:CT Chest 10/25/20  There is mild dependent atelectasis and mild subsegmental atelectasis in the  lower lung fields. The upper lungs are clear. Pulmonary, mediastinal beck, and  splenic calcifications are evidence of old granulomatous disease. The central  airways are patent. No pneumothorax or pleural effusion. The heart is enlarged. A pacemaker is in place. Trace pericardial fluid in the aortic recess is likely  physiologic. No thoracic lymphadenopathy. Incidental note is made of hepatic  cysts and a probable partially imaged left renal cyst.     IMPRESSION: No acute process.      IMPRESSION Renal US  IMPRESSION: Diffusely increased renal echogenicity compatible with medical renal  disease with bilateral renal cysts.     Interpretation Summary ECHO 10/28/20        · LV: Estimated LVEF is 60 - 65%. Normal cavity size and systolic function (ejection fraction normal). Mild concentric hypertrophy. Wall motion: normal. Age-appropriate left ventricular diastolic function. · RV: Mildly dilated right ventricle. Pacer/ICD present. · LA: Moderately dilated left atrium.  Left Atrium volume index is 45.72 mL/m2. · RA: Dilated right atrium. · AO: Mild sinuses of Valsalva and aortic root dilatation. · TV: Mild to moderate tricuspid valve regurgitation is present. Additional tricuspid valve findings: There is possible vegetation noted.   · IVC: Severely elevated central venous pressure (15+ mmHg); IVC diameter is larger than 21 mm and collapses less than 50% with respiration.     Suspicious findings for possible vegetation within ventricular side of tricuspid valve/pacer wire, recommend FACUNDO for further evaluation.               Signed:  Sherwin Cooks, MD  10/30/2020  1:17 PM .

## 2020-10-30 NOTE — PROGRESS NOTES
Comprehensive Nutrition Assessment    Type and Reason for Visit: Initial, RD nutrition re-screen/LOS    Nutrition Recommendations/Plan:   1. Continue cardiac diet. Nutrition Assessment:      10/30: 65 y/o male admitted with sepsis. PMH includes HLD. BMI 30.6, c/w class I obesity. Pt reports good appetite, says he is eating less than normal d/t not liking the hospital food. Says he was eating well PTA (3 meals/day) and weight was stable until admission. Pt states he weighed 272# before getting admitted to the hospital and now is 257#. Recorded weights show 10% wt loss x3.5 months which is considered significant for time frame, however, pt says that weight loss was in June when his wife was in the hospital. Says he was running back and forth and not eating as much during that time but weight has been stable since then until now. Recorded intakes here are good, % meals. Lunch tray in room 100% eaten. No c/o N/V. D/c order noted for today. Labs and meds reviewed. Intakes:  Patient Vitals for the past 168 hrs:   % Diet Eaten   10/29/20 1813 100 %   10/29/20 1318 100 %   10/29/20 0951 85 %   10/28/20 1311 100 %   10/27/20 1855 90 %   10/27/20 1459 90 %   10/27/20 1011 100 %     Weight hx: Wt Readings from Last 10 Encounters:   10/30/20 117 kg (257 lb 15 oz)   06/17/20 129.8 kg (286 lb 3.2 oz)   02/19/20 133.4 kg (294 lb)   11/22/19 130.6 kg (288 lb)   10/04/19 128.9 kg (284 lb 2.8 oz)   10/02/19 129.2 kg (284 lb 12.8 oz)   09/24/19 131.5 kg (290 lb)   09/18/19 129.7 kg (286 lb)   09/11/19 131.5 kg (290 lb)   09/06/19 131.8 kg (290 lb 9.6 oz)         Malnutrition Assessment:  Malnutrition Status: none    Estimated Daily Nutrient Needs:  Energy (kcal): 4954(1329 x 1.3 AF); Weight Used for Energy Requirements: Current  Protein (g): 117(1-1.2 g/kg);  Weight Used for Protein Requirements: Current  Fluid (ml/day): 8977; Weight Used for Fluid Requirements: Current      Nutrition Related Findings:  LBM 10/27 Wounds:    None       Current Nutrition Therapies:  DIET CARDIAC Regular; 2 GM NA (House Low NA)    Anthropometric Measures:  · Height:  6' 5\" (195.6 cm)  · Current Body Wt:  117 kg (257 lb 15 oz)   · Admission Body Wt:       · Usual Body Wt:        · Ideal Body Wt:  208 lbs:  124 %   · Adjusted Body Weight:   ; Weight Adjustment for:     · Adjusted BMI:       · BMI Category:  Obese class 1 (BMI 30.0-34. 9)       Nutrition Diagnosis:   · No nutrition diagnosis at this time related to   as evidenced by        Nutrition Interventions:   Food and/or Nutrient Delivery: Continue current diet  Nutrition Education and Counseling: No recommendations at this time  Coordination of Nutrition Care: Continue to monitor while inpatient    Goals:  PO intake >75% meals next 5-7 days       Nutrition Monitoring and Evaluation:   Behavioral-Environmental Outcomes: None identified  Food/Nutrient Intake Outcomes: Food and nutrient intake  Physical Signs/Symptoms Outcomes: Weight, Biochemical data    Discharge Planning:    Continue current diet     Electronically signed by Aime Galloway RDN on 10/30/2020 at 1:29 PM    Contact: [phone]

## 2020-10-30 NOTE — PROGRESS NOTES
I have reviewed discharge instructions with the patient. The patient verbalized understanding. Patient is discharged with wife and is ambulatory. All questions answered appropriately.

## 2020-10-30 NOTE — ROUTINE PROCESS
Bedside and Verbal shift change report given to Alysa Baca (oncoming nurse) by Jed Gunn (offgoing nurse). Report included the following information SBAR and Kardex.

## 2020-10-30 NOTE — PROGRESS NOTES
Daily Progress Note: 10/30/2020  Kitty Faust MD    Assessment/Plan:   Urinary tract infection with Sepsis  -Recently treated for UTI with Cipro for 10 days  -Given ceftriaxone in the ED, Increased to 2gm daily on 10/26  -BC and UC klebsiella- repeat BC neg   -Renal US shows medical renal disease  -ECHO with possible vegetation present- FACUNDO negative for vegetation      Acute hypoxic respiratory failure  -Off oxygen  -History of cardiomyopathy, check BNP  -CT chest neg  -Covid neg     Symptomatic bradycardia  -S/p PPM insertion October 2019     Venous insufficiency  -S/p greater saphenous vein ablation  -Continue with Lasix as needed     Hypertension but now hypotensive due to sepsis  -Restart amlodipine and metoprolol  -Restart  Lisinopril     Atrial fibrillation  -Continue Eliquis  -Restarted Metoprolol     Obstructive sleep apnea-  -Continue CPAP             Problem List:  Problem List as of 10/30/2020 Date Reviewed: 7/15/2020          Codes Class Noted - Resolved    Tachy-viv syndrome ([facility] [address] ICD-10-CM: I49.5  ICD-9-CM: 427.81  10/4/2019 - Present        Sepsis ([facility] [address] ICD-10-CM: A41.9  ICD-9-CM: 038.9, 995.91  10/25/2020 - Present        Peripheral vascular disease ([facility] [address] ICD-10-CM: I73.9  ICD-9-CM: 443.9  6/17/2020 - Present        Advanced care planning/counseling discussion ICD-10-CM: Z71.89  ICD-9-CM: V65.49  2/3/2017 - Present    Overview Signed 2/3/2017  1:51 PM by Kimberly Esquivel RN     Patient states that a completed AMD is at home. NN encouraged patient to bring a copy to the office for scanning into the medical record. Patient verbalized understanding & agreement.                Paroxysmal atrial fibrillation (HCC) ICD-10-CM: I48.0  ICD-9-CM: 427.31  9/28/2016 - Present        BPH (benign prostatic hyperplasia) ICD-10-CM: N40.0  ICD-9-CM: 600.00  8/17/2016 - Present        Insomnia ICD-10-CM: G47.00  ICD-9-CM: 780.52  8/17/2016 - Present        Osteoarthritis of left knee ICD-10-CM: M17.12  ICD-9-CM: 715.96  8/15/2016 - Present        Spinal stenosis in cervical region ICD-10-CM: M48.02  ICD-9-CM: 723.0  1/5/2016 - Present        Herniated nucleus pulposus, C3-4 ICD-10-CM: M50.21  ICD-9-CM: 722.0  1/5/2016 - Present        Normal cardiac stress test ICD-10-CM: [id number]  ICD-9-CM: Sandra Linen  9/21/2015 - Present        IFG (impaired fasting glucose) ICD-10-CM: R73.01  ICD-9-CM: 790.21  Unknown - Present        ED (erectile dysfunction) ICD-10-CM: N52.9  ICD-9-CM: 607.84  6/18/2014 - Present        SVT (supraventricular tachycardia) (HCC) ICD-10-CM: I47.1  ICD-9-CM: 427.89  Unknown - Present        Right knee DJD ICD-10-CM: M17.11  ICD-9-CM: 715.96  11/4/2013 - Present        Spinal stenosis of lumbar region ICD-10-CM: M48.061  ICD-9-CM: 724.02  Unknown - Present        Tear of medial cartilage or meniscus of knee, current ICD-10-CM: [id number]  ICD-9-CM: 836.0  2/1/2013 - Present        Osteoarthrosis, unspecified whether generalized or localized, lower leg ICD-10-CM: M17.10  ICD-9-CM: 715.96  2/1/2013 - Present        Palpitations ICD-10-CM: R00.2  ICD-9-CM: 785.1  12/12/2011 - Present        Supraventricular tachycardia ([address] ICD-10-CM: I47.1  ICD-9-CM: 427.89  12/12/2011 - Present        HLD (hyperlipidemia) ICD-10-CM: E78.5  ICD-9-CM: 272.4  12/12/2011 - Present        Essential hypertension, benign ICD-10-CM: I10  ICD-9-CM: 401.1  12/12/2011 - Present        Venous insufficiency ICD-10-CM: I87.2  ICD-9-CM: 459.81  12/12/2011 - Present        Colon polyps ICD-10-CM: K63.5  ICD-9-CM: 211.3  Unknown - Present        AVI (obstructive sleep apnea) ICD-10-CM: G47.33  ICD-9-CM: 327.23  Unknown - Present        Heart palpitations ICD-10-CM: R00.2  ICD-9-CM: 785.1  Unknown - Present    Overview Signed 7/27/2010  9:41 AM by MD Dr. Kaye Love             Hyperlipidemia with target LDL less than 130 ICD-10-CM: E78.5  ICD-9-CM: 272.4  Unknown - Present        BPH (benign prostatic hypertrophy) ICD-10-CM: N40.0  ICD-9-CM: 600.00  Unknown - Present        Lower back pain ICD-10-CM: M54.5  ICD-9-CM: 724.2  Unknown - Present    Overview Signed 7/27/2010  9:41 AM by Julio Cesar Rodriguez MD     hx lumbar laminectomyx2 w good results             Knee pain, right ICD-10-CM: M25.561  ICD-9-CM: 719.46  Unknown - Present        Right sided sciatica ICD-10-CM: M54.31  ICD-9-CM: 724.3  Unknown - Present        RESOLVED: Tear of lateral cartilage or meniscus of knee, current ICD-10-CM: L32.031K  ICD-9-CM: 836.1  2/1/2013 - 3/19/2013        RESOLVED: Hypertension ICD-10-CM: I10  ICD-9-CM: 401.9  Unknown - 2/4/2017              Subjective:   Bj Mares is a 66 y.o. male who presented with weakness since this morning. He states he was trying to get out of bed this morning to go to the bathroom but was too weak. He went to bed last night feeling a little bit dizzy however was otherwise feeling okay. According to his wife he was having darker colored urine with some noticeable blood. He recently completed a 10-day course of Cipro for UTI and was feeling better. His wife states that she checked his oxygen levels and it was in the low 80s. It improved after she asked him to take some deep breaths. He denies any shortness of breath, or cough. He did not have a fever at home however had a fever of 101 upon arrival to the emergency department. He denies any nausea, vomiting, abdominal pain, or diarrhea. (Dr Fern Schirmer)    10/26: Feeling better this am. BP are still a bit low and holding home BP meds. Will add back as appropriate. Cultures pending. LA has improved and WBC is better. Tmax 101 yesterday and low grade this am.     10/27:Afebrile. Feeling better. BP higher now so will restart home meds. ID has seen and BC have been repeated. Initial blood cultures + Klebsiella. Needs repeat cultures. COVID neg. ID would like renal US done. 10/28: Repeat BC neg at 15 hours. BP up so will restart Lisinopril. Stop IVF. US with medical renal disease. No complaints this am.     10/29: Repeat BC still neg at 2 days. BP better. Had ECHO yesterday and possible vegetation present. For FACUNDO in am.     10/30: For FACUNDO today. BP with SBP in the 140-150s. Will increase BB.     1:20: FACUNDO negative for vegetation. For d/c today. Review of Systems:   A comprehensive review of systems was negative except for that written in the HPI. Objective:   Physical Exam:     Visit Vitals  BP (!) 155/92 (BP 1 Location: Right arm, BP Patient Position: At rest)   Pulse 61   Temp 98 °F (36.7 °C)   Resp 16   Ht 6' 5\" (1.956 m)   Wt 260 lb 5.8 oz (118.1 kg)   SpO2 96%   BMI 30.87 kg/m²    O2 Flow Rate (L/min): 1 l/min O2 Device: Room air    Temp (24hrs), Av.2 °F (36.8 °C), Min:97.7 °F (36.5 °C), Max:98.7 °F (37.1 °C)    10/29 1901 - 10/30 0700  In: 200 [P.O.:200]  Out: -    10/28 0701 - 10/29 1900  In: 1440 [P.O.:1440]  Out: 0336 [Urine:1325]    General:  Alert, cooperative, no distress, appears stated age. Head:  Normocephalic, without obvious abnormality, atraumatic. Eyes:  Conjunctivae/corneas clear. PERRL, EOMs intact. Nose: Nares normal. Septum midline. Mucosa normal. No drainage or sinus tenderness. Throat: Lips, mucosa, and tongue moist..   Neck: Supple, symmetrical, trachea midline, no adenopathy, thyroid: no enlargement/tenderness/nodules, no carotid bruit and no JVD. Back:   Symmetric, no curvature. ROM normal. No CVA tenderness. Lungs:   Clear to auscultation bilaterally. Chest wall:  No tenderness or deformity. Heart:  Regular rate and rhythm, S1, S2 normal, no murmur, click, rub or gallop. Abdomen:   Soft, non-tender. Bowel sounds normal. No masses,  No organomegaly. Extremities: no cyanosis or edema. No calf tenderness or cords. Pulses: 2+ and symmetric all extremities. Skin: Skin color, texture, turgor normal. No rashes or lesions   Neurologic: CNII-XII intact. Alert and oriented X 3.   Fine motor of hands and fingers normal.   equal.  No cogwheeling or rigidity. Gait not tested at this time. Sensation grossly normal to touch. Gross motor of extremities normal.       Data Review:    FINDINGS:CT Chest 10/25/20  There is mild dependent atelectasis and mild subsegmental atelectasis in the  lower lung fields. The upper lungs are clear. Pulmonary, mediastinal beck, and  splenic calcifications are evidence of old granulomatous disease. The central  airways are patent. No pneumothorax or pleural effusion. The heart is enlarged. A pacemaker is in place. Trace pericardial fluid in the aortic recess is likely  physiologic. No thoracic lymphadenopathy. Incidental note is made of hepatic  cysts and a probable partially imaged left renal cyst.     IMPRESSION: No acute process. IMPRESSION Renal US  IMPRESSION: Diffusely increased renal echogenicity compatible with medical renal  disease with bilateral renal cysts. Interpretation Summary ECHO 10/28/20       · LV: Estimated LVEF is 60 - 65%. Normal cavity size and systolic function (ejection fraction normal). Mild concentric hypertrophy. Wall motion: normal. Age-appropriate left ventricular diastolic function. · RV: Mildly dilated right ventricle. Pacer/ICD present. · LA: Moderately dilated left atrium. Left Atrium volume index is 45.72 mL/m2. · RA: Dilated right atrium. · AO: Mild sinuses of Valsalva and aortic root dilatation. · TV: Mild to moderate tricuspid valve regurgitation is present. Additional tricuspid valve findings: There is possible vegetation noted. · IVC: Severely elevated central venous pressure (15+ mmHg); IVC diameter is larger than 21 mm and collapses less than 50% with respiration. Suspicious findings for possible vegetation within ventricular side of tricuspid valve/pacer wire, recommend FACUNDO for further evaluation.        Recent Days:  Recent Labs     10/29/20  0504 10/28/20  0350   WBC 6.1 6.6   HGB 11.9* 12.3   HCT 36.1* 36.4*    145*     Recent Labs     10/29/20  0504 10/28/20  0350    140   K 3.6 3.4*    108   CO2 28 25   GLU 93 91   BUN 12 11   CREA 0.96 0.75   CA 8.1* 7.7*   ALB 2.7* 2.7*   TBILI 0.6 0.7   ALT 28 25     No results for input(s): PH, PCO2, PO2, HCO3, FIO2 in the last 72 hours. 24 Hour Results:  No results found for this or any previous visit (from the past 24 hour(s)). Medications reviewed  Current Facility-Administered Medications   Medication Dose Route Frequency    lisinopriL (PRINIVIL, ZESTRIL) tablet 40 mg  40 mg Oral DAILY    metoprolol succinate (TOPROL-XL) XL tablet 50 mg  50 mg Oral DAILY    amLODIPine (NORVASC) tablet 10 mg  10 mg Oral DAILY    apixaban (ELIQUIS) tablet 5 mg  5 mg Oral BID    cefTRIAXone (ROCEPHIN) 2 g in 0.9% sodium chloride (MBP/ADV) 50 mL  2 g IntraVENous Q24H    traZODone (DESYREL) tablet 150 mg  150 mg Oral QHS    gabapentin (NEURONTIN) capsule 300 mg  300 mg Oral QHS    sodium chloride (NS) flush 5-10 mL  5-10 mL IntraVENous PRN    sodium chloride (NS) flush 5-40 mL  5-40 mL IntraVENous Q8H    sodium chloride (NS) flush 5-40 mL  5-40 mL IntraVENous PRN    acetaminophen (TYLENOL) tablet 650 mg  650 mg Oral Q6H PRN    Or    acetaminophen (TYLENOL) suppository 650 mg  650 mg Rectal Q6H PRN    polyethylene glycol (MIRALAX) packet 17 g  17 g Oral DAILY PRN    promethazine (PHENERGAN) tablet 12.5 mg  12.5 mg Oral Q6H PRN    Or    ondansetron (ZOFRAN) injection 4 mg  4 mg IntraVENous Q6H PRN       Care Plan discussed with: Patient/Family    Total time spent with patient: 30 minutes.     Alba Retana MD

## 2020-10-30 NOTE — PROGRESS NOTES
Problem: Falls - Risk of  Goal: *Absence of Falls  Outcome: Progressing Towards Goal  Note: Fall Risk Interventions:  Mobility Interventions: Patient to call before getting OOB         Medication Interventions: Patient to call before getting OOB    Elimination Interventions: Call light in reach

## 2020-11-02 ENCOUNTER — PATIENT OUTREACH (OUTPATIENT)
Dept: CASE MANAGEMENT | Age: 75
End: 2020-11-02

## 2020-11-02 LAB
BACTERIA SPEC CULT: NORMAL
BACTERIA SPEC CULT: NORMAL
SERVICE CMNT-IMP: NORMAL
SERVICE CMNT-IMP: NORMAL

## 2020-11-03 VITALS
HEART RATE: 76 BPM | BODY MASS INDEX: 30.46 KG/M2 | WEIGHT: 257.94 LBS | RESPIRATION RATE: 18 BRPM | DIASTOLIC BLOOD PRESSURE: 80 MMHG | HEIGHT: 77 IN | TEMPERATURE: 98.1 F | SYSTOLIC BLOOD PRESSURE: 126 MMHG | OXYGEN SATURATION: 97 %

## 2020-11-05 NOTE — PROGRESS NOTES
Physician Progress Note      PATIENT:               Kyle Dahl  CSN #:                  [id number]  :                       1945  ADMIT DATE:       10/25/2020 11:41 AM  DISCH DATE:        10/30/2020 3:08 PM  RESPONDING  PROVIDER #:        Andrea Ramirez MD          QUERY TEXT:    Dear Dr. Dion Reynoso,  Patient admitted with sepsis due to UTI. Noted documentation of acute hypoxic respiratory failure in admission H&P on 10/25/20. Based on clinical indicators, the diagnosis of acute hypoxic respiratory failure may be challenged on a clinical basis by an external reviewer. . Please indicate one of the following and document in the medical record: The medical record reflects the following:  Risk Factors: 66 yr. old male admitted with sepsis due to UTI. Clinical Indicators: The medical record notes that *His wife states that she checked his oxygen levels and it was in the low 80s. \" prior to coming to the ED. H&P notes SpO2 at 93% on O2 at 4l via nasal cannula. Tachypnea noted, R-30 on admission and up to 36. Treatment: Lab, work, O2 at 4L via nasal cannula, chest x-ray, CT chest    Acute Respiratory Failure Clinical Indicators per  [facility] Training Guide and Quick Reference Guide:  pO2 < 60 mmHg or SpO2 (pulse oximetry) < 91% breathing room air  pCO2 > 50 and pH < 7.35  P/F ratio (pO2 / FIO2) < 300  pO2 decrease or pCO2 increase by 10 mmHg from baseline (if known)  Supplemental oxygen of 40% or more  Presence of respiratory distress, tachypnea, dyspnea, shortness of breath, wheezing  Unable to speak in complete sentences  Use of accessory muscles to breathe  Extreme anxiety and feeling of impending doom  Tripod position  Confusion/altered mental status/obtunded  Options provided:  -- Acute Hypoxic Respiratory Failure currently as evidenced by, Please document evidence.   -- Currently resolved Acute Hypoxic Respiratory Failure was evidenced by, Please document evidence  -- Acute Hypoxic Respiratory Failure ruled out after study  -- Other - I will add my own diagnosis  -- Disagree - Not applicable / Not valid  -- Disagree - Clinically unable to determine / Unknown  -- Refer to Clinical Documentation Reviewer    PROVIDER RESPONSE TEXT:    Acute Hypoxic Respiratory Failure has been ruled out after study.     Query created by: Dangelo Chou on 11/4/2020 4:37 PM      Electronically signed by:  Libby Patricia MD 11/5/2020 8:09 AM

## 2020-11-06 ENCOUNTER — PATIENT OUTREACH (OUTPATIENT)
Dept: CASE MANAGEMENT | Age: 75
End: 2020-11-06

## 2020-11-20 ENCOUNTER — OFFICE VISIT (OUTPATIENT)
Dept: CARDIOLOGY CLINIC | Age: 75
End: 2020-11-20
Payer: MEDICARE

## 2020-11-20 VITALS
SYSTOLIC BLOOD PRESSURE: 118 MMHG | HEART RATE: 66 BPM | OXYGEN SATURATION: 66 % | RESPIRATION RATE: 16 BRPM | BODY MASS INDEX: 31.88 KG/M2 | HEIGHT: 77 IN | DIASTOLIC BLOOD PRESSURE: 74 MMHG | WEIGHT: 270 LBS

## 2020-11-20 DIAGNOSIS — E78.2 MIXED HYPERLIPIDEMIA: ICD-10-CM

## 2020-11-20 DIAGNOSIS — I49.5 TACHY-BRADY SYNDROME (HCC): ICD-10-CM

## 2020-11-20 DIAGNOSIS — I47.1 SVT (SUPRAVENTRICULAR TACHYCARDIA) (HCC): ICD-10-CM

## 2020-11-20 DIAGNOSIS — I48.0 PAROXYSMAL ATRIAL FIBRILLATION (HCC): ICD-10-CM

## 2020-11-20 DIAGNOSIS — I87.2 VENOUS INSUFFICIENCY: ICD-10-CM

## 2020-11-20 DIAGNOSIS — I10 ESSENTIAL HYPERTENSION, BENIGN: Primary | ICD-10-CM

## 2020-11-20 DIAGNOSIS — G47.33 OSA (OBSTRUCTIVE SLEEP APNEA): ICD-10-CM

## 2020-11-20 DIAGNOSIS — I73.9 PERIPHERAL VASCULAR DISEASE (HCC): ICD-10-CM

## 2020-11-20 PROCEDURE — G0463 HOSPITAL OUTPT CLINIC VISIT: HCPCS | Performed by: INTERNAL MEDICINE

## 2020-11-20 PROCEDURE — 99215 OFFICE O/P EST HI 40 MIN: CPT | Performed by: INTERNAL MEDICINE

## 2020-11-20 RX ORDER — CEPHALEXIN 500 MG/1
CAPSULE ORAL
COMMUNITY
Start: 2020-10-12 | End: 2022-01-10

## 2020-11-20 NOTE — PATIENT INSTRUCTIONS
Please begin taking only 50mg daily of your Toprol. Please make an appt with Dr. Uzair Cardona in 1 month with a pacemaker check 1 week prior.

## 2020-11-20 NOTE — PROGRESS NOTES
CardioVascular Progress Note    Patient: Piper Stone MRN: [id number]  SSN: [id number]    YOB: 1945  Age: 66 y.o. Sex: male       Subjective:        Piper Stone is a 66 y.o.  male who presents for assessment of venous insufficiency. Mr. Jessie Allred is a 65 yo WM with a history of HTN, SVT, Afib on Eliquis, AVI on CPAP, dyslipidemia who presents for follow up assessment of venous insufficiency. The patient has symptomatic bradycardia and underwent PPM insertion 10/4/2019 with Dr. Eladio Vines. He has a history of bilateral GSV ablation in 2008. Symptoms improved at that time. Unfortunately, he has had recurrent of discomfort in the calves over the past few years. He has also noted superficial varicosities. Mild dependent edema and nocturia. He was recently admitted to [facility] 10/25 to 10/30/2020 with sepsis syndrome from UTI (Klebsiella). Presented with weakness, dehydration and low oxygen. Feels much better. No chest pain or shortness of breath. No syncope. Occasional lightheadedness- especially getting up from chair. No fevers or chills. Legs starting to swell up. Has not been wearing support stockings. Taking care of wife who has had some fractures recently- two back surgeries and arm. Doing better. Past Medical History:   Diagnosis Date    Arthritis     left knee and lt. hand    Atrial fibrillation (HCC)     BPH (benign prostatic hyperplasia)     Chronic pain     Back pain    Colon polyps     DJD (degenerative joint disease) of lumbar spine     laminectomy 1979, 1991, 2015    ED (erectile dysfunction) 6/18/2014    Heart palpitations     Dr. Radha Segovia    Herniated nucleus pulposus, C3-4 12/2015    Dr. Wilson Ip    Hyperlipidemia     Hypertension     IFG (impaired fasting glucose)     Insomnia     severe.   has treated, AVI, back pain    Knee pain, right     Normal cardiac stress test 9/21/15    OA (osteoarthritis) of knee     Dr. Malia Bender Obesity  Onychomycosis 2018    Dr Rogelio Pool. lamisil    AVI on CPAP     Dr. Verner Memos    Seasonal allergic rhinitis     Spinal stenosis in cervical region 12/2015    Spinal stenosis of lumbar region     MRI 12/2012. Dr. Soumya Marti    SVT (supraventricular tachycardia) [facility])     Dr. Gerri Sweet [facility] [address] Varicose veins     vein stripping 10/10      Past Surgical History:   Procedure Laterality Date    COLONOSCOPY  2008    2 polyps. repeat 2011. Dr. Leatha Gong  2007    7 polyps per pt     COLONOSCOPY N/A 6/15/2016    COLONOSCOPY performed by Kaleigh Henderson MD at [address] [place] [address], COLON, DIAGNOSTIC  2011    return in 2016    HX BLADDER REPAIR      polyp removal    HX CERVICAL FUSION  1/16/16    ACDF C3-C4 Dr. Soumya Marti    HX COLONOSCOPY  4/27/11    Dr Mary Anne Mares, smal polyp.   repeat 4/2016    HX KNEE ARTHROSCOPY  2005    right, Dr. Chele Hurley    HX KNEE ARTHROSCOPY  02/01/2012    left, Dr Chance Payanape ARTHROSCOPY  1/2013    right    HX KNEE REPLACEMENT  11/4/2013    HX LUMBAR FUSION  9/2015    L3-L4, Dr. Walter Freeman      left, Dr Yazan Salazar    bladder polyp removed with cystoscopy    HX VEIN STRIPPING  9/2010    Dr. Davin Lloyd, bilateral    AK INS NEW/RPLC PRM PACEMAKER W/TRANSV ELTRD VENTR N/A 10/4/2019    INSERT PPM SINGLE VENTRICULAR/[employer] performed by Bianka Lake MD at [address] CATH LAB    AK INS NEW/RPLCMT PRM PM W/TRANSV ELTRD ATRIAL&VENT N/A 10/4/2019    Insert Ppm Dual performed by Bianka Lake MD at [address] CATH LAB    AK REMOVAL SUBCUTANEOUS CARDIAC RHYTHM MONITOR N/A 10/4/2019    LOOP RECORDER REMOVAL performed by Bianka Lake MD at [address] CATH LAB    SHOULDER SURG [address] UNLISTED      left DECOMPRESSION, Dr. Valeria Cortez  11/2013    VASCULAR SURGERY PROCEDURE UNLIST  2008    bilateral vein stripping Current Outpatient Medications   Medication Sig    cephALEXin (KEFLEX) 500 mg capsule PT TO TK 4 CS PO 1 HOUR PRIOR TO DENTAL APPOINTMENT    metoprolol succinate (TOPROL-XL) 25 mg XL tablet Take 1 Tab by mouth daily.  amLODIPine (NORVASC) 10 mg tablet TAKE 1 TABLET BY MOUTH DAILY    gabapentin (NEURONTIN) 300 mg capsule Take 300 mg by mouth nightly.  furosemide (LASIX) 20 mg tablet Take 20 mg by mouth daily as needed.  traZODone (DESYREL) 150 mg tablet Take 300 mg by mouth nightly.  terazosin (HYTRIN) 5 mg capsule TAKE 1 CAPSULE DAILY    lisinopriL (PRINIVIL, ZESTRIL) 40 mg tablet TAKE 1 TABLET DAILY    diclofenac (VOLTAREN) 1 % gel Apply 4 g to affected area daily as needed.  chlorhexidine (PERIDEX) 0.12 % solution Take 15 mL by mouth two (2) times daily as needed.  Eliquis 5 mg tablet TAKE 1 TABLET TWICE A DAY    levocetirizine (XYZAL) 5 mg tablet TAKE 1 TABLET DAILY    atorvastatin (LIPITOR) 20 mg tablet TAKE 1 TABLET DAILY    metoprolol succinate (TOPROL-XL) 50 mg XL tablet Take 1 Tab by mouth daily. No current facility-administered medications for this visit. Allergies   Allergen Reactions    Percocet [Oxycodone-Acetaminophen] Other (comments)     hallucinations    Penicillin G Rash     Did okay with keflex - no reaction with that. Subjective:     Visit Vitals  /74 (BP 1 Location: Left arm, BP Patient Position: Sitting)   Pulse 66   Resp 16   Ht 6' 5\" (1.956 m)   Wt 270 lb (122.5 kg)   SpO2 (!) 66%   BMI 32.02 kg/m²     Physical Exam: Overweight  Head: Normocephalic, atraumatic. Eyes: Pupils equal, round, reactive to light and accomodation. , Extra ocular muscles intact. Sclera anicteric. Ears: Grossly responsive to sound. Neck: No adenopathy. No bruits. Throat: No sores or erythema. Heart: Regular rate and rhythm. Normal S1 and S2. No murmurs, gallops, or rub. Lungs: Clear to auscultation bilaterally. No wheezing, rales, or rhonchi.   Abdomen: Soft, non-tender. No guarding or rebound. No hepatosplenomegaly. Bowel sounds active. Ext: Varicosities. Diminished pulses. 2+ pitting edema to calves  Skin: Stasis dermatitis in legs. . Warm and dry. No rash. Neuro: Cranial nerves II through XII intact. Motor and sensory grossly intact. Affect: Appropriate. Alert and interactive.      09/11/19   DUPLEX LOWER EXT VENOUS BILAT 09/12/2019 9/12/2019    Narrative *  Right Leg:  Mild venous insufficiency is detected within saphenofemoral   junction and within the calf of the greater saphenous vein. No evidence   of deep or superficial venous thrombosis within the right common femoral,   femoral, popliteal, posterior tibial or greater saphenous vein. *  Left Leg:  Significant venous insufficiency is detected within the calf   of the greater saphenous vein. No evidence of deep vein or superficial   venous thrombosis within the left common femoral, femoral, popliteal,   posterior tibial or greater saphenous vein. Signed by: MD Miranda Greene  Nuclear Stress Test   Negative myocardial perfusion imaging. There is no prior study available for comparison. .   Interpretation Summary     · Gated SPECT: Left ventricular function post-stress was normal. Calculated ejection fraction is 55%. There is no evidence of transient ischemic dilation (TID). · Baseline ECG: Normal sinus rhythm. · No ECG changes with Lexiscan. .  · Left ventricular perfusion is normal.  · Negative myocardial perfusion imaging. Nuclear Stress Findings     Nuclear Stress Function Left ventricular function post-stress was normal. Wall motion was normal at stress. Calculated ejection fraction is 55%. There is no evidence of transient ischemic dilation (TID).    Nuclear Perfusion Left ventricular perfusion is normal.     Electronically signed by Kindra Rincon DO on 9/30/19 at [phone] EDT     09/11/19   ECHO ADULT COMPLETE 09/12/2019 9/12/2019    Narrative · Left Ventricle: Normal cavity size, wall thickness and systolic function   (ejection fraction normal). Estimated left ventricular ejection fraction   is 61 - 65%. Biplane method used to measure ejection fraction. No regional   wall motion abnormality noted. Inconclusive left ventricular diastolic   function. · Left Atrium: Moderately dilated left atrium. · Right Ventricle: Mildly dilated right ventricle. · Aortic Valve: Mild aortic valve sclerosis with no significant stenosis. Signed by: Kateryna Arciniega MD     Lab Results   Component Value Date/Time    Cholesterol, total 133 02/12/2019 08:52 AM    HDL Cholesterol 43 02/12/2019 08:52 AM    LDL, calculated 74 02/12/2019 08:52 AM    VLDL, calculated 16 02/12/2019 08:52 AM    Triglyceride 78 02/12/2019 08:52 AM    CHOL/HDL Ratio 3.3 10/14/2010 09:43 AM     Lab Results   Component Value Date/Time    TSH 2.320 09/11/2019 04:18 PM     Lab Results   Component Value Date/Time    Hemoglobin A1c 5.6 02/12/2019 08:52 AM                Assessment/Plan        ICD-10-CM ICD-9-CM    1. Essential hypertension, benign  I10 401.1    2. Venous insufficiency  I87.2 459.81    3. SVT (supraventricular tachycardia) (HCC)  I47.1 427.89    4. Paroxysmal atrial fibrillation (HCC)  I48.0 427.31    5. Peripheral vascular disease (HCC)  I73.9 443.9    6. Tachy-viv syndrome (HCC)  I49.5 427.81    7. AVI (obstructive sleep apnea)  G47.33 327.23    8. Mixed hyperlipidemia  E78.2 272.2        Mr. Araceli Carroll is a 57-year-old white male with HTN, dyslipidemia, atrial fibrillation, tachy-viv syndrome, AVI, obesity, venous insufficiency with recent admission for UTI and sepsis. He is slowly improving. Jennie not like hospital fiid, and admits to eating a lot since discharge with resultant weight gain. Not walking as much as he should. Dizziness during the day due to lack of chronotropic competence. This is induced by our medications. Will decrease Toprol XL to 50 daily.  Reassess HR and BP, as well as symptoms. Resume LE stockings for venous insufficiency and edema. Increase activity. Diet  Lose weight.     RTC 4 weeks  If HR variable- interrogate PPM      Sammi Soliz MD  11/20/2020, 11:52 AM    [facility] Office:   [facility] Office:  [address] [place] [address] Dr Cassius Trevizo [address] [facility] [address]     [address] [place] [address]  P: [phone]    P: [phone]  F: [phone]    F: [phone]

## 2020-12-21 NOTE — PROGRESS NOTES
HISTORY OF PRESENTING ILLNESS      Luli Lowe is a 66 y.o. male with atrial fibrillation, ILR who has experienced fatigue felt to be potentially secondary to bradycardia. He underwent dual chamber pacemaker and his metoprolol was increased. His device interrogation demonstrates normal functioning. He remains on Eliquis 5mg BID for permanent AF. He has seen Dr. Mayelin Flannery for venous insufficiency; however has difficulty putting his compression stockings on d/t back pain. He's had random dizziness over the past year which he is unable to attribute to any particular activity. Echocardiogram this fall during his hospitalization for sepsis showed a preserved LV function. PAST MEDICAL HISTORY     Past Medical History:   Diagnosis Date    Arthritis     left knee and lt. hand    Atrial fibrillation (HCC)     BPH (benign prostatic hyperplasia)     Chronic pain     Back pain    Colon polyps     DJD (degenerative joint disease) of lumbar spine     laminectomy 1979, 1991, 2015    ED (erectile dysfunction) 6/18/2014    Heart palpitations     Dr. Radha Ortiz    Herniated nucleus pulposus, C3-4 12/2015    Dr. Gladys Pulido    Hyperlipidemia     Hypertension     IFG (impaired fasting glucose)     Insomnia     severe. has treated, AVI, back pain    Knee pain, right     Normal cardiac stress test 9/21/15    OA (osteoarthritis) of knee     Dr. Mark Renee Obesity     Onychomycosis 2018    Dr Tresa Teixeira. lamisil    AVI on CPAP     Dr. Josh Hardy    Seasonal allergic rhinitis     Spinal stenosis in cervical region 12/2015    Spinal stenosis of lumbar region     MRI 12/2012. Dr. Gladys Pulido    SVT (supraventricular tachycardia) [facility])     Dr. Radha Ortiz [facility] Dr Yenifer Jeronimo Varicose veins     vein stripping 10/10           PAST SURGICAL HISTORY     Past Surgical History:   Procedure Laterality Date    COLONOSCOPY  2008    2 polyps. repeat 2011.   Dr. Maranda Tucker  2007    7 polyps per pt     COLONOSCOPY N/A 6/15/2016    COLONOSCOPY performed by Jerry Rojas MD at [address], DIAGNOSTIC  2011    return in 2016    HX BLADDER REPAIR      polyp removal    HX CERVICAL FUSION  1/16/16    ACDF C3-C4 Dr. Lisa Plummer HX COLONOSCOPY  4/27/11    Dr Srinivas Davis, [facility] polyp. repeat 4/2016    HX KNEE ARTHROSCOPY  2005    right, Dr. Emerson Mejia    HX KNEE ARTHROSCOPY  02/01/2012    left, Dr Emerson Mejia    HX KNEE ARTHROSCOPY  1/2013    right    HX KNEE REPLACEMENT  11/4/2013    HX LUMBAR FUSION  9/2015    L3-L4, Dr. Maribell Kilgore      left, Dr Dale Mariscal    bladder polyp removed with cystoscopy    HX VEIN STRIPPING  9/2010    Dr. Tonya Duffy, bilateral    PA INS NEW/RPLC PRM PACEMAKER W/TRANSV ELTRD VENTR N/A 10/4/2019    INSERT PPM SINGLE VENTRICULAR/[employer] performed by Mushtaq Richmond MD at [address] CATH LAB    PA INS NEW/RPLCMT PRM PM W/TRANSV ELTRD ATRIAL&VENT N/A 10/4/2019    Insert Ppm Dual performed by Mushtaq Richmond MD at [address] CATH LAB    PA REMOVAL SUBCUTANEOUS CARDIAC RHYTHM MONITOR N/A 10/4/2019    LOOP RECORDER REMOVAL performed by Mushtaq Richmond MD at [address] CATH LAB    SHOULDER SURG [address] UNLISTED      left DECOMPRESSION, Dr. Jason Christian  11/2013    VASCULAR SURGERY PROCEDURE UNLIST  2008    bilateral vein stripping          ALLERGIES     Allergies   Allergen Reactions    Percocet [Oxycodone-Acetaminophen] Other (comments)     hallucinations    Penicillin G Rash     Did okay with keflex - no reaction with that.           FAMILY HISTORY     Family History   Problem Relation Age of Onset   Con Borrero Cancer Mother         liver    Cancer Father         leukemia    Cancer Sister         lung cancer    negative for cardiac disease       SOCIAL HISTORY     Social History     Socioeconomic History    Marital status:  Spouse name: Janette Quispe Number of children: 3    Years of education: Not on file    Highest education level: Not on file   Tobacco Use    Smoking status: Former Smoker     Packs/day: 0.25     Years: 19.00     Pack years: 4.75     Types: Cigarettes     Quit date: 1980     Years since quittin.0    Smokeless tobacco: Never Used    Tobacco comment: quit ~   Substance and Sexual Activity    Alcohol use: Yes     Comment: socially    Drug use: No    Sexual activity: Yes   Other Topics Concern     Service Yes     Comment: [employer]   Social History Narrative    ** Merged History Encounter **         1 child, 2 stepchildren    fishes         MEDICATIONS     Current Outpatient Medications   Medication Sig    alfuzosin SR (UROXATRAL) 10 mg SR tablet Take 10 mg by mouth daily.  cephALEXin (KEFLEX) 500 mg capsule PT TO TK 4 CS PO 1 HOUR PRIOR TO DENTAL APPOINTMENT    amLODIPine (NORVASC) 10 mg tablet TAKE 1 TABLET BY MOUTH DAILY    gabapentin (NEURONTIN) 300 mg capsule Take 300 mg by mouth nightly.  furosemide (LASIX) 20 mg tablet Take 20 mg by mouth daily as needed.  traZODone (DESYREL) 150 mg tablet Take 150 mg by mouth nightly.  terazosin (HYTRIN) 5 mg capsule TAKE 1 CAPSULE DAILY    lisinopriL (PRINIVIL, ZESTRIL) 40 mg tablet TAKE 1 TABLET DAILY    diclofenac (VOLTAREN) 1 % gel Apply 4 g to affected area daily as needed.  chlorhexidine (PERIDEX) 0.12 % solution Take 15 mL by mouth two (2) times daily as needed.  Eliquis 5 mg tablet TAKE 1 TABLET TWICE A DAY    levocetirizine (XYZAL) 5 mg tablet TAKE 1 TABLET DAILY    atorvastatin (LIPITOR) 20 mg tablet TAKE 1 TABLET DAILY    metoprolol succinate (TOPROL-XL) 50 mg XL tablet Take 1 Tab by mouth daily. No current facility-administered medications for this visit. I have reviewed the nurses notes, vitals, problem list, allergy list, medical history, family, social history and medications.        REVIEW OF SYMPTOMS General: +random dizziness/lightheadedness, Pt denies excessive weight gain or loss. Pt is able to conduct ADL's  HEENT: Denies blurred vision, headaches, hearing loss, epistaxis and difficulty swallowing. Respiratory: Denies cough, congestion, shortness of breath, MCRAE, wheezing or stridor. Cardiovascular: Denies precordial pain, palpitations, edema or PND  Gastrointestinal: Denies poor appetite, indigestion, abdominal pain or blood in stool  Genitourinary: Denies hematuria, dysuria, increased urinary frequency  Musculoskeletal: Denies joint pain or swelling from muscles or joints  Neurologic: Denies tremor, paresthesias, headache, or sensory motor disturbance  Psychiatric: Denies confusion, insomnia, depression  Integumentray: Denies rash, itching or ulcers. Hematologic: Denies easy bruising, bleeding       PHYSICAL EXAMINATION      Vitals: see vitals section  General: Well developed, in no acute distress. HEENT: No jaundice, oral mucosa moist, no oral ulcers  Neck: Supple, no stiffness, no lymphadenopathy, supple  Heart:  Normal S1/S2 negative S3 or S4. Regular, no murmur, gallop or rub, no jugular venous distention  Respiratory: Clear bilaterally x 4, no wheezing or rales  Abdomen:   Soft, non-tender, bowel sounds are active. Extremities:  No edema, normal cap refill, no cyanosis. Musculoskeletal: No clubbing, no deformities  Neuro: A&Ox3, speech clear, gait stable, cooperative, no focal neurologic deficits  Skin: Skin color is normal. No rashes or lesions.  Non diaphoretic, moist.  Vascular: 2+ pulses symmetric in all extremities       DIAGNOSTIC DATA      EKG:        LABORATORY DATA      Lab Results   Component Value Date/Time    WBC 6.1 10/29/2020 05:04 AM    HGB 11.9 (L) 10/29/2020 05:04 AM    HCT 36.1 (L) 10/29/2020 05:04 AM    PLATELET 262 19/85/8646 05:04 AM    MCV 90.5 10/29/2020 05:04 AM      Lab Results   Component Value Date/Time    Sodium 140 10/29/2020 05:04 AM    Potassium 3.6 10/29/2020 05:04 AM    Chloride 107 10/29/2020 05:04 AM    CO2 28 10/29/2020 05:04 AM    Anion gap 5 10/29/2020 05:04 AM    Glucose 93 10/29/2020 05:04 AM    BUN 12 10/29/2020 05:04 AM    Creatinine 0.96 10/29/2020 05:04 AM    BUN/Creatinine ratio 13 10/29/2020 05:04 AM    GFR est AA >60 10/29/2020 05:04 AM    GFR est non-AA >60 10/29/2020 05:04 AM    Calcium 8.1 (L) 10/29/2020 05:04 AM    Bilirubin, total 0.6 10/29/2020 05:04 AM    Alk. phosphatase 49 10/29/2020 05:04 AM    Protein, total 5.9 (L) 10/29/2020 05:04 AM    Albumin 2.7 (L) 10/29/2020 05:04 AM    Globulin 3.2 10/29/2020 05:04 AM    A-G Ratio 0.8 (L) 10/29/2020 05:04 AM    ALT (SGPT) 28 10/29/2020 05:04 AM           ASSESSMENT         1. Supraventricular tachycardia  2. Hypertension  3. Hyperlipidemia  4. Venous insufficiency  5. First degree AV block    6. AVI on CPAP  7. Atrial fibrillation                        Persistent   8. Edema  9. Bradycardia  10. PVCs  12. Shortness of breath  13. Dizziness       PLAN     Will continue to monitor dizziness- if this progresses then we will re-evaluate. Continue per device clinic and follow up in one year. ICD-10-CM ICD-9-CM    1. Cardiac pacemaker in situ  Z95.0 V45.01    2. Essential hypertension, benign  I10 401.1    3. SVT (supraventricular tachycardia) (HCC)  I47.1 427.89    4. Paroxysmal atrial fibrillation (HCC)  I48.0 427.31      Orders Placed This Encounter    alfuzosin SR (UROXATRAL) 10 mg SR tablet     Sig: Take 10 mg by mouth daily. FOLLOW-UP   1 year    Thank you, Sinai Daley MD and Dr. Dhara Pierre for allowing me to participate in the care of this extraordinarily pleasant male. Please do not hesitate to contact me for further questions/concerns.      Josiah Pulse, NP    [address]  [address] [facility], [facility], [address]  [phone] / [phone] Fax   [phone] / [phone] Fax

## 2020-12-22 ENCOUNTER — OFFICE VISIT (OUTPATIENT)
Dept: CARDIOLOGY CLINIC | Age: 75
End: 2020-12-22
Payer: MEDICARE

## 2020-12-22 ENCOUNTER — CLINICAL SUPPORT (OUTPATIENT)
Dept: CARDIOLOGY CLINIC | Age: 75
End: 2020-12-22
Payer: MEDICARE

## 2020-12-22 VITALS
BODY MASS INDEX: 31.83 KG/M2 | WEIGHT: 269.6 LBS | HEIGHT: 77 IN | OXYGEN SATURATION: 98 % | DIASTOLIC BLOOD PRESSURE: 70 MMHG | HEART RATE: 72 BPM | SYSTOLIC BLOOD PRESSURE: 102 MMHG | RESPIRATION RATE: 16 BRPM

## 2020-12-22 DIAGNOSIS — I48.0 PAROXYSMAL ATRIAL FIBRILLATION (HCC): ICD-10-CM

## 2020-12-22 DIAGNOSIS — Z95.0 CARDIAC PACEMAKER IN SITU: Primary | ICD-10-CM

## 2020-12-22 DIAGNOSIS — I10 ESSENTIAL HYPERTENSION, BENIGN: ICD-10-CM

## 2020-12-22 DIAGNOSIS — I47.1 SVT (SUPRAVENTRICULAR TACHYCARDIA) (HCC): ICD-10-CM

## 2020-12-22 PROCEDURE — 1101F PT FALLS ASSESS-DOCD LE1/YR: CPT | Performed by: NURSE PRACTITIONER

## 2020-12-22 PROCEDURE — G8536 NO DOC ELDER MAL SCRN: HCPCS | Performed by: NURSE PRACTITIONER

## 2020-12-22 PROCEDURE — 99215 OFFICE O/P EST HI 40 MIN: CPT | Performed by: NURSE PRACTITIONER

## 2020-12-22 PROCEDURE — 93280 PM DEVICE PROGR EVAL DUAL: CPT | Performed by: INTERNAL MEDICINE

## 2020-12-22 PROCEDURE — G8427 DOCREV CUR MEDS BY ELIG CLIN: HCPCS | Performed by: NURSE PRACTITIONER

## 2020-12-22 PROCEDURE — G8417 CALC BMI ABV UP PARAM F/U: HCPCS | Performed by: NURSE PRACTITIONER

## 2020-12-22 PROCEDURE — G8752 SYS BP LESS 140: HCPCS | Performed by: NURSE PRACTITIONER

## 2020-12-22 PROCEDURE — G8754 DIAS BP LESS 90: HCPCS | Performed by: NURSE PRACTITIONER

## 2020-12-22 PROCEDURE — G8432 DEP SCR NOT DOC, RNG: HCPCS | Performed by: NURSE PRACTITIONER

## 2020-12-22 PROCEDURE — G0463 HOSPITAL OUTPT CLINIC VISIT: HCPCS | Performed by: NURSE PRACTITIONER

## 2020-12-22 PROCEDURE — 93288 INTERROG EVL PM/LDLS PM IP: CPT | Performed by: INTERNAL MEDICINE

## 2020-12-22 RX ORDER — ALFUZOSIN HYDROCHLORIDE 10 MG/1
10 TABLET, EXTENDED RELEASE ORAL AS NEEDED
COMMUNITY

## 2020-12-22 NOTE — PROGRESS NOTES
Room # 3    Chest pain: no  Shortness of breath: no  Edema: yes  Palpitations, Skipped beats, Rapid heartbeat: no  Dizziness:random lightheadedness for the last year more frequent now    New diagnosis/Surgeries: no    ER/Hospitalizations: 10/25/2020 - 10/30/2020 (5 days)  [facility]-Sepsis    Refills:no    Visit Vitals  /70 (BP 1 Location: Left arm, BP Patient Position: Sitting)   Pulse 72   Resp 16   Ht 6' 5\" (1.956 m)   Wt 269 lb 9.6 oz (122.3 kg)   SpO2 98%   BMI 31.97 kg/m²

## 2020-12-22 NOTE — LETTER
12/22/2020 Patient: Jignesh Elliott YOB: 1945 Date of Visit: 12/22/2020 Rosalie Neumann, [address] Fax: [phone] Dear Rosalie Neumann MD, Thank you for referring Mr. Jignesh Elliott to [address] [facility] for evaluation. My notes for this consultation are attached. If you have questions, please do not hesitate to call me. I look forward to following your patient along with you. Sincerely, Martin Reyna NP

## 2020-12-22 NOTE — PROGRESS NOTES
c/annual MDT pacer ck/thresholds. Normal & appropriate device function. 100% afib since March 2020. Takes Eliquis. See scanned document for details.

## 2021-01-04 RX ORDER — METOPROLOL SUCCINATE 50 MG/1
TABLET, EXTENDED RELEASE ORAL
Qty: 90 TAB | Refills: 3 | Status: SHIPPED | OUTPATIENT
Start: 2021-01-04

## 2021-03-03 ENCOUNTER — OFFICE VISIT (OUTPATIENT)
Dept: CARDIOLOGY CLINIC | Age: 76
End: 2021-03-03
Payer: MEDICARE

## 2021-03-03 VITALS
HEART RATE: 72 BPM | BODY MASS INDEX: 30.94 KG/M2 | DIASTOLIC BLOOD PRESSURE: 88 MMHG | WEIGHT: 262 LBS | SYSTOLIC BLOOD PRESSURE: 132 MMHG | HEIGHT: 77 IN

## 2021-03-03 DIAGNOSIS — I48.0 PAROXYSMAL ATRIAL FIBRILLATION (HCC): ICD-10-CM

## 2021-03-03 DIAGNOSIS — I10 ESSENTIAL HYPERTENSION, BENIGN: Primary | ICD-10-CM

## 2021-03-03 DIAGNOSIS — I73.9 PERIPHERAL VASCULAR DISEASE (HCC): ICD-10-CM

## 2021-03-03 DIAGNOSIS — G47.33 OSA (OBSTRUCTIVE SLEEP APNEA): ICD-10-CM

## 2021-03-03 DIAGNOSIS — I87.2 VENOUS INSUFFICIENCY: ICD-10-CM

## 2021-03-03 DIAGNOSIS — E78.2 MIXED HYPERLIPIDEMIA: ICD-10-CM

## 2021-03-03 DIAGNOSIS — I49.5 TACHY-BRADY SYNDROME (HCC): ICD-10-CM

## 2021-03-03 PROCEDURE — G0463 HOSPITAL OUTPT CLINIC VISIT: HCPCS | Performed by: INTERNAL MEDICINE

## 2021-03-03 PROCEDURE — 99215 OFFICE O/P EST HI 40 MIN: CPT | Performed by: INTERNAL MEDICINE

## 2021-03-03 RX ORDER — CELECOXIB 200 MG/1
200 CAPSULE ORAL
COMMUNITY
Start: 2021-01-07

## 2021-03-03 RX ORDER — LEVOFLOXACIN 500 MG/1
500 TABLET, FILM COATED ORAL AS NEEDED
COMMUNITY
Start: 2020-12-06 | End: 2022-01-10

## 2021-03-03 RX ORDER — CYCLOBENZAPRINE HCL 10 MG
10 TABLET ORAL
COMMUNITY

## 2021-03-03 NOTE — PROGRESS NOTES
CardioVascular Progress Note    Patient: Burak Brar MRN: [id number]  SSN: [id number]    YOB: 1945  Age: 64 y.o. Sex: male      PCP: Savanna Looney MD  EP: Rosalba Ward MD     Subjective:        Burak Brar is a 64 y.o.  male who presents for follow up assessment of venous insufficiency. Mr. Dave Pérez is a 67 yo WM with a history of HTN, SVT, Afib on Eliquis, AVI on CPAP, dyslipidemia who presents for follow up assessment of venous insufficiency. The patient has symptomatic bradycardia and underwent PPM insertion 10/4/2019 with Dr. Odilia Conti. He has a history of bilateral GSV ablation in 2008. Symptoms improved at that time. Unfortunately, he has had recurrent of discomfort in the calves over the past few years. He has also noted superficial varicosities. Mild dependent edema and nocturia. Improved with ambulation. Admitted to [facility] 10/25 to 10/30/2020 with sepsis syndrome from UTI (Klebsiella). Presented with weakness, dehydration and low oxygen. No chest pain or shortness of breath. No syncope. Occasional lightheadedness- especially getting up from chair. No fevers or chills. Legs starting to swell up. Has not been wearing support stockings. rare dizziness if he turns head quickly. No syncope or falls. No palpitations. Taking care of wife who has had some fractures recently- two back surgeries and arm. Doing better. Received 2 doses of Covid vaccination.   Eating more salads- losing weight  9/22/2020 Pacer check reviewed      Past Medical History:   Diagnosis Date    Arthritis     left knee and lt. hand    Atrial fibrillation (HCC)     BPH (benign prostatic hyperplasia)     Chronic pain     Back pain    Colon polyps     DJD (degenerative joint disease) of lumbar spine     laminectomy 1979, 1991, 2015    ED (erectile dysfunction) 6/18/2014    Heart palpitations     Dr. Mera Ana    Herniated nucleus pulposus, C3-4 12/2015    Dr. Pillo Pineda Hyperlipidemia     Hypertension     IFG (impaired fasting glucose)     Insomnia     severe. has treated, AVI, back pain    Knee pain, right     Normal cardiac stress test 9/21/15    OA (osteoarthritis) of knee     Dr. Maris Brooke Obesity     Onychomycosis 2018    Dr Elena Ward. lamisil    AVI on CPAP     Dr. Milton Collier    Seasonal allergic rhinitis     Spinal stenosis in cervical region 12/2015    Spinal stenosis of lumbar region     MRI 12/2012. Dr. Saskia Bustillos    SVT (supraventricular tachycardia) [facility])     Dr. Keith Almazan [place] Gum Dr Carlos Enrique Victoria Varicose veins     vein stripping 10/10      Past Surgical History:   Procedure Laterality Date    COLONOSCOPY  2008    2 polyps. repeat 2011. Dr. Jeff Gordon  2007    7 polyps per pt     COLONOSCOPY N/A 6/15/2016    COLONOSCOPY performed by Babs Prajapati MD at [facility]. [address], COLON, DIAGNOSTIC  2011    return in 2016    HX BLADDER REPAIR      polyp removal    HX CERVICAL FUSION  1/16/16    ACDF C3-C4 Dr. Saskia Bustillos    HX COLONOSCOPY  4/27/11    Dr Teresa Medina, smal polyp.   repeat 4/2016    HX KNEE ARTHROSCOPY  2005    right, Dr. Kush Damon    HX KNEE ARTHROSCOPY  02/01/2012    left, Dr Rupesh Osman ARTHROSCOPY  1/2013    right    HX KNEE REPLACEMENT  11/4/2013    HX LUMBAR FUSION  9/2015    L3-L4, Dr. Kei Hamlin      left, Dr Nicole Colon    bladder polyp removed with cystoscopy    HX VEIN STRIPPING  9/2010    Dr. Jun Mark, bilateral    MS INS NEW/RPLC PRM PACEMAKER W/TRANSV ELTRD VENTR N/A 10/4/2019    INSERT PPM SINGLE VENTRICULAR/[employer] performed by Arden Chan MD at [address] CATH LAB    MS INS NEW/RPLCMT PRM PM W/TRANSV ELTRD ATRIAL&VENT N/A 10/4/2019    Insert Ppm Dual performed by Arden Chan MD at [address] CATH LAB    MS REMOVAL SUBCUTANEOUS CARDIAC RHYTHM MONITOR N/A 10/4/2019    LOOP RECORDER REMOVAL performed by [facility] T[facility] Prado MD at [address] [facility] CATH LAB    IN SHOULDER SURG [address] UNLISTED      left DECOMPRESSION, Dr. Morrison Divers  11/2013    VASCULAR SURGERY PROCEDURE UNLIST  2008    bilateral vein stripping        Current Outpatient Medications   Medication Sig    celecoxib (CELEBREX) 200 mg capsule Take 200 mg by mouth.  metoprolol succinate (TOPROL-XL) 50 mg XL tablet TAKE 1 TABLET DAILY (Patient taking differently: 25 mg daily.)    alfuzosin SR (UROXATRAL) 10 mg SR tablet Take 10 mg by mouth daily.  cephALEXin (KEFLEX) 500 mg capsule PT TO TK 4 CS PO 1 HOUR PRIOR TO DENTAL APPOINTMENT    amLODIPine (NORVASC) 10 mg tablet TAKE 1 TABLET BY MOUTH DAILY    gabapentin (NEURONTIN) 300 mg capsule Take 300 mg by mouth nightly.  furosemide (LASIX) 20 mg tablet Take 20 mg by mouth daily as needed.  traZODone (DESYREL) 150 mg tablet Take 150 mg by mouth nightly.  terazosin (HYTRIN) 5 mg capsule TAKE 1 CAPSULE DAILY    lisinopriL (PRINIVIL, ZESTRIL) 40 mg tablet TAKE 1 TABLET DAILY    diclofenac (VOLTAREN) 1 % gel Apply 4 g to affected area daily as needed.  chlorhexidine (PERIDEX) 0.12 % solution Take 15 mL by mouth two (2) times daily as needed.  Eliquis 5 mg tablet TAKE 1 TABLET TWICE A DAY    levocetirizine (XYZAL) 5 mg tablet TAKE 1 TABLET DAILY    atorvastatin (LIPITOR) 20 mg tablet TAKE 1 TABLET DAILY    cyclobenzaprine (FLEXERIL) 10 mg tablet Take 10 mg by mouth daily as needed.  levoFLOXacin (LEVAQUIN) 500 mg tablet Take 500 mg by mouth as needed. No current facility-administered medications for this visit. Allergies   Allergen Reactions    Percocet [Oxycodone-Acetaminophen] Other (comments)     hallucinations    Penicillin G Rash     Did okay with keflex - no reaction with that.            Subjective:     Visit Vitals  /88 (BP 1 Location: Left upper arm, BP Patient Position: Sitting)   Pulse 72   Ht 6' 5\" (1.956 m)   Wt 118.8 kg (262 lb)   BMI 31.07 kg/m²     Wt Readings from Last 3 Encounters:   03/03/21 118.8 kg (262 lb)   12/22/20 122.3 kg (269 lb 9.6 oz)   11/20/20 122.5 kg (270 lb)       Physical Exam: Overweight  Head: Normocephalic, atraumatic. Eyes: Pupils equal, round, reactive to light and accomodation. , Extra ocular muscles intact. Sclera anicteric. Ears: Grossly responsive to sound. Neck: No adenopathy. No bruits. Throat: No sores or erythema. Heart: Regular rate and rhythm. Normal S1 and S2. No murmurs, gallops, or rub. Lungs: Clear to auscultation bilaterally. No wheezing, rales, or rhonchi. Abdomen: Soft, non-tender. No guarding or rebound. No hepatosplenomegaly. Bowel sounds active. Ext: Varicosities. Diminished pulses. 1+ pitting edema to calves  Skin: Stasis dermatitis in legs. . Warm and dry. No rash. Neuro: Cranial nerves II through XII intact. Motor and sensory grossly intact. Affect: Appropriate. Alert and interactive.      09/11/19   DUPLEX LOWER EXT VENOUS BILAT 09/12/2019 9/12/2019    Narrative *  Right Leg:  Mild venous insufficiency is detected within saphenofemoral   junction and within the calf of the greater saphenous vein. No evidence   of deep or superficial venous thrombosis within the right common femoral,   femoral, popliteal, posterior tibial or greater saphenous vein. *  Left Leg:  Significant venous insufficiency is detected within the calf   of the greater saphenous vein. No evidence of deep vein or superficial   venous thrombosis within the left common femoral, femoral, popliteal,   posterior tibial or greater saphenous vein. Signed by: Toya Sales MD       9/24/2019 Nuclear Stress Test   Negative myocardial perfusion imaging. There is no prior study available for comparison. .   Interpretation Summary     · Gated SPECT: Left ventricular function post-stress was normal. Calculated ejection fraction is 55%.  There is no evidence of transient ischemic dilation (TID). · Baseline ECG: Normal sinus rhythm. · No ECG changes with Lexiscan. .  · Left ventricular perfusion is normal.  · Negative myocardial perfusion imaging. Nuclear Stress Findings     Nuclear Stress Function Left ventricular function post-stress was normal. Wall motion was normal at stress. Calculated ejection fraction is 55%. There is no evidence of transient ischemic dilation (TID). Nuclear Perfusion Left ventricular perfusion is normal.     Electronically signed by Julius Caldera DO on 9/30/19 at [phone] EDT     09/11/19   ECHO ADULT COMPLETE 09/12/2019 9/12/2019    Narrative · Left Ventricle: Normal cavity size, wall thickness and systolic function   (ejection fraction normal). Estimated left ventricular ejection fraction   is 61 - 65%. Biplane method used to measure ejection fraction. No regional   wall motion abnormality noted. Inconclusive left ventricular diastolic   function. · Left Atrium: Moderately dilated left atrium. · Right Ventricle: Mildly dilated right ventricle. · Aortic Valve: Mild aortic valve sclerosis with no significant stenosis. Signed by: Estrella Doan MD     Lab Results   Component Value Date/Time    Cholesterol, total 133 02/12/2019 08:52 AM    HDL Cholesterol 43 02/12/2019 08:52 AM    LDL, calculated 74 02/12/2019 08:52 AM    VLDL, calculated 16 02/12/2019 08:52 AM    Triglyceride 78 02/12/2019 08:52 AM    CHOL/HDL Ratio 3.3 10/14/2010 09:43 AM     Lab Results   Component Value Date/Time    TSH 2.320 09/11/2019 04:18 PM     Lab Results   Component Value Date/Time    Hemoglobin A1c 5.6 02/12/2019 08:52 AM          Assessment/Plan        ICD-10-CM ICD-9-CM    1. Essential hypertension, benign  I10 401.1    2. Venous insufficiency  I87.2 459.81    3. Paroxysmal atrial fibrillation (HCC)  I48.0 427.31    4. Peripheral vascular disease (HCC)  I73.9 443.9    5. Tachy-viv syndrome (HCC)  I49.5 427.81    6. AVI (obstructive sleep apnea)  G47.33 327.23    7.  Mixed hyperlipidemia  E78.2 272.2        Mr. Ilana Guerra is a 58-year-old white male with HTN, dyslipidemia, atrial fibrillation, tachy-viv syndrome, AVI, obesity, venous insufficiency who is doing well. Mild dizziness in the past improved with reducing beta-blocker. Continue LE stockings for venous insufficiency and edema. Increase activity and continue dieting to lose weight. He is doing well in this regard. PPM interrogation reviewed- functioning appropriately with excellent battery life. Lipid panel  RTC 1 year.       Love Ott MD  3/3/2021, 11:52 AM    Time: 40 mins    [facility] Office:   [facility] Office:  [address] [place] [address] Dr    [place] [address] [facility]  [address]     [address], [address] [place] [address]  P: [phone]    P: [phone]  F: [phone]    F: [phone]

## 2021-03-03 NOTE — PATIENT INSTRUCTIONS
Have labs obtained today. Our office will call you with results. Follow up with Dr. Dominique Castellanos in 1 month.

## 2021-03-23 ENCOUNTER — OFFICE VISIT (OUTPATIENT)
Dept: CARDIOLOGY CLINIC | Age: 76
End: 2021-03-23
Payer: MEDICARE

## 2021-03-23 DIAGNOSIS — Z95.0 CARDIAC PACEMAKER IN SITU: Primary | ICD-10-CM

## 2021-03-23 PROCEDURE — 93294 REM INTERROG EVL PM/LDLS PM: CPT | Performed by: INTERNAL MEDICINE

## 2021-03-23 NOTE — LETTER
3/25/2021 10:23 AM 
 
Mr. Andressa Bailey 
[address] [facility] [address] Dear Patient, We have received your recent remote monitor check of your implanted device scheduled on  3/23/2021. Your remaining estimated battery life is 11.7 years and your device is working normally & appropriately. Your next remote monitor check is scheduled for  6/22/2021. If you have any questions, please call the Pacemaker/ICD clinic at the [address] [place] [address] location at [phone]. Sincerely, 
 
Lillian Gonzalez BSN, RN Cardiac Device Clinic Coordinator [facility] [address] [place], [address] [place] [address] 
[phone]

## 2021-06-08 NOTE — TELEPHONE ENCOUNTER
Requested Prescriptions     Signed Prescriptions Disp Refills    apixaban (Eliquis) 5 mg tablet 180 Tablet 2     Sig: Take 1 Tablet by mouth two (2) times a day. Authorizing Provider: Cameron Bennett     Ordering User: Geovanna Kingsley     Refill per verbal order Dr. Ronnie Connolly.

## 2021-06-22 ENCOUNTER — OFFICE VISIT (OUTPATIENT)
Dept: CARDIOLOGY CLINIC | Age: 76
End: 2021-06-22
Payer: MEDICARE

## 2021-06-22 DIAGNOSIS — Z95.0 CARDIAC PACEMAKER IN SITU: Primary | ICD-10-CM

## 2021-06-22 PROCEDURE — 93294 REM INTERROG EVL PM/LDLS PM: CPT | Performed by: INTERNAL MEDICINE

## 2021-07-12 DIAGNOSIS — I48.0 PAROXYSMAL ATRIAL FIBRILLATION (HCC): ICD-10-CM

## 2021-07-12 DIAGNOSIS — I47.1 SVT (SUPRAVENTRICULAR TACHYCARDIA) (HCC): ICD-10-CM

## 2021-07-12 DIAGNOSIS — I10 ESSENTIAL HYPERTENSION: ICD-10-CM

## 2021-07-12 RX ORDER — LISINOPRIL 40 MG/1
TABLET ORAL
Qty: 90 TABLET | Refills: 3 | Status: SHIPPED | OUTPATIENT
Start: 2021-07-12 | End: 2022-07-08 | Stop reason: SDUPTHER

## 2021-09-20 ENCOUNTER — OFFICE VISIT (OUTPATIENT)
Dept: CARDIOLOGY CLINIC | Age: 76
End: 2021-09-20
Payer: MEDICARE

## 2021-09-20 DIAGNOSIS — Z95.0 CARDIAC PACEMAKER IN SITU: Primary | ICD-10-CM

## 2021-09-20 PROCEDURE — 93296 REM INTERROG EVL PM/IDS: CPT | Performed by: NURSE PRACTITIONER

## 2021-10-27 RX ORDER — FUROSEMIDE 20 MG/1
20 TABLET ORAL 2 TIMES DAILY
Qty: 180 TABLET | Refills: 1 | Status: SHIPPED | OUTPATIENT
Start: 2021-10-27 | End: 2022-04-04

## 2021-10-27 NOTE — TELEPHONE ENCOUNTER
Requested Prescriptions     Signed Prescriptions Disp Refills    furosemide (LASIX) 20 mg tablet 180 Tablet 1     Sig: Take 1 Tablet by mouth two (2) times a day. Authorizing Provider: Kristian Nielson     Ordering User: Didier Hernandez     Refill per verbal order ROWENA Fletcher NP.

## 2021-12-03 ENCOUNTER — TELEPHONE (OUTPATIENT)
Dept: CARDIOLOGY CLINIC | Age: 76
End: 2021-12-03

## 2021-12-03 NOTE — TELEPHONE ENCOUNTER
Patient's spouse is calling back to follow up on the request for cardiac clearance.       Please advise           [facility]:[phone]

## 2021-12-03 NOTE — TELEPHONE ENCOUNTER
Returned call to patient's wife Penny Form, patient ID verified using two patient identifiers. Informed her that all forms for patient's upcoming surgery with [facility] have been completed and faxed to [address] [place] [address]. Carolinaetta Form is very appreciative and will contact the office with any further questions or concerns.

## 2021-12-10 ENCOUNTER — HOSPITAL ENCOUNTER (OUTPATIENT)
Dept: PREADMISSION TESTING | Age: 76
Discharge: HOME | End: 2021-12-10
Payer: MEDICARE

## 2021-12-10 PROCEDURE — U0005 INFEC AGEN DETEC AMPLI PROBE: HCPCS

## 2021-12-12 LAB
SARS-COV-2, XPLCVT: NOT DETECTED
SOURCE, COVRS: NORMAL

## 2021-12-14 ENCOUNTER — ANESTHESIA EVENT (OUTPATIENT)
Dept: SURGERY | Age: 76
End: 2021-12-14
Payer: MEDICARE

## 2021-12-15 ENCOUNTER — ANESTHESIA (OUTPATIENT)
Dept: SURGERY | Age: 76
End: 2021-12-15
Payer: MEDICARE

## 2021-12-15 ENCOUNTER — HOSPITAL ENCOUNTER (OUTPATIENT)
Age: 76
Setting detail: OUTPATIENT SURGERY
Discharge: HOME | End: 2021-12-15
Attending: ORTHOPAEDIC SURGERY | Admitting: ORTHOPAEDIC SURGERY
Payer: MEDICARE

## 2021-12-15 VITALS
RESPIRATION RATE: 17 BRPM | HEIGHT: 77 IN | BODY MASS INDEX: 31.32 KG/M2 | WEIGHT: 265.21 LBS | SYSTOLIC BLOOD PRESSURE: 165 MMHG | OXYGEN SATURATION: 94 % | TEMPERATURE: 97.9 F | DIASTOLIC BLOOD PRESSURE: 94 MMHG | HEART RATE: 62 BPM

## 2021-12-15 DIAGNOSIS — M65.321 TRIGGER FINGER, RIGHT INDEX FINGER: Primary | ICD-10-CM

## 2021-12-15 DIAGNOSIS — M65.351 TRIGGER FINGER, RIGHT LITTLE FINGER: ICD-10-CM

## 2021-12-15 PROCEDURE — 77030000032 HC CUF TRNQT ZIMM -B: Performed by: ORTHOPAEDIC SURGERY

## 2021-12-15 PROCEDURE — 76030000000 HC AMB SURG OR TIME 0.5 TO 1: Performed by: ORTHOPAEDIC SURGERY

## 2021-12-15 PROCEDURE — 77030003601 HC NDL NRV BLK BBMI -A

## 2021-12-15 PROCEDURE — A4565 SLINGS: HCPCS

## 2021-12-15 PROCEDURE — 76060000061 HC AMB SURG ANES 0.5 TO 1 HR: Performed by: ORTHOPAEDIC SURGERY

## 2021-12-15 PROCEDURE — 74011250636 HC RX REV CODE- 250/636: Performed by: ANESTHESIOLOGY

## 2021-12-15 PROCEDURE — 74011000250 HC RX REV CODE- 250: Performed by: ORTHOPAEDIC SURGERY

## 2021-12-15 PROCEDURE — 77030002986 HC SUT PROL J&J -A: Performed by: ORTHOPAEDIC SURGERY

## 2021-12-15 PROCEDURE — 74011250636 HC RX REV CODE- 250/636: Performed by: ORTHOPAEDIC SURGERY

## 2021-12-15 PROCEDURE — 77030006689 HC BLD OPHTH BVR BD -A: Performed by: ORTHOPAEDIC SURGERY

## 2021-12-15 PROCEDURE — 76210000050 HC AMBSU PH II REC 0.5 TO 1 HR: Performed by: ORTHOPAEDIC SURGERY

## 2021-12-15 PROCEDURE — 2709999900 HC NON-CHARGEABLE SUPPLY: Performed by: ORTHOPAEDIC SURGERY

## 2021-12-15 PROCEDURE — 77030040922 HC BLNKT HYPOTHRM STRY -A

## 2021-12-15 PROCEDURE — 74011250636 HC RX REV CODE- 250/636: Performed by: NURSE ANESTHETIST, CERTIFIED REGISTERED

## 2021-12-15 PROCEDURE — 77030040361 HC SLV COMPR DVT MDII -B

## 2021-12-15 PROCEDURE — 77030010777 HC CRDL FT DERY -B

## 2021-12-15 RX ORDER — SODIUM CHLORIDE, SODIUM LACTATE, POTASSIUM CHLORIDE, CALCIUM CHLORIDE 600; 310; 30; 20 MG/100ML; MG/100ML; MG/100ML; MG/100ML
125 INJECTION, SOLUTION INTRAVENOUS CONTINUOUS
Status: DISCONTINUED | OUTPATIENT
Start: 2021-12-15 | End: 2021-12-15 | Stop reason: HOSPADM

## 2021-12-15 RX ORDER — DIPHENHYDRAMINE HYDROCHLORIDE 50 MG/ML
12.5 INJECTION, SOLUTION INTRAMUSCULAR; INTRAVENOUS AS NEEDED
Status: DISCONTINUED | OUTPATIENT
Start: 2021-12-15 | End: 2021-12-15 | Stop reason: HOSPADM

## 2021-12-15 RX ORDER — ROPIVACAINE HYDROCHLORIDE 5 MG/ML
INJECTION, SOLUTION EPIDURAL; INFILTRATION; PERINEURAL AS NEEDED
Status: DISCONTINUED | OUTPATIENT
Start: 2021-12-15 | End: 2021-12-15 | Stop reason: HOSPADM

## 2021-12-15 RX ORDER — PROPOFOL 10 MG/ML
INJECTION, EMULSION INTRAVENOUS AS NEEDED
Status: DISCONTINUED | OUTPATIENT
Start: 2021-12-15 | End: 2021-12-15 | Stop reason: HOSPADM

## 2021-12-15 RX ORDER — SODIUM CHLORIDE 0.9 % (FLUSH) 0.9 %
5-40 SYRINGE (ML) INJECTION AS NEEDED
Status: DISCONTINUED | OUTPATIENT
Start: 2021-12-15 | End: 2021-12-15 | Stop reason: HOSPADM

## 2021-12-15 RX ORDER — SODIUM CHLORIDE, SODIUM LACTATE, POTASSIUM CHLORIDE, CALCIUM CHLORIDE 600; 310; 30; 20 MG/100ML; MG/100ML; MG/100ML; MG/100ML
75 INJECTION, SOLUTION INTRAVENOUS CONTINUOUS
Status: DISCONTINUED | OUTPATIENT
Start: 2021-12-15 | End: 2021-12-15 | Stop reason: HOSPADM

## 2021-12-15 RX ORDER — SODIUM CHLORIDE 0.9 % (FLUSH) 0.9 %
5-40 SYRINGE (ML) INJECTION EVERY 8 HOURS
Status: DISCONTINUED | OUTPATIENT
Start: 2021-12-15 | End: 2021-12-15 | Stop reason: HOSPADM

## 2021-12-15 RX ORDER — HYDROCODONE BITARTRATE AND ACETAMINOPHEN 5; 325 MG/1; MG/1
1 TABLET ORAL
Qty: 20 TABLET | Refills: 0 | Status: SHIPPED
Start: 2021-12-15 | End: 2021-12-18

## 2021-12-15 RX ORDER — LIDOCAINE HYDROCHLORIDE 10 MG/ML
0.1 INJECTION, SOLUTION EPIDURAL; INFILTRATION; INTRACAUDAL; PERINEURAL AS NEEDED
Status: DISCONTINUED | OUTPATIENT
Start: 2021-12-15 | End: 2021-12-15 | Stop reason: HOSPADM

## 2021-12-15 RX ORDER — SODIUM CHLORIDE, SODIUM LACTATE, POTASSIUM CHLORIDE, CALCIUM CHLORIDE 600; 310; 30; 20 MG/100ML; MG/100ML; MG/100ML; MG/100ML
100 INJECTION, SOLUTION INTRAVENOUS CONTINUOUS
Status: DISCONTINUED | OUTPATIENT
Start: 2021-12-15 | End: 2021-12-15 | Stop reason: HOSPADM

## 2021-12-15 RX ORDER — PROPOFOL 10 MG/ML
INJECTION, EMULSION INTRAVENOUS
Status: DISCONTINUED | OUTPATIENT
Start: 2021-12-15 | End: 2021-12-15 | Stop reason: HOSPADM

## 2021-12-15 RX ORDER — FENTANYL CITRATE 50 UG/ML
INJECTION, SOLUTION INTRAMUSCULAR; INTRAVENOUS AS NEEDED
Status: DISCONTINUED | OUTPATIENT
Start: 2021-12-15 | End: 2021-12-15 | Stop reason: HOSPADM

## 2021-12-15 RX ORDER — CLINDAMYCIN PHOSPHATE 900 MG/50ML
900 INJECTION, SOLUTION INTRAVENOUS ONCE
Status: COMPLETED | OUTPATIENT
Start: 2021-12-15 | End: 2021-12-15

## 2021-12-15 RX ORDER — HYDROMORPHONE HYDROCHLORIDE 1 MG/ML
.25-1 INJECTION, SOLUTION INTRAMUSCULAR; INTRAVENOUS; SUBCUTANEOUS
Status: DISCONTINUED | OUTPATIENT
Start: 2021-12-15 | End: 2021-12-15 | Stop reason: HOSPADM

## 2021-12-15 RX ORDER — ONDANSETRON 2 MG/ML
4 INJECTION INTRAMUSCULAR; INTRAVENOUS AS NEEDED
Status: DISCONTINUED | OUTPATIENT
Start: 2021-12-15 | End: 2021-12-15 | Stop reason: HOSPADM

## 2021-12-15 RX ORDER — LIDOCAINE HYDROCHLORIDE 10 MG/ML
INJECTION INFILTRATION; PERINEURAL AS NEEDED
Status: DISCONTINUED | OUTPATIENT
Start: 2021-12-15 | End: 2021-12-15 | Stop reason: HOSPADM

## 2021-12-15 RX ADMIN — PROPOFOL 10 MG: 10 INJECTION, EMULSION INTRAVENOUS at 14:51

## 2021-12-15 RX ADMIN — CLINDAMYCIN PHOSPHATE 900 MG: 900 INJECTION, SOLUTION INTRAVENOUS at 14:37

## 2021-12-15 RX ADMIN — FENTANYL CITRATE 50 MCG: 50 INJECTION INTRAMUSCULAR; INTRAVENOUS at 14:05

## 2021-12-15 RX ADMIN — SODIUM CHLORIDE, POTASSIUM CHLORIDE, SODIUM LACTATE AND CALCIUM CHLORIDE: 600; 310; 30; 20 INJECTION, SOLUTION INTRAVENOUS at 14:31

## 2021-12-15 RX ADMIN — PROPOFOL 75 MCG/KG/MIN: 10 INJECTION, EMULSION INTRAVENOUS at 14:36

## 2021-12-15 RX ADMIN — PROPOFOL 20 MG: 10 INJECTION, EMULSION INTRAVENOUS at 14:39

## 2021-12-15 RX ADMIN — PROPOFOL 10 MG: 10 INJECTION, EMULSION INTRAVENOUS at 14:50

## 2021-12-15 RX ADMIN — PROPOFOL 50 MCG/KG/MIN: 10 INJECTION, EMULSION INTRAVENOUS at 14:40

## 2021-12-15 RX ADMIN — ROPIVACAINE HYDROCHLORIDE 30 ML: 5 INJECTION, SOLUTION EPIDURAL; INFILTRATION; PERINEURAL at 14:09

## 2021-12-15 RX ADMIN — FENTANYL CITRATE 50 MCG: 50 INJECTION INTRAMUSCULAR; INTRAVENOUS at 14:03

## 2021-12-15 RX ADMIN — PROPOFOL 75 MCG/KG/MIN: 10 INJECTION, EMULSION INTRAVENOUS at 14:51

## 2021-12-15 NOTE — H&P
Date of Surgery Update:  Verónica Corrales was seen and examined. History and physical has been reviewed. The patient has been examined.  There have been no significant clinical changes since the completion of the originally dated History and Physical.    Signed By: ERIKA Yepez     December 15, 2021 2:08 PM

## 2021-12-15 NOTE — DISCHARGE INSTRUCTIONS
MD Dr. Susy Duran Dr., MD Dr. Francene Pitman. Sherly Godwin MD    You have undergone surgery by one of our hand specialists. Please follow these instructions to ensure a safe and speedy recovery. 1. SURGICAL DRESSING (bandage): Your bandage should be kept dry and in place until you return to the office for your follow up visit. This helps to guard against infection. [x]         You can shower if you place a plastic bag over your dressing.    []         You will need to sponge bathe until you are seen in the office. 2. ELEVATION:  It is VERY IMPORTANT that you keep your arm and hand above the level of your heart at all times, awake or asleep. The higher you elevate your hand, the less it will swell, and the less it will hurt. It is best to elevate the hand as shown below:              Laying down, especially at night,  with your arm elevated on pillows help keep your shoulder and elbow from getting stiff. 3. Ice bags / ice packs can be used to help control pain. If you make your own ice bag, double-bagging helps to prevent leaks. 4. MEDICATIONS:  You have been given a prescription for pain medications. Take it according to the instructions on the bottle. DO NOT drink alcohol while taking pain medications. DO NOT drive, operate heavy machinery, or make important personal or business decisions since the medications will make you drowsy. We do NOT refill prescriptions over the weekend. Please arrange to call for prescription refills during regular office hours. PRESCRIPTION SENT TO:   [address] [place] [address]      5. APPOINTMENT:  Your post operative appointment has been made for the following time:    TIME:  9:30am       DATE:    1/12/22         At the:         [x]  [facility]                            HAND THERAPY:    12/27/21-   10:00am    [address]      6.  AFTER GENERAL ANESTHESIA or IV SEDATION:   DO NOT drink alcohol or drive, work around [facility], or make important personal or business decisions. Limit your activity for 24 hours. Resume your diet with light foods (Jell-o ®, clear soups, clear fluids, caffeine-free drinks). If you do not have any nausea, you may resume your regular diet. We at [address] [place] [address] want your surgery and recovery to be as comfortable and successful as possible. Should you have problems, please call our office during the morning hours. In particular, call if your pain is not adequately controlled by prescribed medication, temperature is over 101 degrees, or your bandage is wet or has a four odor. Your doctor contact information:   Rad [phone]  Humaira Uribe, ext [phone] [facility] - [address] [facility], [address]  [place], [address] [place] [address] SUMMARY from your Nurse      PATIENT INSTRUCTIONS    After general anesthesia or intravenous sedation, for 24 hours or while taking prescription Narcotics:  · Limit your activities  · Do not drive and operate hazardous machinery  · Do not make important personal or business decisions  · Do  not drink alcoholic beverages  · If you have not urinated within 8 hours after discharge, please contact your surgeon on call.     Report the following to your surgeon:  · Excessive pain, swelling, redness or odor of or around the surgical area  · Temperature over 100.5  · Nausea and vomiting lasting longer than 4 hours or if unable to take medications  · Any signs of decreased circulation or nerve impairment to extremity: change in color, persistent  numbness, tingling, coldness or increase pain  · Any questions      GOOD HELP TO FIGHT AN INFECTION  Here are a few tip to help reduce the chance of getting an infection after surgery:   Wash Your Hands   Good handwashing is the most important thing you and your caregiver can do.   Wash before and after caring for any wounds. Dry your hand with a clean towel.  Wash with soap and water for at least 20 seconds. A TIP: sing the \"Happy Birthday\" song through one time while washing to help with the timing.  Use a hand  in between washings.  Shower   When your surgeon says it is OK to take a shower, use a new bar of antibacterial soap (if that is what you use, and keep that bar of soap ONLY for your use), or antibacterial body wash.  Use a clean wash cloth or sponge when you bathe.  Dry off with a clean towel  after every bath - be careful around any wounds, skin staples, sutures or surgical glue over/on wounds.  Do not enter swimming pools, hot tubs, lakes, rivers and/or ocean until wounds are healed and your doctor/surgeon says it is OK.  Use Clean Sheets   Sleep on freshly laundered sheets after your surgery.  Keep the surgery site covered with a clean, dry bandage (if instructed to do so). If the bandage becomes soiled, reapply a new, dry, clean bandage.  Do not allow pets to sleep with you while your wound is healing.  Lifestyle Modification and Controlling Your Blood Sugar   Smoking slows wound healing. Stop smoking and limit exposure to second-hand smoke.  High blood sugar slows wound healing. Eat a well-balanced diet to provide proper nutrition while healing   Monitor your blood sugar (if you are a diabetic) and take your medications as you are suppose to so you can control you blood sugar after surgery. COUGH AND DEEP BREATHE    Breathing deeply and coughing are very important exercises to do after surgery. Deep breathing and coughing open the little air tubes and air sacks in your lungs. You take deep breaths every day. You may not even notice - it is just something you do when you sigh or yawn. It is a natural exercise you do to keep these air passages open. After surgery, take deep breaths and cough, on purpose. DIRECTIONS:  · Take 10 to 15 slow deep breaths every hour while awake. · Breathe in deeply, and hold it for 2 seconds. · Exhale slowly through puckered lips, like blowing up a balloon. · After every 4th or 5th deep breath, hug your pillow to your chest or belly and give a hard, deep cough. Yes, it will probably hurt. But doing this exercise is a very important part of healing after surgery. Take your pain medicine to help you do this exercise without too much pain. Coughing and deep breathing help prevent bronchitis and pneumonia after surgery. If you had chest or belly surgery, use a pillow as a \"hug giovani\" and hold it tightly to your chest or belly when you cough. ANKLE PUMPS    Ankle pumps increase the circulation of oxygenated blood to your lower extremities and decrease your risk for circulation problems such as blood clots. They also stretch the muscles, tendons and ligaments in your foot and ankle, and prevent joint contracture in the ankle and foot, especially after surgeries on the legs. It is important to do ankle pump exercises regularly after surgery because immobility increases your risk for developing a blood clot. Your doctor may also have you take an Aspirin for the next few days as well. If your doctor did not ask you to take an Aspirin, consult with him before starting Aspirin therapy on your own. The exercise is quite simple. · Slowly point your foot forward, feeling the muscles on the top of your lower leg stretch, and hold this position for 5 seconds. · Next, pull your foot back toward you as far as possible, stretching the calf muscles, and hold that position for 5 seconds. · Repeat with the other foot. · Perform 10 repetitions every hour while awake for both ankles if possible (down and then up with the foot once is one repetition).     You should feel gentle stretching of the muscles in your lower leg when doing this exercise. If you feel pain, or your range of motion is limited, don't push too hard. Only go the limit your joint and muscles will let you go. If you have increasing pain, progressively worsening leg warmth or swelling, STOP the exercise and call your doctor. MEDICATION AND   SIDE EFFECT GUIDE    The [facility] MEDICATION AND SIDE EFFECT GUIDE was provided to the PATIENT AND CARE PROVIDER. Information provided includes instruction about drug purpose and common side effects for the following medications:   · Norco        These are general instructions for a healthy lifestyle:    *   Please give a list of your current medications to your Primary Care Provider. *   Please update this list whenever your medications are discontinued, doses are changed, or new medications (including over-the-counter products) are added. *   Please carry medication information at all times in case of emergency situations. About Smoking  No smoking / No tobacco products  Avoid exposure to second hand smoke     Surgeon General's Warning:  Quitting smoking now greatly reduces serious risk to your health. Obesity, smoking, and sedentary lifestyle greatly increases your risk for illness and disease. A healthy diet, regular physical exercise & weight monitoring are important for maintaining a healthy lifestyle. Congestive Heart Failure  You may be retaining fluid if you have a history of heart failure or if you experience any of the following symptoms:  Weight gain of 3 pounds or more overnight or 5 pounds in a week, increased swelling in your hands or feet or shortness of breath while lying flat in bed. Please call your doctor as soon as you notice any of these symptoms; do not wait until your next office visit.       Recognize signs and symptoms of STROKE:  F -  Face looks uneven  A -  Arms unable to move or move evenly  S -  Speech slurred or non-existent  T -  Time-call 911 as soon as signs and symptoms begin-DO NOT go          back to bed or wait to see if you get better-TIME IS BRAIN. Warning Signs of HEART ATTACK   Call 911 if you have these symptoms:     Chest discomfort. Most heart attacks involve discomfort in the center of the chest that lasts more than a few minutes, or that goes away and comes back. It can feel like uncomfortable pressure, squeezing, fullness, or pain.  Discomfort in other areas of the upper body. Symptoms can include pain or discomfort in one or both arms, the back, neck, jaw, or stomach.  Shortness of breath with or without chest discomfort.  Other signs may include breaking out in a cold sweat, nausea, or lightheadedness. Don't wait more than five minutes to call 911 - MINUTES MATTER! Fast action can save your life. Calling 911 is almost always the fastest way to get lifesaving treatment. Emergency Medical Services staff can begin treatment when they arrive -- up to an hour sooner than if someone gets to the hospital by car. Learning About Coronavirus [phone])  Coronavirus [phone]): Overview  What is coronavirus (COVID-19)? The coronavirus disease (COVID-19) is caused by a virus. It is an illness that was first found in [place], [place], in December 2019. It has since spread worldwide. The virus can cause fever, cough, and trouble breathing. In severe cases, it can cause pneumonia and make it hard to breathe without help. It can cause death. Coronaviruses are a large group of viruses. They cause the common cold. They also cause more serious illnesses like Middle East respiratory syndrome (MERS) and severe acute respiratory syndrome (SARS). COVID-19 is caused by a novel coronavirus. That means it's a new type that has not been seen in people before. This virus spreads person-to-person through droplets from coughing and sneezing. It can also spread when you are close to someone who is infected.  And it can spread when you touch something that has the virus on it, such as a doorknob or a tabletop. What can you do to protect yourself from coronavirus (COVID-19)? The best way to protect yourself from getting sick is to:  · Avoid areas where there is an outbreak. · Avoid contact with people who may be infected. · Wash your hands often with soap or alcohol-based hand sanitizers. · Avoid crowds and try to stay at least 6 feet away from other people. · Wash your hands often, especially after you cough or sneeze. Use soap and water, and scrub for at least 20 seconds. If soap and water aren't available, use an alcohol-based hand . · Avoid touching your mouth, nose, and eyes. What can you do to avoid spreading the virus to others? To help avoid spreading the virus to others:  · Cover your mouth with a tissue when you cough or sneeze. Then throw the tissue in the trash. · Use a disinfectant to clean things that you touch often. · Stay home if you are sick or have been exposed to the virus. Don't go to school, work, or public areas. And don't use public transportation. · If you are sick:  ? Leave your home only if you need to get medical care. But call the doctor's office first so they know you're coming. And wear a face mask, if you have one.  ? If you have a face mask, wear it whenever you're around other people. It can help stop the spread of the virus when you cough or sneeze. ? Clean and disinfect your home every day. Use household  and disinfectant wipes or sprays. Take special care to clean things that you grab with your hands. These include doorknobs, remote controls, phones, and handles on your refrigerator and microwave. And don't forget countertops, tabletops, bathrooms, and computer keyboards. When to call for help  Call 911 anytime you think you may need emergency care. For example, call if:  · You have severe trouble breathing. (You can't talk at all.)  · You have constant chest pain or pressure.   · You are severely dizzy or lightheaded. · You are confused or can't think clearly. · Your face and lips have a blue color. · You pass out (lose consciousness) or are very hard to wake up. Call your doctor now if you develop symptoms such as:  · Shortness of breath. · Fever. · Cough. If you need to get care, call ahead to the doctor's office for instructions before you go. Make sure you wear a face mask, if you have one, to prevent exposing other people to the virus. Where can you get the latest information? The following health organizations are tracking and studying this virus. Their websites contain the most up-to-date information. Ayana Collado also learn what to do if you think you may have been exposed to the virus. · U.S. Centers for Disease Control and Prevention (CDC): The CDC provides updated news about the disease and travel advice. The website also tells you how to prevent the spread of infection. www.cdc.gov  · World Health Organization [facility]): WHO offers information about the virus outbreaks. WHO also has travel advice. www.who.int  Current as of: April 1, 2020               Content Version: 12.4  © 3642-5733 [employer]. Care instructions adapted under license by your healthcare professional. If you have questions about a medical condition or this instruction, always ask your healthcare professional. [address] any warranty or liability for your use of this information.     CONTENTS FOUND IN YOUR DISCHARGE ENVELOPE:  [x]     Surgeon and Hospital Discharge Instructions  [x]     [facility] Surgical Services Care Provider Card  [x]     Medication & Side Effect Guide            (your newly prescribed medications have been marked/highlighted showing the most common side effects from   the classes of drugs on your prescriptions)  []     Medication Prescription(s) x *** ( [] These have been sent electronically to your pharmacy by your surgeon,   - OR -       your surgeon has already provided these to you during a previous/pre-op office visit)  []     [address]  []     Physical Therapy Prescription  []     Follow-up Appointment Cards  []     Surgery-related Pictures/Media  []     Pain block and/or block with On-Q Catheter from Anesthesia Service (information included in your instructions above)  []     Medical device information sheets/pamphlets from their    []     School/work excuse note. []     /parent work excuse note.

## 2021-12-15 NOTE — ANESTHESIA PROCEDURE NOTES
Peripheral Block    Start time: 12/15/2021 2:03 PM  End time: 12/15/2021 2:09 PM  Performed by: Cass Leon MD  Authorized by: Cass Leon MD       Pre-procedure:    Indications: at surgeon's request and primary anesthetic    Preanesthetic Checklist: patient identified, risks and benefits discussed, site marked, timeout performed, anesthesia consent given and patient being monitored    Timeout Time: 14:09 EST          Block Type:   Block Type:  Supraclavicular  Laterality:  Right  Monitoring:  Continuous pulse ox, frequent vital sign checks, heart rate, responsive to questions and oxygen  Injection Technique:  Single shot  Procedures: ultrasound guided    Patient Position: supine  Prep: chlorhexidine    Location:  Supraclavicular  Needle Type:  Stimuplex  Needle Gauge:  22 G  Needle Localization:  Anatomical landmarks and ultrasound guidance    Assessment:  Number of attempts:  1  Injection Assessment:  Incremental injection every 5 mL, local visualized surrounding nerve on ultrasound, negative aspiration for blood, no paresthesia and no intravascular symptoms  Patient tolerance:  Patient tolerated the procedure well with no immediate complications

## 2021-12-15 NOTE — ANESTHESIA PREPROCEDURE EVALUATION
Relevant Problems   RESPIRATORY SYSTEM   (+) AVI (obstructive sleep apnea)      CARDIOVASCULAR   (+) Essential hypertension, benign   (+) Paroxysmal atrial fibrillation (HCC)   (+) SVT (supraventricular tachycardia) (HCC)   (+) Supraventricular tachycardia (HCC)   (+) Tachy-viv syndrome (HCC)       Anesthetic History   No history of anesthetic complications            Review of Systems / Medical History  Patient summary reviewed and pertinent labs reviewed    Pulmonary        Sleep apnea: No treatment           Neuro/Psych   Within defined limits        Pertinent negatives: No seizures, TIA and CVA   Cardiovascular    Hypertension        Dysrhythmias : atrial fibrillation    Pertinent negatives: No past MI, angina and CHF  Exercise tolerance: >4 METS  Comments: Tachy-viv syndrome s/p PPM  Eliquis appropriately held   GI/Hepatic/Renal  Within defined limits           Pertinent negatives: No renal disease   Endo/Other        Obesity and arthritis  Pertinent negatives: No diabetes   Other Findings              Physical Exam    Airway  Mallampati: III  TM Distance: 4 - 6 cm  Neck ROM: normal range of motion   Mouth opening: Normal     Cardiovascular      Rate: normal         Dental    Dentition: Caps/crowns     Pulmonary  Breath sounds clear to auscultation               Abdominal  GI exam deferred       Other Findings            Anesthetic Plan    ASA: 3  Anesthesia type: regional and MAC - supraclavicular block          Induction: Intravenous  Anesthetic plan and risks discussed with: Patient

## 2021-12-15 NOTE — ANESTHESIA POSTPROCEDURE EVALUATION
Procedure(s):  RIGHT INDEX FINGER A1 PULLEY RELEASE, RIGHT SMALL FINGER A1 PULLEY RELEASE.    regional, MAC    Anesthesia Post Evaluation        Patient location during evaluation: PACU  Patient participation: complete - patient participated  Level of consciousness: sleepy but conscious  Pain management: adequate  Airway patency: patent  Anesthetic complications: no  Cardiovascular status: acceptable and stable  Respiratory status: acceptable and unassisted  Hydration status: acceptable  Comments: The patient was seen and evaluated in the post-operative period. The time of my evaluation may not match the time of this note. The patient denied uncontrolled pain or nausea, and there were no significant complications evident. Drea Ervin MD      Post anesthesia nausea and vomiting:  none  Final Post Anesthesia Temperature Assessment:  Normothermia (36.0-37.5 degrees C)      INITIAL Post-op Vital signs:   Vitals Value Taken Time   /94 12/15/21 1540   Temp 36.6 °C (97.9 °F) 12/15/21 1540   Pulse 64 12/15/21 1544   Resp 18 12/15/21 1544   SpO2 94 % 12/15/21 1544   Vitals shown include unvalidated device data.

## 2021-12-16 NOTE — OP NOTES
[facility]  OPERATIVE REPORT    Name:  Cinthya Lucio  MR#:  [id number]  :  1945  ACCOUNT #:  [de-identified]  DATE OF SERVICE:  02/15/2021    PREOPERATIVE DIAGNOSES:  1. Right index finger stenosing tenosynovitis. 2.  Right small finger stenosing tenosynovitis. POSTOPERATIVE DIAGNOSES:  1. Right index finger stenosing tenosynovitis. 2.  Right small finger stenosing tenosynovitis. PROCEDURES PERFORMED:  1. Right index finger A1 pulley release. 2.  Right small finger A1 pulley release. SURGEON:  Yoel Francis MD    ASSISTANT:  ERIKA Haines    ANESTHESIA:  Axillary block. COMPLICATIONS:  None. SPECIMENS REMOVED:  none. IMPLANTS:  none. ESTIMATED BLOOD LOSS:  Zero. TOURNIQUET TIME:  15 minutes. INDICATIONS:  The patient a 64-year-old male with a history of triggering of the right index and small finger. He was counseled regarding surgical and nonsurgical treatment and elected to proceed with the procedure as above. Risks and benefits were discussed in detail. PROCEDURE:  Patient was taken to the operating room and placed supine on the operating table. Following administration of axillary block anesthetic, the right arm was prepped and draped in sterile fashion with Hibiclens and alcohol. The tourniquet applied to the right proximal arm. The arm was exsanguinated with an Esmarch bandage and the tourniquet inflated to 250 mmHg. The base of the index finger between the distal palmar crease and palmar digital crease, a subcutaneous dissection was carried out and incision made transversely. Subcutaneous dissection was carried out. Neurovascular bundles to the index finger identified and protected. The A1 pulley completely released and tenosynovectomy performed. Tendon quality was good. No triggering remained. The wound was copiously irrigated with sterile saline. Hemostasis achieved with bipolar cautery.   The wound repaired using 5-0 Prolene simple sutures. Attention was turned to the small finger where an incision transversely between the distal palmar crease and palmar digital crease was created. Subcutaneous dissection to the level of the A1 pulley was performed. A cystic lesion was identified. Neurovascular bundles were carefully protected and the A1 pulley completely released. The cystic lesion removed. Tenosynovectomy performed. Significant tenosynovitis was present. No tendon abnormality identified. The wound was copiously irrigated with sterile saline. Hemostasis achieved with bipolar cautery. No triggering remained. The wound was repaired using 5-0 Prolene interrupted simple sutures. A sterile bulky soft hand dressing was applied. Tourniquet let down. The patient awakened from anesthesia and discharged to recovery room in stable condition. During the procedure, a physician assistant was vital to the outcome of the case providing stability to the arm, retraction of crucial structures and assistance with wound closure. DISCHARGE PLAN:  The patient was instructed to keep arm elevated, clean and dry at all times, to call with any question or concerns. Follow up in 10 days for suture removal, wound check and hand therapy referral if indicated. The patient is given a prescription for hydrocodone for pain control.       MD LYNNE [email or web address]  D:  12/15/2021 17:20  T:  12/16/2021 1:21  JOB #:  [id number]

## 2022-01-10 ENCOUNTER — CLINICAL SUPPORT (OUTPATIENT)
Dept: CARDIOLOGY CLINIC | Age: 77
End: 2022-01-10
Payer: MEDICARE

## 2022-01-10 ENCOUNTER — OFFICE VISIT (OUTPATIENT)
Dept: CARDIOLOGY CLINIC | Age: 77
End: 2022-01-10
Payer: MEDICARE

## 2022-01-10 VITALS
HEIGHT: 77 IN | OXYGEN SATURATION: 100 % | WEIGHT: 276 LBS | BODY MASS INDEX: 32.59 KG/M2 | SYSTOLIC BLOOD PRESSURE: 120 MMHG | DIASTOLIC BLOOD PRESSURE: 72 MMHG | HEART RATE: 72 BPM

## 2022-01-10 DIAGNOSIS — Z95.0 CARDIAC PACEMAKER IN SITU: Primary | ICD-10-CM

## 2022-01-10 PROCEDURE — 93279 PRGRMG DEV EVAL PM/LDLS PM: CPT | Performed by: INTERNAL MEDICINE

## 2022-01-10 PROCEDURE — G8417 CALC BMI ABV UP PARAM F/U: HCPCS | Performed by: NURSE PRACTITIONER

## 2022-01-10 PROCEDURE — 99215 OFFICE O/P EST HI 40 MIN: CPT | Performed by: NURSE PRACTITIONER

## 2022-01-10 PROCEDURE — G8427 DOCREV CUR MEDS BY ELIG CLIN: HCPCS | Performed by: NURSE PRACTITIONER

## 2022-01-10 PROCEDURE — G8752 SYS BP LESS 140: HCPCS | Performed by: NURSE PRACTITIONER

## 2022-01-10 PROCEDURE — G8536 NO DOC ELDER MAL SCRN: HCPCS | Performed by: NURSE PRACTITIONER

## 2022-01-10 PROCEDURE — G8754 DIAS BP LESS 90: HCPCS | Performed by: NURSE PRACTITIONER

## 2022-01-10 PROCEDURE — 1101F PT FALLS ASSESS-DOCD LE1/YR: CPT | Performed by: NURSE PRACTITIONER

## 2022-01-10 PROCEDURE — G8432 DEP SCR NOT DOC, RNG: HCPCS | Performed by: NURSE PRACTITIONER

## 2022-01-10 PROCEDURE — G0463 HOSPITAL OUTPT CLINIC VISIT: HCPCS | Performed by: NURSE PRACTITIONER

## 2022-01-10 NOTE — PROGRESS NOTES
HISTORY OF PRESENTING ILLNESS      Demarcus Godinez is a 64 y.o. male with atrial fibrillation, ILR who has experienced fatigue felt to be potentially secondary to bradycardia. He underwent dual chamber pacemaker and his metoprolol was increased. He remains on Eliquis 5mg BID for permanent AF. He continues to wear compression stockings for his venous insufficiency. He denies cardiac complaints aside from exertional fatigue. PAST MEDICAL HISTORY     Past Medical History:   Diagnosis Date    Arthritis     left knee and lt. hand    Atrial fibrillation (HCC)     BPH (benign prostatic hyperplasia)     Chronic pain     Back pain    Colon polyps     DJD (degenerative joint disease) of lumbar spine     laminectomy 1979, 1991, 2015    ED (erectile dysfunction) 6/18/2014    Heart palpitations     Dr. Karen Soto    Herniated nucleus pulposus, C3-4 12/2015    Dr. Mariola Mills    Hyperlipidemia     Hypertension     IFG (impaired fasting glucose)     Insomnia     severe. has treated, AVI, back pain    Knee pain, right     Normal cardiac stress test 9/21/15    OA (osteoarthritis) of knee     Dr. Margarita Thomson Obesity     Onychomycosis 2018    Dr Jane. lamisil    AVI on CPAP     Dr. Lucy Garza    Seasonal allergic rhinitis     Spinal stenosis in cervical region 12/2015    Spinal stenosis of lumbar region     MRI 12/2012. Dr. Mariola Mills    SVT (supraventricular tachycardia) [facility])     Dr. Karen Soto Claudene Pitch Dr Ana Lopes Varicose veins     vein stripping 10/10           PAST SURGICAL HISTORY     Past Surgical History:   Procedure Laterality Date    COLONOSCOPY  2008    2 polyps. repeat 2011.   Dr. Doug Hall  2007    7 polyps per pt     COLONOSCOPY N/A 6/15/2016    COLONOSCOPY [facility] by Wilbert Morales MD at [facility]  2011    return in 2016    HX BLADDER REPAIR      polyp removal    HX CERVICAL FUSION  1/16/16    ACDF C3-C4 Dr. Lauren Ross HX COLONOSCOPY 11    Dr Kristin Euceda, smal polyp. repeat 2016    HX KNEE ARTHROSCOPY  2005    right, Dr. Donaldson Cassette    HX KNEE ARTHROSCOPY  2012    left, Dr Donaldson Cassette    HX KNEE ARTHROSCOPY  2013    right    HX KNEE REPLACEMENT  2013    HX LUMBAR FUSION  2015    L3-L4, Dr. Janet Lane      left, Dr Yadi Garnica    bladder polyp removed with cystoscopy    HX VEIN STRIPPING  2010    Dr. Gabi Zarco, bilateral    TN INS NEW/RPLC PRM PACEMAKER W/TRANSV ELTRD VENTR N/A 10/4/2019    INSERT PPM SINGLE VENTRICULAR/[employer] performed by Evangelina Epley, MD at [address] CATH LAB    TN INS NEW/RPLCMT PRM PM W/TRANSV ELTRD ATRIAL&VENT N/A 10/4/2019    Insert Ppm Dual performed by Evangelina Epley, MD at [address] CATH LAB    TN REMOVAL SUBCUTANEOUS CARDIAC RHYTHM MONITOR N/A 10/4/2019    LOOP RECORDER REMOVAL performed by Evangelina Epley, MD at [address] CATH LAB    TN SHOULDER SURG [address] UNLISTED      left DECOMPRESSION, Dr. oJsette Perales  2013    VASCULAR SURGERY PROCEDURE UNLIST      bilateral vein stripping          ALLERGIES     Allergies   Allergen Reactions    Percocet [Oxycodone-Acetaminophen] Other (comments)     hallucinations    Penicillin G Rash     Did okay with keflex - no reaction with that.           FAMILY HISTORY     Family History   Problem Relation Age of Onset   Aníbal Lasso Cancer Mother         liver    Cancer Father         leukemia    Cancer Sister         lung cancer    negative for cardiac disease       SOCIAL HISTORY     Social History     Socioeconomic History    Marital status:      Spouse name: Aaron Vera Number of children: 3   Tobacco Use    Smoking status: Former Smoker     Packs/day: 0.25     Years: 19.00     Pack years: 4.75     Types: Cigarettes     Quit date: 1980     Years since quittin.0    Smokeless tobacco: Never Used    Tobacco comment: quit ~1980   Substance and Sexual Activity    Alcohol use: Yes     Comment: socially    Drug use: No    Sexual activity: Yes   Other Topics Concern     Service Yes     Comment: [employer]   Social History Narrative    ** Merged History Encounter **         1 child, 2 stepchildren    fishes         MEDICATIONS     Current Outpatient Medications   Medication Sig    furosemide (LASIX) 20 mg tablet Take 1 Tablet by mouth two (2) times a day.  lisinopriL (PRINIVIL, ZESTRIL) 40 mg tablet TAKE 1 TABLET DAILY    apixaban (Eliquis) 5 mg tablet Take 1 Tablet by mouth two (2) times a day.  celecoxib (CELEBREX) 200 mg capsule Take 200 mg by mouth. Three times a week    cyclobenzaprine (FLEXERIL) 10 mg tablet Take 10 mg by mouth daily as needed.  metoprolol succinate (TOPROL-XL) 50 mg XL tablet TAKE 1 TABLET DAILY (Patient taking differently: 25 mg daily.)    alfuzosin SR (UROXATRAL) 10 mg SR tablet Take 10 mg by mouth as needed.  amLODIPine (NORVASC) 10 mg tablet TAKE 1 TABLET BY MOUTH DAILY    gabapentin (NEURONTIN) 300 mg capsule Take 300 mg by mouth nightly.  traZODone (DESYREL) 150 mg tablet Take 150 mg by mouth nightly.  terazosin (HYTRIN) 5 mg capsule TAKE 1 CAPSULE DAILY    diclofenac (VOLTAREN) 1 % gel Apply 4 g to affected area daily as needed.  chlorhexidine (PERIDEX) 0.12 % solution Take 15 mL by mouth two (2) times daily as needed.  levocetirizine (XYZAL) 5 mg tablet TAKE 1 TABLET DAILY    atorvastatin (LIPITOR) 20 mg tablet TAKE 1 TABLET DAILY    levoFLOXacin (LEVAQUIN) 500 mg tablet Take 500 mg by mouth as needed. (Patient not taking: Reported on 12/15/2021)    cephALEXin (KEFLEX) 500 mg capsule PT TO TK 4 CS PO 1 HOUR PRIOR TO DENTAL APPOINTMENT (Patient not taking: Reported on 12/15/2021)     No current facility-administered medications for this visit.        I have reviewed the nurses notes, vitals, problem list, allergy list, medical history, family, social history and medications. REVIEW OF SYMPTOMS      General: +random dizziness/lightheadedness, Pt denies excessive weight gain or loss. Pt is able to conduct ADL's  HEENT: Denies blurred vision, headaches, hearing loss, epistaxis and difficulty swallowing. Respiratory: Denies cough, congestion, shortness of breath, MCRAE, wheezing or stridor. Cardiovascular: Denies precordial pain, palpitations, edema or PND  Gastrointestinal: Denies poor appetite, indigestion, abdominal pain or blood in stool  Genitourinary: Denies hematuria, dysuria, increased urinary frequency  Musculoskeletal: Denies joint pain or swelling from muscles or joints  Neurologic: Denies tremor, paresthesias, headache, or sensory motor disturbance  Psychiatric: Denies confusion, insomnia, depression  Integumentray: Denies rash, itching or ulcers. Hematologic: Denies easy bruising, bleeding       PHYSICAL EXAMINATION      Vitals: see vitals section  General: Well developed, in no acute distress. HEENT: No jaundice, oral mucosa moist, no oral ulcers  Neck: Supple, no stiffness, no lymphadenopathy, supple  Heart:  Normal S1/S2 negative S3 or S4. Regular, no murmur, gallop or rub, no jugular venous distention  Respiratory: Clear bilaterally x 4, no wheezing or rales  Abdomen:   Soft, non-tender, bowel sounds are active. Extremities:  No edema, normal cap refill, no cyanosis. Musculoskeletal: No clubbing, no deformities  Neuro: A&Ox3, speech clear, gait stable, cooperative, no focal neurologic deficits  Skin: Skin color is normal. No rashes or lesions.  Non diaphoretic, moist.  Vascular: 2+ pulses symmetric in all extremities       DIAGNOSTIC DATA      EKG:        LABORATORY DATA      Lab Results   Component Value Date/Time    WBC 6.1 10/29/2020 05:04 AM    HGB 11.9 (L) 10/29/2020 05:04 AM    HCT 36.1 (L) 10/29/2020 05:04 AM    PLATELET 240 35/75/8872 05:04 AM    MCV 90.5 10/29/2020 05:04 AM      Lab Results   Component Value Date/Time    Sodium 140 10/29/2020 05:04 AM    Potassium 3.6 10/29/2020 05:04 AM    Chloride 107 10/29/2020 05:04 AM    CO2 28 10/29/2020 05:04 AM    Anion gap 5 10/29/2020 05:04 AM    Glucose 93 10/29/2020 05:04 AM    BUN 12 10/29/2020 05:04 AM    Creatinine 0.96 10/29/2020 05:04 AM    BUN/Creatinine ratio 13 10/29/2020 05:04 AM    GFR est AA >60 10/29/2020 05:04 AM    GFR est non-AA >60 10/29/2020 05:04 AM    Calcium 8.1 (L) 10/29/2020 05:04 AM    Bilirubin, total 0.6 10/29/2020 05:04 AM    Alk. phosphatase 49 10/29/2020 05:04 AM    Protein, total 5.9 (L) 10/29/2020 05:04 AM    Albumin 2.7 (L) 10/29/2020 05:04 AM    Globulin 3.2 10/29/2020 05:04 AM    A-G Ratio 0.8 (L) 10/29/2020 05:04 AM    ALT (SGPT) 28 10/29/2020 05:04 AM           ASSESSMENT         1. Supraventricular tachycardia  2. Hypertension  3. Hyperlipidemia  4. Venous insufficiency  5. First degree AV block    6. AVI on CPAP  7. Atrial fibrillation                        Persistent   8. Edema  9. Bradycardia  10. PVCs  12. Shortness of breath  13. Dizziness       PLAN     Device interrogation shows normal functioning with 80% RVP. Adjustments were made to his device, increasing LRL and changing to VVIR. Will evaluate echocardiogram for any changes in his LVEF. Continue current medical therapy for HTN/hyperlipidemia and follow up with PCP. Will obtain most recent labwork from PCP. Compliant with CPAP- continue sleep medicine follow up as needed. FOLLOW-UP   1 year    Thank you, Pia Hung MD and Dr. Ceci Stevens for allowing me to participate in the care of this extraordinarily pleasant male. Please do not hesitate to contact me for further questions/concerns.      Lul Bushy, NP    [address]  [address] [facility], [place] [address]  Eugenia Lopez [address] [place], [place] [address]  [phone] / [phone] Fax   [phone] / [phone] Fax

## 2022-01-10 NOTE — PROGRESS NOTES
Room EP3    Visit Vitals  /72 (BP 1 Location: Left upper arm, BP Patient Position: Sitting, BP Cuff Size: Large adult)   Pulse 72   Ht 6' 5\" (1.956 m)   Wt 276 lb (125.2 kg)   SpO2 100%   BMI 32.73 kg/m²         Chest pain: no  Shortness of breath: no  Edema: no  Palpitations, Skipped beats, Rapid heartbeat: no  Dizziness: no    New diagnosis/Surgeries: below     ER/Hospitalizations:  Right index finger stenosing tenosynovitis. and Right small finger stenosing tenosynovitis.     Refills: no

## 2022-02-10 NOTE — TELEPHONE ENCOUNTER
Requested Prescriptions     Signed Prescriptions Disp Refills    apixaban (Eliquis) 5 mg tablet 180 Tablet 2     Sig: Take 1 Tablet by mouth two (2) times a day. Authorizing Provider: Nini Castro     Ordering User: Toy Abdi     Refill per verbal order ROWENA Tan NP.    Last visit:  1/10/22  Next visit:  1/16/23

## 2022-03-01 ENCOUNTER — OFFICE VISIT (OUTPATIENT)
Dept: CARDIOLOGY CLINIC | Age: 77
End: 2022-03-01
Payer: MEDICARE

## 2022-03-01 VITALS
HEIGHT: 77 IN | SYSTOLIC BLOOD PRESSURE: 118 MMHG | BODY MASS INDEX: 32.73 KG/M2 | HEART RATE: 74 BPM | DIASTOLIC BLOOD PRESSURE: 78 MMHG | OXYGEN SATURATION: 95 % | WEIGHT: 277.2 LBS

## 2022-03-01 DIAGNOSIS — Z95.0 CARDIAC PACEMAKER IN SITU: ICD-10-CM

## 2022-03-01 DIAGNOSIS — I48.0 PAROXYSMAL ATRIAL FIBRILLATION (HCC): ICD-10-CM

## 2022-03-01 DIAGNOSIS — I10 ESSENTIAL HYPERTENSION: Primary | ICD-10-CM

## 2022-03-01 DIAGNOSIS — I49.5 TACHY-BRADY SYNDROME (HCC): ICD-10-CM

## 2022-03-01 PROCEDURE — G8427 DOCREV CUR MEDS BY ELIG CLIN: HCPCS | Performed by: STUDENT IN AN ORGANIZED HEALTH CARE EDUCATION/TRAINING PROGRAM

## 2022-03-01 PROCEDURE — G0463 HOSPITAL OUTPT CLINIC VISIT: HCPCS | Performed by: STUDENT IN AN ORGANIZED HEALTH CARE EDUCATION/TRAINING PROGRAM

## 2022-03-01 PROCEDURE — G8752 SYS BP LESS 140: HCPCS | Performed by: STUDENT IN AN ORGANIZED HEALTH CARE EDUCATION/TRAINING PROGRAM

## 2022-03-01 PROCEDURE — G8510 SCR DEP NEG, NO PLAN REQD: HCPCS | Performed by: STUDENT IN AN ORGANIZED HEALTH CARE EDUCATION/TRAINING PROGRAM

## 2022-03-01 PROCEDURE — G8417 CALC BMI ABV UP PARAM F/U: HCPCS | Performed by: STUDENT IN AN ORGANIZED HEALTH CARE EDUCATION/TRAINING PROGRAM

## 2022-03-01 PROCEDURE — G8536 NO DOC ELDER MAL SCRN: HCPCS | Performed by: STUDENT IN AN ORGANIZED HEALTH CARE EDUCATION/TRAINING PROGRAM

## 2022-03-01 PROCEDURE — 1101F PT FALLS ASSESS-DOCD LE1/YR: CPT | Performed by: STUDENT IN AN ORGANIZED HEALTH CARE EDUCATION/TRAINING PROGRAM

## 2022-03-01 PROCEDURE — 99214 OFFICE O/P EST MOD 30 MIN: CPT | Performed by: STUDENT IN AN ORGANIZED HEALTH CARE EDUCATION/TRAINING PROGRAM

## 2022-03-01 PROCEDURE — 93005 ELECTROCARDIOGRAM TRACING: CPT | Performed by: STUDENT IN AN ORGANIZED HEALTH CARE EDUCATION/TRAINING PROGRAM

## 2022-03-01 PROCEDURE — G8754 DIAS BP LESS 90: HCPCS | Performed by: STUDENT IN AN ORGANIZED HEALTH CARE EDUCATION/TRAINING PROGRAM

## 2022-03-01 PROCEDURE — 93010 ELECTROCARDIOGRAM REPORT: CPT | Performed by: STUDENT IN AN ORGANIZED HEALTH CARE EDUCATION/TRAINING PROGRAM

## 2022-03-01 NOTE — PROGRESS NOTES
[facility] [address], [address] [facility], [address] [place] [address] Nw    Office [phone],SVV [phone]           Benji Kinsey is a 76 y.o. male presents to the office for follow-up      Assessment/Recommendations:      ICD-10-CM ICD-9-CM    1. Essential hypertension  I10 401.9 AMB POC EKG ROUTINE W/ 12 LEADS, INTER & REP   2. Paroxysmal atrial fibrillation (HCC)  I48.0 427.31    3. Tachy-viv syndrome (HCC)  I49.5 427.81    4. Cardiac pacemaker in situ  Z95.0 V45.01        atrial fibrillation  Symptomatic bradycardia, s/p permanent pacemaker 10/2019.  80% RVP on interogation 1/22  Obstructive sleep apnea  Hypertension   Concentric LVH on echocardiogram  venous insufficiency s/p GSV ablation  Morbid obesity    Medically doing very well. Recommend he continue his current antihypertensive regimen. He is scheduled for echocardiogram in near future to reassess LV function with RV pacing. Continue Eliquis for stroke prophylaxis  Continue metoprolol XL 25 mg daily for rate control        Primary Care Physician- Maria Guadalupe Aburto MD    Follow-up 1 year        Subjective:  76 y.o. presents the office for follow-up visit. Last seen by Dr. Paulo Woodward 3/21. Follows with EP for permanent pacemaker. Clinically doing very well. No ongoing chest pain or chest pressure symptoms. No shortness of breath. Past Medical History:   Diagnosis Date    Arthritis     left knee and lt. hand    Atrial fibrillation (HCC)     BPH (benign prostatic hyperplasia)     Chronic pain     Back pain    Colon polyps     DJD (degenerative joint disease) of lumbar spine     laminectomy 1979, 1991, 2015    ED (erectile dysfunction) 6/18/2014    Heart palpitations     Dr. Fabricio Grewal    Herniated nucleus pulposus, C3-4 12/2015    Dr. Juliana Milner    Hyperlipidemia     Hypertension     IFG (impaired fasting glucose)     Insomnia     severe.   has treated, AVI, back pain    Knee pain, right  Normal cardiac stress test 9/21/15    OA (osteoarthritis) of knee     Dr. Edita Hodge Obesity     Onychomycosis 2018    Dr Elidia Wooten. lamisil    AVI on CPAP     Dr. Chakraborty Heading    Seasonal allergic rhinitis     Spinal stenosis in cervical region 12/2015    Spinal stenosis of lumbar region     MRI 12/2012. Dr. Charlee Gimenez    [facility]ricular [facility]cardia[facility])     Dr. Ramón Fontana [facility] Dr Chyna Gonzalez Varicose veins     vein stripping 10/10        Past Surgical History:   Procedure Laterality Date    COLONOSCOPY  2008    2 polyps. repeat 2011. Dr. Alivia Manzo  2007    7 polyps per pt     COLONOSCOPY N/A 6/15/2016    COLONOSCOPY performed by Ursula Lin MD at [address] [place] [address] [place], COLON, DIAGNOSTIC  2011    return in 2016    HX BLADDER REPAIR      polyp removal    HX CERVICAL FUSION  1/16/16    ACDF C3-C4 Dr. Charlee Gimenez    HX COLONOSCOPY  4/27/11    Dr Griselda Lafleur, smal polyp.   repeat 4/2016    HX KNEE ARTHROSCOPY  2005    right, Dr. Thaddeus Arizmendi    HX KNEE ARTHROSCOPY  02/01/2012    left, Dr Thaddeus Arizmendi    HX KNEE ARTHROSCOPY  1/2013    right    HX KNEE REPLACEMENT  11/4/2013    HX LUMBAR FUSION  9/2015    L3-L4, Dr. Cody Heck      left, Dr Yelitza Dhaliwal    bladder polyp removed with cystoscopy    HX VEIN STRIPPING  9/2010    Dr. Juliana Bach, bilateral    FL INS NEW/RPLC PRM PACEMAKER W/TRANSV ELTRD VENTR N/A 10/4/2019    INSERT PPM SINGLE VENTRICULAR/[employer] performed by Irma York MD at [address] CATH LAB    FL INS NEW/RPLCMT PRM PM W/TRANSV ELTRD ATRIAL&VENT N/A 10/4/2019    Insert Ppm Dual performed by Irma York MD at [address] CATH LAB    FL REMOVAL SUBCUTANEOUS CARDIAC RHYTHM MONITOR N/A 10/4/2019    LOOP RECORDER REMOVAL performed by Irma York MD at [address] CATH LAB    FL SHOULDER SURG [address] UNLISTED      left DECOMPRESSION, Dr. Freida Champagne [facility] ARTHROPLASTY  11/2013    VASCULAR SURGERY PROCEDURE UNLIST  2008    bilateral vein stripping         Current Outpatient Medications:     apixaban (Eliquis) 5 mg tablet, Take 1 Tablet by mouth two (2) times a day., Disp: 180 Tablet, Rfl: 2    furosemide (LASIX) 20 mg tablet, Take 1 Tablet by mouth two (2) times a day. (Patient taking differently: Take 20 mg by mouth as needed.), Disp: 180 Tablet, Rfl: 1    lisinopriL (PRINIVIL, ZESTRIL) 40 mg tablet, TAKE 1 TABLET DAILY, Disp: 90 Tablet, Rfl: 3    celecoxib (CELEBREX) 200 mg capsule, Take 200 mg by mouth. Three times a week, Disp: , Rfl:     cyclobenzaprine (FLEXERIL) 10 mg tablet, Take 10 mg by mouth daily as needed. , Disp: , Rfl:     metoprolol succinate (TOPROL-XL) 50 mg XL tablet, TAKE 1 TABLET DAILY (Patient taking differently: 25 mg daily.), Disp: 90 Tab, Rfl: 3    alfuzosin SR (UROXATRAL) 10 mg SR tablet, Take 10 mg by mouth as needed. , Disp: , Rfl:     amLODIPine (NORVASC) 10 mg tablet, TAKE 1 TABLET BY MOUTH DAILY, Disp: 90 Tab, Rfl: 0    gabapentin (NEURONTIN) 300 mg capsule, Take 300 mg by mouth nightly., Disp: , Rfl:     traZODone (DESYREL) 150 mg tablet, Take 150 mg by mouth nightly., Disp: , Rfl:     terazosin (HYTRIN) 5 mg capsule, TAKE 1 CAPSULE DAILY, Disp: 90 Cap, Rfl: 3    diclofenac (VOLTAREN) 1 % gel, Apply 4 g to affected area daily as needed. , Disp: , Rfl:     chlorhexidine (PERIDEX) 0.12 % solution, Take 15 mL by mouth two (2) times daily as needed. , Disp: , Rfl:     levocetirizine (XYZAL) 5 mg tablet, TAKE 1 TABLET DAILY, Disp: 90 Tab, Rfl: 3    atorvastatin (LIPITOR) 20 mg tablet, TAKE 1 TABLET DAILY, Disp: 90 Tab, Rfl: 4    Allergies   Allergen Reactions    Percocet [Oxycodone-Acetaminophen] Other (comments)     hallucinations    Penicillin G Rash     Did okay with keflex - no reaction with that.         Family History   Problem Relation Age of Onset    Cancer Mother         liver    Cancer Father leukemia    Cancer Sister         lung cancer       Social History     Tobacco Use    Smoking status: Former Smoker     Packs/day: 0.25     Years: 19.00     Pack years: 4.75     Types: Cigarettes     Quit date: 1980     Years since quittin.1    Smokeless tobacco: Never Used    Tobacco comment: quit ~   Substance Use Topics    Alcohol use: Yes     Comment: socially    Drug use: No       Review of Symptoms:  Pertinent Positive: Negative  Pertinent Negative:No chest pain, dyspnea on exertion, shortness of breath, orthopnea, PND    All Other systems reviewed and are negative for a Comprehensive ROS (10+)    Physical Exam    Blood pressure 118/78, pulse 74, height 6' 5\" (1.956 m), weight 277 lb 3.2 oz (125.7 kg), SpO2 95 %. Constitutional:  well-developed and well-nourished. No distress. HENT: Normocephalic. Eyes: No scleral icterus. Neck:  Neck supple. No JVD present. Pulmonary/Chest: Effort normal and breath sounds normal. No respiratory distress, wheezes or rales. Cardiovascular: Normal rate, regular rhythm, S1 S2 . Exam reveals no gallop and no friction rub. No murmur heard. No edema. Extremities:  Normal muscle tone  Abdominal:   No abnormal distension. Neurological:  Moving all extremities, cranial nerves appear grossly intact. Skin: Skin is not cold. Not diaphoretic. No erythema. Psychiatric:  Grossly normal mood and affect. Intact insight. Objective Data:     Investigations personally reviewed and interpreted    ECG: 3/1/2022underlying aVF with RV pacing          Investigations reviewed     16: ECHO-EF 60%,mild LAE and ROMI, mild concentric hypertrophy  9/11/15: LEXISCAN- normal MPI, no inducible ischemia, LVEF 58%  4/15/14: ECHO- EF 60%, mild concentric hypertrophy, G1DD, markedly dilated LA, mild pulmonic regurgitation  13: ECHO- EF 60%, mild concentric hypertrophy, G1DD, moderately dilated LA, mild TR, mild pHTN  5/3/17- [employer] Loop Recorder Injection per Dr. Kunz Carry  10/4/19 loop removal per Dr. Kunz Carry  10/4/19 Medtronic dual chamber PPM per Dr. Kunz Carry    10/25/20    ECHO FACUNDO W OR WO CONTRAST 10/31/2020 10/31/2020    Interpretation Summary  · Saline contrast was given to evaluate for intracardiac shunt. · LV: Estimated LVEF is 50 - 55%. Normal cavity size, wall thickness and systolic function (ejection fraction normal). · IAS: Agitated saline contrast study was performed. There was no shunting at baseline or with Valsalva. · MV: Mild mitral valve regurgitation is present. · TV: Mild tricuspid valve regurgitation is present. · No echocardiographic evidence of vegetations. Signed by: Garnett Duane, MD on 10/31/2020 10:22 PM        Gunnar Schaefer DO          ATTENTION:   This medical record was transcribed using an electronic medical records/speech recognition system. Although proofread, it may and can contain electronic, spelling and other errors. Corrections may be executed at a later time. Please feel free to contact us for any clarifications as needed.

## 2022-03-01 NOTE — PROGRESS NOTES
Kyaw Yu is a 76 y.o. male    Chief Complaint   Patient presents with    Follow-up     PAF    Hypertension       Chest pain No    SOB No    Dizziness some dizziness once in a while    Swelling in his ankles     Refills No    Visit Vitals  /80 (BP 1 Location: Left upper arm, BP Patient Position: Sitting)   Pulse 74   Ht 6' 5\" (1.956 m)   Wt 277 lb 3.2 oz (125.7 kg)   SpO2 95%   BMI 32.87 kg/m²     Vitals:    03/01/22 1429 03/01/22 1446   BP: 122/80 122/80   BP 1 Location: Left upper arm Left upper arm   BP Patient Position: Sitting Supine   Pulse: 74    Height: 6' 5\" (1.956 m)    Weight: 277 lb 3.2 oz (125.7 kg)    SpO2: 95%        1. Have you been to the ER, urgent care clinic since your last visit? Hospitalized since your last visit? no    2. Have you seen or consulted any other health care providers outside of the [address] [place] [address] since your last visit? Include any pap smears or colon screening.   no

## 2022-03-11 ENCOUNTER — ANCILLARY PROCEDURE (OUTPATIENT)
Dept: CARDIOLOGY CLINIC | Age: 77
End: 2022-03-11
Payer: MEDICARE

## 2022-03-11 VITALS
BODY MASS INDEX: 32.71 KG/M2 | DIASTOLIC BLOOD PRESSURE: 70 MMHG | WEIGHT: 277 LBS | SYSTOLIC BLOOD PRESSURE: 118 MMHG | HEIGHT: 77 IN

## 2022-03-11 DIAGNOSIS — Z95.0 CARDIAC PACEMAKER IN SITU: ICD-10-CM

## 2022-03-11 DIAGNOSIS — I10 BENIGN ESSENTIAL HTN: ICD-10-CM

## 2022-03-11 DIAGNOSIS — I48.0 PAF (PAROXYSMAL ATRIAL FIBRILLATION) (HCC): ICD-10-CM

## 2022-03-11 LAB
ECHO AO ASC DIAM: 3.8 CM
ECHO AO ASCENDING AORTA INDEX: 1.48 CM/M2
ECHO AO ROOT DIAM: 3.8 CM
ECHO AO ROOT INDEX: 1.48 CM/M2
ECHO AV AREA PEAK VELOCITY: 3 CM2
ECHO AV AREA VTI: 2.9 CM2
ECHO AV AREA/BSA PEAK VELOCITY: 1.2 CM2/M2
ECHO AV AREA/BSA VTI: 1.1 CM2/M2
ECHO AV MEAN GRADIENT: 3 MMHG
ECHO AV MEAN VELOCITY: 0.9 M/S
ECHO AV PEAK GRADIENT: 7 MMHG
ECHO AV PEAK VELOCITY: 1.4 M/S
ECHO AV VELOCITY RATIO: 0.71
ECHO AV VTI: 23.6 CM
ECHO EST RA PRESSURE: 3 MMHG
ECHO LA DIAMETER INDEX: 1.63 CM/M2
ECHO LA DIAMETER: 4.2 CM
ECHO LA TO AORTIC ROOT RATIO: 1.11
ECHO LA VOL 2C: 131 ML (ref 18–58)
ECHO LA VOL 4C: 144 ML (ref 18–58)
ECHO LA VOL BP: 145 ML (ref 18–58)
ECHO LA VOL/BSA BIPLANE: 56 ML/M2 (ref 16–34)
ECHO LA VOLUME AREA LENGTH: 154 ML
ECHO LA VOLUME INDEX A2C: 51 ML/M2 (ref 16–34)
ECHO LA VOLUME INDEX A4C: 56 ML/M2 (ref 16–34)
ECHO LA VOLUME INDEX AREA LENGTH: 60 ML/M2 (ref 16–34)
ECHO LV FRACTIONAL SHORTENING: 32 % (ref 28–44)
ECHO LV INTERNAL DIMENSION DIASTOLE INDEX: 2.06 CM/M2
ECHO LV INTERNAL DIMENSION DIASTOLIC: 5.3 CM (ref 4.2–5.9)
ECHO LV INTERNAL DIMENSION SYSTOLIC INDEX: 1.4 CM/M2
ECHO LV INTERNAL DIMENSION SYSTOLIC: 3.6 CM
ECHO LV IVSD: 1.1 CM (ref 0.6–1)
ECHO LV MASS 2D: 227.7 G (ref 88–224)
ECHO LV MASS INDEX 2D: 88.6 G/M2 (ref 49–115)
ECHO LV POSTERIOR WALL DIASTOLIC: 1.1 CM (ref 0.6–1)
ECHO LV RELATIVE WALL THICKNESS RATIO: 0.42
ECHO LVOT AREA: 4.2 CM2
ECHO LVOT AV VTI INDEX: 0.71
ECHO LVOT DIAM: 2.3 CM
ECHO LVOT MEAN GRADIENT: 2 MMHG
ECHO LVOT PEAK GRADIENT: 4 MMHG
ECHO LVOT PEAK VELOCITY: 1 M/S
ECHO LVOT STROKE VOLUME INDEX: 27 ML/M2
ECHO LVOT SV: 69.3 ML
ECHO LVOT VTI: 16.7 CM
ECHO MV E VELOCITY: 1.01 M/S
ECHO RIGHT VENTRICULAR SYSTOLIC PRESSURE (RVSP): 35 MMHG
ECHO RV FREE WALL PEAK S': 12 CM/S
ECHO RV INTERNAL DIMENSION: 3.5 CM
ECHO RV TAPSE: 2.3 CM (ref 1.5–2)
ECHO TV REGURGITANT MAX VELOCITY: 2.82 M/S
ECHO TV REGURGITANT PEAK GRADIENT: 32 MMHG

## 2022-03-11 PROCEDURE — 93306 TTE W/DOPPLER COMPLETE: CPT | Performed by: STUDENT IN AN ORGANIZED HEALTH CARE EDUCATION/TRAINING PROGRAM

## 2022-04-14 ENCOUNTER — OFFICE VISIT (OUTPATIENT)
Dept: CARDIOLOGY CLINIC | Age: 77
End: 2022-04-14
Payer: MEDICARE

## 2022-04-14 DIAGNOSIS — Z95.0 CARDIAC PACEMAKER IN SITU: Primary | ICD-10-CM

## 2022-04-14 PROCEDURE — 93296 REM INTERROG EVL PM/IDS: CPT | Performed by: INTERNAL MEDICINE

## 2022-04-14 NOTE — LETTER
4/15/2022 11:47 AM    Mr. Luis Mccollum  [address] [facility] [address]      Dear Mr. Luis Mccollum,    We have received your recent remote monitor check of your implanted device on 4/15/22. Your remaining estimated battery life is 10.7 years years and your device is working normally & appropriately. Your next remote monitor check is scheduled for 7/21/22. This is NOT an in-clinic appointment. This transmission is sent from your home monitor. Please make sure your home monitor is plugged into power and within 10 feet of where you sleep. If you are using the phone applications, please make sure it is open on your smart phone. If you have difficulty sending a transmission, please do NOT call our office. Instead, call tech support for your device as they are better able to assist.    [employer] ([employer])   [phone]    Your next clinic/office check is scheduled for Monday, 1/16/23 at 10:00 am.  You will have your device checked then see the provider. Please bring a complete list of your medications with strengths and dosages to this appointment. If you have any questions, please call the Pacemaker/ICD clinic at the [facility] location at [phone]. We appreciate you staying remotely connected!     Sincerely,    Denver Dixons RN, BSN  Device Coordinator  [phone]

## 2022-04-15 NOTE — PROGRESS NOTES
chargeable VVI pm remote    Normal device function with 96.2% RV pacing. See scanned report in  for details.

## 2022-07-08 DIAGNOSIS — I48.0 PAROXYSMAL ATRIAL FIBRILLATION (HCC): ICD-10-CM

## 2022-07-08 DIAGNOSIS — I10 ESSENTIAL HYPERTENSION: ICD-10-CM

## 2022-07-08 DIAGNOSIS — I47.1 SVT (SUPRAVENTRICULAR TACHYCARDIA) (HCC): ICD-10-CM

## 2022-07-08 RX ORDER — LISINOPRIL 40 MG/1
40 TABLET ORAL DAILY
Qty: 90 TABLET | Refills: 2 | Status: SHIPPED | OUTPATIENT
Start: 2022-07-08

## 2022-07-08 NOTE — TELEPHONE ENCOUNTER
Requested Prescriptions     Signed Prescriptions Disp Refills    lisinopriL (PRINIVIL, ZESTRIL) 40 mg tablet 90 Tablet 2     Sig: Take 1 Tablet by mouth daily. Authorizing Provider: Sharita Rose     Ordering User: Maranda ASENCIO     Refills per verbal order from Dr. Adore Bull.   Last OV: 3/1/22  Next OV: 3/3/23

## 2022-07-21 ENCOUNTER — OFFICE VISIT (OUTPATIENT)
Dept: CARDIOLOGY CLINIC | Age: 77
End: 2022-07-21
Payer: MEDICARE

## 2022-07-21 DIAGNOSIS — Z95.0 CARDIAC PACEMAKER IN SITU: Primary | ICD-10-CM

## 2022-07-21 PROCEDURE — 93294 REM INTERROG EVL PM/LDLS PM: CPT | Performed by: INTERNAL MEDICINE

## 2022-08-05 ENCOUNTER — TELEPHONE (OUTPATIENT)
Dept: CARDIOLOGY CLINIC | Age: 77
End: 2022-08-05

## 2022-08-05 NOTE — TELEPHONE ENCOUNTER
Patient is scheduled to have EGD, Colonoscopy     Date: 8/9/22    Patient takes Eliquis and needs to STOP prior to the procedure. How many days prior 3 to 5 days. Please advise.

## 2022-10-05 RX ORDER — FUROSEMIDE 20 MG/1
TABLET ORAL
Qty: 90 TABLET | Refills: 0 | Status: SHIPPED | OUTPATIENT
Start: 2022-10-05

## 2022-10-25 ENCOUNTER — OFFICE VISIT (OUTPATIENT)
Dept: CARDIOLOGY CLINIC | Age: 77
End: 2022-10-25
Payer: MEDICARE

## 2022-10-25 DIAGNOSIS — Z95.0 CARDIAC PACEMAKER IN SITU: Primary | ICD-10-CM

## 2022-10-25 PROCEDURE — 93296 REM INTERROG EVL PM/IDS: CPT | Performed by: INTERNAL MEDICINE

## 2022-12-13 ENCOUNTER — APPOINTMENT (OUTPATIENT)
Dept: CT IMAGING | Age: 77
End: 2022-12-13
Attending: EMERGENCY MEDICINE
Payer: MEDICARE

## 2022-12-13 ENCOUNTER — HOSPITAL ENCOUNTER (EMERGENCY)
Age: 77
Discharge: ANOTHER HOSPITAL | End: 2022-12-13
Attending: EMERGENCY MEDICINE
Payer: MEDICARE

## 2022-12-13 VITALS
TEMPERATURE: 98.9 F | HEART RATE: 76 BPM | SYSTOLIC BLOOD PRESSURE: 135 MMHG | HEIGHT: 77 IN | BODY MASS INDEX: 30.98 KG/M2 | RESPIRATION RATE: 19 BRPM | OXYGEN SATURATION: 91 % | DIASTOLIC BLOOD PRESSURE: 86 MMHG | WEIGHT: 262.35 LBS

## 2022-12-13 DIAGNOSIS — S22.32XA CLOSED FRACTURE OF ONE RIB OF LEFT SIDE, INITIAL ENCOUNTER: ICD-10-CM

## 2022-12-13 DIAGNOSIS — S37.012A HEMATOMA OF LEFT KIDNEY, INITIAL ENCOUNTER: Primary | ICD-10-CM

## 2022-12-13 DIAGNOSIS — Z79.01 ANTICOAGULATED: ICD-10-CM

## 2022-12-13 LAB
ALBUMIN SERPL-MCNC: 3.9 G/DL (ref 3.5–5)
ALBUMIN/GLOB SERPL: 1.1 {RATIO} (ref 1.1–2.2)
ALP SERPL-CCNC: 49 U/L (ref 45–117)
ALT SERPL-CCNC: 18 U/L (ref 12–78)
ANION GAP SERPL CALC-SCNC: 9 MMOL/L (ref 5–15)
APTT PPP: 27.6 SEC (ref 22.1–31)
AST SERPL-CCNC: 24 U/L (ref 15–37)
BASOPHILS # BLD: 0 K/UL (ref 0–0.1)
BASOPHILS NFR BLD: 0 % (ref 0–1)
BILIRUB SERPL-MCNC: 1.5 MG/DL (ref 0.2–1)
BUN SERPL-MCNC: 22 MG/DL (ref 6–20)
BUN/CREAT SERPL: 15 (ref 12–20)
CALCIUM SERPL-MCNC: 8.5 MG/DL (ref 8.5–10.1)
CHLORIDE SERPL-SCNC: 101 MMOL/L (ref 97–108)
CO2 SERPL-SCNC: 29 MMOL/L (ref 21–32)
CREAT SERPL-MCNC: 1.44 MG/DL (ref 0.7–1.3)
DIFFERENTIAL METHOD BLD: ABNORMAL
EOSINOPHIL # BLD: 0 K/UL (ref 0–0.4)
EOSINOPHIL NFR BLD: 0 % (ref 0–7)
ERYTHROCYTE [DISTWIDTH] IN BLOOD BY AUTOMATED COUNT: 15.3 % (ref 11.5–14.5)
GLOBULIN SER CALC-MCNC: 3.4 G/DL (ref 2–4)
GLUCOSE SERPL-MCNC: 129 MG/DL (ref 65–100)
HCT VFR BLD AUTO: 44 % (ref 36.6–50.3)
HGB BLD-MCNC: 14.7 G/DL (ref 12.1–17)
IMM GRANULOCYTES # BLD AUTO: 0 K/UL (ref 0–0.04)
IMM GRANULOCYTES NFR BLD AUTO: 0 % (ref 0–0.5)
INR PPP: 1.2 (ref 0.9–1.1)
LYMPHOCYTES # BLD: 0.9 K/UL (ref 0.8–3.5)
LYMPHOCYTES NFR BLD: 9 % (ref 12–49)
MCH RBC QN AUTO: 29.4 PG (ref 26–34)
MCHC RBC AUTO-ENTMCNC: 33.4 G/DL (ref 30–36.5)
MCV RBC AUTO: 88 FL (ref 80–99)
MONOCYTES # BLD: 0.9 K/UL (ref 0–1)
MONOCYTES NFR BLD: 8 % (ref 5–13)
NEUTS SEG # BLD: 8.7 K/UL (ref 1.8–8)
NEUTS SEG NFR BLD: 82 % (ref 32–75)
NRBC # BLD: 0 K/UL (ref 0–0.01)
NRBC BLD-RTO: 0 PER 100 WBC
PLATELET # BLD AUTO: 160 K/UL (ref 150–400)
PMV BLD AUTO: 10.8 FL (ref 8.9–12.9)
POTASSIUM SERPL-SCNC: 4.4 MMOL/L (ref 3.5–5.1)
PROT SERPL-MCNC: 7.3 G/DL (ref 6.4–8.2)
PROTHROMBIN TIME: 11.6 SEC (ref 9–11.1)
RBC # BLD AUTO: 5 M/UL (ref 4.1–5.7)
SODIUM SERPL-SCNC: 139 MMOL/L (ref 136–145)
THERAPEUTIC RANGE,PTTT: NORMAL SECS (ref 58–77)
WBC # BLD AUTO: 10.6 K/UL (ref 4.1–11.1)

## 2022-12-13 PROCEDURE — 74176 CT ABD & PELVIS W/O CONTRAST: CPT

## 2022-12-13 PROCEDURE — 74011250636 HC RX REV CODE- 250/636: Performed by: EMERGENCY MEDICINE

## 2022-12-13 PROCEDURE — 85730 THROMBOPLASTIN TIME PARTIAL: CPT

## 2022-12-13 PROCEDURE — 85610 PROTHROMBIN TIME: CPT

## 2022-12-13 PROCEDURE — 96361 HYDRATE IV INFUSION ADD-ON: CPT

## 2022-12-13 PROCEDURE — 99285 EMERGENCY DEPT VISIT HI MDM: CPT

## 2022-12-13 PROCEDURE — 71250 CT THORAX DX C-: CPT

## 2022-12-13 PROCEDURE — 36415 COLL VENOUS BLD VENIPUNCTURE: CPT

## 2022-12-13 PROCEDURE — 80053 COMPREHEN METABOLIC PANEL: CPT

## 2022-12-13 PROCEDURE — 70450 CT HEAD/BRAIN W/O DYE: CPT

## 2022-12-13 PROCEDURE — 85025 COMPLETE CBC W/AUTO DIFF WBC: CPT

## 2022-12-13 PROCEDURE — 96374 THER/PROPH/DIAG INJ IV PUSH: CPT

## 2022-12-13 PROCEDURE — 74011250637 HC RX REV CODE- 250/637: Performed by: EMERGENCY MEDICINE

## 2022-12-13 PROCEDURE — 96375 TX/PRO/DX INJ NEW DRUG ADDON: CPT

## 2022-12-13 RX ORDER — HYDROCODONE BITARTRATE AND ACETAMINOPHEN 5; 325 MG/1; MG/1
1 TABLET ORAL
Status: COMPLETED | OUTPATIENT
Start: 2022-12-13 | End: 2022-12-13

## 2022-12-13 RX ORDER — FENTANYL CITRATE 50 UG/ML
50 INJECTION, SOLUTION INTRAMUSCULAR; INTRAVENOUS ONCE
Status: COMPLETED | OUTPATIENT
Start: 2022-12-13 | End: 2022-12-13

## 2022-12-13 RX ORDER — ONDANSETRON 2 MG/ML
4 INJECTION INTRAMUSCULAR; INTRAVENOUS
Status: COMPLETED | OUTPATIENT
Start: 2022-12-13 | End: 2022-12-13

## 2022-12-13 RX ORDER — HYDROMORPHONE HYDROCHLORIDE 1 MG/ML
1 INJECTION, SOLUTION INTRAMUSCULAR; INTRAVENOUS; SUBCUTANEOUS ONCE
Status: COMPLETED | OUTPATIENT
Start: 2022-12-13 | End: 2022-12-13

## 2022-12-13 RX ORDER — TAMSULOSIN HYDROCHLORIDE 0.4 MG/1
0.4 CAPSULE ORAL DAILY
COMMUNITY

## 2022-12-13 RX ADMIN — SODIUM CHLORIDE 1000 ML: 9 INJECTION, SOLUTION INTRAVENOUS at 18:52

## 2022-12-13 RX ADMIN — HYDROMORPHONE HYDROCHLORIDE 1 MG: 1 INJECTION, SOLUTION INTRAMUSCULAR; INTRAVENOUS; SUBCUTANEOUS at 21:11

## 2022-12-13 RX ADMIN — FENTANYL CITRATE 50 MCG: 50 INJECTION, SOLUTION INTRAMUSCULAR; INTRAVENOUS at 18:52

## 2022-12-13 RX ADMIN — HYDROCODONE BITARTRATE AND ACETAMINOPHEN 1 TABLET: 5; 325 TABLET ORAL at 16:44

## 2022-12-13 RX ADMIN — ONDANSETRON 4 MG: 2 INJECTION INTRAMUSCULAR; INTRAVENOUS at 18:52

## 2022-12-13 NOTE — ED TRIAGE NOTES
Pt ambulatory to treatment room reports catching his toe on the threshhold on Sunday, fell and hit his left side. Pt reports left rib and back pain. Denies LOC, denies difficulty breathing.

## 2022-12-13 NOTE — ED PROVIDER NOTES
68-year-old man presents to the ER for chief complaint of left-sided rib pain following a mechanical fall that occurred 2 days ago. Patient is on Eliquis. Denies hitting his head or losing consciousness. He states the pain is in his left anterior axillary line around rib #7, 8, 9, as well as in his mid axillary line lower in his left far lateral abdominal wall. He has no abdominal pain however. He denies any difficulty breathing, states he has had a couple episodes of nausea and vomiting since the fall. Denies any headache or vision change. ROS:  Constitutional: Negative for chills or fever. Eyes: Negative for vision change or loss. ENT and mouth: Negative for ear drainage, epistaxis, or mouth sores. Cardiovascular: Negative for chest pain. Respiratory: No wheezing or shortness of breath. Gastrointestinal: No melena or BRB per rectum. Musculoskeletal: No loss of range of motion. Positive for left chest wall pain, left lateral abdominal pain status post fall 2 days ago. Neurologic: No unilateral weakness. Integumentary: No rash. Psychiatric: Negative for SI.    10 level review of systems is otherwise negative except as noted above in the ROS and in the history of present illness. Reviewed and agree with available nursing notes. Available prior ED visit history reviewed. Vital signs were reviewed and oxygenation is adequate. Physical exam:  Constitutional: Awake, alert, not in severe distress. Head and neck: Normocephalic, atraumatic. Negative hemotypanum, negative battles sign, negative racoon sign. No JVD, no nuchal rigidity. ENT and mouth: No active epistaxis, external ears normal, trachea is midline. Eyes: Extraocular movements intact, no periorbital edema. Cardiovascular: Regular rate, regular rhythm. Respiratory: No increased work of breathing, no signs of pending respiratory failure. Gastrointestinal: Nondistended.   Abdomen is soft and nontender to palpation, no pulsatile masses noted. No bruising, negative Jitendra sign, negative Menon Trimble sign. Musculoskeletal: Free range of motion, no edema. Producible left chest wall pain ribs #7 8 and 9, no crepitus. Integumentary: Warm and dry, no rash, skin is intact. Neurologic: Normal speech, no lateralizing neurologic deficit. Psychiatric: Appropriate affect, not responding to internal stimuli. Differential diagnosis includes but is not limited to: Fracture, dislocation, sprain, strain, contusion, laceration, abrasion, cellulitis, soft tissue foreign body, tendon injury, arterial occlusion, DVT, head injury, intracranial injury, cervical spine injury, paresthesia, neuropathy. Medical decision making and ED course: CT imaging performed of the head, thoracic and abdominal regions. Patient says he tolerates Norco without difficulties he was given 1 of those for pain. CT imaging indicates patient does have a infrarenal hemorrhage, and a broken rib acutely on the left side, I did consult with [facility] trauma team as the patient may need to be evaluated for the trauma from the fall with this intra renal bleeding. Remained hemodynamically stable here in the ED He has remained hemodynamically stable throughout his ED stay. Pt accepted by Dr. Anita Beckford at [facility] trauma, pt remains stable throughout course. Fall       Past Medical History:   Diagnosis Date    Arthritis     left knee and lt. hand    Atrial fibrillation (HCC)     BPH (benign prostatic hyperplasia)     Chronic pain     Back pain    Colon polyps     DJD (degenerative joint disease) of lumbar spine     laminectomy 1979, 1991, 2015    ED (erectile dysfunction) 6/18/2014    Heart palpitations     Dr. Hesham Winkler    Herniated nucleus pulposus, C3-4 12/2015    Dr. Maya Casillas    Hyperlipidemia     Hypertension     IFG (impaired fasting glucose)     Insomnia     severe.   has treated, AVI, back pain    Knee pain, right     Normal cardiac stress test 9/21/15    OA (osteoarthritis) of knee Dr. Alexia Jaime    Obesity     Onychomycosis 2018    Dr Saniya Cardozo. lamisil    AVI on CPAP     Dr. Maurice Guzman    Seasonal allergic rhinitis     Spinal stenosis in cervical region 12/2015    Spinal stenosis of lumbar region     MRI 12/2012. Dr. Carlos Basilio    SVT (supraventricular tachycardia) [facility])     Dr. Lilia River    Varicose veins     vein stripping 10/10       Past Surgical History:   Procedure Laterality Date    COLONOSCOPY  2008    2 polyps. repeat 2011. Dr. Cindy Pugh  2007    7 polyps per pt     COLONOSCOPY N/A 6/15/2016    COLONOSCOPY performed by Jovanny Arellano MD at [address] [facility], COLON, DIAGNOSTIC  2011    return in 2016    HX BLADDER REPAIR      polyp removal    HX CERVICAL FUSION  1/16/16    ACDF C3-C4 Dr. Carlos Basilio    HX COLONOSCOPY  4/27/11    Dr Maurisio Burks, smal polyp.   repeat 4/2016    HX KNEE ARTHROSCOPY  2005    right, Dr. Claire Barron    HX KNEE ARTHROSCOPY  02/01/2012    left, Dr Claire Barron    HX KNEE ARTHROSCOPY  1/2013    right    HX KNEE REPLACEMENT  11/4/2013    HX LUMBAR FUSION  9/2015    L3-L4, Dr. Yvonne Mckeon      left, Dr Shonna Woodward    bladder polyp removed with cystoscopy    HX VEIN STRIPPING  9/2010    Dr. Kathe Vaughn, bilateral    VT INS NEW/RPLC PRM PACEMAKER W/TRANSV ELTRD VENTR N/A 10/4/2019    INSERT PPM SINGLE VENTRICULAR/Medtronic performed by Padmini Thomas MD at [address] CATH LAB    VT INS NEW/RPLCMT PRM PM W/TRANSV ELTRD ATRIAL&VENT N/A 10/4/2019    Insert Ppm Dual performed by Padmini Thomas MD at [address] CATH LAB    VT REMOVAL SUBCUTANEOUS CARDIAC RHYTHM MONITOR N/A 10/4/2019    LOOP RECORDER REMOVAL performed by Padmini Thomas MD at [address] CATH LAB    VT SHOULDER SURG [address] UNLISTED      left DECOMPRESSION, Dr. Mary Pinzon  11/2013    VASCULAR SURGERY PROCEDURE UNLIST  2008    bilateral vein stripping         Family History:   Problem Relation Age of Onset    Cancer Mother         liver    Cancer Father         leukemia    Cancer Sister         lung cancer       Social History     Socioeconomic History    Marital status:      Spouse name: Maurilio Mcclain    Number of children: 3    Years of education: Not on file    Highest education level: Not on file   Occupational History    Not on file   Tobacco Use    Smoking status: Former     Packs/day: 0.25     Years: 19.00     Pack years: 4.75     Types: Cigarettes     Quit date: 1980     Years since quittin.9    Smokeless tobacco: Never    Tobacco comments:     quit ~   Substance and Sexual Activity    Alcohol use: Yes     Comment: socially    Drug use: No    Sexual activity: Yes   Other Topics Concern     Service Yes     Comment: [employer]    Blood Transfusions Not Asked    Caffeine Concern Not Asked    Occupational Exposure Not Asked    Hobby Hazards Not Asked    Sleep Concern Not Asked    Stress Concern Not Asked    Weight Concern Not Asked    Special Diet Not Asked    Back Care Not Asked    Exercise Not Asked    Bike Helmet Not Asked    Seat Belt Not Asked    Self-Exams Not Asked   Social History Narrative    ** Merged History Encounter **         1 child, 2 stepchildren    fishes     Social Determinants of Health     Financial Resource Strain: Not on file   Food Insecurity: Not on file   Transportation Needs: Not on file   Physical Activity: Not on file   Stress: Not on file   Social Connections: Not on file   Intimate Partner Violence: Not on file   Housing Stability: Not on file         ALLERGIES: Percocet [oxycodone-acetaminophen] and Penicillin g    Review of Systems    Vitals:    22 1557   BP: 135/88   Pulse: 83   Resp: 18   Temp: 98.9 °F (37.2 °C)   SpO2: 97%   Weight: 119 kg (262 lb 5.6 oz)   Height: 6' 5\" (1.956 m)            Physical Exam     MDM         Procedures

## 2022-12-14 NOTE — ED NOTES
Pt will be transferred to [facility] ED. Dr. Jane Mederos accepted pt.       [facility] ETA: [id number]

## 2022-12-14 NOTE — ED NOTES
TRANSFER - OUT REPORT:    Verbal report given to Ankit Mehta RN(name) on Naga Alonso  being transferred to [facility] ED(unit) for routine progression of care       Report consisted of patients Situation, Background, Assessment and   Recommendations(SBAR). Information from the following report(s) SBAR, MAR, and Recent Results was reviewed with the receiving nurse. Lines:   Peripheral IV 12/13/22 Right Antecubital (Active)   Site Assessment Clean, dry, & intact 12/13/22 1805        Opportunity for questions and clarification was provided.       Patient transported with:   Monitor

## 2023-01-03 PROBLEM — Z79.01 ANTICOAGULATION ADEQUATE: Status: ACTIVE | Noted: 2023-01-03

## 2023-01-04 ENCOUNTER — OFFICE VISIT (OUTPATIENT)
Dept: CARDIOLOGY CLINIC | Age: 78
End: 2023-01-04
Payer: MEDICARE

## 2023-01-04 VITALS
SYSTOLIC BLOOD PRESSURE: 134 MMHG | OXYGEN SATURATION: 99 % | HEART RATE: 70 BPM | RESPIRATION RATE: 16 BRPM | HEIGHT: 77 IN | WEIGHT: 260.6 LBS | BODY MASS INDEX: 30.77 KG/M2 | DIASTOLIC BLOOD PRESSURE: 78 MMHG

## 2023-01-04 DIAGNOSIS — I49.5 TACHY-BRADY SYNDROME (HCC): ICD-10-CM

## 2023-01-04 DIAGNOSIS — I48.0 PAROXYSMAL ATRIAL FIBRILLATION (HCC): Primary | ICD-10-CM

## 2023-01-04 DIAGNOSIS — Z79.01 ANTICOAGULATION ADEQUATE: ICD-10-CM

## 2023-01-04 DIAGNOSIS — Z95.0 CARDIAC PACEMAKER IN SITU: Primary | ICD-10-CM

## 2023-01-04 DIAGNOSIS — Z95.0 PACEMAKER: ICD-10-CM

## 2023-01-04 DIAGNOSIS — G47.33 OSA (OBSTRUCTIVE SLEEP APNEA): ICD-10-CM

## 2023-01-04 DIAGNOSIS — I10 ESSENTIAL HYPERTENSION, BENIGN: ICD-10-CM

## 2023-01-04 PROCEDURE — G0463 HOSPITAL OUTPT CLINIC VISIT: HCPCS | Performed by: INTERNAL MEDICINE

## 2023-01-04 NOTE — PROGRESS NOTES
Room #: 2    Had a fall in early December with back injury that required hospitalization at [facility]. Chief Complaint   Patient presents with    Annual Exam    Pacemaker Check    Irregular Heart Beat    Rapid Heart Rate    Slow Heart Rate       Visit Vitals  /78 (BP 1 Location: Right upper arm, BP Patient Position: Sitting, BP Cuff Size: Adult)   Pulse 70   Resp 16   Ht 6' 5\" (1.956 m)   Wt 260 lb 9.6 oz (118.2 kg)   SpO2 99%   BMI 30.90 kg/m²         Chest pain:  NO  Shortness of breath:  NO  Edema: NO  Palpitations, skipped beats, rapid heartbeat:  NO  Dizziness:  NO    1. Have you been to the ER, urgent care clinic since your last visit? Hospitalized since your last visit? NO    2. Have you seen or consulted any other health care providers outside of the [address] [place] [address] since your last visit? Include any pap smears or colon screening.  NO      Refills:  NO

## 2023-01-04 NOTE — PROGRESS NOTES
Cardiac Electrophysiology OFFICE Consultation Note       Assessment/Plan:   1. Paroxysmal atrial fibrillation (HCC)  2. Tachy-viv syndrome ([address]  3. AVI (obstructive sleep apnea)  4. Essential hypertension, benign  5. Pacemaker  6. Anticoagulation adequate     Chronic atrial fibrillation  Remains in chronic atrial fibrillation. Ventricular paced 92.5% of the time. - Remain on Eliquis 5 mg twice daily for CVA prevention. Does have a history of melena. Recent fall complicated by rib fracture and bruising in his kidneys. We spoke briefly about the potential role of watchman implantation should he wish to come off of Eliquis in the future. - Information regarding watchman was provided  - Follow-up with me in 1 year    Pacemaker  History of heart block, ventricular pacing 92.5%. Medtronic pacemaker implanted on 10/4/2019  - Medtronic dual chamber pacemaker with normal function. Battery life 9.8 years. No new or significant lead issues requiring intervention. No significant arrhythmias noted requiring intervention. Iterative programing was performed to assess lead parameters without any permanent changes. Ventricular pacing 92.5%. Mode programmed at VVIR 70 bpm    Hypertension  BP is well controlled on the current regimen. No change in antihypertensive medications today. Recommended routine exercise and low sodium diet. - Continue amlodipine 10 mg daily metoprolol succinate 25 mg daily    Anticoagulation  See above, might be a candidate for watchman implantation    Subjective:       Darren Rey is a 76 y.o. male presenting for management of Afib and PPM.    Prior patient of Dr. Karina Patricia. History of atrial fibrillation, ILR who has experienced fatigue felt to be potentially secondary to bradycardia. He underwent dual chamber pacemaker and his metoprolol was increased. He returns for follow up today and denies cardiac complaints.  His device interrogation demonstrates normal functioning and incision has healed well. Hospitalized in November, he fell and broke a rib, he was noted to have bleeding in his kidney. He reported prior history of melena and dark tarry stools. Otherwise remain on Eliquis for CVA prevention. He is in chronic atrial fibrillation. Last TTE from 3/11/22 demonstrated LVEF of 60-65%, severely dilated LA. Patient Active Problem List   Diagnosis Code    Heart palpitations R00.2    Hyperlipidemia with target LDL less than 130 E78.5    BPH (benign prostatic hypertrophy) N40.0    Lower back pain M54.50    Knee pain, right M25.561    Right sided sciatica M54.31    AVI (obstructive sleep apnea) G47.33    Colon polyps K63.5    Palpitations R00.2    Supraventricular tachycardia (HCC) I47.1    Essential hypertension, benign I10    Venous insufficiency I87.2    Tear of medial cartilage or meniscus of knee, current [id number]    Osteoarthrosis, unspecified whether generalized or localized, lower leg [id number]    Spinal stenosis of lumbar region M48.061    Right knee DJD M17.11    IFG (impaired fasting glucose) R73.01    ED (erectile dysfunction) N52.9    SVT (supraventricular tachycardia) (HCC) I47.1    Spinal stenosis in cervical region M48.02    Herniated nucleus pulposus, C3-4 M50.21    Normal cardiac stress test [id number]    Osteoarthritis of left knee M17.12    BPH (benign prostatic hyperplasia) N40.0    Insomnia G47.00    Paroxysmal atrial fibrillation (HCC) I48.0    Advanced care planning/counseling discussion Z71.89    Tachy-viv syndrome (HCC) I49.5    Sepsis (HCC) A41.9    Pacemaker Z95.0    Anticoagulation adequate Z79.01     Current Outpatient Medications   Medication Sig Dispense Refill    tamsulosin (FLOMAX) 0.4 mg capsule Take 0.4 mg by mouth daily. apixaban (Eliquis) 5 mg tablet Take 1 Tablet by mouth two (2) times a day. 180 Tablet 2    furosemide (LASIX) 20 mg tablet TAKE 1 TABLET AS NEEDED FOR SWELLING 90 Tablet 0    celecoxib (CELEBREX) 200 mg capsule Take 200 mg by mouth daily. metoprolol succinate (TOPROL-XL) 50 mg XL tablet TAKE 1 TABLET DAILY (Patient taking differently: 25 mg daily.) 90 Tab 3    terazosin (HYTRIN) 5 mg capsule TAKE 1 CAPSULE DAILY 90 Cap 3    levocetirizine (XYZAL) 5 mg tablet TAKE 1 TABLET DAILY 90 Tab 3    atorvastatin (LIPITOR) 20 mg tablet TAKE 1 TABLET DAILY 90 Tab 4    lisinopriL (PRINIVIL, ZESTRIL) 40 mg tablet Take 1 Tablet by mouth daily. (Patient not taking: Reported on 1/4/2023) 90 Tablet 2    alfuzosin SR (UROXATRAL) 10 mg SR tablet Take 10 mg by mouth as needed. (Patient not taking: Reported on 1/4/2023)      amLODIPine (NORVASC) 10 mg tablet TAKE 1 TABLET BY MOUTH DAILY 90 Tab 0    traZODone (DESYREL) 150 mg tablet Take 150 mg by mouth nightly. diclofenac (VOLTAREN) 1 % gel Apply 4 g to affected area daily as needed. (Patient not taking: Reported on 1/4/2023)       Allergies   Allergen Reactions    Percocet [Oxycodone-Acetaminophen] Other (comments)     hallucinations    Penicillin G Rash     Did okay with keflex - no reaction with that. Past Medical History:   Diagnosis Date    Arthritis     left knee and lt. hand    Atrial fibrillation (HCC)     BPH (benign prostatic hyperplasia)     Chronic pain     Back pain    Colon polyps     DJD (degenerative joint disease) of lumbar spine     laminectomy 1979, 1991, 2015    ED (erectile dysfunction) 6/18/2014    Heart palpitations     Dr. Ranjana Luz    Herniated nucleus pulposus, C3-4 12/2015    Dr. Brian Little    Hyperlipidemia     Hypertension     IFG (impaired fasting glucose)     Insomnia     severe. has treated, AVI, back pain    Knee pain, right     Normal cardiac stress test 9/21/15    OA (osteoarthritis) of knee     Dr. Ingrid Rosario    Obesity     Onychomycosis 2018    Dr Chayo Hill. lamisil    AVI on CPAP     Dr. Johnnie Rodriges    Seasonal allergic rhinitis     Spinal stenosis in cervical region 12/2015    Spinal stenosis of lumbar region     MRI 12/2012.   Dr. Brian Little    SVT (supraventricular tachycardia) ([facility] Dr. Tita Leon    Varicose veins     vein stripping 10/10     Past Surgical History:   Procedure Laterality Date    COLONOSCOPY  2008    2 polyps. repeat 2011. Dr. Fonseca Sample  2007    7 polyps per pt     COLONOSCOPY N/A 6/15/2016    COLONOSCOPY performed by Nickie Daniels MD at [address] [facility], COLON, DIAGNOSTIC  2011    return in 2016    HX BLADDER REPAIR      polyp removal    HX CERVICAL FUSION  1/16/16    ACDF C3-C4 Dr. Reynoso Bridegroom    HX COLONOSCOPY  4/27/11    Dr Reyes Quan, smal polyp.   repeat 4/2016    HX KNEE ARTHROSCOPY  2005    right, Dr. Rosanna Kayser    HX KNEE ARTHROSCOPY  02/01/2012    left, Dr Rosanna Kayser    HX KNEE ARTHROSCOPY  1/2013    right    HX KNEE REPLACEMENT  11/4/2013    HX LUMBAR FUSION  9/2015    L3-L4, Dr. Evita Iyer      left, Dr Laura Kendrick    bladder polyp removed with cystoscopy    HX VEIN STRIPPING  9/2010    Dr. Ava Grigsby, bilateral    MI ARTHRP KNE CONDYLE&PLATU MEDIAL&LAT COMPARTMENTS  11/2013    MI INS NEW/RPLC PRM PACEMAKER W/TRANSV ELTRD VENTR N/A 10/4/2019    INSERT PPM SINGLE VENTRICULAR/Medtronic performed by Macario Tovar MD at [address] CATH LAB    MI INS NEW/RPLCMT PRM PM W/TRANSV ELTRD ATRIAL&VENT N/A 10/4/2019    Insert Ppm Dual performed by Macario Tovar MD at [address] CATH LAB    MI REMOVAL SUBCUTANEOUS CARDIAC RHYTHM MONITOR N/A 10/4/2019    LOOP RECORDER REMOVAL performed by Macario Tovar MD at [address] [facility] CATH LAB    MI UNLISTED PROCEDURE ACCESSORY SINUSES  1999    MI UNLISTED PROCEDURE SHOULDER      left DECOMPRESSION, Dr. Christin Springer  2008    bilateral vein stripping     Family History   Problem Relation Age of Onset    Cancer Mother         liver    Cancer Father         leukemia    Cancer Sister         lung cancer     Social History     Tobacco Use    Smoking status: Former     Packs/day: 0.25     Years: 19.00     Pack years: 4.75     Types: Cigarettes     Quit date: 1980     Years since quittin.0    Smokeless tobacco: Never    Tobacco comments:     quit ~   Substance Use Topics    Alcohol use: Yes     Comment: socially        Review of Systems:   12 point review of systems was performed. All negative except for HPI     Objective:   /78 (BP 1 Location: Right upper arm, BP Patient Position: Sitting, BP Cuff Size: Adult)   Pulse 70   Resp 16   Ht 6' 5\" (1.956 m)   Wt 260 lb 9.6 oz (118.2 kg)   SpO2 99%   BMI 30.90 kg/m²     Physical Exam:   General:  Alert and oriented, in no acute distress  Head:  Atraumatic, normocephalic  Eyes:  extraocular muscles intact  Neck:  Supple, normal range of motion  Lungs:  Clear to auscultation bilaterally, no wheezes/rales/rhonchi   Cardiovascular:  Regular rate and rhythm, normal S1-S2, no murmurs/rubs/gallops  Abdomen:  Soft, nontender, nondistended, normoactive bowel sounds  Skin:  Intact, no rash; left pacemaker pectoral site well-healed without any abnormalities  Extremities:, no clubbing, cyanosis, or edema  Musculoskeletal: normal range of motion  Neurological:  Alert and oriented, no focal neurologic deficits  Psychiatric:  Normal mood and affect    Lab Results   Component Value Date/Time    Hemoglobin A1c 5.6 2019 08:52 AM       22    ECHO ADULT COMPLETE 2022 3/11/2022    Interpretation Summary    Left Ventricle: Left ventricle size is normal. Increased wall thickness. Findings consistent with concentric hypertrophy. See diagram for wall motion findings. Normal left ventricular systolic function with a visually estimated EF of 60 - 65%. Tricuspid Valve: Mild transvalvular regurgitation. RVSP is 35 mmHg. Right Atrium: Right atrium is dilated. Pacer wire present. Left Atrium: Left atrium is severely dilated. LA Vol Index A/L is 60 mL/m2.     Signed by: Morgan Ramos DO on 3/11/2022  1:15 PM      19    NUCLEAR CARDIAC STRESS TEST 09/30/2019 10/1/2019    Interpretation Summary  · Gated SPECT: Left ventricular function post-stress was normal. Calculated ejection fraction is 55%. There is no evidence of transient ischemic dilation (TID). · Baseline ECG: Normal sinus rhythm. · No ECG changes with Lexiscan. .  · Left ventricular perfusion is normal.  · Negative myocardial perfusion imaging. Signed by: Morgan Ramos DO on 9/30/2019  7:59 AM    Results for orders placed or performed during the hospital encounter of 10/25/20   EKG, 12 LEAD, INITIAL   Result Value Ref Range    Ventricular Rate 97 BPM    Atrial Rate 105 BPM    QRS Duration 90 ms    Q-T Interval 326 ms    QTC Calculation (Bezet) 414 ms    Calculated R Axis 17 degrees    Calculated T Axis -62 degrees    Diagnosis       Atrial fibrillation  ST & T wave abnormality, consider inferior ischemia  ST & T wave abnormality, consider anterolateral ischemia  Abnormal ECG  When compared with ECG of 18-AUG-2016 12:54,  T wave inversion now evident in Inferior leads  T wave inversion now evident in Anterolateral leads  Confirmed by Tonia Lock M.D., Malachi Lambert ([id number]) on 10/26/2020 3:03:18 PM     Results for orders placed or performed in visit on 12/12/11   AMB POC EKG ROUTINE W/ 12 LEADS, INTER & REP    Narrative    See progress note. Thank you for involving me in this patient's care and please call with further concerns or questions. ________________________________________  An Jenni Baker MD, [facility]  Cardiac Electrophysiology  [facility]  [address] [place] [address]                             [phone]     [address] [place] [address].  [address] [place] [address], [id number] [place] [address]  [phone]

## 2023-01-04 NOTE — PATIENT INSTRUCTIONS
Please call us if you want to discuss further information regarding Watchman or proceed with the procedure  Remote transmission every 3 months  FU with Dr. Irasema Shea in 1 year

## 2023-01-18 ENCOUNTER — DOCUMENTATION ONLY (OUTPATIENT)
Dept: CARDIOLOGY CLINIC | Age: 78
End: 2023-01-18

## 2023-01-18 NOTE — PROGRESS NOTES
Received fax from [address] [place] [address] for MRI order. Per Dr Mart Marcos set VOO 80. Faxed to [address] [facility] fax #[phone]. Received confirmation.

## 2023-03-03 ENCOUNTER — OFFICE VISIT (OUTPATIENT)
Dept: CARDIOLOGY CLINIC | Age: 78
End: 2023-03-03

## 2023-03-03 VITALS
OXYGEN SATURATION: 100 % | BODY MASS INDEX: 30.82 KG/M2 | HEIGHT: 77 IN | SYSTOLIC BLOOD PRESSURE: 104 MMHG | DIASTOLIC BLOOD PRESSURE: 80 MMHG | WEIGHT: 261 LBS | RESPIRATION RATE: 20 BRPM | HEART RATE: 79 BPM

## 2023-03-03 DIAGNOSIS — I49.5 TACHY-BRADY SYNDROME (HCC): ICD-10-CM

## 2023-03-03 DIAGNOSIS — Z95.0 PACEMAKER: ICD-10-CM

## 2023-03-03 DIAGNOSIS — I48.11 LONGSTANDING PERSISTENT ATRIAL FIBRILLATION (HCC): Primary | ICD-10-CM

## 2023-03-03 DIAGNOSIS — Z87.19 HISTORY OF MELENA: ICD-10-CM

## 2023-03-03 DIAGNOSIS — I10 PRIMARY HYPERTENSION: ICD-10-CM

## 2023-03-03 NOTE — LETTER
3/3/2023    Patient: Jason Orr   YOB: 1945   Date of Visit: 3/3/2023     Sonia Caro, [address] [id number]  Via Fax: [phone]    Dear Sonia Caro MD,      Thank you for referring Mr. Jason Orr to [address] [place] [address] for evaluation. My notes for this consultation are attached. If you have questions, please do not hesitate to call me. I look forward to following your patient along with you.       Sincerely,    Jessica Mercedes, DO

## 2023-03-03 NOTE — PROGRESS NOTES
Shayna Carbone, DO  [address] [facility], [address] [place] [address], [address] [place] [address]    Office [phone],NLQ [phone]           Ramiro Yen is a [de-identified] y.o. male presents to the office for follow-up      Assessment/Recommendations:      ICD-10-CM ICD-9-CM    1. Longstanding persistent atrial fibrillation (HCC)  I48.11 427.31       2. Primary hypertension  I10 401.9 AMB POC EKG ROUTINE W/ 12 LEADS, INTER & REP      3. Tachy-viv syndrome (HCC)  I49.5 427.81       4. Pacemaker  Z95.0 V45.01       5. History of melena  Z87.19 V12.79           Chronic atrial fibrillation  Symptomatic bradycardia, s/p permanent pacemaker 10/2019. Obstructive sleep apnea  Hypertension   Concentric LVH on echocardiogram  venous insufficiency s/p GSV ablation  Morbid obesity    Medically doing very well. Recommend he continue his current antihypertensive regimen. He is scheduled for echocardiogram in near future to reassess LV function with RV pacing. Continue Eliquis for stroke prophylaxis. Dr. Ernestine Metz discussed Watchman procedure w/ pt at recent visit. Pt interested in proceeding with watchman implantation. Continue metoprolol XL 25 mg daily for rate control        Primary Care Physician- Scott Pritchett MD    Follow-up 1 year        Subjective:  [de-identified] y.o. presents the office for follow-up visit. Follows with EP for permanent pacemaker. Clinically doing very well. No ongoing chest pain or chest pressure symptoms. No shortness of breath. Interested in Fabian.       Past Medical History:   Diagnosis Date    Arthritis     left knee and lt. hand    Atrial fibrillation (HCC)     BPH (benign prostatic hyperplasia)     Chronic pain     Back pain    Colon polyps     DJD (degenerative joint disease) of lumbar spine     laminectomy 1979, 1991, 2015    ED (erectile dysfunction) 6/18/2014    Heart palpitations     Dr. Claudia Chamberlain    Herniated nucleus pulposus, C3-4 12/2015    Dr. Franny Heart    Hyperlipidemia Hypertension     IFG (impaired fasting glucose)     Insomnia     severe. has treated, AVI, back pain    Knee pain, right     Normal cardiac stress test 9/21/15    OA (osteoarthritis) of knee     Dr. Yovanny Ojeda    Obesity     Onychomycosis 2018    Dr Irvin Mullen. lamisil    AVI on CPAP     Dr. Joan Christianson    Seasonal allergic rhinitis     Spinal stenosis in cervical region 12/2015    Spinal stenosis of lumbar region     MRI 12/2012. Dr. Shanna Moura    SVT (supraventricular tachycardia) [facility])     Dr. Charles Brito    Varicose veins     vein stripping 10/10        Past Surgical History:   Procedure Laterality Date    COLONOSCOPY  2008    2 polyps. repeat 2011. Dr. Lisette Magana  2007    7 polyps per pt     COLONOSCOPY N/A 6/15/2016    COLONOSCOPY performed by Александр Keith MD at [facility] [address], COLON, DIAGNOSTIC  2011    return in 2016    HX BLADDER REPAIR      polyp removal    HX CERVICAL FUSION  1/16/16    ACDF C3-C4 Dr. Shanna Moura    HX COLONOSCOPY  4/27/11    Dr Kathi Ryan, smal polyp.   repeat 4/2016    HX KNEE ARTHROSCOPY  2005    right, Dr. Fabian Manley    HX KNEE ARTHROSCOPY  02/01/2012    left, Dr Fabian Manley    HX KNEE ARTHROSCOPY  1/2013    right    HX KNEE REPLACEMENT  11/4/2013    HX LUMBAR FUSION  9/2015    L3-L4, Dr. Coretta Guzman      left, Dr Lisa Torres    bladder polyp removed with cystoscopy    HX VEIN STRIPPING  9/2010    Dr. Waller Camera, bilateral    UT ARTHRP KNE CONDYLE&PLATU MEDIAL&LAT COMPARTMENTS  11/2013    UT INS NEW/RPLC PRM PACEMAKER W/TRANSV ELTRD VENTR N/A 10/4/2019    INSERT PPM SINGLE VENTRICULAR/[employer] performed by Han Lei MD at [facility] CATH LAB    UT INS NEW/RPLCMT PRM PM W/TRANSV ELTRD ATRIAL&VENT N/A 10/4/2019    Insert Ppm Dual performed by Han Lei MD at [facility] CATH LAB    UT REMOVAL SUBCUTANEOUS CARDIAC RHYTHM MONITOR N/A 10/4/2019    LOOP RECORDER REMOVAL performed by Mayela Chappell MD at [address]      left DECOMPRESSION, Dr. Pranay Olsen      bilateral vein stripping         Current Outpatient Medications:     tamsulosin (FLOMAX) 0.4 mg capsule, Take 0.4 mg by mouth daily. , Disp: , Rfl:     apixaban (Eliquis) 5 mg tablet, Take 1 Tablet by mouth two (2) times a day., Disp: 180 Tablet, Rfl: 2    furosemide (LASIX) 20 mg tablet, TAKE 1 TABLET AS NEEDED FOR SWELLING, Disp: 90 Tablet, Rfl: 0    celecoxib (CELEBREX) 200 mg capsule, Take 200 mg by mouth daily. , Disp: , Rfl:     metoprolol succinate (TOPROL-XL) 50 mg XL tablet, TAKE 1 TABLET DAILY (Patient taking differently: 25 mg daily.), Disp: 90 Tab, Rfl: 3    amLODIPine (NORVASC) 10 mg tablet, TAKE 1 TABLET BY MOUTH DAILY, Disp: 90 Tab, Rfl: 0    traZODone (DESYREL) 150 mg tablet, Take 150 mg by mouth nightly., Disp: , Rfl:     terazosin (HYTRIN) 5 mg capsule, TAKE 1 CAPSULE DAILY, Disp: 90 Cap, Rfl: 3    levocetirizine (XYZAL) 5 mg tablet, TAKE 1 TABLET DAILY, Disp: 90 Tab, Rfl: 3    atorvastatin (LIPITOR) 20 mg tablet, TAKE 1 TABLET DAILY, Disp: 90 Tab, Rfl: 4    Allergies   Allergen Reactions    Percocet [Oxycodone-Acetaminophen] Other (comments)     hallucinations    Penicillin G Rash     Did okay with keflex - no reaction with that.         Family History   Problem Relation Age of Onset    Cancer Mother         liver    Cancer Father         leukemia    Cancer Sister         lung cancer       Social History     Tobacco Use    Smoking status: Former     Packs/day: 0.25     Years: 19.00     Pack years: 4.75     Types: Cigarettes     Quit date: 1980     Years since quittin.1    Smokeless tobacco: Never    Tobacco comments:     quit ~   Substance Use Topics    Alcohol use: Yes     Comment: socially    Drug use: No       Review of Symptoms:  Pertinent Positive: Negative  Pertinent Negative:No chest pain, dyspnea on exertion, shortness of breath, orthopnea, PND    All Other systems reviewed and are negative for a Comprehensive ROS (10+)    Physical Exam    Blood pressure 104/80, pulse 79, resp. rate 20, height 6' 5\" (1.956 m), weight 261 lb (118.4 kg), SpO2 100 %. Constitutional:  well-developed and well-nourished. No distress. HENT: Normocephalic. Eyes: No scleral icterus. Neck:  Neck supple. No JVD present. Pulmonary/Chest: Effort normal and breath sounds normal. No respiratory distress, wheezes or rales. Cardiovascular: Normal rate, regular rhythm, S1 S2 . Exam reveals no gallop and no friction rub. No murmur heard. No edema. Extremities:  Normal muscle tone  Abdominal:   No abnormal distension. Neurological:  Moving all extremities, cranial nerves appear grossly intact. Skin: Skin is not cold. Not diaphoretic. No erythema. Psychiatric:  Grossly normal mood and affect. Intact insight. Objective Data: Investigations personally reviewed and interpreted    ECG: 3/1/2022-underlying aVF with RV pacing          Investigations reviewed     8/18/16: ECHO-EF 60%,mild LAE and ROMI, mild concentric hypertrophy  9/11/15: LEXISCAN- normal MPI, no inducible ischemia, LVEF 58%  4/15/14: ECHO- EF 60%, mild concentric hypertrophy, G1DD, markedly dilated LA, mild pulmonic regurgitation  2/28/13: ECHO- EF 60%, mild concentric hypertrophy, G1DD, moderately dilated LA, mild TR, mild pHTN  5/3/17- Medtronic Loop Recorder Injection per Dr. Joseline Plummer  10/4/19 loop removal per Dr. Joseline Plummer  10/4/19 Medtronic dual chamber PPM per Dr. Joseline Plummer    10/25/20    ECHO FACUNDO W OR WO CONTRAST 10/31/2020 10/31/2020    Interpretation Summary  · Saline contrast was given to evaluate for intracardiac shunt. · LV: Estimated LVEF is 50 - 55%. Normal cavity size, wall thickness and systolic function (ejection fraction normal). · IAS: Agitated saline contrast study was performed. There was no shunting at baseline or with Valsalva. · MV: Mild mitral valve regurgitation is present. · TV: Mild tricuspid valve regurgitation is present. · No echocardiographic evidence of vegetations. Signed by: Eusebio Lopes MD on 10/31/2020 10:22 PM      03/11/22    ECHO ADULT COMPLETE 03/11/2022 3/11/2022    Interpretation Summary    Left Ventricle: Left ventricle size is normal. Increased wall thickness. Findings consistent with concentric hypertrophy. See diagram for wall motion findings. Normal left ventricular systolic function with a visually estimated EF of 60 - 65%. Tricuspid Valve: Mild transvalvular regurgitation. RVSP is 35 mmHg. Right Atrium: Right atrium is dilated. Pacer wire present. Left Atrium: Left atrium is severely dilated. LA Vol Index A/L is 60 mL/m2. Signed by: Reji Mccullough DO on 3/11/2022  1:15 PM                ATTENTION:   This medical record was transcribed using an electronic medical records/speech recognition system. Although proofread, it may and can contain electronic, spelling and other errors. Corrections may be executed at a later time. Please feel free to contact us for any clarifications as needed.

## 2023-03-03 NOTE — PROGRESS NOTES
Lalito  is a [de-identified] y.o. male    Chief Complaint   Patient presents with    Annual Exam    Hypertension     PAF       Chest pain : No  SOB : No  Dizziness : no  Edema : yes, left  Refills : no    Visit Vitals  Ht 6' 5\" (1.956 m)   Wt 261 lb (118.4 kg)   BMI 30.95 kg/m²

## 2023-03-07 ENCOUNTER — TELEPHONE (OUTPATIENT)
Dept: CARDIOLOGY CLINIC | Age: 78
End: 2023-03-07

## 2023-03-07 DIAGNOSIS — I48.0 PAROXYSMAL ATRIAL FIBRILLATION (HCC): ICD-10-CM

## 2023-03-07 DIAGNOSIS — I49.5 TACHY-BRADY SYNDROME (HCC): ICD-10-CM

## 2023-03-07 DIAGNOSIS — I48.11 LONGSTANDING PERSISTENT ATRIAL FIBRILLATION (HCC): Primary | ICD-10-CM

## 2023-03-07 DIAGNOSIS — I48.0 PAF (PAROXYSMAL ATRIAL FIBRILLATION) (HCC): ICD-10-CM

## 2023-03-07 DIAGNOSIS — G47.33 OSA (OBSTRUCTIVE SLEEP APNEA): ICD-10-CM

## 2023-03-08 NOTE — TELEPHONE ENCOUNTER
Orders Placed This Encounter    CTA CHEST W OR W WO CONT     Please provide patient with imaging on a disk to take to procedure,. Standing Status:   Future     Standing Expiration Date:   9/8/2023     Scheduling Instructions:      Left atrial appendage morphology prior to watchman device     Order Specific Question:   STAT Creatinine as indicated     Answer:   Yes    CBC W/O DIFF     Standing Status:   Future     Standing Expiration Date:   7/3/5589    METABOLIC PANEL, BASIC     Standing Status:   Future     Standing Expiration Date:   3/8/2024       Orders placed for pre-procedure labs and imaging.

## 2023-03-08 NOTE — TELEPHONE ENCOUNTER
Spoke with Pt of Watchman/FACUNDO with Dr. Ange Dukes at [address]. For 4/4/23  at 9:00am arrive at 7:00am Pt aware that they need a  NPO from [facility] [address] the night before. Check in at the second floor Outpt. Reg. Desk. Pt is to have Labs done on 3/6/23 no later then 3/27/23.    Medications:  Hold Eliquis day of procedure   VO by /nurse Mira Mcallister.

## 2023-03-22 LAB
BUN SERPL-MCNC: 23 MG/DL (ref 8–27)
BUN/CREAT SERPL: 18 (ref 10–24)
CALCIUM SERPL-MCNC: 9 MG/DL (ref 8.6–10.2)
CHLORIDE SERPL-SCNC: 103 MMOL/L (ref 96–106)
CO2 SERPL-SCNC: 22 MMOL/L (ref 20–29)
CREAT SERPL-MCNC: 1.27 MG/DL (ref 0.76–1.27)
EGFRCR SERPLBLD CKD-EPI 2021: 57 ML/MIN/1.73
ERYTHROCYTE [DISTWIDTH] IN BLOOD BY AUTOMATED COUNT: 13.5 % (ref 11.6–15.4)
GLUCOSE SERPL-MCNC: 114 MG/DL (ref 70–99)
HCT VFR BLD AUTO: 39.1 % (ref 37.5–51)
HGB BLD-MCNC: 13.2 G/DL (ref 13–17.7)
MCH RBC QN AUTO: 30.5 PG (ref 26.6–33)
MCHC RBC AUTO-ENTMCNC: 33.8 G/DL (ref 31.5–35.7)
MCV RBC AUTO: 90 FL (ref 79–97)
PLATELET # BLD AUTO: 170 X10E3/UL (ref 150–450)
POTASSIUM SERPL-SCNC: 4.4 MMOL/L (ref 3.5–5.2)
RBC # BLD AUTO: 4.33 X10E6/UL (ref 4.14–5.8)
REPORT: NORMAL
SODIUM SERPL-SCNC: 139 MMOL/L (ref 134–144)
WBC # BLD AUTO: 5.1 X10E3/UL (ref 3.4–10.8)

## 2023-03-27 ENCOUNTER — HOSPITAL ENCOUNTER (OUTPATIENT)
Dept: CT IMAGING | Age: 78
Discharge: HOME | End: 2023-03-27
Attending: INTERNAL MEDICINE
Payer: MEDICARE

## 2023-03-27 ENCOUNTER — PREP FOR PROCEDURE (OUTPATIENT)
Dept: CARDIOLOGY CLINIC | Age: 78
End: 2023-03-27

## 2023-03-27 DIAGNOSIS — I48.11 LONGSTANDING PERSISTENT ATRIAL FIBRILLATION (HCC): ICD-10-CM

## 2023-03-27 DIAGNOSIS — I48.0 PAF (PAROXYSMAL ATRIAL FIBRILLATION) (HCC): ICD-10-CM

## 2023-03-27 DIAGNOSIS — I48.0 PAROXYSMAL ATRIAL FIBRILLATION (HCC): ICD-10-CM

## 2023-03-27 PROCEDURE — 71275 CT ANGIOGRAPHY CHEST: CPT

## 2023-03-27 PROCEDURE — 74011000636 HC RX REV CODE- 636: Performed by: INTERNAL MEDICINE

## 2023-03-27 RX ORDER — SODIUM CHLORIDE 0.9 % (FLUSH) 0.9 %
5-40 SYRINGE (ML) INJECTION EVERY 8 HOURS
OUTPATIENT
Start: 2023-03-27

## 2023-03-27 RX ORDER — SODIUM CHLORIDE 0.9 % (FLUSH) 0.9 %
5-40 SYRINGE (ML) INJECTION AS NEEDED
OUTPATIENT
Start: 2023-03-27

## 2023-03-27 RX ADMIN — IOPAMIDOL 100 ML: 755 INJECTION, SOLUTION INTRAVENOUS at 12:16

## (undated) DEVICE — 3M™ IOBAN™ 2 ANTIMICROBIAL INCISE DRAPE 6640EZ: Brand: IOBAN™ 2

## (undated) DEVICE — SUTURE ETHBND EXCEL SZ 2 L30IN NONABSORBABLE GRN L40MM V-37 MX69G

## (undated) DEVICE — GLOVE SURG SZ 6 THK91MIL LTX FREE SYN POLYISOPRENE ANTI

## (undated) DEVICE — GLOVE ORANGE PI 7 1/2   MSG9075

## (undated) DEVICE — ZIP 8I SURGICAL SKIN CLOSURE DEVICE: Brand: ZIP 8I SURGICAL SKIN CLOSURE DEVICE

## (undated) DEVICE — SOL IRRIGATION INJ NACL 0.9% 500ML BTL

## (undated) DEVICE — SUTURE MCRYL 3-0 L27IN ABSRB VLT SH L26MM 1/2 CIR Y316H

## (undated) DEVICE — GLOVE ORANGE PI 7   MSG9070

## (undated) DEVICE — REM POLYHESIVE ADULT PATIENT RETURN ELECTRODE: Brand: VALLEYLAB

## (undated) DEVICE — PACEMAKER PACK: Brand: MEDLINE INDUSTRIES, INC.

## (undated) DEVICE — HAND-SFMCASU: Brand: MEDLINE INDUSTRIES, INC.

## (undated) DEVICE — SUTURE VCRL SZ 2-0 L36IN ABSRB UD L40MM CT 1/2 CIR J957H

## (undated) DEVICE — MEDI-TRACE CADENCE ADULT, DEFIBRILLATION ELECTRODE -RTS  (10 PR/PK) - PHYSIO-CONTROL: Brand: MEDI-TRACE CADENCE

## (undated) DEVICE — BANDAGE,GAUZE,BULKEE II,4.5"X4.1YD,STRL: Brand: MEDLINE

## (undated) DEVICE — SLING ORTHOPEDIC PCH UNIV 19.5X9 IN 2-39 IN ARM W/ FOAM STRP

## (undated) DEVICE — BULB SYRINGE, IRRIGATION WITH PROTECTIVE CAP, 60 CC, INDIVIDUALLY WRAPPED: Brand: DOVER

## (undated) DEVICE — GOWN,ECLIPSE,POLYRNF,W/TWL,L,30/CS: Brand: MEDLINE

## (undated) DEVICE — Device

## (undated) DEVICE — BLADE OPHTH 180DEG CUT SURF BLU STR SHRP DBL BVL GRINDLESS

## (undated) DEVICE — DRSG AQUACEL SURG 3.5X6IN -- CONVERT TO ITEM 369227

## (undated) DEVICE — ZIMMER® STERILE DISPOSABLE TOURNIQUET CUFF WITH PROTECTIVE SLEEVE AND PLC, DUAL PORT, SINGLE BLADDER, 18 IN. (46 CM)

## (undated) DEVICE — BANDAGE COMPR SELF ADH 5 YDX3 IN TAN NS PREMIERPRO LF

## (undated) DEVICE — GAUZE,SPONGE,FLUFF,6"X6.75",STRL,5/TRAY: Brand: MEDLINE

## (undated) DEVICE — DRSG GZ OIL EMUL CURAD 3X3 --

## (undated) DEVICE — LIMB HOLDER, WRIST/ANKLE: Brand: DEROYAL

## (undated) DEVICE — SUT PROL 5-0 18IN P3 BLU --

## (undated) DEVICE — INTRO SHTH 7FR 13X20CM -- TEARAWAY

## (undated) DEVICE — SKIN PREP TRAY W/CHG: Brand: MEDLINE INDUSTRIES, INC.